# Patient Record
Sex: FEMALE | Race: WHITE | NOT HISPANIC OR LATINO
[De-identification: names, ages, dates, MRNs, and addresses within clinical notes are randomized per-mention and may not be internally consistent; named-entity substitution may affect disease eponyms.]

---

## 2016-12-29 NOTE — ED PROVIDER NOTE - OBJECTIVE STATEMENT
Pt presents to the ED by her podiatrist for a foot infection. She has diabetes, pvd. She has a necrotic 1st right toe that came off today when he was removing her dressing. 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

## 2016-12-29 NOTE — H&P ADULT. - PROBLEM SELECTOR PLAN 1
2/2 right toe gangrene/cellulititis , and also left site of toe amputationgangrene/cellulitus.. consults podiatry Dr Reyes, Vascular Dr Hill,   abx vanco/zosyn, IVF 2LNS (ED), on 150cc/hr, (bp borderline low), repeated lactate wnl, f/u ulcer culture, bc.. goal clear lact,map>65, UO>0.5..

## 2016-12-29 NOTE — ED ADULT NURSE NOTE - PMH
Arthritis    Asthma    Back ache    [place] esophagus    Chronic pain    Diabetes    DM (diabetes mellitus)    Fibromyalgia    Foot ulcer  Left Foot  Osteopenia    Shoulder joint stiffness, left    Stomach ulcer

## 2016-12-29 NOTE — H&P ADULT. - HISTORY OF PRESENT ILLNESS
50 y/o F w/hx DM, depression, COPD, current smoker, Presents to the ED, sent by her podiatrist (Dr Reyes), due to right toe gangrene. She states  this has started weeks ago, usually goes to the podiatry every/week, but now the infection has worsen,. surrounded w/erythema and foul smelling discharge, yellowish, warm to palpation. Also noticed that in site of left toe amputation, started also w/erythema and gangrene, associated w/ abundant foul smelling dc as well/ As;p c/o of fever and chills. A vascular doct has been recommended in the past, but she states she hasn't set up an appt yet.Denies vomiting, n, cough, abd pain,dysuria, sob, weakness, blurred vision, numbness, or any further complaints.

## 2016-12-29 NOTE — ED ADULT TRIAGE NOTE - CHIEF COMPLAINT QUOTE
Patient arrived to ED today with c/o bilateral foot pain.  Patient has abscess to her right big toe.  Patient was told to come to ED by Dr. Solomon for treatment and iv antibiotics.  Patient denies injury.

## 2016-12-29 NOTE — H&P ADULT. - ASSESSMENT
Amarilis Swan [id number] Pmd Dr Serrano   52 y/o F w/hx DM, depression, COPD, current smoker, Presents to the ED, sent by her podiatrist (Dr Reyes), due to right toe gangrene. She states  this has started weeks ago, usually goes to the podiatry every/week, but now the infection has worsen,. surrounded w/erythema and foul smelling discharge, yellowish, warm to palpation. Also noticed that in site of left toe amputation, started also w/erythema and gangrene, associated w/ abundant foul smelling dc as well/ As;p c/o of fever and chills. A vascular doct has been recommended in the past, but she states she hasn't set up an appt yet.Denies vomiting, n, cough, abd pain,dysuria, sob, weakness, blurred vision, numbness, or any further complaints. Amarilis Swan [id number] Pmd Dr Serrano Consult Podiatry Dr Reyes(left message on his phone), vascular consult Dr Hill Psych Consult  52 y/o F w/hx DM, depression, COPD, current smoker, Presents to the ED,w/ flat affect, non-stop crying.  sent by her podiatrist (Dr Reyes), due to right toe gangrene. She states  this has started weeks ago, usually goes to the podiatry every/week, but now the infection has worsen,. surrounded w/erythema and foul smelling discharge, yellowish, warm to palpation. Also noticed that in site of left toe amputation, started also w/erythema and gangrene, associated w/ abundant foul smelling dc as well/ As;p c/o of fever and chills. A vascular doct has been recommended in the past, but she states she hasn't set up an appt yet.Denies vomiting, n, cough, abd pain,dysuria, sob, weakness, blurred vision, numbness, or any further complaints. alct elevated, hr 111, bp 92/64 admitted sepsis/toe gangrene/cellulitis.started on  vanco/zosyn, IVF, repeated lactate wnl, f/u cultures

## 2016-12-29 NOTE — ED ADULT NURSE NOTE - OBJECTIVE STATEMENT
pt states that she went to her foot doctor today for her foot infection and was told to come to ED for admission and antibiotic therapy. pt  left foot first two toes amputated area there swollen and red. left foot is also swollen to ankle. patient's right great toe is necrotic with redness and swelling to the toe. pt .

## 2016-12-29 NOTE — ED ADULT NURSE REASSESSMENT NOTE - NS ED NURSE REASSESS COMMENT FT1
pt with complaints of pain. medication given. lactate draw. pt explained plan of care. will continue to monitor.

## 2016-12-30 NOTE — PROGRESS NOTE ADULT - SUBJECTIVE AND OBJECTIVE BOX
CC: toe gangrene (29 Dec 2016 21:26)    HPI:  50 y/o F w/hx DM, depression, COPD, current smoker, Presents to the ED, sent by her podiatrist (Dr Reyes), due to right toe gangrene. She states  this has started weeks ago, usually goes to the podiatry every/week, but now the infection has worsen,. surrounded w/erythema and foul smelling discharge, yellowish, warm to palpation. Also noticed that in site of left toe amputation, started also w/erythema and gangrene, associated w/ abundant foul smelling dc as well/ As;p c/o of fever and chills. A vascular doct has been recommended in the past, but she states she hasn't set up an appt yet.Denies vomiting, n, cough, abd pain,dysuria, sob, weakness, blurred vision, numbness, or any further complaints. (29 Dec 2016 21:26)    INTERVAL HPI/OVERNIGHT EVENTS: Constipation, numbness b/l feet    Vital Signs Last 24 Hrs  T(C): 36.9, Max: 37.4 (- @ 23:39)  T(F): 98.5, Max: 99.3 (12- @ 23:39)  HR: 71 (71 - 90)  BP: 108/70 (92/64 - 108/72)  BP(mean): --  RR: 18 (18 - 20)  SpO2: 95% (95% - 97%)  I&O's Detail                            10.7   3.48  )-----------( 114      ( 30 Dec 2016 08:13 )             32.0     30 Dec 2016 08:13    133    |  95     |  9.0    ----------------------------<  204    3.4     |  27.0   |  0.54     Ca    7.9        30 Dec 2016 08:13    TPro  6.0    /  Alb  2.4    /  TBili  0.6    /  DBili  x      /  AST  16     /  ALT  8      /  AlkPhos  81     30 Dec 2016 08:13    PT/INR - ( 29 Dec 2016 17:58 )   PT: 14.9 sec;   INR: 1.35 ratio         PTT - ( 29 Dec 2016 17:58 )  PTT:29.9 sec  CAPILLARY BLOOD GLUCOSE  232 (29 Dec 2016 23:39)  319 (29 Dec 2016 18:16)    LIVER FUNCTIONS - ( 30 Dec 2016 08:13 )  Alb: 2.4 g/dL / Pro: 6.0 g/dL / ALK PHOS: 81 U/L / ALT: 8 U/L / AST: 16 U/L / GGT: x           Urinalysis Basic - ( 30 Dec 2016 13:27 )    Color: Yellow / Appearance: Slightly Turbid / S.010 / pH: x  Gluc: x / Ketone: Negative  / Bili: Negative / Urobili: Negative mg/dL   Blood: x / Protein: 15 mg/dL / Nitrite: Negative   Leuk Esterase: Small / RBC: 20-30 /HPF / WBC 26-50   Sq Epi: x / Non Sq Epi: Few / Bacteria: Few      Hemoglobin A1C, Whole Blood: 8.0 % ( @ 08:13)    MEDICATIONS  (STANDING):  DULoxetine 60milliGRAM(s) Oral daily  buDESOnide  80 MICROgram(s)/formoterol 4.5 MICROgram(s) Inhaler 2Puff(s) Inhalation two times a day  montelukast 10milliGRAM(s) Oral at bedtime  heparin  Injectable 5000Unit(s) SubCutaneous every 12 hours  insulin glargine Injectable (LANTUS) 30Unit(s) SubCutaneous at bedtime  insulin lispro (HumaLOG) corrective regimen sliding scale  SubCutaneous Before meals and at bedtime  dextrose 5%. 1000milliLiter(s) IV Continuous <Continuous>  dextrose 50% Injectable 12.5Gram(s) IV Push once  dextrose 50% Injectable 25Gram(s) IV Push once  dextrose 50% Injectable 25Gram(s) IV Push once  sodium chloride 0.9%. 1000milliLiter(s) IV Continuous <Continuous>  piperacillin/tazobactam IVPB. 3.375Gram(s) IV Intermittent every 8 hours  vancomycin  IVPB 1000milliGRAM(s) IV Intermittent every 8 hours  diazepam    Tablet 5milliGRAM(s) Oral every 8 hours  polyethylene glycol 3350 17Gram(s) Oral daily  Dakins Solution - 1/2 Strength 1Application(s) Topical daily  buPROPion XL . 300milliGRAM(s) Oral daily  traZODone 100milliGRAM(s) Oral at bedtime    MEDICATIONS  (PRN):  gabapentin 800milliGRAM(s) Oral three times a day PRN neuropathic pain  dextrose Gel 1Dose(s) Oral once PRN Blood Glucose LESS THAN 70 milliGRAM(s)/deciliter  glucagon  Injectable 1milliGRAM(s) IntraMuscular once PRN Glucose LESS THAN 70 milligrams/deciliter  ALBUTerol/ipratropium for Nebulization 3milliLiter(s) Nebulizer every 8 hours PRN sob  HYDROmorphone   Tablet 1milliGRAM(s) Oral every 6 hours PRN Severe Pain (7 - 10)      RADIOLOGY & ADDITIONAL TESTS: CC: toe gangrene (29 Dec 2016 21:26)    HPI:  50 y/o F w/hx DM, depression, COPD, current smoker, Presents to the ED, sent by her podiatrist (Dr Reyes), due to right toe gangrene. She states  this has started weeks ago, usually goes to the podiatry every/week, but now the infection has worsen,. surrounded w/erythema and foul smelling discharge, yellowish, warm to palpation. Also noticed that in site of left toe amputation, started also w/erythema and gangrene, associated w/ abundant foul smelling dc as well/ As;p c/o of fever and chills. A vascular doct has been recommended in the past, but she states she hasn't set up an appt yet.Denies vomiting, n, cough, abd pain,dysuria, sob, weakness, blurred vision, numbness, or any further complaints. (29 Dec 2016 21:26)    INTERVAL HPI/OVERNIGHT EVENTS: Constipation, numbness b/l feet    Vital Signs Last 24 Hrs  T(C): 36.9, Max: 37.4 (- @ 23:39)  T(F): 98.5, Max: 99.3 (12- @ 23:39)  HR: 71 (71 - 90)  BP: 108/70 (92/64 - 108/72)  BP(mean): --  RR: 18 (18 - 20)  SpO2: 95% (95% - 97%)  I&O's Detail                        10.7   3.48  )-----------( 114      ( 30 Dec 2016 08:13 )             32.0     30 Dec 2016 08:13    133    |  95     |  9.0    ----------------------------<  204    3.4     |  27.0   |  0.54     Ca    7.9        30 Dec 2016 08:13    TPro  6.0    /  Alb  2.4    /  TBili  0.6    /  DBili  x      /  AST  16     /  ALT  8      /  AlkPhos  81     30 Dec 2016 08:13    PT/INR - ( 29 Dec 2016 17:58 )   PT: 14.9 sec;   INR: 1.35 ratio         PTT - ( 29 Dec 2016 17:58 )  PTT:29.9 sec  CAPILLARY BLOOD GLUCOSE  232 (29 Dec 2016 23:39)  319 (29 Dec 2016 18:16)    LIVER FUNCTIONS - ( 30 Dec 2016 08:13 )  Alb: 2.4 g/dL / Pro: 6.0 g/dL / ALK PHOS: 81 U/L / ALT: 8 U/L / AST: 16 U/L / GGT: x           Urinalysis Basic - ( 30 Dec 2016 13:27 )    Color: Yellow / Appearance: Slightly Turbid / S.010 / pH: x  Gluc: x / Ketone: Negative  / Bili: Negative / Urobili: Negative mg/dL   Blood: x / Protein: 15 mg/dL / Nitrite: Negative   Leuk Esterase: Small / RBC: 20-30 /HPF / WBC 26-50   Sq Epi: x / Non Sq Epi: Few / Bacteria: Few      Hemoglobin A1C, Whole Blood: 8.0 % ( @ 08:13)    MEDICATIONS  (STANDING):  DULoxetine 60milliGRAM(s) Oral daily  buDESOnide  80 MICROgram(s)/formoterol 4.5 MICROgram(s) Inhaler 2Puff(s) Inhalation two times a day  montelukast 10milliGRAM(s) Oral at bedtime  heparin  Injectable 5000Unit(s) SubCutaneous every 12 hours  insulin glargine Injectable (LANTUS) 30Unit(s) SubCutaneous at bedtime  insulin lispro (HumaLOG) corrective regimen sliding scale  SubCutaneous Before meals and at bedtime  dextrose 5%. 1000milliLiter(s) IV Continuous <Continuous>  dextrose 50% Injectable 12.5Gram(s) IV Push once  dextrose 50% Injectable 25Gram(s) IV Push once  dextrose 50% Injectable 25Gram(s) IV Push once  sodium chloride 0.9%. 1000milliLiter(s) IV Continuous <Continuous>  piperacillin/tazobactam IVPB. 3.375Gram(s) IV Intermittent every 8 hours  vancomycin  IVPB 1000milliGRAM(s) IV Intermittent every 8 hours  diazepam    Tablet 5milliGRAM(s) Oral every 8 hours  polyethylene glycol 3350 17Gram(s) Oral daily  Dakins Solution - 1/2 Strength 1Application(s) Topical daily  buPROPion XL . 300milliGRAM(s) Oral daily  traZODone 100milliGRAM(s) Oral at bedtime    MEDICATIONS  (PRN):  gabapentin 800milliGRAM(s) Oral three times a day PRN neuropathic pain  dextrose Gel 1Dose(s) Oral once PRN Blood Glucose LESS THAN 70 milliGRAM(s)/deciliter  glucagon  Injectable 1milliGRAM(s) IntraMuscular once PRN Glucose LESS THAN 70 milligrams/deciliter  ALBUTerol/ipratropium for Nebulization 3milliLiter(s) Nebulizer every 8 hours PRN sob  HYDROmorphone   Tablet 1milliGRAM(s) Oral every 6 hours PRN Severe Pain (7 - 10)      RADIOLOGY & ADDITIONAL TESTS:

## 2016-12-30 NOTE — PROGRESS NOTE ADULT - ASSESSMENT
51y  Female with DM, depression, COPD, current smoker, sent by podiatrist Dr Reyes for right toe gangrene worsening drainage. At prior left toe amputation site also with erythema and gangrene,  foul smelling drainage, patient with low grade temperatures of 99.3 in hospital, but reported fevers at home.   Blood cultures in hospital positive for GPC bacteremia. 51y  Female with DM, depression, COPD, current smoker, sent by podiatrist Dr Reyes for right toe gangrene worsening drainage. At prior left toe amputation site also with erythema and gangrene,  foul smelling drainage, patient with low grade temperatures of 99.3 in hospital, but reported fevers at home.   Blood cultures in hospital positive for GPC bacteremia in 2 bottles

## 2016-12-30 NOTE — PROGRESS NOTE ADULT - PROBLEM SELECTOR PLAN 1
ID Consult  IV Vanc/Zosyn  BC x 2  Echo ID Consult appreciated  IV Vanc/Zosyn  BC x 2 - follow up sensitivities  Echo to further evaluate valves and for preoperative assessment

## 2016-12-30 NOTE — CONSULT NOTE ADULT - ASSESSMENT
Failure to respond to oral outpatient management of bilateral DM foot infection involving both great toes. No evidence of significant arterial insufficieny - no indication for arterial Duplex, PERNELL or PVR's.

## 2016-12-30 NOTE — PROGRESS NOTE ADULT - SUBJECTIVE AND OBJECTIVE BOX
Patient is in MRI. Due to her poor historian and bacteremia, echo was ordered to rule out endocarditis Will take patient for amputation and debridement Monday afternoon.     Podiatry to follow.

## 2016-12-30 NOTE — CONSULT NOTE ADULT - SUBJECTIVE AND OBJECTIVE BOX
51 year old female patient seen bedside for right foot gangrene. Patient complains of N/V no sob, abdominal pain, had no bowel movement since the 14th. Patient had nothing to eat today. Patient has a podiatrist  who sent her to the ED for Right foot gangrene. Patient states she changes her dressing at home. She is a poor historian and says she is very weak. Patient denies fever and chills    vitals: 98.5 71 108/70 18 95   Labs: 3.48 WBC 10.7 /32.0 H/H 114 plt   BUN 9.0 Creatinine .54   HBA1c 8.0       PMH: Depression, Diabetic Mellitis uncontrolled, Fibromyalgia , Smoker  Social: Smoker  PSH: Left partial 1st ray amputation     Exam:  B/L foot dp/pt pulses, cft wnl to all digits except hallux right foot gangrene and left foot amputated hallux.  TG: Bilateral foot are warm  Derm: Right foot hallux necrotic and gangrenous probing to bone ulceration malodor and erythema which ends at the first mpj  no other wounds.  Left foot - ulceration at the previous amputated hallux site- does not probe to bone, draining purulence. malodor, erythema to the anterior midfoot which decreased since yesterday  Neuro: protective sensation diminished

## 2016-12-30 NOTE — CONSULT NOTE ADULT - SUBJECTIVE AND OBJECTIVE BOX
The patient is a 51 year old diabetic who is an active smoker and is s/p left great toe amp. She was referred to [facility] ED with an infection of the left reat toe amp site and gangrene of the right great toe. She was taking an oral 1st generation cephalosporin as an outpatient and was apparently failing this therapy and so was referred for in patient care.    Other PMH: COPD, depression    	-Vertigo (780.4, R42)  Onset: 2-Sep-2014  Status: Active	  	-Toe infection (686.9, L08.9)  Onset: 29-Aug-2016  Status: Active	  	-Restrictive lung disease (518.89, J98.4)  Onset: 4-Mar-2014  Status: Active	  	-Pain syndrome, chronic (338.4, G89.4)  Onset: 4-Oct-2013  Status: Active	  	-Nicotine dependence (305.1, F17.200)  Onset: 5-Jan-2015  Status: Active	  	-Neuropathy, diabetic (250.60, 357.2, E11.40)  Onset: 2-Sep-2014  Status: Active	  	-Nausea (787.02, R11.0)  Onset: 4-Dec-2015  Status: Active	  	-Muscle spasm of left shoulder (728.85, M62.838)  Onset: 14-Aug-2014  Status: Active	  	-Medication management (V58.69, Z79.899)  Onset: 2-May-2016  Status: Active	  	-IDDM (insulin dependent diabetes mellitus) (250.00, V58.67, E11.9, Z79.4)  Onset: 4-Oct-2013  Status: Active	  	-IBS (irritable bowel syndrome) (564.1, K58.9)  Onset: 5-May-2015  Status: Active	  	-Hypovitaminosis D (268.9, E55.9)  Onset: 5-May-2015  Status: Active	  	-Head ache (784.0, R51)  Onset: 14-Jan-2014  Status: Active	  	-GERD (gastroesophageal reflux disease) (530.81, K21.9)  Onset: 4-Oct-2013  Status: Active	  	-Gastroparesis (536.3, K31.84)  Onset: 7-Aug-2015  Status: Active	  	-Flu vaccine need (V04.81, Z23)  Onset: 30-Nov-2016  Status: Active	  	-Encounter for routine gynecological examination (V72.31, Z01.419)  Onset: 15-Sep-2014  Status: Active	  	-Ear pain (388.70, H92.09)  Onset: 4-Oct-2013  Status: Active	  	-Diarrhea, unspecified type (787.91, R19.7)  Onset: 29-Aug-2016  Status: Active	  	-Depression (311, F32.9)  Onset: 4-Oct-2013  Status: Active	  	-Chest pain (786.50, R07.9)  Onset: 2-Sep-2014  Status: Active	  	-Bursitis of shoulder (726.10, M75.50)  Onset: 14-Aug-2014  Status: Active	  	-Atypical chest pain (786.59, R07.89)  Onset: 28-Oct-2016  Status: Active	  	-Asthma with chronic obstructive pulmonary disease (COPD) (493.20, J44.9, J45.909)  Onset: 4-Mar-2014  Status: Active	    MEDICATIONS  (STANDING):  DULoxetine 60milliGRAM(s) Oral daily  buDESOnide  80 MICROgram(s)/formoterol 4.5 MICROgram(s) Inhaler 2Puff(s) Inhalation two times a day  montelukast 10milliGRAM(s) Oral at bedtime  heparin  Injectable 5000Unit(s) SubCutaneous every 12 hours  insulin glargine Injectable (LANTUS) 30Unit(s) SubCutaneous at bedtime  insulin lispro (HumaLOG) corrective regimen sliding scale  SubCutaneous Before meals and at bedtime  dextrose 5%. 1000milliLiter(s) IV Continuous <Continuous>  dextrose 50% Injectable 12.5Gram(s) IV Push once  dextrose 50% Injectable 25Gram(s) IV Push once  dextrose 50% Injectable 25Gram(s) IV Push once  sodium chloride 0.9%. 1000milliLiter(s) IV Continuous <Continuous>  piperacillin/tazobactam IVPB. 3.375Gram(s) IV Intermittent every 8 hours  vancomycin  IVPB 1000milliGRAM(s) IV Intermittent every 8 hours  diazepam    Tablet 5milliGRAM(s) Oral every 8 hours  polyethylene glycol 3350 17Gram(s) Oral daily  potassium chloride    Tablet ER 20milliEquivalent(s) Oral once  Dakins Solution - 1/2 Strength 1Application(s) Topical daily  buPROPion XL . 300milliGRAM(s) Oral daily  traZODone 100milliGRAM(s) Oral at bedtime      ROS: C/O nausea, bilateral foot pain, denies CP/SOB, any lateralizing motor weakness      Vital Signs Last 24 Hrs  T(C): 36.9, Max: 37.4 (12-29 @ 23:39)  T(F): 98.5, Max: 99.3 (12-29 @ 23:39)  HR: 71 (71 - 111)  BP: 108/70 (90/60 - 108/72)  BP(mean): --  RR: 18 (18 - 24)  SpO2: 95% (95% - 97%)      O/E: Easily palpable bilateral popliteal and DP pulses.  Dry gangrene right great toe with surrounding erythematous reaction/cellulitis  Cellulitis left gret toe at amp site        Comprehensive Metabolic Panel in AM (12.30.16 @ 08:13)    Sodium, Serum: 133 mmol/L    Potassium, Serum: 3.4 mmol/L    Chloride, Serum: 95 mmol/L    Carbon Dioxide, Serum: 27.0 mmol/L    Anion Gap, Serum: 11 mmol/L    Blood Urea Nitrogen, Serum: 9.0 mg/dL    Creatinine, Serum: 0.54 mg/dL    Glucose, Serum: 204 mg/dL    Calcium, Total Serum: 7.9 mg/dL    Protein Total, Serum: 6.0 g/dL    Albumin, Serum: 2.4 g/dL    Bilirubin Total, Serum: 0.6 mg/dL    Alkaline Phosphatase, Serum: 81 U/L    Aspartate Aminotransferase (AST/SGOT): 16 U/L    Alanine Aminotransferase (ALT/SGPT): 8 U/L    Complete Blood Count + Automated Diff in AM (12.30.16 @ 08:13)    WBC Count: 3.48 K/uL    RBC Count: 3.77 M/uL    Hemoglobin: 10.7 g/dL    Hematocrit: 32.0 %    Mean Cell Volume: 84.9 fl    Mean Cell Hemoglobin: 28.4 pg    Mean Cell Hemoglobin Conc: 33.4 g/dL    Red Cell Distrib Width: 12.9 %    Platelet Count - Automated: 114 K/uL    Auto Neutrophil %: 80.0: Differential percentages must be correlated with absolute numbers for  clinical significance. %    Auto Lymphocyte %: 9.0 %    Auto Monocyte %: 11.0 %

## 2016-12-30 NOTE — CONSULT NOTE ADULT - PROBLEM SELECTOR RECOMMENDATION 2
Treat infection with antibiotics and surgically (per podiatry) as needed. No indication for additional arterial testing or vascular intervention.    Rec aggressive DVT prophylaxis with both SCD's and an anticoagulant.
Antibiotics improving cellulitis
- will need vascular surgery eval if not already called  - consider MRI of LEs to r/o OM   - continue Vancomycin 1 gram IV Q8h; trough prior to 4th dose - goal 15-20  - continue zosyn

## 2016-12-30 NOTE — CONSULT NOTE ADULT - ASSESSMENT
51 y.o. diabetic smoker, here for foot infection. found with bacteremia.  RIGHT 1st toe gangrene and likely osteomyelitis. LEFT 1st metatarsal area with ulcer and cellulitis

## 2016-12-30 NOTE — CONSULT NOTE ADULT - SUBJECTIVE AND OBJECTIVE BOX
[id number]  SONYA LENNON is a 51y  Female with DM, depression, COPD, current smoker, sent by podiatrist Dr Reyes for right toe gangrene worsening drainage. At prior left toe amputation site also with erythema and gangrene,  foul smelling drainage.   patient with low grade temperatures of 99.3 in hospital, but reported fevers at home.               Past Medical & Surgical Hx:  PAST MEDICAL & SURGICAL HISTORY:  Fibromyalgia  DM (diabetes mellitus)  Foot ulcer: Left Foot  Osteopenia  Chronic pain  Back ache  Arthritis  Shoulder joint stiffness, left  [place] esophagus  Stomach ulcer  Diabetes  Asthma  S/P knee surgery  S/P hysterectomy  S/P cholecystectomy      Problem List:  HEALTH ISSUES - PROBLEM Dx:  Skin ulcer of left foot with fat layer exposed: Skin ulcer of left foot with fat layer exposed  Cellulitis of left lower extremity: Cellulitis of left lower extremity  Cellulitis of toe of right foot: Cellulitis of toe of right foot  Depression: Depression  Smoker: Smoker  COPD (chronic obstructive pulmonary disease): COPD (chronic obstructive pulmonary disease)  Diabetes: Diabetes  Toe gangrene: Toe gangrene  Sepsis: Sepsis    Social Hx:  Social History:  · Marital Status	Domestic partner	  · Lives With	friend	    Substance Use History:  · Substance Use	never used	    Alcohol Use History:  · Have you ever consumed alcohol	never	    Tobacco Usage Definitions:  . .    Tobacco Usage:  · Tobacco Usage: Former smoker	  · Tobacco Type: cigarettes	  · Last Tobacco Use (dd-mmm-yy): 29-Dec-2016	  · Number of Packs per Day: 1	  · Cessation Medication Offered: refused	        FAMILY HISTORY:  No pertinent family history in first degree relatives    Allergies  No Known Allergies  Intolerances      MEDICATIONS  (STANDING):  DULoxetine 60milliGRAM(s) Oral daily  buDESOnide  80 MICROgram(s)/formoterol 4.5 MICROgram(s) Inhaler 2Puff(s) Inhalation two times a day  montelukast 10milliGRAM(s) Oral at bedtime  heparin  Injectable 5000Unit(s) SubCutaneous every 12 hours  insulin glargine Injectable (LANTUS) 30Unit(s) SubCutaneous at bedtime  insulin lispro (HumaLOG) corrective regimen sliding scale  SubCutaneous Before meals and at bedtime  dextrose 5%. 1000milliLiter(s) IV Continuous <Continuous>  dextrose 50% Injectable 12.5Gram(s) IV Push once  dextrose 50% Injectable 25Gram(s) IV Push once  dextrose 50% Injectable 25Gram(s) IV Push once  sodium chloride 0.9%. 1000milliLiter(s) IV Continuous <Continuous>  piperacillin/tazobactam IVPB. 3.375Gram(s) IV Intermittent every 8 hours  vancomycin  IVPB 1000milliGRAM(s) IV Intermittent every 8 hours  diazepam    Tablet 5milliGRAM(s) Oral every 8 hours  polyethylene glycol 3350 17Gram(s) Oral daily  potassium chloride    Tablet ER 20milliEquivalent(s) Oral once  Dakins Solution - 1/2 Strength 1Application(s) Topical daily  buPROPion XL . 300milliGRAM(s) Oral daily  traZODone 100milliGRAM(s) Oral at bedtime    MEDICATIONS  (PRN):  gabapentin 800milliGRAM(s) Oral three times a day PRN neuropathic pain  dextrose Gel 1Dose(s) Oral once PRN Blood Glucose LESS THAN 70 milliGRAM(s)/deciliter  glucagon  Injectable 1milliGRAM(s) IntraMuscular once PRN Glucose LESS THAN 70 milligrams/deciliter  ALBUTerol/ipratropium for Nebulization 3milliLiter(s) Nebulizer every 8 hours PRN sob  HYDROmorphone   Tablet 2milliGRAM(s) Oral every 6 hours PRN Severe Pain (7 - 10)     REVIEW OF SYSTEMS:  CONSTITUTIONAL:  as per HPI  HEENT:  Eyes:  No diplopia or blurred vision. ENT:  No earache, sore throat or runny nose.  CARDIOVASCULAR:  No pressure, squeezing, strangling, tightness, heaviness or aching about the chest, neck, axilla or epigastrium.  RESPIRATORY:  No cough, shortness of breath, PND or orthopnea.  GASTROINTESTINAL:  No nausea, vomiting or diarrhea.  GENITOURINARY:  No dysuria, frequency or urgency. No Blood in urine  MUSCULOSKELETAL:  no joint aches, no muscle pain  SKIN:  No change in skin, hair or nails.  NEUROLOGIC:  No paresthesias, fasciculations, seizures or weakness.  PSYCHIATRIC:  No disorder of thought or mood.  ENDOCRINE:  No heat or cold intolerance, polyuria or polydipsia.  HEMATOLOGICAL:  No easy bruising or bleeding.       Physical Exam:  Vital Signs Last 24 Hrs  T(C): 36.9, Max: 37.4 (12-29 @ 23:39)  T(F): 98.5, Max: 99.3 (12-29 @ 23:39)  HR: 71 (71 - 111)  BP: 108/70 (90/60 - 108/72)  RR: 18 (18 - 24)  SpO2: 95% (95% - 97%)  Height (cm): 172.7 (12-29 @ 23:15)  Weight (kg): 69.9 (12-29 @ 23:15)  BMI (kg/m2): 23.4 (12-29 @ 23:15)  BSA (m2): 1.83 (12-29 @ 23:15)  GEN: NAD, pleasant  HEENT: normocephalic and atraumatic. EOMI. [place].    NECK: Supple. No carotid bruits.  No lymphadenopathy or thyromegaly.  LUNGS: Clear to auscultation.  HEART: Regular rate and rhythm without murmur.  ABDOMEN: Soft, nontender, and nondistended.  Positive bowel sounds.    EXTREMITIES: Without any cyanosis, clubbing, rash, lesions or edema.  NEUROLOGIC: Cranial nerves II through XII are grossly intact.  PSYCHIATRIC: Appropriate affect .  SKIN: No ulceration or induration present.      Labs:   30 Dec 2016 08:13    133    |  95     |  9.0    ----------------------------<  204    3.4     |  27.0   |  0.54     Ca    7.9        30 Dec 2016 08:13    TPro  6.0    /  Alb  2.4    /  TBili  0.6    /  DBili  x      /  AST  16     /  ALT  8      /  AlkPhos  81     30 Dec 2016 08:13                          10.7   3.48  )-----------( 114      ( 30 Dec 2016 08:13 )             32.0       PT/INR - ( 29 Dec 2016 17:58 )   PT: 14.9 sec;   INR: 1.35 ratio         PTT - ( 29 Dec 2016 17:58 )  PTT:29.9 sec    LIVER FUNCTIONS - ( 30 Dec 2016 08:13 )  Alb: 2.4 g/dL / Pro: 6.0 g/dL / ALK PHOS: 81 U/L / ALT: 8 U/L / AST: 16 U/L / GGT: x           CAPILLARY BLOOD GLUCOSE  232 (29 Dec 2016 23:39)  319 (29 Dec 2016 18:16)    RECENT CULTURES:  12-29 .Blood Blood XXXX XXXX   Growth in aerobic bottle: Gram Positive Cocci in Clusters  Aerobic Bottle: 14:15 Hours to positivity  Anaerobic Bottle: No growth to date  .  TYPE: (C=Critical, N=Notification, A=Abnormal) C  TESTS:  _ Positive Blood GS  DATE/TIME CALLED: _ 12/30/20 12/29/16 Bilateral feet  IMPRESSION:  Bilateral feet soft tissue swelling. No evidence of   osteomyelitis. MRI is more sensitive. Chronic postoperative changes. [id number]  SOYNA LENNON is a 51y  Female with DM, depression, COPD, current smoker, sent by podiatrist Dr Reyes for right toe gangrene worsening drainage. At prior left toe amputation site also with erythema and gangrene,  foul smelling drainage.   patient with low grade temperatures of 99.3 in hospital, but reported fevers at home.     Blood cultures in hospital positive for GPC bacteremia. We are consulted for evaluation.         Past Medical & Surgical Hx:  PAST MEDICAL & SURGICAL HISTORY:  Fibromyalgia  DM (diabetes mellitus)  Foot ulcer: Left Foot  Osteopenia  Chronic pain  Back ache  Arthritis  Shoulder joint stiffness, left  [place] esophagus  Stomach ulcer  Diabetes  Asthma  S/P knee surgery  S/P hysterectomy  S/P cholecystectomy      Problem List:  HEALTH ISSUES - PROBLEM Dx:  Skin ulcer of left foot with fat layer exposed: Skin ulcer of left foot with fat layer exposed  Cellulitis of left lower extremity: Cellulitis of left lower extremity  Cellulitis of toe of right foot: Cellulitis of toe of right foot  Depression: Depression  Smoker: Smoker  COPD (chronic obstructive pulmonary disease): COPD (chronic obstructive pulmonary disease)  Diabetes: Diabetes  Toe gangrene: Toe gangrene  Sepsis: Sepsis    Social Hx:  Social History:  · Marital Status	Domestic partner	  · Lives With	friend	    Substance Use History:  · Substance Use	never used	    Alcohol Use History:  · Have you ever consumed alcohol	never	    Tobacco Usage Definitions:  . .    Tobacco Usage:  · Tobacco Usage: Former smoker	  · Tobacco Type: cigarettes	  · Last Tobacco Use (dd-mmm-yy): 29-Dec-2016	  · Number of Packs per Day: 1	  · Cessation Medication Offered: refused	         FAMILY HISTORY:  No pertinent family history in first degree relatives    Allergies  No Known Allergies  Intolerances      MEDICATIONS  (STANDING):  DULoxetine 60milliGRAM(s) Oral daily  buDESOnide  80 MICROgram(s)/formoterol 4.5 MICROgram(s) Inhaler 2Puff(s) Inhalation two times a day  montelukast 10milliGRAM(s) Oral at bedtime  heparin  Injectable 5000Unit(s) SubCutaneous every 12 hours  insulin glargine Injectable (LANTUS) 30Unit(s) SubCutaneous at bedtime  insulin lispro (HumaLOG) corrective regimen sliding scale  SubCutaneous Before meals and at bedtime  dextrose 5%. 1000milliLiter(s) IV Continuous <Continuous>  dextrose 50% Injectable 12.5Gram(s) IV Push once  dextrose 50% Injectable 25Gram(s) IV Push once  dextrose 50% Injectable 25Gram(s) IV Push once  sodium chloride 0.9%. 1000milliLiter(s) IV Continuous <Continuous>  piperacillin/tazobactam IVPB. 3.375Gram(s) IV Intermittent every 8 hours  vancomycin  IVPB 1000milliGRAM(s) IV Intermittent every 8 hours  diazepam    Tablet 5milliGRAM(s) Oral every 8 hours  polyethylene glycol 3350 17Gram(s) Oral daily  potassium chloride    Tablet ER 20milliEquivalent(s) Oral once  Dakins Solution - 1/2 Strength 1Application(s) Topical daily  buPROPion XL . 300milliGRAM(s) Oral daily  traZODone 100milliGRAM(s) Oral at bedtime    MEDICATIONS  (PRN):  gabapentin 800milliGRAM(s) Oral three times a day PRN neuropathic pain  dextrose Gel 1Dose(s) Oral once PRN Blood Glucose LESS THAN 70 milliGRAM(s)/deciliter  glucagon  Injectable 1milliGRAM(s) IntraMuscular once PRN Glucose LESS THAN 70 milligrams/deciliter  ALBUTerol/ipratropium for Nebulization 3milliLiter(s) Nebulizer every 8 hours PRN sob  HYDROmorphone   Tablet 2milliGRAM(s) Oral every 6 hours PRN Severe Pain (7 - 10)     REVIEW OF SYSTEMS:  CONSTITUTIONAL:  as per HPI  HEENT:  Eyes:  No diplopia or blurred vision. ENT:  No earache, sore throat or runny nose.  CARDIOVASCULAR:  No pressure, squeezing, strangling, tightness, heaviness or aching about the chest, neck, axilla or epigastrium.  RESPIRATORY:  No cough, shortness of breath, PND or orthopnea.  GASTROINTESTINAL:  No nausea, vomiting or diarrhea.  GENITOURINARY:  No dysuria, frequency or urgency. No Blood in urine  MUSCULOSKELETAL:  foot pain  SKIN:  as above  NEUROLOGIC:  No paresthesias, fasciculations, seizures or weakness.  PSYCHIATRIC:  No disorder of thought or mood.  ENDOCRINE:  No heat or cold intolerance, polyuria or polydipsia.  HEMATOLOGICAL:  No easy bruising or bleeding.       Physical Exam:  Vital Signs Last 24 Hrs  T(C): 36.9, Max: 37.4 (12-29 @ 23:39)  T(F): 98.5, Max: 99.3 (12-29 @ 23:39)  HR: 71 (71 - 111)  BP: 108/70 (90/60 - 108/72)  RR: 18 (18 - 24)  SpO2: 95% (95% - 97%)  Height (cm): 172.7 (12-29 @ 23:15)  Weight (kg): 69.9 (12-29 @ 23:15)  BMI (kg/m2): 23.4 (12-29 @ 23:15)  BSA (m2): 1.83 (12-29 @ 23:15)  GEN: NAD, pleasant  HEENT: normocephalic and atraumatic. EOMI. [place]., POOR DENTITION    NECK: Supple. No carotid bruits.     LUNGS: Clear to auscultation.  HEART: Regular rate and rhythm without murmur.  ABDOMEN: Soft, nontender, and nondistended.  Positive bowel sounds.    EXTREMITIES: RIGHT foot  1st toe dry gangrene distally, with wet gangrene proximally.           LEFT foot 1st and 2nd toes missing.  base of amputation site wtih 5mm ulcer and surrounding erythema. base is wet with serosang drainage.   NEUROLOGIC: Cranial nerves II through XII are grossly intact.  PSYCHIATRIC: Appropriate affect .  SKIN: No ulceration or induration present.      Labs:   30 Dec 2016 08:13    133    |  95     |  9.0    ----------------------------<  204    3.4     |  27.0   |  0.54     Ca    7.9        30 Dec 2016 08:13    TPro  6.0    /  Alb  2.4    /  TBili  0.6    /  DBili  x      /  AST  16     /  ALT  8      /  AlkPhos  81     30 Dec 2016 08:13                          10.7   3.48  )-----------( 114      ( 30 Dec 2016 08:13 )             32.0       PT/INR - ( 29 Dec 2016 17:58 )   PT: 14.9 sec;   INR: 1.35 ratio         PTT - ( 29 Dec 2016 17:58 )  PTT:29.9 sec    LIVER FUNCTIONS - ( 30 Dec 2016 08:13 )  Alb: 2.4 g/dL / Pro: 6.0 g/dL / ALK PHOS: 81 U/L / ALT: 8 U/L / AST: 16 U/L / GGT: x           CAPILLARY BLOOD GLUCOSE  232 (29 Dec 2016 23:39)  319 (29 Dec 2016 18:16)    RECENT CULTURES:  12-29 .Blood Blood XXXX XXXX   Growth in aerobic bottle: Gram Positive Cocci in Clusters  Aerobic Bottle: 14:15 Hours to positivity  Anaerobic Bottle: No growth to date  .  TYPE: (C=Critical, N=Notification, A=Abnormal) C  TESTS:  _ Positive Blood GS  DATE/TIME CALLED: _ 12/30/20 12/29/16 Bilateral feet  IMPRESSION:  Bilateral feet soft tissue swelling. No evidence of   osteomyelitis. MRI is more sensitive. Chronic postoperative changes.

## 2016-12-31 NOTE — PROGRESS NOTE ADULT - ASSESSMENT
51y  Female with DM, depression, COPD, current smoker, sent by podiatrist Dr Reyes for right toe gangrene worsening drainage. At prior left toe amputation site also with erythema and gangrene,  foul smelling drainage, patient with low grade temperatures of 99.3 in hospital, but reported fevers at home.   Blood cultures in hospital positive for GPC bacteremia in 2 bottles

## 2016-12-31 NOTE — PROGRESS NOTE ADULT - PROBLEM SELECTOR PLAN 1
ID Consult appreciated  IV Vanc/Zosyn  BC x 2 - follow up sensitivities  Transthoracic Echo - no valvular evidence of endocarditis noted - will discuss with ID if CARLITA is warranted

## 2016-12-31 NOTE — PROGRESS NOTE ADULT - SUBJECTIVE AND OBJECTIVE BOX
50y/o female patient seen at bedside for follow up care of bilateral foot ulcerations with gangrene of right hallux. Pt is in NAD. Pt denies f/c/n/v/sob/d.     PMH: Depression, Diabetic Mellitis uncontrolled, Fibromyalgia , Smoker  Social: Smoker  PSH: Left partial 1st ray amputation                             10.3   4.59  )-----------( 108      ( 31 Dec 2016 08:12 )             31.3   31 Dec 2016 08:08    130    |  94     |  9.0    ----------------------------<  216    3.8     |  26.0   |  0.81     Ca    8.1        31 Dec 2016 08:08  Phos  2.7       31 Dec 2016 08:08  Mg     1.6       31 Dec 2016 08:08    TPro  6.0    /  Alb  2.4    /  TBili  0.6    /  DBili  x      /  AST  16     /  ALT  8      /  AlkPhos  81     30 Dec 2016 08:13      Exam:  B/L foot dp/pt pulses, cft wnl to all digits except hallux right foot gangrene and left foot amputated hallux.  TG: Bilateral foot are warm  Derm: Right foot hallux necrotic and gangrenous with ulcer that probes to bone, erythema which ends at the first mpj, purulence discharge noted, no other wounds.  Left foot - ulceration at the previous amputated hallux site- does not probe to bone, draining purulence, erythema to the anterior midfoot which decreased since yesterday  Neuro: protective sensation diminished

## 2016-12-31 NOTE — PROGRESS NOTE ADULT - SUBJECTIVE AND OBJECTIVE BOX
Patient was seen and evaluated today AM, is here since Thursday for Rt. Gangrenous great Toe, and also has hx of Depression for many years, currently on Cymbalta 60 mg with Wellbutrin  mg daily. Has trouble ambulating, but alert and oriented to time and place. On Dilaudid for pain, never tried to commit suicide, no drug abuse smokes cigarettes like a chimney, wants to stop cigarettes, and was advised to have Nicotine Patch 21 mg daily for 12 hours rather than other interventions.    MSE: Patient a 50 y/o female, looks older than stated age, dressed in hospital gown, pleasant on approach with good eye contact. Mood is OK with constricted affect. Speech is of normal vol/tone, her thoughts are linear with no S/H/I/P. Denied A/H or paranoia. AAOx3 inattentive at times and less focused and concentrated. Memory is intact.     Diagnosis: M. Depressive D/O, Recurrent , Severe without Psychosis    Plan: Continue with Wellbutrin  mg daily                               Cymbalta 60 mg daily          F/U in AM          Routine Checks.

## 2016-12-31 NOTE — PROGRESS NOTE ADULT - ASSESSMENT
A:  Right foot gangrene and cellitlis  Left foot ulceration infected with cellulitis     P:  Pt evaluated, chart reviewed.  Wounds cleaned with dakins and dressed with DSD  Continue IV abx as per ID team   MRI reviewed: Left: Status post second ray amputation at the level of the metatarsal head. Cutaneous ulceration along the medial aspect of the stump of the metatarsal with adjacent soft tissue edema and enhancement and with findings consistent with osteomyelitis in the second metatarsal.  Right: Cutaneous ulcerations adjacent to the distal phalanx of the hallux. Associated osteomyelitis in the distal phalanx and 10 x 9 x 12 mm abscess in the plantar subcutaneous tissues underlying the distal phalanx.  elsa discontinued   Pending medicine clearance  NPO patient- possible OR on Monday afternoon.   pending culture bedside  Discussed with patient about need for surgery, patient agrees.

## 2016-12-31 NOTE — PROGRESS NOTE ADULT - SUBJECTIVE AND OBJECTIVE BOX
CC:  Patient noted to have some right arm swelling just superior and inferior to the antecubital fossa.  She noted that her bilateral foot pain improves slightly with oral dilaudid and is tolerable.    HPI:  50 y/o F w/hx DM, depression, COPD, current smoker, Presents to the ED, sent by her podiatrist (Dr Reyes), due to right toe gangrene. She states  this has started weeks ago, usually goes to the podiatry every/week, but now the infection has worsen,. surrounded w/erythema and foul smelling discharge, yellowish, warm to palpation. Also noticed that in site of left toe amputation, started also w/erythema and gangrene, associated w/ abundant foul smelling dc as well/ As;p c/o of fever and chills. A vascular doct has been recommended in the past, but she states she hasn't set up an appt yet.Denies vomiting, n, cough, abd pain,dysuria, sob, weakness, blurred vision, numbness, or any further complaints. (29 Dec 2016 21:26)    REVIEW OF SYSTEMS:    Patient denied fever, chills, abdominal pain, nausea, vomiting, cough, shortness of breath, chest pain or palpitations    Vital Signs Last 24 Hrs  T(C): 37.2, Max: 37.8 (12-30 @ 23:16)  T(F): 98.9, Max: 100.1 (12-30 @ 23:16)  HR: 81 (74 - 85)  BP: 110/68 (99/60 - 117/72)  BP(mean): --  RR: 17 (16 - 18)  SpO2: 97% (94% - 97%)I&O's Summary    PHYSICAL EXAM:  GENERAL: NAD   HEENT: +EOMI, anicteric, no [place]  NECK: Supple, No JVD   CHEST/LUNG: CTA bilaterally; Normal effort  HEART: S1S2 Normal intensity, no murmurs, gallops or rubs noted  ABDOMEN: Soft, BS Normoactive, NT, ND, no HSM noted  EXTREMITIES:  1+ radial pulses noted, no clubbing, cyanosis, or edema noted, limited ROM, both feet dressings are in place  SKIN: feet covered at this time, did not remove  NEURO: A&Ox3, no focal deficits noted, CN II-XII intact  PSYCH: normal mood and affect; insight/judgement appropriate    LABS:                        10.3   4.59  )-----------( 108      ( 31 Dec 2016 08:12 )             31.3     31 Dec 2016 08:08    130    |  94     |  9.0    ----------------------------<  216    3.8     |  26.0   |  0.81     Ca    8.1        31 Dec 2016 08:08  Phos  2.7       31 Dec 2016 08:08  Mg     1.6       31 Dec 2016 08:08    TPro  6.0    /  Alb  2.4    /  TBili  0.6    /  DBili  x      /  AST  16     /  ALT  8      /  AlkPhos  81     30 Dec 2016 08:13      Urinalysis Basic - ( 30 Dec 2016 13:27 )    Color: Yellow / Appearance: Slightly Turbid / S.010 / pH: x  Gluc: x / Ketone: Negative  / Bili: Negative / Urobili: Negative mg/dL   Blood: x / Protein: 15 mg/dL / Nitrite: Negative   Leuk Esterase: Small / RBC: 20-30 /HPF / WBC 26-50   Sq Epi: x / Non Sq Epi: Few / Bacteria: Few      RADIOLOGY & ADDITIONAL TESTS:    MEDICATIONS:  MEDICATIONS  (STANDING):  DULoxetine 60milliGRAM(s) Oral daily  buDESOnide  80 MICROgram(s)/formoterol 4.5 MICROgram(s) Inhaler 2Puff(s) Inhalation two times a day  montelukast 10milliGRAM(s) Oral at bedtime  heparin  Injectable 5000Unit(s) SubCutaneous every 12 hours  insulin glargine Injectable (LANTUS) 30Unit(s) SubCutaneous at bedtime  insulin lispro (HumaLOG) corrective regimen sliding scale  SubCutaneous Before meals and at bedtime  dextrose 5%. 1000milliLiter(s) IV Continuous <Continuous>  dextrose 50% Injectable 12.5Gram(s) IV Push once  dextrose 50% Injectable 25Gram(s) IV Push once  dextrose 50% Injectable 25Gram(s) IV Push once  piperacillin/tazobactam IVPB. 3.375Gram(s) IV Intermittent every 8 hours  vancomycin  IVPB 1000milliGRAM(s) IV Intermittent every 8 hours  diazepam    Tablet 5milliGRAM(s) Oral every 8 hours  polyethylene glycol 3350 17Gram(s) Oral daily  Dakins Solution - 1/2 Strength 1Application(s) Topical daily  buPROPion XL . 300milliGRAM(s) Oral daily  traZODone 100milliGRAM(s) Oral at bedtime  fluconAZOLE   Tablet 200milliGRAM(s) Oral daily  magnesium oxide 400milliGRAM(s) Oral two times a day with meals    MEDICATIONS  (PRN):  gabapentin 800milliGRAM(s) Oral three times a day PRN neuropathic pain  dextrose Gel 1Dose(s) Oral once PRN Blood Glucose LESS THAN 70 milliGRAM(s)/deciliter  glucagon  Injectable 1milliGRAM(s) IntraMuscular once PRN Glucose LESS THAN 70 milligrams/deciliter  ALBUTerol/ipratropium for Nebulization 3milliLiter(s) Nebulizer every 8 hours PRN sob  HYDROmorphone   Tablet 1milliGRAM(s) Oral every 6 hours PRN Severe Pain (7 - 10)

## 2017-01-01 ENCOUNTER — RESULT REVIEW (OUTPATIENT)
Age: 52
End: 2017-01-01

## 2017-01-01 NOTE — PROGRESS NOTE ADULT - ASSESSMENT
51y  Female with DM, depression, COPD, current smoker, sent by podiatrist Dr Reyes for right toe gangrene worsening drainage. At prior left toe amputation site also with erythema and gangrene,  foul smelling drainage, patient with low grade temperatures of 99.3 in hospital, but reported fevers at home.   Blood cultures in hospital positive for GPC bacteremia in 4 bottles and recommendation for CARLITA made.  I consulted [facility] to see patient. 51y  Female with DM, depression, COPD, current smoker, sent by podiatrist Dr Reyes for right toe gangrene worsening drainage. At prior left toe amputation site also with erythema and gangrene,  foul smelling drainage, patient with low grade temperatures of 99.3 in hospital, but reported fevers at home.   Blood cultures in hospital positive for GPC bacteremia in 4 bottles and recommendation for CARLITA made.  I consulted [facility] to see patient.  Patient is medically optimized for surgery tomorrow as her ECG and Echo don't show any evidence of ischemia and a normal LVFn.

## 2017-01-01 NOTE — PROGRESS NOTE ADULT - PROBLEM SELECTOR PLAN 1
- MSSA bacteremia  - needs CARLITA to r/o endocarditis in view of persistent  - daily blood cultures  - d/c vanco/ Zosyn  - started on Nafcillin 2 gram IV Q 4H

## 2017-01-01 NOTE — PROGRESS NOTE ADULT - PROBLEM SELECTOR PLAN 5
HGA1c 8.0  continue Accuchecks ISS/Lantus  Diabetes Specialist Consult uncontrolled  HGA1c 8.0  continue Accuchecks ISS/Lantus - lantus increased to 40units  Diabetes Specialist Consult

## 2017-01-01 NOTE — PROGRESS NOTE ADULT - SUBJECTIVE AND OBJECTIVE BOX
CC: Patient seen crying today as she had recently been informed that her bacteremia may have led to an infection in her heart.  She denied feet pain, fever, chills.    HPI:  52 y/o F w/hx DM, depression, COPD, current smoker, Presents to the ED, sent by her podiatrist (Dr Reyes), due to right toe gangrene. She states  this has started weeks ago, usually goes to the podiatry every/week, but now the infection has worsen,. surrounded w/erythema and foul smelling discharge, yellowish, warm to palpation. Also noticed that in site of left toe amputation, started also w/erythema and gangrene, associated w/ abundant foul smelling dc as well/ As;p c/o of fever and chills. A vascular doct has been recommended in the past, but she states she hasn't set up an appt yet.Denies vomiting, n, cough, abd pain,dysuria, sob, weakness, blurred vision, numbness, or any further complaints. (29 Dec 2016 21:26)    REVIEW OF SYSTEMS:    Patient denied abdominal pain, nausea, vomiting, cough, shortness of breath, chest pain or palpitations    Vital Signs Last 24 Hrs  T(C): 36.3, Max: 37 (12-31 @ 23:42)  T(F): 97.4, Max: 98.6 (12-31 @ 23:42)  HR: 74 (74 - 93)  BP: 101/64 (101/64 - 118/72)  BP(mean): --  RR: 18 (18 - 19)  SpO2: 98% (95% - 98%)I&O's Summary    PHYSICAL EXAM:  GENERAL: seen crying.  No apparent cardiopulmonary distress  HEENT: PERRL, +EOMI, anicteric, no [place]  NECK: Supple, No JVD   CHEST/LUNG: CTA bilaterally; Normal effort  HEART: S1S2 decreased intensity, no murmurs, gallops or rubs noted  ABDOMEN: Soft, BS Normoactive, NT, ND, no HSM noted  EXTREMITIES:  diminished pedal pulses noted with gangrenous toes noted bilaterally;  FROM x 4  SKIN: as above  NEURO: A&Ox3, + neuropathy both feet  PSYCH: insight/judgement appropriate    LABS:                        10.5   4.37  )-----------( 115      ( 01 Jan 2017 09:57 )             31.4     01 Jan 2017 09:57    133    |  93     |  12.0   ----------------------------<  232    3.6     |  28.0   |  1.56     Ca    8.3        01 Jan 2017 09:57  Phos  2.7       31 Dec 2016 08:08  Mg     1.9       01 Jan 2017 09:57          RADIOLOGY & ADDITIONAL TESTS:    MEDICATIONS:  MEDICATIONS  (STANDING):  DULoxetine 60milliGRAM(s) Oral daily  buDESOnide  80 MICROgram(s)/formoterol 4.5 MICROgram(s) Inhaler 2Puff(s) Inhalation two times a day  montelukast 10milliGRAM(s) Oral at bedtime  heparin  Injectable 5000Unit(s) SubCutaneous every 12 hours  insulin glargine Injectable (LANTUS) 30Unit(s) SubCutaneous at bedtime  insulin lispro (HumaLOG) corrective regimen sliding scale  SubCutaneous Before meals and at bedtime  dextrose 5%. 1000milliLiter(s) IV Continuous <Continuous>  dextrose 50% Injectable 12.5Gram(s) IV Push once  dextrose 50% Injectable 25Gram(s) IV Push once  dextrose 50% Injectable 25Gram(s) IV Push once  diazepam    Tablet 5milliGRAM(s) Oral every 8 hours  polyethylene glycol 3350 17Gram(s) Oral daily  Dakins Solution - 1/2 Strength 1Application(s) Topical daily  buPROPion XL . 300milliGRAM(s) Oral daily  traZODone 100milliGRAM(s) Oral at bedtime  magnesium oxide 400milliGRAM(s) Oral two times a day with meals  nafcillin  IVPB 2Gram(s) IV Intermittent every 4 hours  nafcillin  IVPB  IV Intermittent     MEDICATIONS  (PRN):  gabapentin 800milliGRAM(s) Oral three times a day PRN neuropathic pain  dextrose Gel 1Dose(s) Oral once PRN Blood Glucose LESS THAN 70 milliGRAM(s)/deciliter  glucagon  Injectable 1milliGRAM(s) IntraMuscular once PRN Glucose LESS THAN 70 milligrams/deciliter  ALBUTerol/ipratropium for Nebulization 3milliLiter(s) Nebulizer every 8 hours PRN sob  HYDROmorphone   Tablet 1milliGRAM(s) Oral every 6 hours PRN Severe Pain (7 - 10)

## 2017-01-01 NOTE — PROGRESS NOTE ADULT - SUBJECTIVE AND OBJECTIVE BOX
52y/o female patient seen at bedside for follow up care of bilateral foot ulcerations with gangrene of right hallux. Pt is in NAD. Pt denies f/c/n/v/sob/d.     PMH: Depression, Diabetic Mellitis uncontrolled, Fibromyalgia , Smoker  Social: Smoker  PSH: Left partial 1st ray amputation                             10.5   4.37  )-----------( 115      ( 01 Jan 2017 09:57 )             31.4          Exam:  B/L foot dp/pt pulses, cft wnl to all digits except hallux right foot gangrene and left foot amputated hallux.  TG: Bilateral foot are warm  Derm: Right foot hallux necrotic and gangrenous with ulcer that probes to bone, erythema which ends at the first mpj, purulence discharge noted, no other wounds.  Left foot - ulceration at the previous amputated hallux site- does not probe to bone, draining purulence, erythema to the anterior midfoot which decreased since yesterday  Neuro: protective sensation diminished

## 2017-01-01 NOTE — PROGRESS NOTE ADULT - PROBLEM SELECTOR PLAN 1
ID Consult appreciated  IV Vanc/Zosyn - need to titrate down vanco as current trough is high.  BC x 2 - follow up sensitivities  Transthoracic Echo - no valvular evidence of endocarditis noted - will discuss with ID if CARLITA is warranted

## 2017-01-01 NOTE — PROGRESS NOTE ADULT - ASSESSMENT
A:  Right foot gangrene and cellitlis  Left foot ulceration infected with cellulitis     P:  Pt evaluated, chart reviewed.  Wounds cleaned with dakins and dressed with DSD  Continue IV abx as per ID team   MRI reviewed: Left: Status post second ray amputation at the level of the metatarsal head. Cutaneous ulceration along the medial aspect of the stump of the metatarsal with adjacent soft tissue edema and enhancement and with findings consistent with osteomyelitis in the second metatarsal.  Right: Cutaneous ulcerations adjacent to the distal phalanx of the hallux. Associated osteomyelitis in the distal phalanx and 10 x 9 x 12 mm abscess in the plantar subcutaneous tissues underlying the distal phalanx.  elsa discontinued     Pending medicine clearance    NPO patient- possible OR on Monday afternoon 1/2/17  pending culture bedside  Discussed with patient about need for surgery, patient agrees.

## 2017-01-01 NOTE — CONSULT NOTE ADULT - SUBJECTIVE AND OBJECTIVE BOX
[facility]                                   [address] [place] [address]                                                      PHONE: [phone]                                                         FAX: [phone]  [email or web address]  ---------------------------------------------------------------------------------------------------------------------------------    Reason for Consult: Possible endocarditis  CVS: None  HPI:  SONYA LENNON is an 51y Female PMHx DM, [place]'s esophagus, fibromyalgia admitted with right toe gangrene found to have MSSA bacteremia. The patient had a normal cath 2013 but no longer wants to follow with that cardiology group.  The recently stopped smoking and is able to walk up a flight of stairs without limitation.    PAST MEDICAL & SURGICAL HISTORY:  Fibromyalgia  DM (diabetes mellitus)  Foot ulcer: Left Foot  Osteopenia  Chronic pain  Back ache  Arthritis  Shoulder joint stiffness, left  [place] esophagus  Stomach ulcer  Diabetes  Asthma  S/P knee surgery  S/P hysterectomy  S/P cholecystectomy      No Known Allergies      MEDICATIONS  (STANDING):  DULoxetine 60milliGRAM(s) Oral daily  buDESOnide  80 MICROgram(s)/formoterol 4.5 MICROgram(s) Inhaler 2Puff(s) Inhalation two times a day  montelukast 10milliGRAM(s) Oral at bedtime  heparin  Injectable 5000Unit(s) SubCutaneous every 12 hours  insulin lispro (HumaLOG) corrective regimen sliding scale  SubCutaneous Before meals and at bedtime  dextrose 5%. 1000milliLiter(s) IV Continuous <Continuous>  dextrose 50% Injectable 12.5Gram(s) IV Push once  dextrose 50% Injectable 25Gram(s) IV Push once  dextrose 50% Injectable 25Gram(s) IV Push once  diazepam    Tablet 5milliGRAM(s) Oral every 8 hours  polyethylene glycol 3350 17Gram(s) Oral daily  Dakins Solution - 1/2 Strength 1Application(s) Topical daily  buPROPion XL . 300milliGRAM(s) Oral daily  traZODone 100milliGRAM(s) Oral at bedtime  magnesium oxide 400milliGRAM(s) Oral two times a day with meals  nafcillin  IVPB 2Gram(s) IV Intermittent every 4 hours  nafcillin  IVPB  IV Intermittent   insulin glargine Injectable (LANTUS) 40Unit(s) SubCutaneous at bedtime    MEDICATIONS  (PRN):  gabapentin 800milliGRAM(s) Oral three times a day PRN neuropathic pain  dextrose Gel 1Dose(s) Oral once PRN Blood Glucose LESS THAN 70 milliGRAM(s)/deciliter  glucagon  Injectable 1milliGRAM(s) IntraMuscular once PRN Glucose LESS THAN 70 milligrams/deciliter  ALBUTerol/ipratropium for Nebulization 3milliLiter(s) Nebulizer every 8 hours PRN sob  HYDROmorphone   Tablet 1milliGRAM(s) Oral every 6 hours PRN Severe Pain (7 - 10)      Social History:  Cigarettes:   recently quit                 Alchohol:    no             Illicit Drug Abuse:  no    ROS: Negative other than as mentioned in HPI.    Vital Signs Last 24 Hrs  T(C): 36.3, Max: 37 (12-31 @ 23:42)  T(F): 97.4, Max: 98.6 (12-31 @ 23:42)  HR: 74 (74 - 93)  BP: 101/64 (101/64 - 118/72)  BP(mean): --  RR: 18 (18 - 19)  SpO2: 98% (95% - 98%)  ICU Vital Signs Last 24 Hrs  [facility]  I&O's Detail    I&O's Summary    Drug Dosing Weight  [facility][note 3]      PHYSICAL EXAM:  General: Appears well developed, well nourished alert and cooperative.  HEENT: Head; normocephalic, atraumatic.  Eyes: Pupils reactive, cornea wnl.  Neck: Supple, no nodes adenopathy, no NVD or carotid bruit or thyromegaly.  CARDIOVASCULAR: Normal S1 and S2, No murmur, rub, gallop or lift.   LUNGS: No rales, rhonchi or wheeze. Normal breath sounds bilaterally.  ABDOMEN: Soft, nontender without mass or organomegaly. bowel sounds normoactive.  EXTREMITIES: No clubbing, cyanosis or edema. LE wounds  SKIN: warm and dry with normal turgor.  NEURO: Alert/oriented x 3/normal motor exam. No pathologic reflexes.    PSYCH: normal affect.        LABS:                        10.5   4.37  )-----------( 115      ( 01 Jan 2017 09:57 )             31.4     01 Jan 2017 09:57    133    |  93     |  12.0   ----------------------------<  232    3.6     |  28.0   |  1.56     Ca    8.3        01 Jan 2017 09:57  Phos  2.7       31 Dec 2016 08:08  Mg     1.9       01 Jan 2017 09:57      SONYA LENNON            RADIOLOGY & ADDITIONAL STUDIES:      ECG: NS @ 80 no acute ischemic changes    ECHO:   1. Left ventricular ejection fraction, by visual estimation, is 55 to   60%.   2. Normal global left ventricular systolic function.   3. Normal left ventricular internal cavity size.   4. Spectral Doppler shows impaired relaxation pattern of left   ventricular myocardial filling (Grade I diastolic dysfunction).   5. Trivial pericardial effusion.      Assessment and Plan:  In summary, SONYA LENNON is an 51y Female with past medical history significant for DM, [place]'s esophagus, fibromyalgia admitted with right toe gangrene found to have MSSA bacteremia. The patient had a normal cath 2013 but no longer wants to follow with that cardiology group.  The recently stopped smoking and is able to walk up a flight of stairs without limitation.    1) Will prepare with CARLITA for Tuesday (NPO after midnight monday night except meds) although scheduling maybe difficult with holiday weekend.    2) Preoperative Optimization       -No Cardiovascular Contraindication to surgery       -Patient is acceptable risk for a moderate  risk surgery       -Continue cardiac medications as scheduled       -Will follow

## 2017-01-01 NOTE — PROGRESS NOTE ADULT - SUBJECTIVE AND OBJECTIVE BOX
SONYA LENNON   Chart reviewed  patient seen and examined.     Allergies:  No Known Allergies      Medications:  gabapentin 800milliGRAM(s) Oral three times a day PRN  DULoxetine 60milliGRAM(s) Oral daily  buDESOnide  80 MICROgram(s)/formoterol 4.5 MICROgram(s) Inhaler 2Puff(s) Inhalation two times a day  montelukast 10milliGRAM(s) Oral at bedtime  heparin  Injectable 5000Unit(s) SubCutaneous every 12 hours  insulin glargine Injectable (LANTUS) 30Unit(s) SubCutaneous at bedtime  insulin lispro (HumaLOG) corrective regimen sliding scale  SubCutaneous Before meals and at bedtime  dextrose 5%. 1000milliLiter(s) IV Continuous <Continuous>  dextrose Gel 1Dose(s) Oral once PRN  dextrose 50% Injectable 12.5Gram(s) IV Push once  dextrose 50% Injectable 25Gram(s) IV Push once  dextrose 50% Injectable 25Gram(s) IV Push once  glucagon  Injectable 1milliGRAM(s) IntraMuscular once PRN  ALBUTerol/ipratropium for Nebulization 3milliLiter(s) Nebulizer every 8 hours PRN  diazepam    Tablet 5milliGRAM(s) Oral every 8 hours  HYDROmorphone   Tablet 1milliGRAM(s) Oral every 6 hours PRN  polyethylene glycol 3350 17Gram(s) Oral daily  Dakins Solution - 1/2 Strength 1Application(s) Topical daily  buPROPion XL . 300milliGRAM(s) Oral daily  traZODone 100milliGRAM(s) Oral at bedtime  magnesium oxide 400milliGRAM(s) Oral two times a day with meals  nafcillin  IVPB  IV Intermittent             REVIEW OF SYSTEMS:  CONSTITUTIONAL:  as per HPI  HEENT:  Eyes:  No diplopia or blurred vision. ENT:  No earache, sore throat or runny nose.  CARDIOVASCULAR:  No pressure, squeezing, strangling, tightness, heaviness or aching about the chest, neck, axilla or epigastrium.  RESPIRATORY:  No cough, shortness of breath, PND or orthopnea.  GASTROINTESTINAL:  No nausea, vomiting or diarrhea.  GENITOURINARY:  No dysuria, frequency or urgency. No Blood in urine  MUSCULOSKELETAL:  no joint aches, no muscle pain  SKIN:  No change in skin, hair or nails.  NEUROLOGIC:  No paresthesias, fasciculations, seizures or weakness.  PSYCHIATRIC:  No disorder of thought or mood.  ENDOCRINE:  No heat or cold intolerance, polyuria or polydipsia.  HEMATOLOGICAL:  No easy bruising or bleeding.            Physical Exam:  ICU Vital Signs Last 24 Hrs  T(C): 36.5, Max: 37.2 ( @ 16:04)  T(F): 97.7, Max: 98.9 ( @ 16:04)  HR: 77 (77 - 93)  BP: 115/77 (110/68 - 118/72)  BP(mean): --  ABP: --  ABP(mean): --  RR: 19 (17 - 19)  SpO2: 95% (95% - 97%)    GEN: NAD, pleasant  HEENT: normocephalic and atraumatic. EOMI. AURORA.    NECK: Supple. No carotid bruits.  No lymphadenopathy or thyromegaly.  LUNGS: Clear to auscultation.  HEART: Regular rate and rhythm without murmur.  ABDOMEN: Soft, nontender, and nondistended.  Positive bowel sounds.    EXTREMITIES: Without any cyanosis, clubbing, rash, lesions or edema.  NEUROLOGIC: Cranial nerves II through XII are grossly intact.  PSYCHIATRIC: Appropriate affect .  SKIN: No ulceration or induration present.        Labs:  2017 09:57    133    |  93     |  12.0   ----------------------------<  232    3.6     |  28.0   |  1.56     Ca    8.3        2017 09:57  Phos  2.7       31 Dec 2016 08:08  Mg     1.9       2017 09:57                            10.5   4.37  )-----------( 115      ( 2017 09:57 )             31.4         Urinalysis Basic - ( 30 Dec 2016 13:27 )    Color: Yellow / Appearance: Slightly Turbid / S.010 / pH: x  Gluc: x / Ketone: Negative  / Bili: Negative / Urobili: Negative mg/dL   Blood: x / Protein: 15 mg/dL / Nitrite: Negative   Leuk Esterase: Small / RBC: 20-30 /HPF / WBC 26-50   Sq Epi: x / Non Sq Epi: Few / Bacteria: Few           CAPILLARY BLOOD GLUCOSE  229 (2017 08:28)  204 (31 Dec 2016 17:00)  255 (31 Dec 2016 12:00)        RECENT CULTURES:   .Urine Clean Catch (Midstream) XXXX XXXX   No growth     .Blood Blood Staphylococcus aureus XXXX   Growth in aerobic bottle: Staphylococcus aureus  Aerobic Bottle: 1 day 02:07 Hours to positivity  Anaerobic Bottle: No growth to date  ,  TYPE: (C=Critical, N=Notification, A=Abnormal) c  TESTS:  _ gs  DATE/TIME CALLED: 2016 15:22:52  CALLED     .Blood Blood Staphylococcus aureus XXXX   Growth in aerobic bottle: Staphylococcus aureus  Aerobic Bottle: 17:02 Hours to positivity  Anaerobic Bottle: No growth to date  ,  TYPE: (C=Critical, N=Notification, A=Abnormal) c  TESTS:  _ gs  DATE/TIME CALLED: _ 2016 09:19:18  CALLED TO: _     .Other Right foot gangrene hallux Staphylococcus aureus  Enterococcus faecalis XXXX   Numerous Staphylococcus aureus  Numerous Enterococcus faecalis     .Blood Blood Staphylococcus aureus XXXX   Growth in anaerobic bottle: Staphylococcus aureus  Anaerobic Bottle: 20:41 Hours to positivity  Aerobic Bottle: No growth to date  .  TYPE: (C=Critical, N=Notification, A=Abnormal) c  TESTS:  _ bldgs  DATE/TIME CALLED: _ 2016 17:33:11  CALLED     .Blood Blood Staphylococcus aureus XXXX   Growth in aerobic and anaerobic bottles: Staphylococcus aureus  Aerobic Bottle: 14:15 Hours to positivity  Anaerobic Bottle: 23:36 Hours to positivity  .  TYPE: (C=Critical, N=Notification, A=Abnormal) C  TESTS:  _ Positive Blood GS  DATE/TIME MAYNARD    Culture - Blood (16 @ 12:43)    -  Gentamicin: S <=4    -  Erythromycin: R >4    -  Cefazolin: S <=4    -  Levofloxacin: S <=1    -  Moxifloxacin(Aerobic): S <=0.5    -  Oxacillin: S <=0.25    -  RIF- Rifampin: S <=1    -  Trimethoprim/Sulfamethoxazole: S <=0.5/9.5    -  Vancomycin: S 2    -  Ampicillin/Sulbactam: S <=8/4    -  Ciprofloxacin: S <=1    -  Clindamycin: R <=0.5    -  Penicillin: S <=0.03    -  Tetra/Doxy: S <=4    Specimen Source: .Blood Blood    Organism: Staphylococcus aureus    Culture Results:   Growth in aerobic bottle: Staphylococcus aureus  Aerobic Bottle: 1 day 02:07 Hours to positivity  Anaerobic Bottle: No growth to date  ,  TYPE: (C=Critical, N=Notification, A=Abnormal) c  TESTS:  _ gs  DATE/TIME CALLED: _ 2016 15:22:52  CALLED TO: Evan burns rn  READ BACK (2 Patient Identifiers)(Y/N): _ y  READ BACK VALUES (Y/N): _ y  CALLED BY: Evan ashby    Organism Identification: Staphylococcus aureus    Method Type: JEREL    MRI foot LEFT  IMPRESSION: Status post second ray amputation at the level of the   metatarsal head. Cutaneous ulceration along the medial aspect of the   stump of the metatarsal with adjacent soft tissue edema and enhancement   and with findings consistent with osteomyelitis in the second metatarsal,   as above.    Other findings as above.    MRI foot right  MPRESSION: Cutaneous ulcerations adjacent to the distal phalanx of the   hallux. Associated osteomyelitis in the distal phalanx and 10 x 9 x 12 mm   abscess in the plantar subcutaneous tissues underlying the distal phalanx.          TTE     Summary:   1. Left ventricular ejection fraction, by visual estimation, is 55 to   60%.   2. Normal global left ventricular systolic function.   3. Normal left ventricular internal cavity size.   4. Spectral Doppler shows impaired relaxation pattern of left   ventricular myocardial filling (Grade I diastolic dysfunction).   5. Trivial pericardial effusion. SONYA LENNON   Chart reviewed  patient seen and examined.       Allergies:  No Known Allergies    Medications:  gabapentin 800milliGRAM(s) Oral three times a day PRN  DULoxetine 60milliGRAM(s) Oral daily  buDESOnide  80 MICROgram(s)/formoterol 4.5 MICROgram(s) Inhaler 2Puff(s) Inhalation two times a day  montelukast 10milliGRAM(s) Oral at bedtime  heparin  Injectable 5000Unit(s) SubCutaneous every 12 hours  insulin glargine Injectable (LANTUS) 30Unit(s) SubCutaneous at bedtime  insulin lispro (HumaLOG) corrective regimen sliding scale  SubCutaneous Before meals and at bedtime  dextrose 5%. 1000milliLiter(s) IV Continuous <Continuous>  dextrose Gel 1Dose(s) Oral once PRN  dextrose 50% Injectable 12.5Gram(s) IV Push once  dextrose 50% Injectable 25Gram(s) IV Push once  dextrose 50% Injectable 25Gram(s) IV Push once  glucagon  Injectable 1milliGRAM(s) IntraMuscular once PRN  ALBUTerol/ipratropium for Nebulization 3milliLiter(s) Nebulizer every 8 hours PRN  diazepam    Tablet 5milliGRAM(s) Oral every 8 hours  HYDROmorphone   Tablet 1milliGRAM(s) Oral every 6 hours PRN  polyethylene glycol 3350 17Gram(s) Oral daily  Dakins Solution - 1/2 Strength 1Application(s) Topical daily  buPROPion XL . 300milliGRAM(s) Oral daily  traZODone 100milliGRAM(s) Oral at bedtime  magnesium oxide 400milliGRAM(s) Oral two times a day with meals  nafcillin  IVPB  IV Intermittent             REVIEW OF SYSTEMS:  CONSTITUTIONAL:  as per HPI  HEENT:  Eyes:  No diplopia or blurred vision. ENT:  No earache, sore throat or runny nose.  CARDIOVASCULAR:  No pressure, squeezing, strangling, tightness, heaviness or aching about the chest, neck, axilla or epigastrium.  RESPIRATORY:  No cough, shortness of breath, PND or orthopnea.  GASTROINTESTINAL:  No nausea, vomiting or diarrhea.  GENITOURINARY:  No dysuria, frequency or urgency. No Blood in urine  MUSCULOSKELETAL:  no joint aches, no muscle pain  SKIN:  No change in skin, hair or nails.  NEUROLOGIC:  No paresthesias, fasciculations, seizures or weakness.  PSYCHIATRIC:  No disorder of thought or mood.  ENDOCRINE:  No heat or cold intolerance, polyuria or polydipsia.  HEMATOLOGICAL:  No easy bruising or bleeding.            Physical Exam:  ICU Vital Signs Last 24 Hrs  T(C): 36.5, Max: 37.2 ( @ 16:04)  T(F): 97.7, Max: 98.9 ( @ 16:04)  HR: 77 (77 - 93)  BP: 115/77 (110/68 - 118/72)  BP(mean): --  ABP: --  ABP(mean): --  RR: 19 (17 - 19)  SpO2: 95% (95% - 97%)    GEN: NAD, pleasant  HEENT: normocephalic and atraumatic. EOMI. AURORA., POOR DENTITION    NECK: Supple. No carotid bruits.     LUNGS: Clear to auscultation.  HEART: Regular rate and rhythm without murmur.  ABDOMEN: Soft, nontender, and nondistended.  Positive bowel sounds.    EXTREMITIES: dressing sin place on both feet.   NEUROLOGIC: Cranial nerves II through XII are grossly intact.  PSYCHIATRIC: Appropriate affect .       Labs:  2017 09:57    133    |  93     |  12.0   ----------------------------<  232    3.6     |  28.0   |  1.56     Ca    8.3        2017 09:57  Phos  2.7       31 Dec 2016 08:08  Mg     1.9       2017 09:57                            10.5   4.37  )-----------( 115      ( 2017 09:57 )             31.4       Urinalysis Basic - ( 30 Dec 2016 13:27 )    Color: Yellow / Appearance: Slightly Turbid / S.010 / pH: x  Gluc: x / Ketone: Negative  / Bili: Negative / Urobili: Negative mg/dL   Blood: x / Protein: 15 mg/dL / Nitrite: Negative   Leuk Esterase: Small / RBC: 20-30 /HPF / WBC 26-50   Sq Epi: x / Non Sq Epi: Few / Bacteria: Few      CAPILLARY BLOOD GLUCOSE  229 (2017 08:28)  204 (31 Dec 2016 17:00)  255 (31 Dec 2016 12:00)      RECENT CULTURES:   .Urine Clean Catch (Midstream) XXXX XXXX   No growth     .Blood Blood Staphylococcus aureus XXXX   Growth in aerobic bottle: Staphylococcus aureus  Aerobic Bottle: 1 day 02:07 Hours to positivity  Anaerobic Bottle: No growth to date  ,  TYPE: (C=Critical, N=Notification, A=Abnormal) c  TESTS:  _ gs  DATE/TIME CALLED: _ 2016 15:22:52  CALLED     .Blood Blood Staphylococcus aureus XXXX   Growth in aerobic bottle: Staphylococcus aureus  Aerobic Bottle: 17:02 Hours to positivity  Anaerobic Bottle: No growth to date  ,  TYPE: (C=Critical, N=Notification, A=Abnormal) c  TESTS:  _ gs  DATE/TIME CALLED: _ 2016 09:19:18  CALLED TO: _     .Other Right foot gangrene hallux Staphylococcus aureus  Enterococcus faecalis XXXX   Numerous Staphylococcus aureus  Numerous Enterococcus faecalis     .Blood Blood Staphylococcus aureus XXXX   Growth in anaerobic bottle: Staphylococcus aureus  Anaerobic Bottle: 20:41 Hours to positivity  Aerobic Bottle: No growth to date  .  TYPE: (C=Critical, N=Notification, A=Abnormal) c  TESTS:  _ bldgs  DATE/TIME CALLED: _ 2016 17:33:11  CALLED     .Blood Blood Staphylococcus aureus XXXX   Growth in aerobic and anaerobic bottles: Staphylococcus aureus  Aerobic Bottle: 14:15 Hours to positivity  Anaerobic Bottle: 23:36 Hours to positivity  .  TYPE: (C=Critical, N=Notification, A=Abnormal) C  TESTS:  _ Positive Blood GS  DATE/TIME MAYNARD    Culture - Blood (. @ 12:43)    -  Gentamicin: S <=4    -  Erythromycin: R >4    -  Cefazolin: S <=4    -  Levofloxacin: S <=1    -  Moxifloxacin(Aerobic): S <=0.5    -  Oxacillin: S <=0.25    -  RIF- Rifampin: S <=1    -  Trimethoprim/Sulfamethoxazole: S <=0.5/9.5    -  Vancomycin: S 2    -  Ampicillin/Sulbactam: S <=8/4    -  Ciprofloxacin: S <=1    -  Clindamycin: R <=0.5    -  Penicillin: S <=0.03    -  Tetra/Doxy: S <=4    Specimen Source: .Blood Blood    Organism: Staphylococcus aureus    Culture Results:   Growth in aerobic bottle: Staphylococcus aureus  Aerobic Bottle: 1 day 02:07 Hours to positivity  Anaerobic Bottle: No growth to date  ,  TYPE: (C=Critical, N=Notification, A=Abnormal) c  TESTS:  _ gs  DATE/TIME CALLED: _ 2016 15:22:52  CALLED TO: Evan burns rn  READ BACK (2 Patient Identifiers)(Y/N): _ y  READ BACK VALUES (Y/N): _ y  CALLED BY: Evan ashby    Organism Identification: Staphylococcus aureus    Method Type: JEREL    MRI foot LEFT  IMPRESSION: Status post second ray amputation at the level of the   metatarsal head. Cutaneous ulceration along the medial aspect of the   stump of the metatarsal with adjacent soft tissue edema and enhancement   and with findings consistent with osteomyelitis in the second metatarsal,   as above.    Other findings as above.    MRI foot right  MPRESSION: Cutaneous ulcerations adjacent to the distal phalanx of the   hallux. Associated osteomyelitis in the distal phalanx and 10 x 9 x 12 mm   abscess in the plantar subcutaneous tissues underlying the distal phalanx.          TTE     Summary:   1. Left ventricular ejection fraction, by visual estimation, is 55 to   60%.   2. Normal global left ventricular systolic function.   3. Normal left ventricular internal cavity size.   4. Spectral Doppler shows impaired relaxation pattern of left   ventricular myocardial filling (Grade I diastolic dysfunction).   5. Trivial pericardial effusion.

## 2017-01-01 NOTE — PROGRESS NOTE ADULT - ASSESSMENT
51 y.o. diabetic smoker, here for foot infection. found with MSSA bacteremia.  RIGHT 1st toe gangrene and osteomyelitis. LEFT 2nd metatarsal OM.

## 2017-01-02 NOTE — BRIEF OPERATIVE NOTE - PRE-OP DX
Osteomyelitis of foot, left, acute  01/02/2017    Active  Amadou Aj  Osteomyelitis of foot, right, acute  01/02/2017    Active  Amadou Aj

## 2017-01-02 NOTE — BRIEF OPERATIVE NOTE - POST-OP DX
Osteomyelitis of foot, left, acute  01/02/2017    Active  Amadou Aj  Osteomyelitis of foot, left, acute  01/02/2017    Active  Amadou Aj

## 2017-01-02 NOTE — PROGRESS NOTE ADULT - SUBJECTIVE AND OBJECTIVE BOX
Podiatry post op note    S: Dr. Alf Sanchez  A: Dr. Amadou Dawson, pgy-2  P: Wet gangrene with osteomyelitis of right hallux and osteomyelitis of left 2nd metatarsal with infected ulceration  P: Same  P: Right partial 1st ray amputation and Left 2nd metatarsal resection with debridement of ulceration  P: Bone and Soft tissue bilateral foot  A: IV sedation with local bilateral foot block  H: none  E: 60cc   M: iodoform packing on Left  I: none  C: none    Pt transferred to PACU in stable condition with VSS and NVS intact to both feet. Pt to remain NON WB on both feet. Please keep dressing on both feet corby, dry and intact. If any strikethrough, please reinforce dressing. Podiatry to follow pt in house.

## 2017-01-03 NOTE — CONSULT NOTE ADULT - SUBJECTIVE AND OBJECTIVE BOX
Chief Complaint:    HPI:  52 y/o F w/hx DM, depression, COPD, current smoker, Presents to the ED, sent by her podiatrist (Dr Reyes), due to right toe gangrene. She states  this has started weeks ago, usually goes to the podiatry every/week, but now the infection has worsen,. surrounded w/erythema and foul smelling discharge, yellowish, warm to palpation. Also noticed that in site of left toe amputation, started also w/erythema and gangrene, associated w/ abundant foul smelling dc as well/ As;p c/o of fever and chills. A vascular doct has been recommended in the past, but she states she hasn't set up an appt yet.Denies vomiting, n, cough, abd pain,dysuria, sob, weakness, blurred vision, numbness, or any further complaints. (29 Dec 2016 21:26)      PAST MEDICAL & SURGICAL HISTORY:  Fibromyalgia  DM (diabetes mellitus)  Foot ulcer: Left Foot  Osteopenia  Chronic pain  Back ache  Arthritis  Shoulder joint stiffness, left  [place] esophagus  Stomach ulcer  Diabetes  Asthma  S/P knee surgery  S/P hysterectomy  S/P cholecystectomy      FAMILY HISTORY:  No pertinent family history in first degree relatives      SOCIAL HISTORY:  [ ] Denies Smoking, Alcohol, or Drug Use    Allergies    No Known Allergies    Intolerances        PAIN MEDICATIONS:  gabapentin 800milliGRAM(s) Oral three times a day PRN  DULoxetine 60milliGRAM(s) Oral daily  diazepam    Tablet 5milliGRAM(s) Oral every 8 hours  buPROPion XL . 300milliGRAM(s) Oral daily  traZODone 100milliGRAM(s) Oral at bedtime  ondansetron Injectable 4milliGRAM(s) IV Push every 4 hours PRN  HYDROmorphone   Tablet 2milliGRAM(s) Oral every 4 hours PRN  HYDROmorphone   Tablet 1milliGRAM(s) Oral every 4 hours PRN    Heme:  heparin  Injectable 5000Unit(s) SubCutaneous every 12 hours    Antibiotics:  nafcillin  IVPB 2Gram(s) IV Intermittent every 4 hours  nafcillin  IVPB  IV Intermittent     Cardiovascular:    GI:  polyethylene glycol 3350 17Gram(s) Oral daily    Endocrine:  insulin lispro (HumaLOG) corrective regimen sliding scale  SubCutaneous Before meals and at bedtime  dextrose Gel 1Dose(s) Oral once PRN  dextrose 50% Injectable 12.5Gram(s) IV Push once  dextrose 50% Injectable 25Gram(s) IV Push once  dextrose 50% Injectable 25Gram(s) IV Push once  glucagon  Injectable 1milliGRAM(s) IntraMuscular once PRN  insulin glargine Injectable (LANTUS) 40Unit(s) SubCutaneous at bedtime    All Other Medications:  dextrose 5%. 1000milliLiter(s) IV Continuous <Continuous>  Dakins Solution - 1/2 Strength 1Application(s) Topical daily  saccharomyces boulardii 500milliGRAM(s) Oral two times a day  sodium chloride 0.9%. 1000milliLiter(s) IV Continuous <Continuous>      REVIEW OF SYSTEMS:    CONSTITUTIONAL: No fever, weight loss, or fatigue  EYES: No eye pain, visual disturbances, or discharge  ENMT:  No difficulty hearing, tinnitus, vertigo; No sinus or throat pain  NECK: No pain or stiffness  BREASTS: No pain, masses, or nipple discharge  RESPIRATORY: No cough, wheezing, chills or hemoptysis; No shortness of breath  CARDIOVASCULAR: No chest pain, palpitations, dizziness, or leg swelling  GASTROINTESTINAL: No abdominal or epigastric pain. No nausea, vomiting, or hematemesis; No diarrhea or constipation. No melena or hematochezia.  GENITOURINARY: No dysuria, frequency, hematuria, or incontinence  NEUROLOGICAL: No headaches, memory loss, loss of strength, numbness, or tremors  SKIN: No itching, burning, rashes, or lesions   LYMPH NODES: No enlarged glands  ENDOCRINE: No heat or cold intolerance; No hair loss  MUSCULOSKELETAL: No joint pain or swelling; No muscle, back, or extremity pain  PSYCHIATRIC: No depression, anxiety, mood swings, or difficulty sleeping  HEME/LYMPH: No easy bruising, or bleeding gums  ALLERY AND IMMUNOLOGIC: No hives or eczema      Vital Signs Last 24 Hrs  T(C): 36.1, Max: 36.6 (01-02 @ 14:22)  T(F): 97, Max: 97.8 (01-02 @ 14:22)  HR: 79 (76 - 80)  BP: 99/62 (93/56 - 108/63)  BP(mean): --  RR: 18 (13 - 22)  SpO2: 62% (62% - 100%)    PAIN SCORE:         SCALE USED: (1-10 VNRS)             CONSTITUTIONAL: Well-appearing; well nourished; in no apparrent distress.  HEAD: Normocephalic, atraumatic.  EYES: PERRL, EOM intact, conjunctiva and sclera WNL  NECK/LYMPH: Supple  LUNGS:Normal chest excursion with respiration  ABD/GI: Normal bowel sounds; non-distended, non-tender, no palpable organomegly.  Back: No evidence of deformity, or step off noted.  Mild pain to palpation of the para-spinal area.   EXT/MS: Normal ROM in all four extremities; non-tender to palpation; distal pulses are normal.  SKIN: Warm and dry; good skin turgor; no apparrent lesions or exudate.  NEURO: Awake, alert and oriented X3, no gross deficits        LABS:                          9.8    4.28  )-----------( 141      ( 03 Jan 2017 09:07 )             29.7     03 Jan 2017 09:07    133    |  93     |  10.0   ----------------------------<  109    3.4     |  27.0   |  1.61     Ca    8.0        03 Jan 2017 09:07  Phos  4.1       03 Jan 2017 09:07  Mg     2.0       03 Jan 2017 09:07            RADIOLOGY:    Drug Screen:            [ ]  [place]  Reviewed and Copied to Chart Chief Complaint: Incisional pain    HPI:  50 y/o female with history of DM, depression, COPD, current smoker, admitted via ED with right to gangrene and cellulitis. Patient states pain, foul odor, redness and got progressively worst over past few weeks, even though she was evaluated weekly by her Podiatrist Dr. Esquivel. Patient reports difficulty with weight bearing, due to worsening symptoms. Patient admits to fever and chills. Patient is s/p  Right foot partial first ray amputation, Left foot  debridement and second metatarsal debridement and bone biopsy.       PAST MEDICAL & SURGICAL HISTORY:  Fibromyalgia  DM (diabetes mellitus)  Foot ulcer: Left Foot  Osteopenia  Chronic pain  Back ache  Arthritis  Shoulder joint stiffness, left  [place] esophagus  Stomach ulcer  Diabetes  Asthma  S/P knee surgery  S/P hysterectomy  S/P cholecystectomy      FAMILY HISTORY:  No pertinent family history in first degree relatives      SOCIAL HISTORY:  [ ] Denies Smoking, Alcohol, or Drug Use    Allergies    No Known Allergies    Intolerances        PAIN MEDICATIONS:  gabapentin 800milliGRAM(s) Oral three times a day PRN  DULoxetine 60milliGRAM(s) Oral daily  diazepam    Tablet 5milliGRAM(s) Oral every 8 hours  buPROPion XL . 300milliGRAM(s) Oral daily  traZODone 100milliGRAM(s) Oral at bedtime  ondansetron Injectable 4milliGRAM(s) IV Push every 4 hours PRN  HYDROmorphone   Tablet 2milliGRAM(s) Oral every 4 hours PRN  HYDROmorphone   Tablet 1milliGRAM(s) Oral every 4 hours PRN    Heme:  heparin  Injectable 5000Unit(s) SubCutaneous every 12 hours    Antibiotics:  nafcillin  IVPB 2Gram(s) IV Intermittent every 4 hours  nafcillin  IVPB  IV Intermittent     Cardiovascular:    GI:  polyethylene glycol 3350 17Gram(s) Oral daily    Endocrine:  insulin lispro (HumaLOG) corrective regimen sliding scale  SubCutaneous Before meals and at bedtime  dextrose Gel 1Dose(s) Oral once PRN  dextrose 50% Injectable 12.5Gram(s) IV Push once  dextrose 50% Injectable 25Gram(s) IV Push once  dextrose 50% Injectable 25Gram(s) IV Push once  glucagon  Injectable 1milliGRAM(s) IntraMuscular once PRN  insulin glargine Injectable (LANTUS) 40Unit(s) SubCutaneous at bedtime    All Other Medications:  dextrose 5%. 1000milliLiter(s) IV Continuous <Continuous>  Dakins Solution - 1/2 Strength 1Application(s) Topical daily  saccharomyces boulardii 500milliGRAM(s) Oral two times a day  sodium chloride 0.9%. 1000milliLiter(s) IV Continuous <Continuous>      REVIEW OF SYSTEMS:    CONSTITUTIONAL: + fever,  fatigue  NECK: No pain or stiffness  RESPIRATORY: No cough, wheezing, chills; No shortness of breath  CARDIOVASCULAR: No chest pain, palpitations, dizziness  GASTROINTESTINAL: No abdominal or epigastric pain. No nausea, vomiting, No diarrhea or constipation..  GENITOURINARY: No dysuria, frequency, hematuria, or incontinence  NEUROLOGICAL: No headaches, memory loss  SKIN: No itching, burning, rashes,   MUSCULOSKELETAL: + joint pain, swelling; + muscle, back, and extremity pain  PSYCHIATRIC: + depression, anxiety, and difficulty sleeping      Vital Signs Last 24 Hrs  T(C): 36.1, Max: 36.6 (01-02 @ 14:22)  T(F): 97, Max: 97.8 (01-02 @ 14:22)  HR: 79 (76 - 80)  BP: 99/62 (93/56 - 108/63)  BP(mean): --  RR: 18 (13 - 22)  SpO2: 62% (62% - 100%)    PAIN SCORE:         SCALE USED: (1-10 VNRS)             CONSTITUTIONAL: Well-appearing; well nourished; in no apparent distress.  HEAD: Normocephalic, atraumatic.  EYES: PERRL, EOM intact, conjunctiva and sclera WNL  NECK/LYMPH: Supple  LUNGS: Normal chest excursion with respiration  ABD/GI: Normal bowel sounds; non-distended, non-tender  Back: No evidence of deformity, or step off noted.  Mild pain to palpation of the para-spinal area.   EXT/MS: Dressing noted to bilateral LE, C/D/I  SKIN: Warm and dry; good skin turgor;   NEURO: Awake, alert and oriented X3, no gross deficits        LABS:                          9.8    4.28  )-----------( 141      ( 03 Jan 2017 09:07 )             29.7     03 Jan 2017 09:07    133    |  93     |  10.0   ----------------------------<  109    3.4     |  27.0   |  1.61     Ca    8.0        03 Jan 2017 09:07  Phos  4.1       03 Jan 2017 09:07  Mg     2.0       03 Jan 2017 09:07            RADIOLOGY:    Drug Screen:            [x ]  [place]  Reviewed and Copied to Chart

## 2017-01-03 NOTE — DIETITIAN INITIAL EVALUATION ADULT. - OTHER INFO
Pt currently NPO after midnight, tolerating consistent carbohydrate diet with poor intake. Refusing nutritional supplements. " Doesn't want to talk about food right now, makes her nauseas just thinking about it". Pt with poor DM compliance at home. Pt stating she doesn't think shes able to care for herself for much longer-SW made aware. Diet education not appropriate at this time. States her weight fluctuates between 135-155lb.

## 2017-01-03 NOTE — CONSULT NOTE ADULT - ASSESSMENT
Problem/Plan - 1:  ·  Problem: Sepsis.  Plan: 2/2 right toe gangrene/cellulititis , and also left site of toe amputationgangrene/cellulitus.. consults podiatry Dr Reyes, Vascular Dr Hill,   abx vanco/zosyn, IVF 2LNS (ED), on 150cc/hr, (bp borderline low), repeated lactate wnl, f/u ulcer culture, bc.. goal clear lact,map>65, UO>0.5...     Problem/Plan - 2:  ·  Problem: Toe gangrene.  Plan: as above.. 2/2 PAD  arterial dopplers for now, pt will need vascular imagings.     Problem/Plan - 3:  ·  Problem: Diabetes.  Plan: lantus, slinfind scale.     Problem/Plan - 4:  ·  Problem: COPD (chronic obstructive pulmonary disease).  Plan: cont advair  albuterol prn.     Problem/Plan - 5:  ·  Problem: Smoker.  Plan: refused smoking cessation.     Problem/Plan - 6:  Problem: Depression. Plan: psych consult.      Discharge Planning:  ·  Tentative Discharge Location: unsure.  Expected Length of Stay: unknown.     Document Status:   H&P Status:  · Document Status	Authored by Attending 52 y/o female diabetic, right toe gangrene, cellulitis.  S/p: Right foot partial first ray amputation                                                                                Left foot debridement and second metatarsal debridement and                                                                                                                       bone biopsy

## 2017-01-03 NOTE — CONSULT NOTE ADULT - PROBLEM SELECTOR PROBLEM 1
Bacteremia due to Staphylococcus
Cellulitis of left lower extremity
Osteomyelitis of multiple sites, unspecified chronicity
Toe gangrene

## 2017-01-03 NOTE — PROGRESS NOTE ADULT - PROBLEM SELECTOR PLAN 1
ID Consult appreciated  IV Vanc/Zosyn - need to titrate down vanco as current trough is high.  BC daily  Transthoracic Echo - no valvular evidence of endocarditis noted - CARLITA scheduled for tomorrow  Will need PICC when BC no longer positive ID Consult appreciated  IV Nafcillin   BC daily  Transthoracic Echo - no valvular evidence of endocarditis noted - CARLITA scheduled for tomorrow  Will need PICC when BC no longer positive

## 2017-01-03 NOTE — CONSULT NOTE ADULT - CONSULT REASON
Evaluation of arterial adequacy for healing of bilateral toe infections/gangrene
Bilateral foot wounds right foot gangrene
Pain Management
bacteremia
possible endocarditis

## 2017-01-03 NOTE — DIETITIAN INITIAL EVALUATION ADULT. - ORAL INTAKE PTA
Pt reports persistent nausea 2/2 GI issues (barretts esophagus, ulcerations of GI lining x several years). Pt states she barely eats. Only cold liquids seem to not give pain./poor

## 2017-01-03 NOTE — PROGRESS NOTE ADULT - ASSESSMENT
s/p 1 day right foot first partial ray amputation and left foot incision and drainage and second metatarsal debridement and bone biopsy

## 2017-01-03 NOTE — PROGRESS NOTE ADULT - SUBJECTIVE AND OBJECTIVE BOX
CC: toe gangrene (29 Dec 2016 21:26)    HPI:  50 y/o F w/hx DM, depression, COPD, current smoker, Presents to the ED, sent by her podiatrist (Dr Reyes), due to right toe gangrene. She states  this has started weeks ago, usually goes to the podiatry every/week, but now the infection has worsen,. surrounded w/erythema and foul smelling discharge, yellowish, warm to palpation. Also noticed that in site of left toe amputation, started also w/erythema and gangrene, associated w/ abundant foul smelling dc as well/ As;p c/o of fever and chills. A vascular doct has been recommended in the past, but she states she hasn't set up an appt yet.Denies vomiting, n, cough, abd pain,dysuria, sob, weakness, blurred vision, numbness, or any further complaints. (29 Dec 2016 21:26)    INTERVAL HPI/OVERNIGHT EVENTS: B/L foot pain    Vital Signs Last 24 Hrs  T(C): 36.1, Max: 36.2 (01-02 @ 17:13)  T(F): 97, Max: 97.2 (01-02 @ 17:13)  HR: 79 (76 - 80)  BP: 99/62 (93/56 - 99/62)  BP(mean): --  RR: 18 (13 - 22)  SpO2: 62% (62% - 100%)  I&O's Detail                      9.8    4.28  )-----------( 141      ( 03 Jan 2017 09:07 )             29.7     03 Jan 2017 09:07    133    |  93     |  10.0   ----------------------------<  109    3.4     |  27.0   |  1.61     Ca    8.0        03 Jan 2017 09:07  Phos  4.1       03 Jan 2017 09:07  Mg     2.0       03 Jan 2017 09:07        CAPILLARY BLOOD GLUCOSE  150 (03 Jan 2017 07:30)  112 (02 Jan 2017 21:57)        Hemoglobin A1C, Whole Blood: 8.0 % (12-30 @ 08:13)    MEDICATIONS  (STANDING):  DULoxetine 60milliGRAM(s) Oral daily  buDESOnide  80 MICROgram(s)/formoterol 4.5 MICROgram(s) Inhaler 2Puff(s) Inhalation two times a day  montelukast 10milliGRAM(s) Oral at bedtime  heparin  Injectable 5000Unit(s) SubCutaneous every 12 hours  insulin lispro (HumaLOG) corrective regimen sliding scale  SubCutaneous Before meals and at bedtime  dextrose 5%. 1000milliLiter(s) IV Continuous <Continuous>  dextrose 50% Injectable 12.5Gram(s) IV Push once  dextrose 50% Injectable 25Gram(s) IV Push once  dextrose 50% Injectable 25Gram(s) IV Push once  diazepam    Tablet 5milliGRAM(s) Oral every 8 hours  polyethylene glycol 3350 17Gram(s) Oral daily  Dakins Solution - 1/2 Strength 1Application(s) Topical daily  buPROPion XL . 300milliGRAM(s) Oral daily  traZODone 100milliGRAM(s) Oral at bedtime  nafcillin  IVPB 2Gram(s) IV Intermittent every 4 hours  nafcillin  IVPB  IV Intermittent   insulin glargine Injectable (LANTUS) 40Unit(s) SubCutaneous at bedtime  saccharomyces boulardii 500milliGRAM(s) Oral two times a day  tiotropium 18 MICROgram(s) Capsule 1Capsule(s) Inhalation daily  sodium chloride 0.9%. 1000milliLiter(s) IV Continuous <Continuous>    MEDICATIONS  (PRN):  gabapentin 800milliGRAM(s) Oral three times a day PRN neuropathic pain  dextrose Gel 1Dose(s) Oral once PRN Blood Glucose LESS THAN 70 milliGRAM(s)/deciliter  glucagon  Injectable 1milliGRAM(s) IntraMuscular once PRN Glucose LESS THAN 70 milligrams/deciliter  ALBUTerol/ipratropium for Nebulization 3milliLiter(s) Nebulizer every 8 hours PRN sob  ondansetron Injectable 4milliGRAM(s) IV Push every 4 hours PRN Nausea and/or Vomiting  HYDROmorphone   Tablet 2milliGRAM(s) Oral every 4 hours PRN Severe Pain (7 - 10)  HYDROmorphone   Tablet 1milliGRAM(s) Oral every 4 hours PRN Moderate Pain (4 - 6)      RADIOLOGY & ADDITIONAL TESTS:    REVIEW OF SYSTEMS:  + B/L foot pain/back pain, + Depressed mood  Patient denied abdominal pain, nausea, vomiting, cough, shortness of breath, chest pain or palpitations CC: toe gangrene (29 Dec 2016 21:26)    HPI:  52 y/o F w/hx DM, depression, COPD, current smoker, Presents to the ED, sent by her podiatrist (Dr Reyes), due to right toe gangrene. She states  this has started weeks ago, usually goes to the podiatry every/week, but now the infection has worsen,. surrounded w/erythema and foul smelling discharge, yellowish, warm to palpation. Also noticed that in site of left toe amputation, started also w/erythema and gangrene, associated w/ abundant foul smelling dc as well/ As;p c/o of fever and chills. A vascular doct has been recommended in the past, but she states she hasn't set up an appt yet.Denies vomiting, n, cough, abd pain,dysuria, sob, weakness, blurred vision, numbness, or any further complaints. (29 Dec 2016 21:26)    INTERVAL HPI/OVERNIGHT EVENTS: B/L foot pain  Other ROS reviewed and neg     Vital Signs Last 24 Hrs  T(C): 36.1, Max: 36.2 (01-02 @ 17:13)  T(F): 97, Max: 97.2 (01-02 @ 17:13)  HR: 79 (76 - 80)  BP: 99/62 (93/56 - 99/62)  BP(mean): --  RR: 18 (13 - 22)  SpO2: 62% (62% - 100%)  I&O's Detail                      9.8    4.28  )-----------( 141      ( 03 Jan 2017 09:07 )             29.7     03 Jan 2017 09:07    133    |  93     |  10.0   ----------------------------<  109    3.4     |  27.0   |  1.61     Ca    8.0        03 Jan 2017 09:07  Phos  4.1       03 Jan 2017 09:07  Mg     2.0       03 Jan 2017 09:07        CAPILLARY BLOOD GLUCOSE  150 (03 Jan 2017 07:30)  112 (02 Jan 2017 21:57)        Hemoglobin A1C, Whole Blood: 8.0 % (12-30 @ 08:13)    MEDICATIONS  (STANDING):  DULoxetine 60milliGRAM(s) Oral daily  buDESOnide  80 MICROgram(s)/formoterol 4.5 MICROgram(s) Inhaler 2Puff(s) Inhalation two times a day  montelukast 10milliGRAM(s) Oral at bedtime  heparin  Injectable 5000Unit(s) SubCutaneous every 12 hours  insulin lispro (HumaLOG) corrective regimen sliding scale  SubCutaneous Before meals and at bedtime  dextrose 5%. 1000milliLiter(s) IV Continuous <Continuous>  dextrose 50% Injectable 12.5Gram(s) IV Push once  dextrose 50% Injectable 25Gram(s) IV Push once  dextrose 50% Injectable 25Gram(s) IV Push once  diazepam    Tablet 5milliGRAM(s) Oral every 8 hours  polyethylene glycol 3350 17Gram(s) Oral daily  Dakins Solution - 1/2 Strength 1Application(s) Topical daily  buPROPion XL . 300milliGRAM(s) Oral daily  traZODone 100milliGRAM(s) Oral at bedtime  nafcillin  IVPB 2Gram(s) IV Intermittent every 4 hours  nafcillin  IVPB  IV Intermittent   insulin glargine Injectable (LANTUS) 40Unit(s) SubCutaneous at bedtime  saccharomyces boulardii 500milliGRAM(s) Oral two times a day  tiotropium 18 MICROgram(s) Capsule 1Capsule(s) Inhalation daily  sodium chloride 0.9%. 1000milliLiter(s) IV Continuous <Continuous>    MEDICATIONS  (PRN):  gabapentin 800milliGRAM(s) Oral three times a day PRN neuropathic pain  dextrose Gel 1Dose(s) Oral once PRN Blood Glucose LESS THAN 70 milliGRAM(s)/deciliter  glucagon  Injectable 1milliGRAM(s) IntraMuscular once PRN Glucose LESS THAN 70 milligrams/deciliter  ALBUTerol/ipratropium for Nebulization 3milliLiter(s) Nebulizer every 8 hours PRN sob  ondansetron Injectable 4milliGRAM(s) IV Push every 4 hours PRN Nausea and/or Vomiting  HYDROmorphone   Tablet 2milliGRAM(s) Oral every 4 hours PRN Severe Pain (7 - 10)  HYDROmorphone   Tablet 1milliGRAM(s) Oral every 4 hours PRN Moderate Pain (4 - 6)      RADIOLOGY & ADDITIONAL TESTS:    REVIEW OF SYSTEMS:  + B/L foot pain/back pain, + Depressed mood  Patient denied abdominal pain, nausea, vomiting, cough, shortness of breath, chest pain or palpitations

## 2017-01-03 NOTE — CONSULT NOTE ADULT - PROBLEM SELECTOR RECOMMENDATION 9
Treat infection with antibiotics and surgically (per podiatry) as needed. No indication for additional arterial testing or vascular intervention.    Rec aggressive DVT prophylaxis with both SCD's and an anticoagulant.
Antibiotics- IV   MRI pending   Xray negative for gas gangrene   elsa pending   Pending clearance from Medicine- abdominal xray and EKG  NPO patient- possible OR today or Monday afternoon depending on MRI results  pending culture bedside  Discussed with patient about need for surgery, patient agrees.
- repeat blood cx x 2 sets today  - check ESR / CRp  - needs echo; r/o endocarditis - duration of infection is unclear - pt unreliable hx
1-Pain Control: Meds-      Adjust-Dilaudid 1/2mg po q4hprn     Continue:  Valium 5mg/q8 as ordered                   Cymbalta 60mg qd as ordered                   Trazadone 100mg as ordered    2-Bowel Regimen: Miralax  3-Patient agrees with above plan, verbalised understanding questions answered to her satisfaction.  4-Will continue to follow  5-Thank You for this kind consult

## 2017-01-03 NOTE — PROGRESS NOTE ADULT - SUBJECTIVE AND OBJECTIVE BOX
[place] [facility] - [facility], THE HEART CENTER                                   [address] [place] [address]                                                      PHONE: [phone]                                                         FAX: [phone]  [email or web address]  ---------------------------------------------------------------------------------------------------------------------------------    Overnight events/patient complaints:      PAST MEDICAL & SURGICAL HISTORY:  Fibromyalgia  DM (diabetes mellitus)  Foot ulcer: Left Foot  Osteopenia  Chronic pain  Back ache  Arthritis  Shoulder joint stiffness, left  Matamoros esophagus  Stomach ulcer  Diabetes  Asthma  S/P knee surgery  S/P hysterectomy  S/P cholecystectomy      No Known Allergies    MEDICATIONS  (STANDING):  DULoxetine 60milliGRAM(s) Oral daily  buDESOnide  80 MICROgram(s)/formoterol 4.5 MICROgram(s) Inhaler 2Puff(s) Inhalation two times a day  montelukast 10milliGRAM(s) Oral at bedtime  heparin  Injectable 5000Unit(s) SubCutaneous every 12 hours  insulin lispro (HumaLOG) corrective regimen sliding scale  SubCutaneous Before meals and at bedtime  dextrose 5%. 1000milliLiter(s) IV Continuous <Continuous>  dextrose 50% Injectable 12.5Gram(s) IV Push once  dextrose 50% Injectable 25Gram(s) IV Push once  dextrose 50% Injectable 25Gram(s) IV Push once  diazepam    Tablet 5milliGRAM(s) Oral every 8 hours  polyethylene glycol 3350 17Gram(s) Oral daily  Dakins Solution - 1/2 Strength 1Application(s) Topical daily  buPROPion XL . 300milliGRAM(s) Oral daily  traZODone 100milliGRAM(s) Oral at bedtime  nafcillin  IVPB 2Gram(s) IV Intermittent every 4 hours  nafcillin  IVPB  IV Intermittent   insulin glargine Injectable (LANTUS) 40Unit(s) SubCutaneous at bedtime  saccharomyces boulardii 500milliGRAM(s) Oral two times a day  tiotropium 18 MICROgram(s) Capsule 1Capsule(s) Inhalation daily  sodium chloride 0.9%. 1000milliLiter(s) IV Continuous <Continuous>    MEDICATIONS  (PRN):  gabapentin 800milliGRAM(s) Oral three times a day PRN neuropathic pain  dextrose Gel 1Dose(s) Oral once PRN Blood Glucose LESS THAN 70 milliGRAM(s)/deciliter  glucagon  Injectable 1milliGRAM(s) IntraMuscular once PRN Glucose LESS THAN 70 milligrams/deciliter  ALBUTerol/ipratropium for Nebulization 3milliLiter(s) Nebulizer every 8 hours PRN sob  ondansetron Injectable 4milliGRAM(s) IV Push every 4 hours PRN Nausea and/or Vomiting  HYDROmorphone   Tablet 2milliGRAM(s) Oral every 4 hours PRN Severe Pain (7 - 10)  HYDROmorphone   Tablet 1milliGRAM(s) Oral every 4 hours PRN Moderate Pain (4 - 6)      Vital Signs Last 24 Hrs  T(C): 36.1, Max: 36.2 (01-02 @ 17:13)  T(F): 97, Max: 97.2 (01-02 @ 17:13)  HR: 79 (76 - 80)  BP: 99/62 (93/56 - 99/62)  BP(mean): --  RR: 18 (13 - 22)  SpO2: 62% (62% - 100%)  ICU Vital Signs Last 24 Hrs  SONYA LENNON  I&O's Detail    I&O's Summary    Drug Dosing Weight  SONYASTAN LENNON      PHYSICAL EXAM:  General: Appears well developed, well nourished alert and cooperative.  HEENT: Head; normocephalic, atraumatic.  Eyes: Pupils reactive, cornea wnl.  Neck: Supple, no nodes adenopathy, no NVD or carotid bruit or thyromegaly.  CARDIOVASCULAR: Normal S1 and S2, No murmur, rub, gallop or lift.   LUNGS: No rales, rhonchi or wheeze. Normal breath sounds bilaterally.  ABDOMEN: Soft, nontender without mass or organomegaly. bowel sounds normoactive.  EXTREMITIES: No clubbing, cyanosis or edema. Distal pulses wnl.   SKIN: warm and dry with normal turgor.  NEURO: Alert/oriented x 3/normal motor exam. No pathologic reflexes.    PSYCH: normal affect.        LABS:                        9.8    4.28  )-----------( 141      ( 03 Jan 2017 09:07 )             29.7     03 Jan 2017 09:07    133    |  93     |  10.0   ----------------------------<  109    3.4     |  27.0   |  1.61     Ca    8.0        03 Jan 2017 09:07  Phos  4.1       03 Jan 2017 09:07  Mg     2.0       03 Jan 2017 09:07      SONYA LENNON            RADIOLOGY & ADDITIONAL STUDIES:    INTERPRETATION OF TELEMETRY (personally reviewed):        ECG: NS @ 80 no acute ischemic changes    ECHO:   1. Left ventricular ejection fraction, by visual estimation, is 55 to   60%.   2. Normal global left ventricular systolic function.   3. Normal left ventricular internal cavity size.   4. Spectral Doppler shows impaired relaxation pattern of left   ventricular myocardial filling (Grade I diastolic dysfunction).   5. Trivial pericardial effusion.      Assessment and Plan:  In summary, SONYA LENNON is an 51y Female with past medical history significant for DM, Matamoros's esophagus, fibromyalgia admitted with right toe gangrene found to have MSSA bacteremia. The patient had a normal cath 2013 but no longer wants to follow with that cardiology group.  Pt recently stopped smoking and is able to walk up a flight of stairs without limitation.    1) R/O Endocarditis  - CARLITA tomorrow  -Pt to be NPO after midnight    2) Preoperative Optimization       -No Cardiovascular Contraindication to surgery       -Patient is acceptable risk for a moderate  risk surgery       -Continue cardiac medications as scheduled    Thank you for allowing [facility] to participate in the care of this patient.  Please feel free to call with any questions or concerns. [place] [facility] - [facility], [facility]                                   [address] [place] [address]                                                      PHONE: [phone]                                                         FAX: [phone]  [email or web address]  ---------------------------------------------------------------------------------------------------------------------------------    Overnight events/patient complaints:  no complaints     PAST MEDICAL & SURGICAL HISTORY:  Fibromyalgia  DM (diabetes mellitus)  Foot ulcer: Left Foot  Osteopenia  Chronic pain  Back ache  Arthritis  Shoulder joint stiffness, left  Matamoros esophagus  Stomach ulcer  Diabetes  Asthma  S/P knee surgery  S/P hysterectomy  S/P cholecystectomy      No Known Allergies    MEDICATIONS  (STANDING):  DULoxetine 60milliGRAM(s) Oral daily  buDESOnide  80 MICROgram(s)/formoterol 4.5 MICROgram(s) Inhaler 2Puff(s) Inhalation two times a day  montelukast 10milliGRAM(s) Oral at bedtime  heparin  Injectable 5000Unit(s) SubCutaneous every 12 hours  insulin lispro (HumaLOG) corrective regimen sliding scale  SubCutaneous Before meals and at bedtime  dextrose 5%. 1000milliLiter(s) IV Continuous <Continuous>  dextrose 50% Injectable 12.5Gram(s) IV Push once  dextrose 50% Injectable 25Gram(s) IV Push once  dextrose 50% Injectable 25Gram(s) IV Push once  diazepam    Tablet 5milliGRAM(s) Oral every 8 hours  polyethylene glycol 3350 17Gram(s) Oral daily  Dakins Solution - 1/2 Strength 1Application(s) Topical daily  buPROPion XL . 300milliGRAM(s) Oral daily  traZODone 100milliGRAM(s) Oral at bedtime  nafcillin  IVPB 2Gram(s) IV Intermittent every 4 hours  nafcillin  IVPB  IV Intermittent   insulin glargine Injectable (LANTUS) 40Unit(s) SubCutaneous at bedtime  saccharomyces boulardii 500milliGRAM(s) Oral two times a day  tiotropium 18 MICROgram(s) Capsule 1Capsule(s) Inhalation daily  sodium chloride 0.9%. 1000milliLiter(s) IV Continuous <Continuous>    MEDICATIONS  (PRN):  gabapentin 800milliGRAM(s) Oral three times a day PRN neuropathic pain  dextrose Gel 1Dose(s) Oral once PRN Blood Glucose LESS THAN 70 milliGRAM(s)/deciliter  glucagon  Injectable 1milliGRAM(s) IntraMuscular once PRN Glucose LESS THAN 70 milligrams/deciliter  ALBUTerol/ipratropium for Nebulization 3milliLiter(s) Nebulizer every 8 hours PRN sob  ondansetron Injectable 4milliGRAM(s) IV Push every 4 hours PRN Nausea and/or Vomiting  HYDROmorphone   Tablet 2milliGRAM(s) Oral every 4 hours PRN Severe Pain (7 - 10)  HYDROmorphone   Tablet 1milliGRAM(s) Oral every 4 hours PRN Moderate Pain (4 - 6)      Vital Signs Last 24 Hrs  T(C): 36.1, Max: 36.2 (01-02 @ 17:13)  T(F): 97, Max: 97.2 (01-02 @ 17:13)  HR: 79 (76 - 80)  BP: 99/62 (93/56 - 99/62)  BP(mean): --  RR: 18 (13 - 22)  SpO2: 62% (62% - 100%)  ICU Vital Signs Last 24 Hrs  SONYA LENNON  I&O's Detail    I&O's Summary    Drug Dosing Weight  SONYA LENNON      PHYSICAL EXAM:  General: Appears well developed, well nourished alert and cooperative.  HEENT: Head; normocephalic, atraumatic.  Eyes: Pupils reactive, cornea wnl.  Neck: Supple, no nodes adenopathy, no NVD or carotid bruit or thyromegaly.  CARDIOVASCULAR: Normal S1 and S2, No murmur, rub, gallop or lift.   LUNGS: No rales, rhonchi or wheeze. Normal breath sounds bilaterally.  ABDOMEN: Soft, nontender without mass or organomegaly. bowel sounds normoactive.  EXTREMITIES: No clubbing, cyanosis or edema. Distal pulses wnl.   SKIN: warm and dry with normal turgor.  NEURO: Alert/oriented x 3/normal motor exam. No pathologic reflexes.    PSYCH: normal affect.        LABS:                        9.8    4.28  )-----------( 141      ( 03 Jan 2017 09:07 )             29.7     03 Jan 2017 09:07    133    |  93     |  10.0   ----------------------------<  109    3.4     |  27.0   |  1.61     Ca    8.0        03 Jan 2017 09:07  Phos  4.1       03 Jan 2017 09:07  Mg     2.0       03 Jan 2017 09:07      SONYA LENNON            RADIOLOGY & ADDITIONAL STUDIES:    INTERPRETATION OF TELEMETRY (personally reviewed):        ECG: NS @ 80 no acute ischemic changes    ECHO:   1. Left ventricular ejection fraction, by visual estimation, is 55 to   60%.   2. Normal global left ventricular systolic function.   3. Normal left ventricular internal cavity size.   4. Spectral Doppler shows impaired relaxation pattern of left   ventricular myocardial filling (Grade I diastolic dysfunction).   5. Trivial pericardial effusion.      Assessment and Plan:  In summary, SONYA LENNON is an 51y Female with past medical history significant for DM, Matamoros's esophagus, fibromyalgia admitted with right toe gangrene found to have MSSA bacteremia. The patient had a normal cath 2013 but no longer wants to follow with that cardiology group.  Pt recently stopped smoking and is able to walk up a flight of stairs without limitation.    1) R/O Endocarditis  - CARLITA tomorrow  -Pt to be NPO after midnight    2) Preoperative Optimization       -No Cardiovascular Contraindication to surgery       -Patient is acceptable risk for a moderate  risk surgery       -Continue cardiac medications as scheduled    Thank you for allowing [facility] to participate in the care of this patient.  Please feel free to call with any questions or concerns.

## 2017-01-03 NOTE — PROGRESS NOTE ADULT - PROBLEM SELECTOR PLAN 1
Patient was seen  dressing changes with packing left foot and bilateral xeroform and DSD  Patient can start walking with surgical shoes  Patient is stable for d/c from podiatry- pending path and culture OR results.  Continue IV antibiotics

## 2017-01-03 NOTE — PROGRESS NOTE ADULT - SUBJECTIVE AND OBJECTIVE BOX
51 year old female patient post op podiatry note s/p 1 day   Right foot partial first ray amputation   Left foot - debridement and second metatarsal debridement and bone biopsy.   Patient denies f/c/v/n/d or sob. Laying comfortable in bed with foot dressing intact , no strikethrough.     vitals: Vital Signs Last 24 Hrs  T(C): 36.1, Max: 36.6 (01-02 @ 14:22)  T(F): 97, Max: 97.8 (01-02 @ 14:22)  HR: 79 (76 - 80)  BP: 99/62 (93/56 - 108/63)  BP(mean): --  RR: 18 (13 - 22)  SpO2: 62% (62% - 100%)    Exam:  Bilateral surgical incisions sutures are intact. No dehiscences no drainage decrease in edema and erythema, no signs of necrosis. No malodor, no tenderness on palpation.

## 2017-01-04 NOTE — PROGRESS NOTE ADULT - SUBJECTIVE AND OBJECTIVE BOX
CC: toe gangrene (29 Dec 2016 21:26)    HPI: 50 y/o F w/hx DM, depression, COPD, current smoker, Presents to the ED, sent by her podiatrist (Dr Reyes), due to right toe gangrene. She states  this has started weeks ago, usually goes to the podiatry every/week, but now the infection has worsen,. surrounded w/erythema and foul smelling discharge, yellowish, warm to palpation. Also noticed that in site of left toe amputation, started also w/erythema and gangrene, associated w/ abundant foul smelling dc as well/ As;p c/o of fever and chills. A vascular doct has been recommended in the past, but she states she hasn't set up an appt yet.Denies vomiting, n, cough, abd pain,dysuria, sob, weakness, blurred vision, numbness, or any further complaints. (29 Dec 2016 21:26)    INTERVAL HPI/OVERNIGHT EVENTS: B/L foot pain, occasional abdominal pain, severely depressed, awaiting CARLITA today    Other ROS reviewed and neg     Vital Signs Last 24 Hrs  T(C): 36.3, Max: 36.7 (01-03 @ 23:31)  T(F): 97.3, Max: 98 (01-03 @ 23:31)  HR: 75 (75 - 77)  BP: 101/65 (86/62 - 102/63)  BP(mean): --  RR: 18 (17 - 18)  SpO2: 97% (94% - 97%)               9.3    4.36  )-----------( 132      ( 04 Jan 2017 07:34 )             28.2     04 Jan 2017 07:31    130    |  93     |  13.0   ----------------------------<  136    3.8     |  27.0   |  2.02     Ca    7.9        04 Jan 2017 07:31  Phos  4.5       04 Jan 2017 07:31  Mg     2.2       04 Jan 2017 07:31        CAPILLARY BLOOD GLUCOSE  112 (04 Jan 2017 13:33)  119 (04 Jan 2017 08:20)  133 (03 Jan 2017 21:41)        Hemoglobin A1C, Whole Blood: 8.0 % (12-30 @ 08:13)      MEDICATIONS  (STANDING):  DULoxetine 60milliGRAM(s) Oral daily  buDESOnide  80 MICROgram(s)/formoterol 4.5 MICROgram(s) Inhaler 2Puff(s) Inhalation two times a day  montelukast 10milliGRAM(s) Oral at bedtime  heparin  Injectable 5000Unit(s) SubCutaneous every 12 hours  insulin lispro (HumaLOG) corrective regimen sliding scale  SubCutaneous Before meals and at bedtime  dextrose 5%. 1000milliLiter(s) IV Continuous <Continuous>  dextrose 50% Injectable 12.5Gram(s) IV Push once  dextrose 50% Injectable 25Gram(s) IV Push once  dextrose 50% Injectable 25Gram(s) IV Push once  diazepam    Tablet 5milliGRAM(s) Oral every 8 hours  polyethylene glycol 3350 17Gram(s) Oral daily  Dakins Solution - 1/2 Strength 1Application(s) Topical daily  buPROPion XL . 300milliGRAM(s) Oral daily  traZODone 100milliGRAM(s) Oral at bedtime  nafcillin  IVPB 2Gram(s) IV Intermittent every 4 hours  nafcillin  IVPB  IV Intermittent   insulin glargine Injectable (LANTUS) 40Unit(s) SubCutaneous at bedtime  saccharomyces boulardii 500milliGRAM(s) Oral two times a day  tiotropium 18 MICROgram(s) Capsule 1Capsule(s) Inhalation daily  sodium chloride 0.9%. 1000milliLiter(s) IV Continuous <Continuous>    MEDICATIONS  (PRN):  gabapentin 800milliGRAM(s) Oral three times a day PRN neuropathic pain  dextrose Gel 1Dose(s) Oral once PRN Blood Glucose LESS THAN 70 milliGRAM(s)/deciliter  glucagon  Injectable 1milliGRAM(s) IntraMuscular once PRN Glucose LESS THAN 70 milligrams/deciliter  ALBUTerol/ipratropium for Nebulization 3milliLiter(s) Nebulizer every 8 hours PRN sob  ondansetron Injectable 4milliGRAM(s) IV Push every 4 hours PRN Nausea and/or Vomiting  HYDROmorphone   Tablet 2milliGRAM(s) Oral every 4 hours PRN Severe Pain (7 - 10)  HYDROmorphone   Tablet 1milliGRAM(s) Oral every 4 hours PRN Moderate Pain (4 - 6)      RADIOLOGY & ADDITIONAL TESTS:    REVIEW OF SYSTEMS:  + B/L foot pain/back pain, + Depressed mood  Patient denied abdominal pain, nausea, vomiting, cough, shortness of breath, chest pain or palpitations      Physical Exam:   · Constitutional	detailed exam  · Constitutional Comments	NAD  · ENMT	No oral lesions; no gross abnormalities  · Back	No deformity or limitation of movement  · Respiratory	detailed exam  · Respiratory Comments	Course BS B/L  · Cardiovascular	detailed exam  · Cardiovascular Details	regular rate and rhythm  · Cardiovascular Details	positive S1; positive S2  · Gastrointestinal	detailed exam  · GI Normal	soft; nontender; bowel sounds normal  · Extremities	detailed exam  · Extremities Comments	B/L foot dressings, not removed  · Neurological	Alert & oriented; no sensory, motor or coordination deficits, normal reflexes  · Musculoskeletal	detailed exam  · Musculoskeletal Details	decreased ROM due to pain  · Psychiatric	detailed exam  · Psychiatric Details	depressed

## 2017-01-04 NOTE — PROGRESS NOTE ADULT - SUBJECTIVE AND OBJECTIVE BOX
51 year old female patient post op podiatry note s/p 1 day   Right foot partial first ray amputation   Left foot - debridement and second metatarsal debridement and bone biopsy.   Patient denies f/c/v/n/d or sob. Laying comfortable in bed with foot dressing intact , no strikethrough.   Patient states she is confused and does not know what she is talking about.     vitals: Vital Signs Last 24 Hrs  T(C): 36.3, Max: 36.7 (01-03 @ 23:31)  T(F): 97.3, Max: 98 (01-03 @ 23:31)  HR: 75 (75 - 77)  BP: 101/65 (86/62 - 102/63)  BP(mean): --  RR: 18 (17 - 18)  SpO2: 97% (94% - 97%)    04 Jan 2017 07:31    130    |  93     |  13.0   ----------------------------<  136    3.8     |  27.0   |  2.02     Ca    7.9        04 Jan 2017 07:31  Phos  4.5       04 Jan 2017 07:31  Mg     2.2       04 Jan 2017 07:31                          9.3    4.36  )-----------( 132      ( 04 Jan 2017 07:34 )             28.2       Exam:  Bilateral surgical incisions sutures are intact. No dehiscences no drainage decrease in edema and erythema, no signs of necrosis. No malodor, no tenderness on palpation.

## 2017-01-04 NOTE — PROGRESS NOTE ADULT - ASSESSMENT
s/p 2 day right foot first partial ray amputation and left foot incision and drainage and second metatarsal debridement and bone biopsy

## 2017-01-04 NOTE — PROGRESS NOTE ADULT - SUBJECTIVE AND OBJECTIVE BOX
SONYA LENNON   Chart reviewed  patient seen and examined.   labs reviewed    Allergies:  No Known Allergies    MEDICATIONS  (STANDING):  DULoxetine 60milliGRAM(s) Oral daily  buDESOnide  80 MICROgram(s)/formoterol 4.5 MICROgram(s) Inhaler 2Puff(s) Inhalation two times a day  montelukast 10milliGRAM(s) Oral at bedtime  heparin  Injectable 5000Unit(s) SubCutaneous every 12 hours  insulin lispro (HumaLOG) corrective regimen sliding scale  SubCutaneous Before meals and at bedtime  dextrose 5%. 1000milliLiter(s) IV Continuous <Continuous>  dextrose 50% Injectable 12.5Gram(s) IV Push once  dextrose 50% Injectable 25Gram(s) IV Push once  dextrose 50% Injectable 25Gram(s) IV Push once  diazepam    Tablet 5milliGRAM(s) Oral every 8 hours  polyethylene glycol 3350 17Gram(s) Oral daily  Dakins Solution - 1/2 Strength 1Application(s) Topical daily  buPROPion XL . 300milliGRAM(s) Oral daily  traZODone 100milliGRAM(s) Oral at bedtime  nafcillin  IVPB 2Gram(s) IV Intermittent every 4 hours  nafcillin  IVPB  IV Intermittent   insulin glargine Injectable (LANTUS) 40Unit(s) SubCutaneous at bedtime  saccharomyces boulardii 500milliGRAM(s) Oral two times a day  tiotropium 18 MICROgram(s) Capsule 1Capsule(s) Inhalation daily  sodium chloride 0.9%. 5000milliLiter(s) IV Continuous <Continuous>    MEDICATIONS  (PRN):  gabapentin 800milliGRAM(s) Oral three times a day PRN neuropathic pain  dextrose Gel 1Dose(s) Oral once PRN Blood Glucose LESS THAN 70 milliGRAM(s)/deciliter  glucagon  Injectable 1milliGRAM(s) IntraMuscular once PRN Glucose LESS THAN 70 milligrams/deciliter  ALBUTerol/ipratropium for Nebulization 3milliLiter(s) Nebulizer every 8 hours PRN sob  ondansetron Injectable 4milliGRAM(s) IV Push every 4 hours PRN Nausea and/or Vomiting  HYDROmorphone   Tablet 2milliGRAM(s) Oral every 4 hours PRN Severe Pain (7 - 10)  HYDROmorphone   Tablet 1milliGRAM(s) Oral every 4 hours PRN Moderate Pain (4 - 6)        REVIEW OF SYSTEMS:  CONSTITUTIONAL:  as per HPI  HEENT:  Eyes:  No diplopia or blurred vision. ENT:  No earache, sore throat or runny nose.  CARDIOVASCULAR:  No pressure, squeezing, strangling, tightness, heaviness or aching about the chest, neck, axilla or epigastrium.  RESPIRATORY:  No cough, shortness of breath, PND or orthopnea.  GASTROINTESTINAL:  No nausea, vomiting or diarrhea.  GENITOURINARY:  No dysuria, frequency or urgency. No Blood in urine  MUSCULOSKELETAL:  no joint aches, no muscle pain  SKIN:  No change in skin, hair or nails.  NEUROLOGIC:  No paresthesias, fasciculations, seizures or weakness.  PSYCHIATRIC:  No disorder of thought or mood.  ENDOCRINE:  No heat or cold intolerance, polyuria or polydipsia.  HEMATOLOGICAL:  No easy bruising or bleeding.        Physical Exam:  Vital Signs Last 24 Hrs  T(C): 36.7, Max: 36.7 (01-03 @ 23:31)  T(F): 98, Max: 98 (01-03 @ 23:31)  HR: 77 (76 - 77)  BP: 95/60 (95/60 - 102/63)  BP(mean): --  RR: 18 (17 - 18)  SpO2: 94% (94% - 96%)    GEN: NAD, pleasant  HEENT: normocephalic and atraumatic. EOMI. AURORA., POOR DENTITION    NECK: Supple. No carotid bruits.     LUNGS: Clear to auscultation.  HEART: Regular rate and rhythm without murmur.  ABDOMEN: Soft, nontender, and nondistended.  Positive bowel sounds.    EXTREMITIES: DRESSING in bilateral feet  NEUROLOGIC: Cranial nerves II through XII are grossly intact.  PSYCHIATRIC: Appropriate affect .       Labs:  04 Jan 2017 07:31    130    |  93     |  13.0   ----------------------------<  136    3.8     |  27.0   |  2.02     Ca    7.9        04 Jan 2017 07:31  Phos  4.5       04 Jan 2017 07:31  Mg     2.2       04 Jan 2017 07:31                            9.3    4.36  )-----------( 132      ( 04 Jan 2017 07:34 )             28.2                 CAPILLARY BLOOD GLUCOSE  119 (04 Jan 2017 08:20)  133 (03 Jan 2017 21:41)        RECENT CULTURES:  01-02 .Tissue r 1st metatarsal bone (swabs) XXXX   Rare White blood cells  No organisms seen   No growth at 1 day.  Culture in progress    01-02 .Blood Blood XXXX XXXX   No growth at 48 hours    01-01 .Blood Blood Staphylococcus aureus XXXX   Growth in aerobic bottle: Staphylococcus aureus  Aerobic Bottle: 23:58 Hours to positivity  Anaerobic Bottle: No growth to date  ,  TYPE: (C=Critical, N=Notification, A=Abnormal) c  TESTS:  _ gs  DATE/TIME CALLED: _ 01/02/2017 13:54:24  CALLED TO: _    12-30 .Urine Clean Catch (Midstream) XXXX XXXX   No growth    12-30 .Blood Blood Staphylococcus aureus XXXX   Growth in aerobic bottle: Staphylococcus aureus  Aerobic Bottle: 1 day 02:07 Hours to positivity  Anaerobic Bottle: No growth to date  ,  TYPE: (C=Critical, N=Notification, A=Abnormal) c  TESTS:  _ gs  DATE/TIME CALLED: _ 12/31/2016 15:22:52  CALLED    12-30 .Blood Blood Staphylococcus aureus XXXX   Growth in aerobic bottle: Staphylococcus aureus  Aerobic Bottle: 17:02 Hours to positivity  Anaerobic Bottle: No growth to date  ,  TYPE: (C=Critical, N=Notification, A=Abnormal) c  TESTS:  _ gs  DATE/TIME CALLED: _ 12/31/2016 09:19:18  CALLED TO: _    12-30 .Other Right foot gangrene hallux Staphylococcus aureus  Enterococcus faecalis XXXX   Numerous Staphylococcus aureus  Numerous Enterococcus faecalis    12-29 .Blood Blood Staphylococcus aureus XXXX   Growth in anaerobic bottle: Staphylococcus aureus  Anaerobic Bottle: 20:41 Hours to positivity  Aerobic Bottle: No growth at 5 days.  .  TYPE: (C=Critical, N=Notification, A=Abnormal) c  TESTS:  _ bldgs  DATE/TIME CALLED: _ 12/30/2016 17:33:11  CALL    12-29 .Blood Blood Staphylococcus aureus XXXX   Growth in aerobic and anaerobic bottles: Staphylococcus aureus  Aerobic Bottle: 14:15 Hours to positivity  Anaerobic Bottle: 23:36 Hours to positivity  .  TYPE: (C=Critical, N=Notification, A=Abnormal) C  TESTS:  _ Positive Blood GS  DATE/TIME MAYNARD      MRI foot right  MPRESSION: Cutaneous ulcerations adjacent to the distal phalanx of the   hallux. Associated osteomyelitis in the distal phalanx and 10 x 9 x 12 mm   abscess in the plantar subcutaneous tissues underlying the distal phalanx.          TTE     Summary:   1. Left ventricular ejection fraction, by visual estimation, is 55 to   60%.   2. Normal global left ventricular systolic function.   3. Normal left ventricular internal cavity size.   4. Spectral Doppler shows impaired relaxation pattern of left   ventricular myocardial filling (Grade I diastolic dysfunction).   5. Trivial pericardial effusion. SONYA LENNON   Chart reviewed. patient seen and examined. labs reviewed.  complaining of pain in the LEFT hand.    Allergies:  No Known Allergies    MEDICATIONS  (STANDING):  DULoxetine 60milliGRAM(s) Oral daily  buDESOnide  80 MICROgram(s)/formoterol 4.5 MICROgram(s) Inhaler 2Puff(s) Inhalation two times a day  montelukast 10milliGRAM(s) Oral at bedtime  heparin  Injectable 5000Unit(s) SubCutaneous every 12 hours  insulin lispro (HumaLOG) corrective regimen sliding scale  SubCutaneous Before meals and at bedtime  dextrose 5%. 1000milliLiter(s) IV Continuous <Continuous>  dextrose 50% Injectable 12.5Gram(s) IV Push once  dextrose 50% Injectable 25Gram(s) IV Push once  dextrose 50% Injectable 25Gram(s) IV Push once  diazepam    Tablet 5milliGRAM(s) Oral every 8 hours  polyethylene glycol 3350 17Gram(s) Oral daily  Dakins Solution - 1/2 Strength 1Application(s) Topical daily  buPROPion XL . 300milliGRAM(s) Oral daily  traZODone 100milliGRAM(s) Oral at bedtime  nafcillin  IVPB 2Gram(s) IV Intermittent every 4 hours  nafcillin  IVPB  IV Intermittent   insulin glargine Injectable (LANTUS) 40Unit(s) SubCutaneous at bedtime  saccharomyces boulardii 500milliGRAM(s) Oral two times a day  tiotropium 18 MICROgram(s) Capsule 1Capsule(s) Inhalation daily  sodium chloride 0.9%. 5000milliLiter(s) IV Continuous <Continuous>    MEDICATIONS  (PRN):  gabapentin 800milliGRAM(s) Oral three times a day PRN neuropathic pain  dextrose Gel 1Dose(s) Oral once PRN Blood Glucose LESS THAN 70 milliGRAM(s)/deciliter  glucagon  Injectable 1milliGRAM(s) IntraMuscular once PRN Glucose LESS THAN 70 milligrams/deciliter  ALBUTerol/ipratropium for Nebulization 3milliLiter(s) Nebulizer every 8 hours PRN sob  ondansetron Injectable 4milliGRAM(s) IV Push every 4 hours PRN Nausea and/or Vomiting  HYDROmorphone   Tablet 2milliGRAM(s) Oral every 4 hours PRN Severe Pain (7 - 10)  HYDROmorphone   Tablet 1milliGRAM(s) Oral every 4 hours PRN Moderate Pain (4 - 6)        REVIEW OF SYSTEMS:  CONSTITUTIONAL:  as per HPI  HEENT:  Eyes:  No diplopia or blurred vision. ENT:  No earache, sore throat or runny nose.  CARDIOVASCULAR:  No pressure, squeezing, strangling, tightness, heaviness or aching about the chest, neck, axilla or epigastrium.  RESPIRATORY:  No cough, shortness of breath, PND or orthopnea.  GASTROINTESTINAL:  No nausea, vomiting or diarrhea.  GENITOURINARY:  No dysuria, frequency or urgency. No Blood in urine  MUSCULOSKELETAL:  no joint aches, no muscle pain  SKIN:  No change in skin, hair or nails.  NEUROLOGIC:  No paresthesias, fasciculations, seizures or weakness.  PSYCHIATRIC:  No disorder of thought or mood.  ENDOCRINE:  No heat or cold intolerance, polyuria or polydipsia.  HEMATOLOGICAL:  No easy bruising or bleeding.        Physical Exam:  Vital Signs Last 24 Hrs  T(C): 36.7, Max: 36.7 (01-03 @ 23:31)  T(F): 98, Max: 98 (01-03 @ 23:31)  HR: 77 (76 - 77)  BP: 95/60 (95/60 - 102/63)  BP(mean): --  RR: 18 (17 - 18)  SpO2: 94% (94% - 96%)    GEN: NAD, pleasant  HEENT: normocephalic and atraumatic. EOMI. AURORA., POOR DENTITION    NECK: Supple. No carotid bruits.     LUNGS: Clear to auscultation.  HEART: Regular rate and rhythm without murmur.  ABDOMEN: Soft, nontender, and nondistended.  Positive bowel sounds.    EXTREMITIES: DRESSING in bilateral feet  NEUROLOGIC: Cranial nerves II through XII are grossly intact.  PSYCHIATRIC: Appropriate affect .       Labs:  04 Jan 2017 07:31    130    |  93     |  13.0   ----------------------------<  136    3.8     |  27.0   |  2.02     Ca    7.9        04 Jan 2017 07:31  Phos  4.5       04 Jan 2017 07:31  Mg     2.2       04 Jan 2017 07:31                            9.3    4.36  )-----------( 132      ( 04 Jan 2017 07:34 )             28.2                 CAPILLARY BLOOD GLUCOSE  119 (04 Jan 2017 08:20)  133 (03 Jan 2017 21:41)        RECENT CULTURES:  01-02 .Tissue r 1st metatarsal bone (swabs) XXXX   Rare White blood cells  No organisms seen   No growth at 1 day.  Culture in progress    01-02 .Blood Blood XXXX XXXX   No growth at 48 hours    01-01 .Blood Blood Staphylococcus aureus XXXX   Growth in aerobic bottle: Staphylococcus aureus  Aerobic Bottle: 23:58 Hours to positivity  Anaerobic Bottle: No growth to date  ,  TYPE: (C=Critical, N=Notification, A=Abnormal) c  TESTS:  _ gs  DATE/TIME CALLED: _ 01/02/2017 13:54:24  CALLED TO: _    12-30 .Urine Clean Catch (Midstream) XXXX XXXX   No growth    12-30 .Blood Blood Staphylococcus aureus XXXX   Growth in aerobic bottle: Staphylococcus aureus  Aerobic Bottle: 1 day 02:07 Hours to positivity  Anaerobic Bottle: No growth to date  ,  TYPE: (C=Critical, N=Notification, A=Abnormal) c  TESTS:  _ gs  DATE/TIME CALLED: _ 12/31/2016 15:22:52  CALLED    12-30 .Blood Blood Staphylococcus aureus XXXX   Growth in aerobic bottle: Staphylococcus aureus  Aerobic Bottle: 17:02 Hours to positivity  Anaerobic Bottle: No growth to date  ,  TYPE: (C=Critical, N=Notification, A=Abnormal) c  TESTS:  _ gs  DATE/TIME CALLED: _ 12/31/2016 09:19:18  CALLED TO: _    12-30 .Other Right foot gangrene hallux Staphylococcus aureus  Enterococcus faecalis XXXX   Numerous Staphylococcus aureus  Numerous Enterococcus faecalis    12-29 .Blood Blood Staphylococcus aureus XXXX   Growth in anaerobic bottle: Staphylococcus aureus  Anaerobic Bottle: 20:41 Hours to positivity  Aerobic Bottle: No growth at 5 days.  .  TYPE: (C=Critical, N=Notification, A=Abnormal) c  TESTS:  _ bldgs  DATE/TIME CALLED: _ 12/30/2016 17:33:11  CALL    12-29 .Blood Blood Staphylococcus aureus XXXX   Growth in aerobic and anaerobic bottles: Staphylococcus aureus  Aerobic Bottle: 14:15 Hours to positivity  Anaerobic Bottle: 23:36 Hours to positivity  .  TYPE: (C=Critical, N=Notification, A=Abnormal) C  TESTS:  _ Positive Blood GS  DATE/TIME MAYNARD      MRI foot right  MPRESSION: Cutaneous ulcerations adjacent to the distal phalanx of the   hallux. Associated osteomyelitis in the distal phalanx and 10 x 9 x 12 mm   abscess in the plantar subcutaneous tissues underlying the distal phalanx.          TTE     Summary:   1. Left ventricular ejection fraction, by visual estimation, is 55 to   60%.   2. Normal global left ventricular systolic function.   3. Normal left ventricular internal cavity size.   4. Spectral Doppler shows impaired relaxation pattern of left   ventricular myocardial filling (Grade I diastolic dysfunction).   5. Trivial pericardial effusion.

## 2017-01-04 NOTE — PROGRESS NOTE ADULT - PROBLEM SELECTOR PLAN 1
ID Consult appreciated  IV Nafcillin   BC daily  Transthoracic Echo - no valvular evidence of endocarditis noted - CARLITA scheduled for today  Will need PICC when BC no longer positive

## 2017-01-04 NOTE — PROGRESS NOTE ADULT - PROBLEM SELECTOR PLAN 1
- MSSA bacteremia  - needs CARLITA to r/o endocarditis in view of persistent positive blood cultures  - cont Nafcillin 2 gram IV Q 4H

## 2017-01-04 NOTE — PROGRESS NOTE ADULT - PROBLEM SELECTOR PLAN 1
Patient was seen  dressing changes with packing left foot and bilateral xeroform and DSD  Patient can start walking with surgical shoes, packing intact to the left foot.   Patient is stable for d/c from podiatry- pending path and culture OR results.  Continue IV antibiotics

## 2017-01-04 NOTE — PROGRESS NOTE ADULT - ASSESSMENT
51 y.o. diabetic smoker, here for foot infection. found with MSSA bacteremia.  RIGHT 1st toe gangrene and osteomyelitis. LEFT 2nd metatarsal OM. s/p amputation.  Cultures sent to lab from OR.

## 2017-01-05 NOTE — PROGRESS NOTE ADULT - ASSESSMENT
52 y/o female diabetic, right toe gangrene, cellulitis.  S/p: Right foot partial first ray amputation                                                                                Left foot debridement and second metatarsal debridement and                                                                                                                       bone biopsy          Problem/Plan - 1:  ·  Problem: Sepsis.  Plan: 2/2 right toe gangrene/cellulititis , and also left site of toe amputationgangrene/cellulitus.. consults podiatry Dr Reyes, Vascular Dr Hill,   abx vanco/zosyn, IVF 2LNS (ED), on 150cc/hr, (bp borderline low), repeated lactate wnl, f/u ulcer culture, bc.. goal clear lact,map>65, UO>0.5...     Problem/Plan - 2:  ·  Problem: Toe gangrene.  Plan: as above.. 2/2 PAD  arterial dopplers for now, pt will need vascular imagings.     Problem/Plan - 3:  ·  Problem: Diabetes.  Plan: lantus, slinfind scale.     Problem/Plan - 4:  ·  Problem: COPD (chronic obstructive pulmonary disease).  Plan: cont advair  albuterol prn.     Problem/Plan - 5:  ·  Problem: Smoker.  Plan: refused smoking cessation.     Problem/Plan - 6:  Problem: Depression. Plan: psych consult.      Discharge Planning:  ·  Tentative Discharge Location: unsure.  Expected Length of Stay: unknown.     Document Status:   H&P Status:  · Document Status	Authored by Attending

## 2017-01-05 NOTE — PROGRESS NOTE ADULT - ASSESSMENT
50 YO FEMALE WITH PVD S/P OR AMPUTATION RIGTH 1ST TOE AND LEFT FOOT ULCER  OSTEOMYELITIS WITH BACTERMIA  ON NAFCILLIN  PLAN AT LEAST 4 WEEKS IV ABX WILL FOLLOW UP  WITH FURTHER RECOMMENDATIONS

## 2017-01-05 NOTE — PROGRESS NOTE ADULT - ASSESSMENT
51 y.o.  Female with DMII, depression, COPD, current smoker sent by podiatrist Dr Reyes for right toe gangrene worsening drainage. At prior left toe amputation site also with erythema and gangrene,  foul smelling drainage, patient with low grade temperatures of 99.3 in hospital, but reported fevers at home.   Pt underwent right foot I st toe partial amputation and Left II debridement, noted to have MSSA bacteremia - resolved on IV abx with neg CARLITA. Course complicated by ERIC due to vancomycin and now diarrhea

## 2017-01-05 NOTE — PROGRESS NOTE ADULT - PROBLEM SELECTOR PLAN 1
ID Consult appreciated  IV Nafcillin   BC daily  CARLITA/TTE - no valvular evidence of endocarditis noted   Will need PICC as BC no longer positive - at least 4 weeks of IV abx as discussed with Dr. Blackburn  New diarrhea - ? abx induced vs Cdiff - f/u Cdiff PCR

## 2017-01-05 NOTE — PROGRESS NOTE ADULT - PROBLEM SELECTOR PLAN 1
1-Pain Controlled  2- Continue:  Dilaudid 1/2mg po q4hprn                      Valium 5mg/q8 as ordered                     Cymbalta 60mg qd as ordered                    Trazadone 100mg as ordered  3-Bowel Regimen: Miralax  4-Patient agrees with above plan.   5-Will sign off, reconsult as needed 1-Patient is stable, pain is controlled with current medication regimen  2- Continue:  Dilaudid 1/2mg po q4hprn                      Valium 5mg/q8 as ordered                      Cymbalta 60mg qd as ordered                     Trazadone 100mg as ordered  3-Bowel Regimen: Miralax  4-Discussed above plan with Dr. Pollard, plan is possible d/c in next 24 hours.  5-Patient will follow-up with her current pain management PCP on d/c  6-Will sign off  5-Will sign off, reconsult as needed

## 2017-01-05 NOTE — PROGRESS NOTE ADULT - SUBJECTIVE AND OBJECTIVE BOX
Patient seen and examined at bedside  Well maintained with the current medication regimen  No adverse side effects  Diet as tolerated      PAST MEDICAL & SURGICAL HISTORY:  Fibromyalgia  DM (diabetes mellitus)  Foot ulcer: Left Foot  Osteopenia  Chronic pain  Back ache  Arthritis  Shoulder joint stiffness, left  [place] esophagus  Stomach ulcer  Diabetes  Asthma  S/P knee surgery  S/P hysterectomy  S/P cholecystectomy      SOCIAL HISTORY:  [ ] Denies Smoking, Alcohol, or Drug Use    Allergies    No Known Allergies    Intolerances        PAIN MEDICATIONS:  DULoxetine 60milliGRAM(s) Oral daily  diazepam    Tablet 5milliGRAM(s) Oral every 8 hours  buPROPion XL . 300milliGRAM(s) Oral daily  traZODone 100milliGRAM(s) Oral at bedtime  ondansetron Injectable 4milliGRAM(s) IV Push every 4 hours PRN  HYDROmorphone   Tablet 2milliGRAM(s) Oral every 4 hours PRN  HYDROmorphone   Tablet 1milliGRAM(s) Oral every 4 hours PRN  gabapentin 600milliGRAM(s) Oral two times a day PRN    Heme:  heparin  Injectable 5000Unit(s) SubCutaneous every 12 hours    Antibiotics:  nafcillin  IVPB 2Gram(s) IV Intermittent every 4 hours  nafcillin  IVPB  IV Intermittent     Cardiac:    Pulmonary:  buDESOnide  80 MICROgram(s)/formoterol 4.5 MICROgram(s) Inhaler 2Puff(s) Inhalation two times a day  montelukast 10milliGRAM(s) Oral at bedtime  ALBUTerol/ipratropium for Nebulization 3milliLiter(s) Nebulizer every 8 hours PRN  tiotropium 18 MICROgram(s) Capsule 1Capsule(s) Inhalation daily    Endocrine  insulin lispro (HumaLOG) corrective regimen sliding scale  SubCutaneous Before meals and at bedtime  dextrose Gel 1Dose(s) Oral once PRN  dextrose 50% Injectable 12.5Gram(s) IV Push once  dextrose 50% Injectable 25Gram(s) IV Push once  dextrose 50% Injectable 25Gram(s) IV Push once  glucagon  Injectable 1milliGRAM(s) IntraMuscular once PRN  insulin glargine Injectable (LANTUS) 40Unit(s) SubCutaneous at bedtime    GI:  polyethylene glycol 3350 17Gram(s) Oral daily  pantoprazole    Tablet 40milliGRAM(s) Oral before breakfast    All Other Meds:  dextrose 5%. 1000milliLiter(s) IV Continuous <Continuous>  Dakins Solution - 1/2 Strength 1Application(s) Topical daily  saccharomyces boulardii 500milliGRAM(s) Oral two times a day  sodium chloride 0.9%. 5000milliLiter(s) IV Continuous <Continuous>      PHYSICAL EXAM:    Vital Signs Last 24 Hrs  T(C): 36.6, Max: 36.7 (01-04 @ 15:56)  T(F): 97.9, Max: 98 (01-04 @ 15:56)  HR: 72 (72 - 77)  BP: 100/52 (89/58 - 101/65)  BP(mean): --  RR: 18 (18 - 18)  SpO2: 97% (93% - 98%)    PAIN SCORE:         SCALE USED: (1-10 VNRS)       GENERAL: Comfortable, in no apparent distress  HEENT:  Atraumatic, Normocephalic  NECK: Supple  LUNG: Respiration even and unlabored, no distress noted  ABDOMEN: Soft, Nontender, Nondistended;   BACK: No midline bony tenderness to palpation, no step off or deformity noted.   EXTREMITIES:  Bilateral LE, dressing C/D/I  NEUROLOGIC: Awake, alert and oriented X3;  No focal deficits  SKIN: Warm and Dry    LABS:                          9.3    4.36  )-----------( 132      ( 04 Jan 2017 07:34 )             28.2     05 Jan 2017 07:20    132    |  98     |  13.0   ----------------------------<  83     3.7     |  24.0   |  2.05     Ca    7.7        05 Jan 2017 07:20  Phos  4.5       04 Jan 2017 07:31  Mg     2.2       04 Jan 2017 07:31            Drug Screen:        RADIOLOGY:      [ ]  [place]  Reviewed and Copied to Chart Patient seen and examined at bedside  Reports medication is effective, but continues to have moderate to severe pain with activities.  Patient requesting IV Morphine, as it "helps me to sleep, and better for my pain"  No adverse side effects  Diet as tolerated      PAST MEDICAL & SURGICAL HISTORY:  Fibromyalgia  DM (diabetes mellitus)  Foot ulcer: Left Foot  Osteopenia  Chronic pain  Back ache  Arthritis  Shoulder joint stiffness, left  [place] esophagus  Stomach ulcer  Diabetes  Asthma  S/P knee surgery  S/P hysterectomy  S/P cholecystectomy      SOCIAL HISTORY:  [ ] Denies Smoking, Alcohol, or Drug Use    Allergies    No Known Allergies    Intolerances        PAIN MEDICATIONS:  DULoxetine 60milliGRAM(s) Oral daily  diazepam    Tablet 5milliGRAM(s) Oral every 8 hours  buPROPion XL . 300milliGRAM(s) Oral daily  traZODone 100milliGRAM(s) Oral at bedtime  ondansetron Injectable 4milliGRAM(s) IV Push every 4 hours PRN  HYDROmorphone   Tablet 2milliGRAM(s) Oral every 4 hours PRN  HYDROmorphone   Tablet 1milliGRAM(s) Oral every 4 hours PRN  gabapentin 600milliGRAM(s) Oral two times a day PRN    Heme:  heparin  Injectable 5000Unit(s) SubCutaneous every 12 hours    Antibiotics:  nafcillin  IVPB 2Gram(s) IV Intermittent every 4 hours  nafcillin  IVPB  IV Intermittent     Cardiac:    Pulmonary:  buDESOnide  80 MICROgram(s)/formoterol 4.5 MICROgram(s) Inhaler 2Puff(s) Inhalation two times a day  montelukast 10milliGRAM(s) Oral at bedtime  ALBUTerol/ipratropium for Nebulization 3milliLiter(s) Nebulizer every 8 hours PRN  tiotropium 18 MICROgram(s) Capsule 1Capsule(s) Inhalation daily    Endocrine  insulin lispro (HumaLOG) corrective regimen sliding scale  SubCutaneous Before meals and at bedtime  dextrose Gel 1Dose(s) Oral once PRN  dextrose 50% Injectable 12.5Gram(s) IV Push once  dextrose 50% Injectable 25Gram(s) IV Push once  dextrose 50% Injectable 25Gram(s) IV Push once  glucagon  Injectable 1milliGRAM(s) IntraMuscular once PRN  insulin glargine Injectable (LANTUS) 40Unit(s) SubCutaneous at bedtime    GI:  polyethylene glycol 3350 17Gram(s) Oral daily  pantoprazole    Tablet 40milliGRAM(s) Oral before breakfast    All Other Meds:  dextrose 5%. 1000milliLiter(s) IV Continuous <Continuous>  Dakins Solution - 1/2 Strength 1Application(s) Topical daily  saccharomyces boulardii 500milliGRAM(s) Oral two times a day  sodium chloride 0.9%. 5000milliLiter(s) IV Continuous <Continuous>      PHYSICAL EXAM:    Vital Signs Last 24 Hrs  T(C): 36.6, Max: 36.7 (01-04 @ 15:56)  T(F): 97.9, Max: 98 (01-04 @ 15:56)  HR: 72 (72 - 77)  BP: 100/52 (89/58 - 101/65)  BP(mean): --  RR: 18 (18 - 18)  SpO2: 97% (93% - 98%)    PAIN SCORE:    5-6/10     SCALE USED: (1-10 VNRS)       GENERAL: Comfortable, in no apparent distress  HEENT:  Atraumatic, Normocephalic  NECK: Supple  LUNG: Respiration even and unlabored, no distress noted  ABDOMEN: Soft, Nontender, Nondistended;   BACK: No midline bony tenderness to palpation, no step off or deformity noted.   EXTREMITIES:  Bilateral LE, dressing C/D/I  NEUROLOGIC: Awake, alert and oriented X3;  No focal deficits  SKIN: Warm and Dry    LABS:                          9.3    4.36  )-----------( 132      ( 04 Jan 2017 07:34 )             28.2     05 Jan 2017 07:20    132    |  98     |  13.0   ----------------------------<  83     3.7     |  24.0   |  2.05     Ca    7.7        05 Jan 2017 07:20  Phos  4.5       04 Jan 2017 07:31  Mg     2.2       04 Jan 2017 07:31            Drug Screen:        RADIOLOGY:      [ ]  [place]  Reviewed and Copied to Chart

## 2017-01-05 NOTE — PROGRESS NOTE ADULT - SUBJECTIVE AND OBJECTIVE BOX
CC: toe gangrene (29 Dec 2016 21:26)    HPI: 50 y/o F w/hx DM, depression, COPD, current smoker, Presents to the ED, sent by her podiatrist (Dr Reyes), due to right toe gangrene. She states  this has started weeks ago, usually goes to the podiatry every/week, but now the infection has worsen,. surrounded w/erythema and foul smelling discharge, yellowish, warm to palpation. Also noticed that in site of left toe amputation, started also w/erythema and gangrene, associated w/ abundant foul smelling dc as well/ As;p c/o of fever and chills. A vascular doct has been recommended in the past, but she states she hasn't set up an appt yet.Denies vomiting, n, cough, abd pain,dysuria, sob, weakness, blurred vision, numbness, or any further complaints. (29 Dec 2016 21:26)    INTERVAL HPI/OVERNIGHT EVENTS: B/L foot pain, occasional abdominal pain, severely depressed, awaiting CARLITA today    Other ROS reviewed and neg     Vital Signs Last 24 Hrs  T(C): 36.6, Max: 36.7 (01-04 @ 15:56)  T(F): 97.9, Max: 98 (01-04 @ 15:56)  HR: 72 (72 - 77)  BP: 100/52 (89/58 - 101/65)  BP(mean): --  RR: 18 (18 - 18)  SpO2: 97% (93% - 98%)               9.3    4.36  )-----------( 132      ( 04 Jan 2017 07:34 )             28.2     05 Jan 2017 07:20    132    |  98     |  13.0   ----------------------------<  83     3.7     |  24.0   |  2.05     Ca    7.7        05 Jan 2017 07:20  Phos  4.5       04 Jan 2017 07:31  Mg     2.2       04 Jan 2017 07:31        CAPILLARY BLOOD GLUCOSE  91 (04 Jan 2017 22:32)  89 (04 Jan 2017 17:43)  112 (04 Jan 2017 13:33)    MEDICATIONS  (STANDING):  DULoxetine 60milliGRAM(s) Oral daily  buDESOnide  80 MICROgram(s)/formoterol 4.5 MICROgram(s) Inhaler 2Puff(s) Inhalation two times a day  montelukast 10milliGRAM(s) Oral at bedtime  heparin  Injectable 5000Unit(s) SubCutaneous every 12 hours  insulin lispro (HumaLOG) corrective regimen sliding scale  SubCutaneous Before meals and at bedtime  dextrose 5%. 1000milliLiter(s) IV Continuous <Continuous>  dextrose 50% Injectable 12.5Gram(s) IV Push once  dextrose 50% Injectable 25Gram(s) IV Push once  dextrose 50% Injectable 25Gram(s) IV Push once  diazepam    Tablet 5milliGRAM(s) Oral every 8 hours  polyethylene glycol 3350 17Gram(s) Oral daily  Dakins Solution - 1/2 Strength 1Application(s) Topical daily  buPROPion XL . 300milliGRAM(s) Oral daily  traZODone 100milliGRAM(s) Oral at bedtime  nafcillin  IVPB 2Gram(s) IV Intermittent every 4 hours  nafcillin  IVPB  IV Intermittent   insulin glargine Injectable (LANTUS) 40Unit(s) SubCutaneous at bedtime  saccharomyces boulardii 500milliGRAM(s) Oral two times a day  tiotropium 18 MICROgram(s) Capsule 1Capsule(s) Inhalation daily  sodium chloride 0.9%. 1000milliLiter(s) IV Continuous <Continuous>    MEDICATIONS  (PRN):  gabapentin 800milliGRAM(s) Oral three times a day PRN neuropathic pain  dextrose Gel 1Dose(s) Oral once PRN Blood Glucose LESS THAN 70 milliGRAM(s)/deciliter  glucagon  Injectable 1milliGRAM(s) IntraMuscular once PRN Glucose LESS THAN 70 milligrams/deciliter  ALBUTerol/ipratropium for Nebulization 3milliLiter(s) Nebulizer every 8 hours PRN sob  ondansetron Injectable 4milliGRAM(s) IV Push every 4 hours PRN Nausea and/or Vomiting  HYDROmorphone   Tablet 2milliGRAM(s) Oral every 4 hours PRN Severe Pain (7 - 10)  HYDROmorphone   Tablet 1milliGRAM(s) Oral every 4 hours PRN Moderate Pain (4 - 6)      RADIOLOGY & ADDITIONAL TESTS:    REVIEW OF SYSTEMS:  + B/L foot pain/back pain, + Depressed mood  Patient denied abdominal pain, nausea, vomiting, cough, shortness of breath, chest pain or palpitations      Physical Exam:   · Constitutional	detailed exam  · Constitutional Comments	NAD  · ENMT	No oral lesions; no gross abnormalities  · Back	No deformity or limitation of movement  · Respiratory	detailed exam  · Respiratory Comments	Course BS B/L  · Cardiovascular	detailed exam  · Cardiovascular Details	regular rate and rhythm  · Cardiovascular Details	positive S1; positive S2  · Gastrointestinal	detailed exam  · GI Normal	soft; nontender; bowel sounds normal  · Extremities	detailed exam  · Extremities Comments	B/L foot dressings, not removed  · Neurological	Alert & oriented; no sensory, motor or coordination deficits, normal reflexes  · Musculoskeletal	detailed exam  · Musculoskeletal Details	decreased ROM due to pain  · Psychiatric	detailed exam  · Psychiatric Details	depressed

## 2017-01-05 NOTE — PROGRESS NOTE ADULT - SUBJECTIVE AND OBJECTIVE BOX
51 year old female patient post op podiatry note s/p 3 day   Right foot partial first ray amputation   Left foot - debridement and second metatarsal debridement and bone biopsy.   Patient denies f/c/v/n/d or sob. Laying comfortable in bed with foot dressing intact , no strikethrough.   Patient states she is confused and does not know what she is talking about.                           9.3    4.36  )-----------( 132      ( 04 Jan 2017 07:34 )             28.2     Exam:  Bilateral surgical incisions sutures are intact. No dehiscences no drainage decrease in edema and erythema, no signs of necrosis. No malodor, no tenderness on palpation.

## 2017-01-05 NOTE — PROGRESS NOTE ADULT - SUBJECTIVE AND OBJECTIVE BOX
SONYA LENNON is a 51y Female with HPI:  50 y/o F w/hx DM, depression, COPD, current smoker, Presents to the ED, sent by her podiatrist (Dr Reyes), due to right toe gangrene. She states  this has started weeks ago, usually goes to the podiatry every/week, but now the infection has worsen,. surrounded w/erythema and foul smelling discharge, yellowish, warm to palpation. Also noticed that in site of left toe amputation, started also w/erythema and gangrene, associated w/ abundant foul smelling dc as well/ As;p c/o of fever and chills  pt s/p or amp right first toe  ON IV ABX MSSA BACTEREMIA NOW CLEARED    Allergies:  No Known Allergies      Medications:  DULoxetine 60milliGRAM(s) Oral daily  buDESOnide  80 MICROgram(s)/formoterol 4.5 MICROgram(s) Inhaler 2Puff(s) Inhalation two times a day  montelukast 10milliGRAM(s) Oral at bedtime  heparin  Injectable 5000Unit(s) SubCutaneous every 12 hours  insulin lispro (HumaLOG) corrective regimen sliding scale  SubCutaneous Before meals and at bedtime  dextrose 5%. 1000milliLiter(s) IV Continuous <Continuous>  dextrose Gel 1Dose(s) Oral once PRN  dextrose 50% Injectable 12.5Gram(s) IV Push once  dextrose 50% Injectable 25Gram(s) IV Push once  dextrose 50% Injectable 25Gram(s) IV Push once  glucagon  Injectable 1milliGRAM(s) IntraMuscular once PRN  ALBUTerol/ipratropium for Nebulization 3milliLiter(s) Nebulizer every 8 hours PRN  diazepam    Tablet 5milliGRAM(s) Oral every 8 hours  polyethylene glycol 3350 17Gram(s) Oral daily  Dakins Solution - 1/2 Strength 1Application(s) Topical daily  buPROPion XL . 300milliGRAM(s) Oral daily  traZODone 100milliGRAM(s) Oral at bedtime  nafcillin  IVPB 2Gram(s) IV Intermittent every 4 hours  nafcillin  IVPB  IV Intermittent   insulin glargine Injectable (LANTUS) 40Unit(s) SubCutaneous at bedtime  saccharomyces boulardii 500milliGRAM(s) Oral two times a day  ondansetron Injectable 4milliGRAM(s) IV Push every 4 hours PRN  tiotropium 18 MICROgram(s) Capsule 1Capsule(s) Inhalation daily  HYDROmorphone   Tablet 2milliGRAM(s) Oral every 4 hours PRN  HYDROmorphone   Tablet 1milliGRAM(s) Oral every 4 hours PRN  gabapentin 600milliGRAM(s) Oral two times a day PRN  sodium chloride 0.9%. 5000milliLiter(s) IV Continuous <Continuous>  pantoprazole    Tablet 40milliGRAM(s) Oral before breakfast      ANTIBIOTICS: NAFCILLIN        Review of Systems: - Negative except as mentioned above     Physical Exam:  ICU Vital Signs Last 24 Hrs  T(C): 36.6, Max: 36.7 (01-04 @ 15:56)  T(F): 97.9, Max: 98 (01-04 @ 15:56)  HR: 72 (72 - 77)  BP: 100/52 (89/58 - 101/65)  BP(mean): --  ABP: --  ABP(mean): --  RR: 18 (18 - 18)  SpO2: 97% (93% - 98%)    GEN: NAD, pleasant  HEENT: normocephalic and atraumatic. EOMI. [place]...  NECK: Supple. No carotid bruits.  No lymphadenopathy or thyromegaly.  LUNGS: Clear to auscultation.  HEART: Regular rate and rhythm without murmur.  ABDOMEN: Soft, nontender, and nondistended.  Positive bowel sounds.  No hepatosplenomegaly was noted.  NO REBOUND NO GUARDING  EXTREMITIES: Without any cyanosis, clubbing, rash, lesions or edema.  NEUROLOGIC: Cranial nerves II through XII are grossly intact.    SKIN: No ulceration or induration present.      Labs:  05 Jan 2017 07:20    132    |  98     |  13.0   ----------------------------<  83     3.7     |  24.0   |  2.05     Ca    7.7        05 Jan 2017 07:20  Phos  4.5       04 Jan 2017 07:31  Mg     2.2       04 Jan 2017 07:31                            9.3    4.36  )-----------( 132      ( 04 Jan 2017 07:34 )             28.2                 CAPILLARY BLOOD GLUCOSE  91 (04 Jan 2017 22:32)  89 (04 Jan 2017 17:43)  112 (04 Jan 2017 13:33)        RECENT CULTURES:  01-03 .Blood Blood XXXX XXXX   No growth at 48 hours    01-02 .Tissue r 1st metatarsal bone (swabs) XXXX   Rare White blood cells  No organisms seen   Culture in progress    01-02 .Blood Blood XXXX XXXX   No growth at 48 hours    01-01 .Blood Blood Staphylococcus aureus XXXX   Growth in aerobic bottle: Staphylococcus aureus  Aerobic Bottle: 23:58 Hours to positivity  Anaerobic Bottle: No growth to date  ,  TYPE: (C=Critical, N=Notification, A=Abnormal) c  TESTS:  _ gs  DATE/TIME CALLED: _ 01/02/2017 13:54:24  CALLED TO: _    12-30 .Urine Clean Catch (Midstream) XXXX XXXX   No growth    12-30 .Blood Blood Staphylococcus aureus XXXX   Growth in aerobic bottle: Staphylococcus aureus  Aerobic Bottle: 1 day 02:07 Hours to positivity  Anaerobic Bottle: No growth at 5 days.  ,  TYPE: (C=Critical, N=Notification, A=Abnormal) c  TESTS:  _ gs  DATE/TIME CALLED: _ 12/31/2016 15:22:52  DERICK    12-30 .Blood Blood Staphylococcus aureus XXXX   Growth in aerobic bottle: Staphylococcus aureus  Aerobic Bottle: 17:02 Hours to positivity  Anaerobic Bottle: No growth at 5 days.  ,  TYPE: (C=Critical, N=Notification, A=Abnormal) c  TESTS:  _ gs  DATE/TIME CALLED: _ 12/31/2016 09:19:18  CALLED TO    12-30 .Other Right foot gangrene hallux Staphylococcus aureus  Enterococcus faecalis XXXX   Numerous Staphylococcus aureus  Numerous Enterococcus faecalis    12-29 .Blood Blood Staphylococcus aureus XXXX   Growth in anaerobic bottle: Staphylococcus aureus  Anaerobic Bottle: 20:41 Hours to positivity  Aerobic Bottle: No growth at 5 days.  .  TYPE: (C=Critical, N=Notification, A=Abnormal) c  TESTS:  _ bldgs  DATE/TIME CALLED: _ 12/30/2016 17:33:11  CALL    12-29 .Blood Blood Staphylococcus aureus XXXX   Growth in aerobic and anaerobic bottles: Staphylococcus aureus  Aerobic Bottle: 14:15 Hours to positivity  Anaerobic Bottle: 23:36 Hours to positivity  .  TYPE: (C=Critical, N=Notification, A=Abnormal) C  TESTS:  _ Positive Blood GS  DATE/TIME MAYNARD

## 2017-01-06 NOTE — PROGRESS NOTE ADULT - SUBJECTIVE AND OBJECTIVE BOX
51 year old female patient seen bedside with . Patient denies f/c but complains of vomiting and stomach pain. Patient has bilateral foot dressing intact with no pain bilateral feet. Patient is s/p bilateral foot surgery.     vitals:Vital Signs Last 24 Hrs  T(C): 36.1, Max: 36.9 (01-05 @ 21:38)  T(F): 97, Max: 98.5 (01-05 @ 21:38)  HR: 70 (70 - 75)  BP: 98/66 (82/54 - 99/68)  BP(mean): --  RR: 16 (16 - 20)  SpO2: 96% (96% - 99%)    Exam: Right foot -  sutures intact no erythema no edema no wound dehiscence no tenderness on palpation   Left foot- packing intact minimal drainage serous no malodor, no bone exposed, mild erythema to the wound.

## 2017-01-06 NOTE — PROGRESS NOTE ADULT - ASSESSMENT
s/p bilateral foot surgery, right hallux and partial first ray amputation and left foot incision and drainage and left second metatarsal debridment.

## 2017-01-06 NOTE — PROGRESS NOTE ADULT - SUBJECTIVE AND OBJECTIVE BOX
SONYA LENNON is a 51y Female with HPI:  52 y/o F w/hx DM, depression, COPD, current smoker, Presents to the ED, sent by her podiatrist (Dr Reyes), due to right toe gangrene. She states  this has started weeks ago, usually goes to the podiatry every/week, but now the infection has worsen,. surrounded w/erythema and foul smelling discharge, yellowish, warm to palpation. Also noticed that in site of left toe amputation, started also w/erythema and gangrene, associated w/ abundant foul smelling dc as well/ As;p c/o of fever and chills  pt s/p or amp right first toe  ON IV ABX MSSA BACTEREMIA NOW CLEARED  MINOR DIARRHEA FROM NAFCILLIN  ALL F/UP C/S NEG    Allergies:  No Known Allergies      Medications:  DULoxetine 60milliGRAM(s) Oral daily  buDESOnide  80 MICROgram(s)/formoterol 4.5 MICROgram(s) Inhaler 2Puff(s) Inhalation two times a day  montelukast 10milliGRAM(s) Oral at bedtime  heparin  Injectable 5000Unit(s) SubCutaneous every 12 hours  insulin lispro (HumaLOG) corrective regimen sliding scale  SubCutaneous Before meals and at bedtime  dextrose 5%. 1000milliLiter(s) IV Continuous <Continuous>  dextrose Gel 1Dose(s) Oral once PRN  dextrose 50% Injectable 12.5Gram(s) IV Push once  dextrose 50% Injectable 25Gram(s) IV Push once  dextrose 50% Injectable 25Gram(s) IV Push once  glucagon  Injectable 1milliGRAM(s) IntraMuscular once PRN  ALBUTerol/ipratropium for Nebulization 3milliLiter(s) Nebulizer every 8 hours PRN  diazepam    Tablet 5milliGRAM(s) Oral every 8 hours  polyethylene glycol 3350 17Gram(s) Oral daily  Dakins Solution - 1/2 Strength 1Application(s) Topical daily  buPROPion XL . 300milliGRAM(s) Oral daily  traZODone 100milliGRAM(s) Oral at bedtime  nafcillin  IVPB 2Gram(s) IV Intermittent every 4 hours  nafcillin  IVPB  IV Intermittent   insulin glargine Injectable (LANTUS) 40Unit(s) SubCutaneous at bedtime  saccharomyces boulardii 500milliGRAM(s) Oral two times a day  ondansetron Injectable 4milliGRAM(s) IV Push every 4 hours PRN  tiotropium 18 MICROgram(s) Capsule 1Capsule(s) Inhalation daily  HYDROmorphone   Tablet 2milliGRAM(s) Oral every 4 hours PRN  HYDROmorphone   Tablet 1milliGRAM(s) Oral every 4 hours PRN  gabapentin 600milliGRAM(s) Oral two times a day PRN  sodium chloride 0.9%. 5000milliLiter(s) IV Continuous <Continuous>  pantoprazole    Tablet 40milliGRAM(s) Oral before breakfast      ANTIBIOTICS: NAFCILLIN CHANGED TO KEFZOL        Review of Systems: - Negative except as mentioned above     Physical Exam:  ICU Vital Signs Last 24 Hrs  T(C): 36.6, Max: 36.7 (01-04 @ 15:56)  T(F): 97.9, Max: 98 (01-04 @ 15:56)  HR: 72 (72 - 77)  BP: 100/52 (89/58 - 101/65)  BP(mean): --  ABP: --  ABP(mean): --  RR: 18 (18 - 18)  SpO2: 97% (93% - 98%)    GEN: NAD, pleasant  HEENT: normocephalic and atraumatic. EOMI. A[place]...  NECK: Supple. No carotid bruits.  No lymphadenopathy or thyromegaly.  LUNGS: Clear to auscultation.  HEART: Regular rate and rhythm without murmur.  ABDOMEN: Soft, nontender, and nondistended.  Positive bowel sounds.  No hepatosplenomegaly was noted.  NO REBOUND NO GUARDING  EXTREMITIES: Without any cyanosis, clubbing, rash, lesions or edema.  NEUROLOGIC: Cranial nerves II through XII are grossly intact.    SKIN: No ulceration or induration present.      Labs:  05 Jan 2017 07:20    132    |  98     |  13.0   ----------------------------<  83     3.7     |  24.0   |  2.05     Ca    7.7        05 Jan 2017 07:20  Phos  4.5       04 Jan 2017 07:31  Mg     2.2       04 Jan 2017 07:31                            9.3    4.36  )-----------( 132      ( 04 Jan 2017 07:34 )             28.2                 CAPILLARY BLOOD GLUCOSE  91 (04 Jan 2017 22:32)  89 (04 Jan 2017 17:43)  112 (04 Jan 2017 13:33)        RECENT CULTURES:  01-03 .Blood Blood XXXX XXXX   No growth at 48 hours    01-02 .Tissue r 1st metatarsal bone (swabs) XXXX   Rare White blood cells  No organisms seen   Culture in progress    01-02 .Blood Blood XXXX XXXX   No growth at 48 hours    01-01 .Blood Blood Staphylococcus aureus XXXX   Growth in aerobic bottle: Staphylococcus aureus  Aerobic Bottle: 23:58 Hours to positivity  Anaerobic Bottle: No growth to date  ,  TYPE: (C=Critical, N=Notification, A=Abnormal) c  TESTS:  _ gs  DATE/TIME CALLED: _ 01/02/2017 13:54:24  CALLED TO: _    12-30 .Urine Clean Catch (Midstream) XXXX XXXX   No growth    12-30 .Blood Blood Staphylococcus aureus XXXX   Growth in aerobic bottle: Staphylococcus aureus  Aerobic Bottle: 1 day 02:07 Hours to positivity  Anaerobic Bottle: No growth at 5 days.  ,  TYPE: (C=Critical, N=Notification, A=Abnormal) c  TESTS:  _ gs  DATE/TIME CALLED: _ 12/31/2016 15:22:52  DERICK    12-30 .Blood Blood Staphylococcus aureus XXXX   Growth in aerobic bottle: Staphylococcus aureus  Aerobic Bottle: 17:02 Hours to positivity  Anaerobic Bottle: No growth at 5 days.  ,  TYPE: (C=Critical, N=Notification, A=Abnormal) c  TESTS:  _ gs  DATE/TIME CALLED: _ 12/31/2016 09:19:18  CALLED TO    12-30 .Other Right foot gangrene hallux Staphylococcus aureus  Enterococcus faecalis XXXX   Numerous Staphylococcus aureus  Numerous Enterococcus faecalis    12-29 .Blood Blood Staphylococcus aureus XXXX   Growth in anaerobic bottle: Staphylococcus aureus  Anaerobic Bottle: 20:41 Hours to positivity  Aerobic Bottle: No growth at 5 days.  .  TYPE: (C=Critical, N=Notification, A=Abnormal) c  TESTS:  _ bldgs  DATE/TIME CALLED: _ 12/30/2016 17:33:11  CALL    12-29 .Blood Blood Staphylococcus aureus XXXX   Growth in aerobic and anaerobic bottles: Staphylococcus aureus  Aerobic Bottle: 14:15 Hours to positivity  Anaerobic Bottle: 23:36 Hours to positivity  .  TYPE: (C=Critical, N=Notification, A=Abnormal) C  TESTS:  _ Positive Blood GS  DATE/TIME MAYNARD

## 2017-01-06 NOTE — PROGRESS NOTE ADULT - PROBLEM SELECTOR PLAN 1
continue antibiotics  patient is stable for d/c as per podiatry  patient is still complaining of vomiting and stomach pain  wound care: packing to the left foot after saline flush with  xeroform and DSD   right foot xeroform and DSD

## 2017-01-06 NOTE — PROGRESS NOTE ADULT - SUBJECTIVE AND OBJECTIVE BOX
CC: toe gangrene (29 Dec 2016 21:26)    HPI: 50 y/o Female with PMHx of DMII, depression, COPD, current smoker sent by her podiatrist (Dr Reyes) due to right toe gangrene.     INTERVAL HPI/OVERNIGHT EVENTS: B/L feet pain, occasional abdominal pain, severely depressed, diarrhea, general weakness  Other ROS reviewed and neg     Vital Signs Last 24 Hrs  T(C): 36.7, Max: 36.9 (01-05 @ 21:38)  T(F): 98, Max: 98.5 (01-05 @ 21:38)  HR: 71 (70 - 94)  BP: 98/52 (81/57 - 99/68)  BP(mean): --  RR: 18 (18 - 20)  SpO2: 98% (97% - 99%)                        8.0    2.83  )-----------( 120      ( 06 Jan 2017 08:25 )             24.9     06 Jan 2017 08:25    139    |  108    |  9.0    ----------------------------<  83     3.0     |  21.0   |  1.64     Ca    6.5        06 Jan 2017 08:25    CAPILLARY BLOOD GLUCOSE  135 (05 Jan 2017 21:56)    MEDICATIONS  (STANDING):  DULoxetine 60milliGRAM(s) Oral daily  buDESOnide  80 MICROgram(s)/formoterol 4.5 MICROgram(s) Inhaler 2Puff(s) Inhalation two times a day  montelukast 10milliGRAM(s) Oral at bedtime  heparin  Injectable 5000Unit(s) SubCutaneous every 12 hours  insulin lispro (HumaLOG) corrective regimen sliding scale  SubCutaneous Before meals and at bedtime  dextrose 5%. 1000milliLiter(s) IV Continuous <Continuous>  dextrose 50% Injectable 12.5Gram(s) IV Push once  dextrose 50% Injectable 25Gram(s) IV Push once  dextrose 50% Injectable 25Gram(s) IV Push once  diazepam    Tablet 5milliGRAM(s) Oral every 8 hours  polyethylene glycol 3350 17Gram(s) Oral daily  Dakins Solution - 1/2 Strength 1Application(s) Topical daily  buPROPion XL . 300milliGRAM(s) Oral daily  traZODone 100milliGRAM(s) Oral at bedtime  nafcillin  IVPB 2Gram(s) IV Intermittent every 4 hours  nafcillin  IVPB  IV Intermittent   insulin glargine Injectable (LANTUS) 40Unit(s) SubCutaneous at bedtime  saccharomyces boulardii 500milliGRAM(s) Oral two times a day  tiotropium 18 MICROgram(s) Capsule 1Capsule(s) Inhalation daily  sodium chloride 0.9%. 1000milliLiter(s) IV Continuous <Continuous>    MEDICATIONS  (PRN):  gabapentin 800milliGRAM(s) Oral three times a day PRN neuropathic pain  dextrose Gel 1Dose(s) Oral once PRN Blood Glucose LESS THAN 70 milliGRAM(s)/deciliter  glucagon  Injectable 1milliGRAM(s) IntraMuscular once PRN Glucose LESS THAN 70 milligrams/deciliter  ALBUTerol/ipratropium for Nebulization 3milliLiter(s) Nebulizer every 8 hours PRN sob  ondansetron Injectable 4milliGRAM(s) IV Push every 4 hours PRN Nausea and/or Vomiting  HYDROmorphone   Tablet 2milliGRAM(s) Oral every 4 hours PRN Severe Pain (7 - 10)  HYDROmorphone   Tablet 1milliGRAM(s) Oral every 4 hours PRN Moderate Pain (4 - 6)      RADIOLOGY & ADDITIONAL TESTS: reviewed    REVIEW OF SYSTEMS:  + B/L foot pain/back pain, + Depressed mood, + abd pain periodically with cramps and diarrhea now, + generalized weakness    Physical Exam:    in mild distress, severely depressed and tearfull  · ENMT	No oral lesions; no gross abnormalities  · Back	No deformity or limitation of movement  · Respiratory	detailed exam  · Respiratory Comments	Course BS B/L  · Cardiovascular	detailed exam  · Cardiovascular Details	regular rate and rhythm  · Cardiovascular Details	positive S1; positive S2  · Gastrointestinal	detailed exam  · GI Normal	soft; nontender; bowel sounds normal  · Extremities	detailed exam  · Extremities Comments	B/L foot dressings, not removed  · Neurological	Alert & oriented; no sensory, motor or coordination deficits, normal reflexes  · Musculoskeletal	detailed exam  · Musculoskeletal Details	decreased ROM due to pain  · Psychiatric	detailed exam  · Psychiatric Details	depressed

## 2017-01-06 NOTE — PROGRESS NOTE ADULT - ASSESSMENT
52 YO FEMALE WITH PVD S/P OR AMPUTATION RIGTH 1ST TOE AND LEFT FOOT ULCER  OSTEOMYELITIS WITH BACTERMIA  CHANGE TO KEFZOL THROUGH END OF MONTH  PICC ORDERED  D/W PT

## 2017-01-07 NOTE — PROGRESS NOTE ADULT - PROBLEM SELECTOR PLAN 1
ID Consult appreciated  IV Nafcillin changed to ancef due to diarrhea - likely cause of N/V now - will discuss with ID other options  BC daily  CARLITA/TTE - no valvular evidence of endocarditis noted   Will need PICC as BC no longer positive - at least 4 weeks of IV abx as discussed with Dr. Blackburn  New diarrhea - ? abx induced vs Cdiff - f/u Cdiff PCR neg

## 2017-01-07 NOTE — PROGRESS NOTE ADULT - SUBJECTIVE AND OBJECTIVE BOX
CC: toe gangrene (29 Dec 2016 21:26)    HPI: 52 y/o Female with PMHx of DMII, depression, COPD, current smoker sent by her podiatrist (Dr Reyes) due to right toe gangrene.     INTERVAL HPI/OVERNIGHT EVENTS: B/L feet pain, c/o left sided abdominal pain with N/V, severely depressed, diarrhea, general weakness  Other ROS reviewed and neg     Vital Signs Last 24 Hrs  T(C): 36.2, Max: 36.4 (01-06 @ 23:40)  T(F): 97.2, Max: 97.5 (01-06 @ 23:40)  HR: 80 (70 - 94)  BP: 113/72 (90/54 - 122/72)  BP(mean): --  RR: 18 (16 - 18)  SpO2: 99% (96% - 100%)                              9.6    3.30  )-----------( 157      ( 07 Jan 2017 05:38 )             29.1     07 Jan 2017 05:38    137    |  106    |  8.0    ----------------------------<  96     4.7     |  21.0   |  2.00     Ca    8.1        07 Jan 2017 05:38  Mg     2.1       07 Jan 2017 05:38    CAPILLARY BLOOD GLUCOSE  84 (07 Jan 2017 11:41)  104 (07 Jan 2017 08:55)  80 (06 Jan 2017 21:21)    MEDICATIONS  (STANDING):  DULoxetine 60milliGRAM(s) Oral daily  buDESOnide  80 MICROgram(s)/formoterol 4.5 MICROgram(s) Inhaler 2Puff(s) Inhalation two times a day  montelukast 10milliGRAM(s) Oral at bedtime  heparin  Injectable 5000Unit(s) SubCutaneous every 12 hours  insulin lispro (HumaLOG) corrective regimen sliding scale  SubCutaneous Before meals and at bedtime  dextrose 5%. 1000milliLiter(s) IV Continuous <Continuous>  dextrose 50% Injectable 12.5Gram(s) IV Push once  dextrose 50% Injectable 25Gram(s) IV Push once  dextrose 50% Injectable 25Gram(s) IV Push once  polyethylene glycol 3350 17Gram(s) Oral daily  Dakins Solution - 1/2 Strength 1Application(s) Topical daily  buPROPion XL . 300milliGRAM(s) Oral daily  traZODone 100milliGRAM(s) Oral at bedtime  insulin glargine Injectable (LANTUS) 40Unit(s) SubCutaneous at bedtime  saccharomyces boulardii 500milliGRAM(s) Oral two times a day  tiotropium 18 MICROgram(s) Capsule 1Capsule(s) Inhalation daily  sodium chloride 0.9%. 5000milliLiter(s) IV Continuous <Continuous>  pantoprazole    Tablet 40milliGRAM(s) Oral before breakfast  ceFAZolin   IVPB 2000milliGRAM(s) IV Intermittent every 8 hours    MEDICATIONS  (PRN):  dextrose Gel 1Dose(s) Oral once PRN Blood Glucose LESS THAN 70 milliGRAM(s)/deciliter  glucagon  Injectable 1milliGRAM(s) IntraMuscular once PRN Glucose LESS THAN 70 milligrams/deciliter  ALBUTerol/ipratropium for Nebulization 3milliLiter(s) Nebulizer every 8 hours PRN sob  ondansetron Injectable 4milliGRAM(s) IV Push every 4 hours PRN Nausea and/or Vomiting  HYDROmorphone   Tablet 2milliGRAM(s) Oral every 4 hours PRN Severe Pain (7 - 10)  HYDROmorphone   Tablet 1milliGRAM(s) Oral every 4 hours PRN Moderate Pain (4 - 6)  gabapentin 600milliGRAM(s) Oral two times a day PRN neuropathic pain      RADIOLOGY & ADDITIONAL TESTS: reviewed    REVIEW OF SYSTEMS:  + B/L foot pain/back pain, + Depressed mood, + abd pain periodically with cramps and diarrhea now, + generalized weakness    Physical Exam:    in mild distress, severely depressed and tearfull  · ENMT	No oral lesions; no gross abnormalities  · Back	No deformity or limitation of movement  · Respiratory	detailed exam  · Respiratory Comments	Course BS B/L  · Cardiovascular	detailed exam  · Cardiovascular Details	regular rate and rhythm  · Cardiovascular Details	positive S1; positive S2  · Gastrointestinal	detailed exam  · GI Normal	soft; mild tenderness on left on deep palpation; bowel sounds normal, anterior abdominal wall ecchymoses  · Extremities	detailed exam  · Extremities Comments	B/L foot dressings, not removed  · Neurological	Alert & oriented; no sensory, motor or coordination deficits, normal reflexes  · Musculoskeletal	detailed exam  · Musculoskeletal Details	decreased ROM due to pain  · Psychiatric	detailed exam  · Psychiatric Details	depressed

## 2017-01-07 NOTE — PROGRESS NOTE ADULT - PROBLEM SELECTOR PLAN 1
continue antibiotics  patient is stable for d/c as per podiatry  wound care: packing to the left foot after saline flush with  xeroform and DSD   right foot xeroform and DSD

## 2017-01-07 NOTE — PROGRESS NOTE ADULT - SUBJECTIVE AND OBJECTIVE BOX
51 year old female patient seen bedside. Patient denies f/c/n/v/sob. Patient has bilateral foot dressing intact with no pain bilateral feet. Patient is s/p bilateral foot surgery.                             9.6    3.30  )-----------( 157      ( 07 Jan 2017 05:38 )             29.1       Exam: Right foot -  sutures intact no erythema no edema no wound dehiscence no tenderness on palpation   Left foot- packing intact minimal drainage serous no malodor, no bone exposed, mild erythema to the wound.

## 2017-01-07 NOTE — PROGRESS NOTE ADULT - ASSESSMENT
51 y.o.  Female with DMII, depression, COPD, current smoker sent by podiatrist Dr Reyes for right toe gangrene worsening drainage. At prior left toe amputation site also with erythema and gangrene,  foul smelling drainage, patient with low grade temperatures of 99.3 in hospital, but reported fevers at home.   Pt underwent right foot I st toe partial amputation and Left II debridement, noted to have MSSA bacteremia - resolved on IV abx with neg CARLITA. Course complicated by ERIC due to vancomycin and now diarrhea, abd pain with N/V

## 2017-01-08 NOTE — PROGRESS NOTE ADULT - PROBLEM SELECTOR PLAN 10
Diflucan x 14 days
Diflucan x 14 days
left hand from IV site - compresses
Diflucan x 14 days

## 2017-01-08 NOTE — PROGRESS NOTE ADULT - SUBJECTIVE AND OBJECTIVE BOX
CC: toe gangrene (29 Dec 2016 21:26)    HPI: 50 y/o Female with PMHx of DMII, depression, COPD, current smoker sent by her podiatrist (Dr Reyes) due to right toe gangrene.     INTERVAL HPI/OVERNIGHT EVENTS: B/L feet pain, c/o left sided abdominal pain with N/V, severely depressed, diarrhea, general weakness, decreased po intake  Other ROS reviewed and neg     Vital Signs Last 24 Hrs  T(C): 36.4, Max: 36.8 (01-07 @ 15:38)  T(F): 97.5, Max: 98.3 (01-07 @ 15:38)  HR: 76 (76 - 81)  BP: 129/82 (97/65 - 129/82)  BP(mean): --  RR: 19 (18 - 19)  SpO2: 100% (98% - 100%)                                      9.4    2.94  )-----------( 149      ( 08 Jan 2017 05:26 )             29.4     08 Jan 2017 05:26    138    |  106    |  7.0    ----------------------------<  84     3.9     |  22.0   |  1.97     Ca    8.2        08 Jan 2017 05:26  Mg     2.1       07 Jan 2017 05:38    CAPILLARY BLOOD GLUCOSE  109 (08 Jan 2017 11:39)  111 (08 Jan 2017 08:26)  76 (07 Jan 2017 17:13)    MEDICATIONS  (STANDING):  DULoxetine 60milliGRAM(s) Oral daily  buDESOnide  80 MICROgram(s)/formoterol 4.5 MICROgram(s) Inhaler 2Puff(s) Inhalation two times a day  montelukast 10milliGRAM(s) Oral at bedtime  heparin  Injectable 5000Unit(s) SubCutaneous every 12 hours  insulin lispro (HumaLOG) corrective regimen sliding scale  SubCutaneous Before meals and at bedtime  dextrose 5%. 1000milliLiter(s) IV Continuous <Continuous>  dextrose 50% Injectable 12.5Gram(s) IV Push once  dextrose 50% Injectable 25Gram(s) IV Push once  dextrose 50% Injectable 25Gram(s) IV Push once  polyethylene glycol 3350 17Gram(s) Oral daily  Dakins Solution - 1/2 Strength 1Application(s) Topical daily  buPROPion XL . 300milliGRAM(s) Oral daily  traZODone 100milliGRAM(s) Oral at bedtime  saccharomyces boulardii 500milliGRAM(s) Oral two times a day  tiotropium 18 MICROgram(s) Capsule 1Capsule(s) Inhalation daily  pantoprazole    Tablet 40milliGRAM(s) Oral before breakfast  ceFAZolin   IVPB 2000milliGRAM(s) IV Intermittent every 8 hours  lactated ringers. 1000milliLiter(s) IV Continuous <Continuous>  diazepam    Tablet 5milliGRAM(s) Oral every 8 hours  ergocalciferol [id number](s) Oral every week  insulin glargine Injectable (LANTUS) 30Unit(s) SubCutaneous at bedtime    MEDICATIONS  (PRN):  dextrose Gel 1Dose(s) Oral once PRN Blood Glucose LESS THAN 70 milliGRAM(s)/deciliter  glucagon  Injectable 1milliGRAM(s) IntraMuscular once PRN Glucose LESS THAN 70 milligrams/deciliter  ALBUTerol/ipratropium for Nebulization 3milliLiter(s) Nebulizer every 8 hours PRN sob  ondansetron Injectable 4milliGRAM(s) IV Push every 4 hours PRN Nausea and/or Vomiting  HYDROmorphone   Tablet 2milliGRAM(s) Oral every 4 hours PRN Severe Pain (7 - 10)  HYDROmorphone   Tablet 1milliGRAM(s) Oral every 4 hours PRN Moderate Pain (4 - 6)  gabapentin 600milliGRAM(s) Oral two times a day PRN neuropathic pain    RADIOLOGY & ADDITIONAL TESTS: reviewed    REVIEW OF SYSTEMS:  + B/L foot pain/back pain, + Depressed mood, + abd pain periodically with cramps and diarrhea now, + generalized weakness    Physical Exam:    in mild distress, severely depressed and tearfull  · ENMT	No oral lesions; no gross abnormalities  · Back	No deformity or limitation of movement  · Respiratory	detailed exam  · Respiratory Comments	Course BS B/L  · Cardiovascular	detailed exam  · Cardiovascular Details	regular rate and rhythm  · Cardiovascular Details	positive S1; positive S2  · Gastrointestinal	detailed exam  · GI Normal	soft; mild tenderness on left on deep palpation; bowel sounds normal, anterior abdominal wall ecchymoses  · Extremities	detailed exam  · Extremities Comments	B/L foot dressings, not removed  · Neurological	Alert & oriented; no sensory, motor or coordination deficits, normal reflexes  · Musculoskeletal	detailed exam  · Musculoskeletal Details	decreased ROM due to pain  · Psychiatric	detailed exam  · Psychiatric Details	depressed

## 2017-01-08 NOTE — PROGRESS NOTE ADULT - PROBLEM SELECTOR PLAN 5
HGA1c 8.0  continue Accuchecks ISS/Lantus - lantus  40units - decrease to 30 due to poor po intake  Diabetes Specialist Consult

## 2017-01-08 NOTE — PROGRESS NOTE ADULT - ASSESSMENT
MSSA - KEFZOL THROUGH END ON MONTH  DOSE ADJ DUE TO CRD - POSS CAUSE OF GI SXS  PE WNL  DEFER VANCO WITH ELEV CREAT AS OUTPT

## 2017-01-08 NOTE — PROGRESS NOTE ADULT - SUBJECTIVE AND OBJECTIVE BOX
SONYA LENNON is a 51y Female with HPI:  50 y/o F w/hx DM, depression, COPD, current smoker, Presents to the ED, sent by her podiatrist (Dr Reyes), due to right toe gangrene. She states  this has started weeks ago, usually goes to the podiatry every/week, but now the infection has worsen,. surrounded w/erythema and foul smelling discharge, yellowish, warm to palpation. Also noticed that in site of left toe amputation, started also w/erythema and gangrene, associated w/ abundant foul smelling dc as well/ As;p c/o of fever and chills  pt s/p or amp right first toe  ON IV ABX MSSA BACTEREMIA NOW CLEARED  MINOR DIARRHEA FROM NAFCILLIN  ALL F/UP C/S NEG  DIARRHEA PERSISTS ON KEFZOL - CDIFF NEG    Allergies:  No Known Allergies      Medications:  DULoxetine 60milliGRAM(s) Oral daily  buDESOnide  80 MICROgram(s)/formoterol 4.5 MICROgram(s) Inhaler 2Puff(s) Inhalation two times a day  montelukast 10milliGRAM(s) Oral at bedtime  heparin  Injectable 5000Unit(s) SubCutaneous every 12 hours  insulin lispro (HumaLOG) corrective regimen sliding scale  SubCutaneous Before meals and at bedtime  dextrose 5%. 1000milliLiter(s) IV Continuous <Continuous>  dextrose Gel 1Dose(s) Oral once PRN  dextrose 50% Injectable 12.5Gram(s) IV Push once  dextrose 50% Injectable 25Gram(s) IV Push once  dextrose 50% Injectable 25Gram(s) IV Push once  glucagon  Injectable 1milliGRAM(s) IntraMuscular once PRN  ALBUTerol/ipratropium for Nebulization 3milliLiter(s) Nebulizer every 8 hours PRN  diazepam    Tablet 5milliGRAM(s) Oral every 8 hours  polyethylene glycol 3350 17Gram(s) Oral daily  Dakins Solution - 1/2 Strength 1Application(s) Topical daily  buPROPion XL . 300milliGRAM(s) Oral daily  traZODone 100milliGRAM(s) Oral at bedtime  nafcillin  IVPB 2Gram(s) IV Intermittent every 4 hours  nafcillin  IVPB  IV Intermittent   insulin glargine Injectable (LANTUS) 40Unit(s) SubCutaneous at bedtime  saccharomyces boulardii 500milliGRAM(s) Oral two times a day  ondansetron Injectable 4milliGRAM(s) IV Push every 4 hours PRN  tiotropium 18 MICROgram(s) Capsule 1Capsule(s) Inhalation daily  HYDROmorphone   Tablet 2milliGRAM(s) Oral every 4 hours PRN  HYDROmorphone   Tablet 1milliGRAM(s) Oral every 4 hours PRN  gabapentin 600milliGRAM(s) Oral two times a day PRN  sodium chloride 0.9%. 5000milliLiter(s) IV Continuous <Continuous>  pantoprazole    Tablet 40milliGRAM(s) Oral before breakfast      ANTIBIOTICS: NAFCILLIN CHANGED TO KEFZOL        Review of Systems: - Negative except as mentioned aboveDIARRHEA     Physical Exam:  ICU Vital Signs Last 24 Hrs  T(C): 36.6, Max: 36.7 (01-04 @ 15:56)  T(F): 97.9, Max: 98 (01-04 @ 15:56)  HR: 72 (72 - 77)  BP: 100/52 (89/58 - 101/65)  BP(mean): --  ABP: --  ABP(mean): --  RR: 18 (18 - 18)  SpO2: 97% (93% - 98%)    GEN: NAD, pleasant  HEENT: normocephalic and atraumatic. EOMI. AURORA...  NECK: Supple. No carotid bruits.  No lymphadenopathy or thyromegaly.  LUNGS: Clear to auscultation.  HEART: Regular rate and rhythm without murmur.  ABDOMEN: Soft, nontender, and nondistended.  Positive bowel sounds.  No hepatosplenomegaly was noted.  NO REBOUND NO GUARDING  EXTREMITIES: Without any cyanosis, clubbing, rash, lesions or edema.  NEUROLOGIC: Cranial nerves II through XII are grossly intact.    SKIN: No ulceration or induration present.      Labs:  05 Jan 2017 07:20    132    |  98     |  13.0   ----------------------------<  83     3.7     |  24.0   |  2.05     Ca    7.7        05 Jan 2017 07:20  Phos  4.5       04 Jan 2017 07:31  Mg     2.2       04 Jan 2017 07:31                            9.3    4.36  )-----------( 132      ( 04 Jan 2017 07:34 )             28.2                 CAPILLARY BLOOD GLUCOSE  91 (04 Jan 2017 22:32)  89 (04 Jan 2017 17:43)  112 (04 Jan 2017 13:33)        RECENT CULTURES:  01-03 .Blood Blood XXXX XXXX   No growth at 48 hours    01-02 .Tissue r 1st metatarsal bone (swabs) XXXX   Rare White blood cells  No organisms seen   Culture in progress    01-02 .Blood Blood XXXX XXXX   No growth at 48 hours    01-01 .Blood Blood Staphylococcus aureus XXXX   Growth in aerobic bottle: Staphylococcus aureus  Aerobic Bottle: 23:58 Hours to positivity  Anaerobic Bottle: No growth to date  ,  TYPE: (C=Critical, N=Notification, A=Abnormal) c  TESTS:  _ gs  DATE/TIME CALLED: _ 01/02/2017 13:54:24  CALLED TO: _    12-30 .Urine Clean Catch (Midstream) XXXX XXXX   No growth    12-30 .Blood Blood Staphylococcus aureus XXXX   Growth in aerobic bottle: Staphylococcus aureus  Aerobic Bottle: 1 day 02:07 Hours to positivity  Anaerobic Bottle: No growth at 5 days.  ,  TYPE: (C=Critical, N=Notification, A=Abnormal) c  TESTS:  _ gs  DATE/TIME CALLED: _ 12/31/2016 15:22:52  DERICK    12-30 .Blood Blood Staphylococcus aureus XXXX   Growth in aerobic bottle: Staphylococcus aureus  Aerobic Bottle: 17:02 Hours to positivity  Anaerobic Bottle: No growth at 5 days.  ,  TYPE: (C=Critical, N=Notification, A=Abnormal) c  TESTS:  _ gs  DATE/TIME CALLED: _ 12/31/2016 09:19:18  CALLED TO    12-30 .Other Right foot gangrene hallux Staphylococcus aureus  Enterococcus faecalis XXXX   Numerous Staphylococcus aureus  Numerous Enterococcus faecalis    12-29 .Blood Blood Staphylococcus aureus XXXX   Growth in anaerobic bottle: Staphylococcus aureus  Anaerobic Bottle: 20:41 Hours to positivity  Aerobic Bottle: No growth at 5 days.  .  TYPE: (C=Critical, N=Notification, A=Abnormal) c  TESTS:  _ bldgs  DATE/TIME CALLED: _ 12/30/2016 17:33:11  CALL    12-29 .Blood Blood Staphylococcus aureus XXXX   Growth in aerobic and anaerobic bottles: Staphylococcus aureus  Aerobic Bottle: 14:15 Hours to positivity  Anaerobic Bottle: 23:36 Hours to positivity

## 2017-01-09 NOTE — PROGRESS NOTE ADULT - PROBLEM SELECTOR PLAN 6
Budesonide  Duo neb prn
Budesonide  Duo neb prn  smoking cessation
gabapentin
improving slowly with IV hydration - likely Vanc toxicity  Tona dc'd  IV fluids as pt has no po intake with N/V   medical renal dx on sono
Budesonide  Duo neb prn
gabapentin

## 2017-01-09 NOTE — PROCEDURE NOTE - ESTIMATED BLOOD LOSS
Anneliese Goode   MRN: [id number]    Department:  [facility] Emergency Department   Date of Visit:  8/12/2019           Disclosure     Insurance plans vary and the physician(s) referred by the ER may not be covered by your plan.  Please contact y tell this physician (or your personal doctor if your instructions are to return to your personal doctor) about any new or lasting problems. The primary care or specialist physician will see patients referred from the [facility] Emergency Department.  Maninder Cagle none

## 2017-01-09 NOTE — PROGRESS NOTE ADULT - PROBLEM SELECTOR PROBLEM 8
Prophylactic measure
Prophylactic measure
PAD (peripheral artery disease)
Prophylactic measure
Phlebitis
Prophylactic measure
PAD (peripheral artery disease)

## 2017-01-09 NOTE — PROGRESS NOTE ADULT - SUBJECTIVE AND OBJECTIVE BOX
CC: toe gangrene (29 Dec 2016 21:26)    HPI:50 y/o Female with PMHx of DMII, depression, COPD, current smoker sent by her podiatrist (Dr Reyes) due to right toe gangrene.    INTERVAL HPI/OVERNIGHT EVENTS: Refusing Lantus due to hypoglycemia. Appetite remains poor.    Vital Signs Last 24 Hrs  T(C): 36.8, Max: 36.9 (01-08 @ 23:00)  T(F): 98.3, Max: 98.4 (01-08 @ 23:00)  HR: 78 (72 - 78)  BP: 120/80 (110/69 - 120/80)  BP(mean): --  RR: 18 (18 - 18)  SpO2: 98% (98% - 99%)  I&O's Detail                        9.0    2.53  )-----------( 119      ( 09 Jan 2017 06:12 )             27.4     09 Jan 2017 06:11    138    |  104    |  8.0    ----------------------------<  110    4.1     |  24.0   |  1.77     Ca    8.1        09 Jan 2017 06:11  Mg     1.6       09 Jan 2017 06:11        CAPILLARY BLOOD GLUCOSE  118 (09 Jan 2017 12:41)  114 (09 Jan 2017 08:50)  113 (08 Jan 2017 21:08)  98 (08 Jan 2017 17:32)      MEDICATIONS  (STANDING):  DULoxetine 60milliGRAM(s) Oral daily  buDESOnide  80 MICROgram(s)/formoterol 4.5 MICROgram(s) Inhaler 2Puff(s) Inhalation two times a day  montelukast 10milliGRAM(s) Oral at bedtime  heparin  Injectable 5000Unit(s) SubCutaneous every 12 hours  insulin lispro (HumaLOG) corrective regimen sliding scale  SubCutaneous Before meals and at bedtime  dextrose 5%. 1000milliLiter(s) IV Continuous <Continuous>  dextrose 50% Injectable 12.5Gram(s) IV Push once  dextrose 50% Injectable 25Gram(s) IV Push once  dextrose 50% Injectable 25Gram(s) IV Push once  Dakins Solution - 1/2 Strength 1Application(s) Topical daily  buPROPion XL . 300milliGRAM(s) Oral daily  traZODone 100milliGRAM(s) Oral at bedtime  saccharomyces boulardii 500milliGRAM(s) Oral two times a day  tiotropium 18 MICROgram(s) Capsule 1Capsule(s) Inhalation daily  pantoprazole    Tablet 40milliGRAM(s) Oral before breakfast  lactated ringers. 1000milliLiter(s) IV Continuous <Continuous>  diazepam    Tablet 5milliGRAM(s) Oral every 8 hours  ergocalciferol [id number](s) Oral every week  ceFAZolin   IVPB 1000milliGRAM(s) IV Intermittent every 8 hours  magnesium sulfate  IVPB 2Gram(s) IV Intermittent once  insulin glargine Injectable (LANTUS) 10Unit(s) SubCutaneous at bedtime    MEDICATIONS  (PRN):  dextrose Gel 1Dose(s) Oral once PRN Blood Glucose LESS THAN 70 milliGRAM(s)/deciliter  glucagon  Injectable 1milliGRAM(s) IntraMuscular once PRN Glucose LESS THAN 70 milligrams/deciliter  ALBUTerol/ipratropium for Nebulization 3milliLiter(s) Nebulizer every 8 hours PRN sob  ondansetron Injectable 4milliGRAM(s) IV Push every 4 hours PRN Nausea and/or Vomiting  gabapentin 600milliGRAM(s) Oral two times a day PRN neuropathic pain  HYDROmorphone   Tablet 2milliGRAM(s) Oral every 4 hours PRN Severe Pain (7 - 10)  HYDROmorphone   Tablet 1milliGRAM(s) Oral every 4 hours PRN Moderate Pain (4 - 6)      RADIOLOGY & ADDITIONAL TESTS:    ROS: +diarrhea, +decrease appetite  +B/L foot pain and back pain  All other pertinent systems reviewed and are negative

## 2017-01-09 NOTE — PROCEDURE NOTE - NSPROCDETAILS_GEN_ALL_CORE
location identified, draped/prepped, sterile technique used/sterile technique, catheter placed/ultrasound guidance/sterile dressing applied/supine position

## 2017-01-09 NOTE — PROGRESS NOTE ADULT - ASSESSMENT
51 y.o.  Female with DMII, depression, COPD, current smoker sent by podiatrist Dr Reyes for right toe gangrene worsening drainage. At prior left toe amputation site also with erythema and gangrene,  foul smelling drainage, patient with low grade temperatures of 99.3 in hospital, but reported fevers at home.   Pt underwent right foot I st toe partial amputation and Left II debridement, noted to have MSSA bacteremia - resolved on IV abx with neg CARLITA. Course complicated by ERIC due to vancomycin and now diarrhea, abd pain no vomiting today. PICC line placed, seen by PT

## 2017-01-09 NOTE — PHYSICAL THERAPY INITIAL EVALUATION ADULT - ADDITIONAL COMMENTS
Pt reports using [facility] PTA, has a transport chair for long distances. Lives with boyfriend who is able to assist

## 2017-01-09 NOTE — PROGRESS NOTE ADULT - EXTREMITIES COMMENTS
B/L foot dressing C/D/I
B/L foot dressings, not removed
Left foot - ulceration at the previous amputated hallux site-purulent drainage  Right foot hallux necrotic and gangrenous probing to bone ulceration malodor and erythema

## 2017-01-09 NOTE — PROGRESS NOTE ADULT - PROBLEM SELECTOR PLAN 1
ID Consult appreciated  IV Nafcillin changed to ancef due to diarrhea   - BC  CARLITA/TTE - no valvular evidence of endocarditis noted   Ancef IV until the end of month as per ID  S/P PICC

## 2017-01-09 NOTE — PROGRESS NOTE ADULT - PROBLEM SELECTOR PROBLEM 2
Osteomyelitis of multiple sites, unspecified chronicity
Foot ulcer
Osteomyelitis of multiple sites, unspecified chronicity
MSSA (methicillin susceptible Staphylococcus aureus)
Diabetic foot infection
Foot ulcer
Foot ulcer

## 2017-01-09 NOTE — PHYSICAL THERAPY INITIAL EVALUATION ADULT - CRITERIA FOR SKILLED THERAPEUTIC INTERVENTIONS
impairments found/therapy frequency/anticipated discharge recommendation/functional limitations in following categories/rehab potential

## 2017-01-09 NOTE — PROGRESS NOTE ADULT - GASTROINTESTINAL DETAILS
soft/nontender/bowel sounds normal
bowel sounds normal/soft/nontender
nontender/soft/bowel sounds normal

## 2017-01-09 NOTE — PROGRESS NOTE ADULT - PROBLEM SELECTOR PROBLEM 6
Chronic obstructive pulmonary disease, unspecified COPD type
Chronic obstructive pulmonary disease, unspecified COPD type
COPD (chronic obstructive pulmonary disease)
Chronic obstructive pulmonary disease, unspecified COPD type
Neuropathy
ERIC (acute kidney injury)
Chronic obstructive pulmonary disease, unspecified COPD type
Neuropathy

## 2017-01-09 NOTE — PROGRESS NOTE ADULT - ASSESSMENT
s/p bilateral foot surgery, right hallux and partial first ray amputation and left foot incision and drainage and left second metatarsal debridement

## 2017-01-09 NOTE — PROGRESS NOTE ADULT - PROBLEM SELECTOR PLAN 8
heparin sq
heparin sq
Vascular consult  PERNELL/PVR ordered
heparin sq
warm soaks
heparin sq
Vascular consult  PERNELL/PVR ordered

## 2017-01-09 NOTE — PROGRESS NOTE ADULT - PROBLEM SELECTOR PLAN 9
HH dropped form 13 on admission to 8 today without apparent hemorrhage    - stool guaiac, iron studies, B12, folate, retic count    - monitor HH closely    - transfuse <8 if symptomatic    - w/u is neg so far - ? acute illness + ABL with surgery, no evidence to suspect RPH
HH dropped form 13 on admission to 8 today without apparent hemorrhage    - stool guaiac, iron studies, B12, folate, retic count    - monitor HH closely    - transfuse <8 if symptomatic    - w/u is neg so far - ? acute illness + ABL with surgery, no evidence to suspect RPH
KUB no obstruction  Aylin lax ordered
Vascular consult  PERNELL/PVR ordered
HH dropped form 13 on admission to 8 today without apparent hemorrhage    - stool guaiac, iron studies, B12, folate, retic count    - monitor HH closely    - transfuse <8 if symptomatic
heparin sq
KUB no obstruction  Aylin lax ordered

## 2017-01-09 NOTE — PROGRESS NOTE ADULT - PROBLEM SELECTOR PROBLEM 9
Anemia, unspecified type
Anemia, unspecified type
Constipation
PAD (peripheral artery disease)
Anemia, unspecified type
Prophylactic measure
Constipation

## 2017-01-09 NOTE — PROGRESS NOTE ADULT - PROBLEM SELECTOR PLAN 7
Pain management consult appreciated
gabapentin
heparin sq
: HH dropped form 13 on admission to 8 today without apparent hemorrhage    - stool guaiac, iron studies, B12, folate, retic count    - monitor HH closely    - transfuse <8 if symptomatic    - w/u is neg so far - ? acute illness + ABL with surgery, no evidence to suspect RPH.
Pain management consult
heparin sq

## 2017-01-09 NOTE — PROGRESS NOTE ADULT - PROBLEM SELECTOR PROBLEM 3
ERIC (acute kidney injury)
ERIC (acute kidney injury)
Depression
ERIC (acute kidney injury)
Depression
ERIC (acute kidney injury)
Depression

## 2017-01-09 NOTE — PHYSICAL THERAPY INITIAL EVALUATION ADULT - DIAGNOSIS, PT EVAL
Pt with decreased functional mobility secondary to LE weakness, decrease balance and decreased endurance.

## 2017-01-09 NOTE — PHYSICAL THERAPY INITIAL EVALUATION ADULT - PERTINENT HX OF CURRENT PROBLEM, REHAB EVAL
Pt presents with gangrene, s/p right hallux and partial first ray amputation, left foot incision/drainage

## 2017-01-09 NOTE — PROGRESS NOTE ADULT - SUBJECTIVE AND OBJECTIVE BOX
51 year old female patient seen bedside. Patient denies f/c/n/v/sob. Patient has bilateral foot dressing intact with no pain bilateral feet. Patient is s/p bilateral foot surgery.                                        9.0    2.53  )-----------( 119      ( 09 Jan 2017 06:12 )             27.4       Exam: Right foot -  sutures intact no erythema no edema no wound dehiscence no tenderness on palpation   Left foot- packing intact minimal drainage serous no malodor, no bone exposed, mild erythema to the wound.

## 2017-01-09 NOTE — PROGRESS NOTE ADULT - PROBLEM SELECTOR PLAN 2
s/p 1 day   Right foot partial first toe amputation   Left foot - debridement and second metatarsal debridement and bone biopsy.   Wound care as per Podiatry
s/p 1 day   Right foot partial first toe amputation   Left foot - debridement and second metatarsal debridement and bone biopsy.   Wound care as per Podiatry
MRI completed  EXAM:  MRI FOOT W - W O CONT LT                        PROCEDURE DATE: FINDINGS: The patient is status post amputation of the first ray at the level the mid metatarsal. There is no marrow signal alteration within the   stump of the metatarsal to suggest osteomyelitis. The patient is also status post amputation of the second ray at the level of the metatarsal head with associated bony productive changes at the stump of the metatarsal. There is a cutaneous ulceration along the medial aspect of the stump of the second metatarsal head. There is high T2 and low T1 marrow signal within the head of the second metatarsal extending   proximally to involve the metatarsal shaft, consistent with osteomyelitis. There is marked soft tissue edema and enhancement surrounding the head of the second metatarsal which is suggestive of phlegmonous change. No well-defined collections are demonstrated.  There is chronic appearing flattening of the head of the third metatarsal   with secondary advanced third metatarsophalangeal joint osteoarthritis.    There are mild osteoarthritic changes at the tarsometatarsal   articulations.    There is diffuse fatty atrophy and increased T2 signal of the musculature   surrounding the foot, consistent with denervation. 12/30/2016      Possible OR on Monday as per Podiatry  Wound care as per Podiatry  EKG for cardiac risk stratification - read as a normal ECG with no ischemic findings
MRI completed  EXAM:  MRI FOOT W - W O CONT LT                        PROCEDURE DATE: FINDINGS: The patient is status post amputation of the first ray at the level the mid metatarsal. There is no marrow signal alteration within the   stump of the metatarsal to suggest osteomyelitis. The patient is also status post amputation of the second ray at the level of the metatarsal head with associated bony productive changes at the stump of the metatarsal. There is a cutaneous ulceration along the medial aspect of the stump of the second metatarsal head. There is high T2 and low T1 marrow signal within the head of the second metatarsal extending   proximally to involve the metatarsal shaft, consistent with osteomyelitis. There is marked soft tissue edema and enhancement surrounding the head of the second metatarsal which is suggestive of phlegmonous change. No well-defined collections are demonstrated.  There is chronic appearing flattening of the head of the third metatarsal   with secondary advanced third metatarsophalangeal joint osteoarthritis.    There are mild osteoarthritic changes at the tarsometatarsal   articulations.    There is diffuse fatty atrophy and increased T2 signal of the musculature   surrounding the foot, consistent with denervation. 12/30/2016      Possible OR on Monday as per Podiatry  Wound care as per Podiatry  EKG for cardiac risk stratification - read as a normal ECG with no ischemic findings
bilateral foot OM  - s/p amputation
s/p Right foot partial first toe amputation   Left foot - debridement of second metatarsal and bone biopsy.   Wound care as per Podiatry  may ambulate in surgical shoes
bilateral foot OM  - pending medical clearance prior to amputation.
s/p Right foot partial first toe amputation   Left foot - debridement of second metatarsal and bone biopsy.   Wound care as per Podiatry  may ambulate in surgical shoes.   PT
s/p 1 day   Right foot partial first ray amputation   Left foot - debridement and second metatarsal debridement and bone biopsy.   Wound care as per Podiatry
MRI completed results pending  Possible OR on Monday as per Podiatry  Wound care as per Podiatry  EKG for cardiac risk stratification

## 2017-01-09 NOTE — PROGRESS NOTE ADULT - PROBLEM SELECTOR PROBLEM 4
Depression
Depression
R/O Bacterial endocarditis, unspecified chronicity
DM (diabetes mellitus)
Depression
Diabetes
Depression
DM (diabetes mellitus)

## 2017-01-09 NOTE — PROGRESS NOTE ADULT - PROBLEM SELECTOR PLAN 3
worsening despite IV hydration - likely Vanc toxicity  Jackieo dc'd  IV fluids  repeat labs in am  Ueos and renal sonogram
worsening despite IV hydration - likely Vanc toxicity  Vanco dc'd  IV fluids  repeat labs without improvement, but stabilisied at 2, medical renal dx on sono  Ueos are neg
Psych consult  continue home medications
improving slowly with IV hydration - likely Vanc toxicity  Vanco dc'd  IV fluids  medical renal dx on sono  Ueos are neg
improving slowly with IV hydration - likely Vanc toxicity  Vanco dc'd  IV fluids to be resumed as pt has no po intake with N/V and creatinine went up again  medical renal dx on sono  Ueos are neg
improving slowly with IV hydration - likely Vanc toxicity  Vanco dc'd  IV fluids to be resumed as pt has no po intake with N/V and creatinine went up again  medical renal dx on sono  Ueos are neg
possibly due to vanco toxicity  IVF, hold vanco and repeat levels
Psych following, improved on current RX
- likely from recent Vancomycin  - increased IVF hydration  - trend renal function.   - avoid nephrotoxic agents.
possibly r/t Vanc toxicity  Vanco dc'd  IV fluids  repeat labs in am
Psych consult  continue home medications

## 2017-01-09 NOTE — PROGRESS NOTE ADULT - PROBLEM SELECTOR PLAN 4
Psych consult  continue home meds
Psych consult  continue home meds - increase wellbutrin
HGA1c 8.0  continue Accuchecks ISS/Lantus  Diabetes Specialist Consult
Psych consult  continue home medications
Psych consult  continue home meds - increased wellbutrin
Psych consult  continue home meds - increased wellbutrin
Psych consult  continue home meds - increased wellbutrin  reevaluate for trazodone dosage
suggest CARLITA to r/o endocarditis   - this would change duration of therapy if CARLITA is positive.
Lantus decreased due to poor oral intake  Accuchecks AC/HS
Psych consult  continue home meds
HGA1c 8.0  continue Accuchecks ISS/Lantus  Diabetes Specialist Consult

## 2017-01-10 ENCOUNTER — TRANSCRIPTION ENCOUNTER (OUTPATIENT)
Age: 52
End: 2017-01-10

## 2017-01-10 RX ORDER — DIAZEPAM 5 MG
1 TABLET ORAL
Qty: 0 | Refills: 0 | COMMUNITY
Start: 2017-01-10

## 2017-01-10 RX ORDER — DULOXETINE HYDROCHLORIDE 30 MG/1
2 CAPSULE, DELAYED RELEASE ORAL
Qty: 0 | Refills: 0 | COMMUNITY
Start: 2017-01-10

## 2017-01-10 RX ORDER — GABAPENTIN 400 MG/1
1 CAPSULE ORAL
Qty: 0 | Refills: 0 | COMMUNITY
Start: 2017-01-10

## 2017-01-10 NOTE — DISCHARGE NOTE ADULT - MEDICATION SUMMARY - MEDICATIONS TO STOP TAKING
I will STOP taking the medications listed below when I get home from the hospital:    NovoLOG 100 units/mL subcutaneous solution  --  subcutaneous 2 times a day  -- 25 units    Soma 350 mg oral tablet  -- 1 tab(s) by mouth 3 times a day, As Needed    Norco 325 mg-5 mg oral tablet  -- 1 tab(s) by mouth every 6 hours    Lidoderm 5% topical film  -- Apply on skin to affected area once a day    saccharomyces boulardii lyo 250 mg oral capsule  -- 2 cap(s) by mouth 2 times a day    cephalexin 500 mg oral capsule  -- 1 cap(s) by mouth 4 times a day    Norco 10 mg-325 mg oral tablet  -- 1-2 tab(s) by mouth every 4-6 hours    cetirizine 10 mg oral tablet  -- 1 tab(s) by mouth once a day

## 2017-01-10 NOTE — DISCHARGE NOTE ADULT - PATIENT PORTAL LINK FT
“You can access the [employer] Patient Portal, offered by [facility], by registering with the following website: [email or web address]”

## 2017-01-10 NOTE — DISCHARGE NOTE ADULT - CARE PROVIDER_API CALL
Dwight Pillai), Infectious Disease; Internal Medicine  [address] [place] [address]  Phone: [phone]  Fax: [phone]    Mark Reyes), Podiatric Medicine and Surgery  [address] [place] [address]  Phone: [phone]  Fax: [phone]

## 2017-01-10 NOTE — PROGRESS NOTE ADULT - SUBJECTIVE AND OBJECTIVE BOX
51 year old female patient seen bedside in [facility]. Patient is AAOx3, states she feel better and is in a good mood.     Vital Signs Last 24 Hrs  T(C): 36.2, Max: 36.9 (01-09 @ 16:30)  T(F): 97.1, Max: 98.4 (01-09 @ 16:30)  HR: 78 (68 - 88)  BP: 116/72 (87/61 - 116/72)  BP(mean): --  RR: 18 (18 - 18)  SpO2: 100% (96% - 100%)    10 Cosme 2017 08:15    138    |  103    |  10.0   ----------------------------<  108    3.8     |  25.0   |  1.77     Ca    7.8        10 Cosme 2017 08:15  Mg     1.9       10 Cosme 2017 08:15                          8.2    2.69  )-----------( 119      ( 10 Cosme 2017 08:15 )             25.5     Exam: RIght foot- surgical site clean, no signs of gross infections, sutures intact no open wounds.   Left foot, small area open post surgery- non draining, no malodor, decrease of erythema.

## 2017-01-10 NOTE — PROGRESS NOTE ADULT - PROBLEM SELECTOR PLAN 1
Patient continue abc as per ID  Patient is stable podiatrically to be discharged- no further surgery  wound care dressing: adaptic, Right foot and DSD  left foot saline flush with daily packing and adaptic with DSD  Follow up with Dr. Alf Sanchez at D/C

## 2017-01-10 NOTE — DISCHARGE NOTE ADULT - ADDITIONAL INSTRUCTIONS
Patient should return to original psych facilty within 120 days of rehab. patient is not to be kept in [facility] for an extended period of time

## 2017-01-10 NOTE — DISCHARGE NOTE ADULT - CARE PLAN
Principal Discharge DX:	Bacteremia  Goal:	The patient will have resolution of infection  Instructions for follow-up, activity and diet:	IV Nafcillin changed to ancef due to diarrhea   - BC  CARLITA/TTE - no valvular evidence of endocarditis noted   Ancef IV until the end of month as per ID  S/P PICC.  Secondary Diagnosis:	Diabetic foot infection  Instructions for follow-up, activity and diet:	s/p Right foot partial first toe amputation   Left foot - debridement of second metatarsal and bone biopsy.   Wound care as per Podiatry  may ambulate in surgical shoes.   PT.  Secondary Diagnosis:	Anemia, unspecified type  Instructions for follow-up, activity and diet:	work up negative  probably R/T acute blood loss from surgery and acute illness  H&H stable  monitor labs  Secondary Diagnosis:	COPD (chronic obstructive pulmonary disease)  Instructions for follow-up, activity and diet:	Continue DUO/NEB/LABA/Singulair  Secondary Diagnosis:	Depression  Instructions for follow-up, activity and diet:	Continue current RX  Seen and followed by Psych  Secondary Diagnosis:	Diabetes  Instructions for follow-up, activity and diet:	Ronnie Winter AC/HS with insulin coverage  Consistent Carb diet Principal Discharge DX:	Bacteremia  Goal:	The patient will have resolution of infection  Instructions for follow-up, activity and diet:	IV Nafcillin changed to ancef due to diarrhea   - BC  CARLITA/TTE - no valvular evidence of endocarditis noted   Ancef IV until the end of month as per ID  S/P PICC.  Secondary Diagnosis:	Diabetic foot infection  Instructions for follow-up, activity and diet:	s/p Right foot partial first toe amputation   Left foot - debridement of second metatarsal and bone biopsy.   Wound care as per Podiatry  may ambulate in surgical shoes.   PT.  Secondary Diagnosis:	Anemia, unspecified type  Instructions for follow-up, activity and diet:	work up negative  probably R/T acute blood loss from surgery and acute illness  H&H stable  monitor labs  Secondary Diagnosis:	COPD (chronic obstructive pulmonary disease)  Instructions for follow-up, activity and diet:	Continue DUO/NEB/LABA/Singulair  Secondary Diagnosis:	Depression  Instructions for follow-up, activity and diet:	Continue current RX  Seen and followed by Psych  Secondary Diagnosis:	Diabetes  Instructions for follow-up, activity and diet:	Ronnie  Accuchecks AC/HS with insulin coverage  Consistent Carb diet  Secondary Diagnosis:	ERIC (acute kidney injury)  Instructions for follow-up, activity and diet:	probably R/T Vanco  improved with IV fluids  avoid nephrotoxic drugs  monitor labs

## 2017-01-10 NOTE — DISCHARGE NOTE ADULT - PLAN OF CARE
IV Nafcillin changed to ancef due to diarrhea   - BC  CARLITA/TTE - no valvular evidence of endocarditis noted   Ancef IV until the end of month as per ID  S/P PICC. The patient will have resolution of infection s/p Right foot partial first toe amputation   Left foot - debridement of second metatarsal and bone biopsy.   Wound care as per Podiatry  may ambulate in surgical shoes.   PT. work up negative  probably R/T acute blood loss from surgery and acute illness  H&H stable  monitor labs Continue DUO/NEB/LABA/Singulair Continue current RX  Seen and followed by Psych Lantus  Accuchecks AC/HS with insulin coverage  Consistent Carb diet probably R/T Vanco  improved with IV fluids  avoid nephrotoxic drugs  monitor labs

## 2017-01-10 NOTE — DISCHARGE NOTE ADULT - MEDICATION SUMMARY - MEDICATIONS TO TAKE
I will START or STAY ON the medications listed below when I get home from the hospital:    HYDROmorphone  -- 1 milligram(s) by mouth every 4 hours, As Needed for moderate pain  -- Indication: For PAin    HYDROmorphone 2 mg oral tablet  -- 1 tab(s) by mouth every 4 hours, As needed, Severe Pain (7 - 10)  hold for sedation or RR<12  -- Indication: For PAin    gabapentin 600 mg oral tablet  -- 1 tab(s) by mouth 2 times a day, As needed, neuropathic pain  -- Indication: For Neuropathy    diazePAM 5 mg oral tablet  -- 1 tab(s) by mouth every 8 hours  -- Indication: For Muscle spasm    traZODone 100 mg oral tablet  -- 1 tab(s) by mouth once a day (at bedtime)  -- Indication: For Depression    DULoxetine 60 mg oral delayed release capsule  -- 1 cap(s) by mouth once a day  -- Indication: For Depression    insulin lispro 100 units/mL subcutaneous solution  --  subcutaneous 4 times a day (before meals and at bedtime); 2 Unit(s) if Glucose 151 - 200  4 Unit(s) if Glucose 201 - 250  6 Unit(s) if Glucose 251 - 300  8 Unit(s) if Glucose 301 - 350  10 Unit(s) if Glucose 351 - 400  12 Unit(s) if Glucose Greater Than 400  -- Indication: For Diabetes    insulin glargine  -- 10 unit(s) subcutaneous once a day (at bedtime)  -- Indication: For Diabetes    sodium hypochlorite 0.25% topical solution  -- 1 application on skin once a day  -- Indication: For wound care    budesonide-formoterol 80 mcg-4.5 mcg/inh inhalation aerosol  -- 1 dose(s) inhaled 2 times a day  -- Indication: For COPD (chronic obstructive pulmonary disease)    ipratropium-albuterol 0.5 mg-2.5 mg/3 mLinhalation solution  -- 3 milliliter(s) inhaled every 6 hours, As Needed  -- Indication: For COPD (chronic obstructive pulmonary disease)    ceFAZolin  -- 1 gram(s) intravenous every 8 hours thru 1/31/17  -- Indication: For Bacteremia    montelukast 10 mg oral tablet  -- 1 tab(s) by mouth once a day (at bedtime)  -- Indication: For COPD (chronic obstructive pulmonary disease)    pantoprazole 40 mg oral delayed release tablet  -- 1 tab(s) by mouth 2 times a day  -- Indication: For GERD    buPROPion 300 mg/24 hours (XL) oral tablet, extended release  -- 1 tab(s) by mouth once a day  -- Indication: For Depression    ergocalciferol 50,000 intl units (1.25 mg) oral capsule  -- 1 cap(s) by mouth once a week  -- Indication: For vitamin d deficiency

## 2017-01-10 NOTE — DISCHARGE NOTE ADULT - MEDICATION SUMMARY - MEDICATIONS TO CHANGE
I will SWITCH the dose or number of times a day I take the medications listed below when I get home from the hospital:    Lantus 100 units/mL subcutaneous solution  -- 70 units sq bid  -- 70 units AM and 70 units HS    Neurontin 800 mg oral tablet  -- 1 tab(s) by mouth 3 times a day    Cymbalta  -- 90 milligram(s) by mouth once a day    Protonix 40 mg oral delayed release tablet  -- 1 tab(s) by mouth once a day    traZODone  --  150 mg by mouth qhs    diazePAM 5 mg oral tablet  -- 1 tab by mouth in the morning and afternoon, and 2 tabs at bedtime    insulin glargine 100 units/mL subcutaneous solution  -- 70 unit(s) subcutaneous 2 times a day  -- 70 units AM and 70 units HS    gabapentin 800 mg oral tablet  -- 1 tab(s) by mouth 3 times a day    insulin aspart 100 units/mL subcutaneous solution  -- 25 unit(s) subcutaneous 3 times a day  -- 25 units    traZODone 100 mg oral tablet  -- 2 tab(s) by mouth once a day (at bedtime)

## 2017-01-10 NOTE — DISCHARGE NOTE ADULT - HOSPITAL COURSE
51 y.o.  Female with DMII, depression, COPD, current smoker sent by podiatrist Dr Reyes for right toe gangrene worsening drainage. At prior left toe amputation site also with erythema and gangrene,  foul smelling drainage, patient with low grade temperatures of 99.3 in hospital, but reported fevers at home.   Pt underwent right foot I st toe partial amputation and Left II debridement, noted to have MSSA bacteremia - resolved on IV abx with neg CARLITA. Course complicated by ERIC due to vancomycin and now diarrhea, abd pain no vomiting today. PICC line placed, seen by PT recommended ISIDRO. Followed by ID recommend IV Kefzol via PICC thru 1/31/17.    Vital Signs Last 24 Hrs  T(C): 36.2, Max: 36.9 (01-09 @ 16:30)  T(F): 97.1, Max: 98.4 (01-09 @ 16:30)  HR: 78 (68 - 88)  BP: 116/72 (87/61 - 116/72)  BP(mean): --  RR: 18 (18 - 18)  SpO2: 100% (96% - 100%)                              8.2    2.69  )-----------( 119      ( 10 Cosme 2017 08:15 )             25.5     10 Cosme 2017 08:15    138    |  103    |  10.0   ----------------------------<  108    3.8     |  25.0   |  1.77     Ca    7.8        10 Cosme 2017 08:15  Mg     1.9       10 Cosme 2017 08:15        CAPILLARY BLOOD GLUCOSE  104 (10 Cosme 2017 07:41)  106 (09 Jan 2017 21:00)  102 (09 Jan 2017 17:28)  118 (09 Jan 2017 12:41)

## 2017-01-10 NOTE — DISCHARGE NOTE ADULT - SECONDARY DIAGNOSIS.
Diabetic foot infection Anemia, unspecified type COPD (chronic obstructive pulmonary disease) Depression Diabetes ERIC (acute kidney injury)

## 2017-01-23 ENCOUNTER — APPOINTMENT (OUTPATIENT)
Dept: INTERNAL MEDICINE | Facility: CLINIC | Age: 52
End: 2017-01-23

## 2017-02-06 ENCOUNTER — APPOINTMENT (OUTPATIENT)
Dept: INTERNAL MEDICINE | Facility: CLINIC | Age: 52
End: 2017-02-06

## 2017-02-06 ENCOUNTER — NON-APPOINTMENT (OUTPATIENT)
Age: 52
End: 2017-02-06

## 2017-02-17 ENCOUNTER — APPOINTMENT (OUTPATIENT)
Dept: INTERNAL MEDICINE | Facility: CLINIC | Age: 52
End: 2017-02-17

## 2017-02-27 ENCOUNTER — APPOINTMENT (OUTPATIENT)
Dept: INTERNAL MEDICINE | Facility: CLINIC | Age: 52
End: 2017-02-27

## 2017-02-27 VITALS
TEMPERATURE: 97.5 F | BODY MASS INDEX: 23.86 KG/M2 | HEART RATE: 87 BPM | OXYGEN SATURATION: 98 % | DIASTOLIC BLOOD PRESSURE: 70 MMHG | SYSTOLIC BLOOD PRESSURE: 120 MMHG | WEIGHT: 152 LBS | HEIGHT: 67 IN

## 2017-02-28 ENCOUNTER — APPOINTMENT (OUTPATIENT)
Dept: RADIOLOGY | Facility: CLINIC | Age: 52
End: 2017-02-28

## 2017-02-28 ENCOUNTER — OUTPATIENT (OUTPATIENT)
Dept: OUTPATIENT SERVICES | Facility: HOSPITAL | Age: 52
LOS: 1 days | End: 2017-02-28
Payer: MEDICAID

## 2017-02-28 DIAGNOSIS — Z00.8 ENCOUNTER FOR OTHER GENERAL EXAMINATION: ICD-10-CM

## 2017-02-28 PROCEDURE — 73630 X-RAY EXAM OF FOOT: CPT

## 2017-02-28 PROCEDURE — 73610 X-RAY EXAM OF ANKLE: CPT

## 2017-03-22 ENCOUNTER — APPOINTMENT (OUTPATIENT)
Dept: RADIOLOGY | Facility: CLINIC | Age: 52
End: 2017-03-22

## 2017-03-22 ENCOUNTER — OUTPATIENT (OUTPATIENT)
Dept: OUTPATIENT SERVICES | Facility: HOSPITAL | Age: 52
LOS: 1 days | End: 2017-03-22
Payer: MEDICAID

## 2017-03-22 DIAGNOSIS — Z00.8 ENCOUNTER FOR OTHER GENERAL EXAMINATION: ICD-10-CM

## 2017-03-22 PROCEDURE — 73630 X-RAY EXAM OF FOOT: CPT

## 2017-03-22 PROCEDURE — 73630 X-RAY EXAM OF FOOT: CPT | Mod: 26,RT

## 2017-03-29 ENCOUNTER — APPOINTMENT (OUTPATIENT)
Dept: INTERNAL MEDICINE | Facility: CLINIC | Age: 52
End: 2017-03-29

## 2017-03-29 VITALS
DIASTOLIC BLOOD PRESSURE: 80 MMHG | HEART RATE: 62 BPM | HEIGHT: 67 IN | TEMPERATURE: 98.1 F | OXYGEN SATURATION: 97 % | SYSTOLIC BLOOD PRESSURE: 100 MMHG

## 2017-03-31 ENCOUNTER — MEDICATION RENEWAL (OUTPATIENT)
Age: 52
End: 2017-03-31

## 2017-04-11 ENCOUNTER — APPOINTMENT (OUTPATIENT)
Dept: INTERNAL MEDICINE | Facility: CLINIC | Age: 52
End: 2017-04-11

## 2017-04-11 ENCOUNTER — LABORATORY RESULT (OUTPATIENT)
Age: 52
End: 2017-04-11

## 2017-04-11 VITALS
OXYGEN SATURATION: 95 % | BODY MASS INDEX: 22.6 KG/M2 | DIASTOLIC BLOOD PRESSURE: 90 MMHG | SYSTOLIC BLOOD PRESSURE: 130 MMHG | TEMPERATURE: 97.7 F | HEIGHT: 67 IN | HEART RATE: 101 BPM | WEIGHT: 144 LBS

## 2017-04-11 DIAGNOSIS — K58.9 IRRITABLE BOWEL SYNDROME W/OUT DIARRHEA: ICD-10-CM

## 2017-04-11 DIAGNOSIS — R30.0 DYSURIA: ICD-10-CM

## 2017-04-11 DIAGNOSIS — Z56.0 UNEMPLOYMENT, UNSPECIFIED: ICD-10-CM

## 2017-04-11 SDOH — ECONOMIC STABILITY - INCOME SECURITY: UNEMPLOYMENT, UNSPECIFIED: Z56.0

## 2017-04-12 ENCOUNTER — CLINICAL ADVICE (OUTPATIENT)
Age: 52
End: 2017-04-12

## 2017-04-12 LAB
ALBUMIN SERPL ELPH-MCNC: 4.2 G/DL
ALP BLD-CCNC: 125 U/L
ALT SERPL-CCNC: 15 U/L
AMYLASE/CREAT SERPL: 69 U/L
ANION GAP SERPL CALC-SCNC: 17 MMOL/L
APPEARANCE: CLEAR
AST SERPL-CCNC: 30 U/L
BASOPHILS # BLD AUTO: 0.03 K/UL
BASOPHILS NFR BLD AUTO: 0.4 %
BILIRUB SERPL-MCNC: 0.3 MG/DL
BILIRUBIN URINE: NEGATIVE
BLOOD URINE: ABNORMAL
BUN SERPL-MCNC: 19 MG/DL
CALCIUM SERPL-MCNC: 9.2 MG/DL
CHLORIDE SERPL-SCNC: 104 MMOL/L
CO2 SERPL-SCNC: 22 MMOL/L
COLOR: YELLOW
CREAT SERPL-MCNC: 2.18 MG/DL
EOSINOPHIL # BLD AUTO: 0.24 K/UL
EOSINOPHIL NFR BLD AUTO: 3.1 %
GLUCOSE QUALITATIVE U: NORMAL MG/DL
GLUCOSE SERPL-MCNC: 135 MG/DL
HCT VFR BLD CALC: 40.4 %
HGB BLD-MCNC: 13.2 G/DL
IMM GRANULOCYTES NFR BLD AUTO: 0.1 %
KETONES URINE: NEGATIVE
LEUKOCYTE ESTERASE URINE: NEGATIVE
LPL SERPL-CCNC: 45 U/L
LYMPHOCYTES # BLD AUTO: 2.03 K/UL
LYMPHOCYTES NFR BLD AUTO: 26.3 %
MAN DIFF?: NORMAL
MCHC RBC-ENTMCNC: 28.2 PG
MCHC RBC-ENTMCNC: 32.7 GM/DL
MCV RBC AUTO: 86.3 FL
MONOCYTES # BLD AUTO: 0.41 K/UL
MONOCYTES NFR BLD AUTO: 5.3 %
NEUTROPHILS # BLD AUTO: 4.99 K/UL
NEUTROPHILS NFR BLD AUTO: 64.8 %
NITRITE URINE: NEGATIVE
PH URINE: 5.5
PLATELET # BLD AUTO: 121 K/UL
POTASSIUM SERPL-SCNC: 4.3 MMOL/L
PROT SERPL-MCNC: 7.3 G/DL
PROTEIN URINE: 100 MG/DL
RBC # BLD: 4.68 M/UL
RBC # FLD: 14.4 %
SODIUM SERPL-SCNC: 143 MMOL/L
SPECIFIC GRAVITY URINE: 1.01
UROBILINOGEN URINE: NORMAL MG/DL
WBC # FLD AUTO: 7.71 K/UL

## 2017-04-12 RX ORDER — METRONIDAZOLE 500 MG/1
500 TABLET ORAL 3 TIMES DAILY
Qty: 42 | Refills: 0 | Status: DISCONTINUED | COMMUNITY
Start: 2017-03-29 | End: 2017-04-12

## 2017-04-12 RX ORDER — CIPROFLOXACIN HYDROCHLORIDE 500 MG/1
500 TABLET, FILM COATED ORAL
Qty: 28 | Refills: 0 | Status: DISCONTINUED | COMMUNITY
Start: 2017-03-29 | End: 2017-04-12

## 2017-04-14 LAB — BACTERIA UR CULT: NORMAL

## 2017-04-17 ENCOUNTER — FORM ENCOUNTER (OUTPATIENT)
Age: 52
End: 2017-04-17

## 2017-04-17 ENCOUNTER — APPOINTMENT (OUTPATIENT)
Dept: INTERNAL MEDICINE | Facility: CLINIC | Age: 52
End: 2017-04-17

## 2017-04-17 VITALS
HEIGHT: 67 IN | HEART RATE: 77 BPM | TEMPERATURE: 97.6 F | OXYGEN SATURATION: 95 % | WEIGHT: 152 LBS | DIASTOLIC BLOOD PRESSURE: 80 MMHG | BODY MASS INDEX: 23.86 KG/M2 | SYSTOLIC BLOOD PRESSURE: 124 MMHG

## 2017-04-18 ENCOUNTER — APPOINTMENT (OUTPATIENT)
Dept: ULTRASOUND IMAGING | Facility: CLINIC | Age: 52
End: 2017-04-18

## 2017-04-18 ENCOUNTER — OUTPATIENT (OUTPATIENT)
Dept: OUTPATIENT SERVICES | Facility: HOSPITAL | Age: 52
LOS: 1 days | End: 2017-04-18
Payer: MEDICAID

## 2017-04-18 DIAGNOSIS — R60.0 LOCALIZED EDEMA: ICD-10-CM

## 2017-04-18 PROCEDURE — 93971 EXTREMITY STUDY: CPT

## 2017-04-18 PROCEDURE — 93971 EXTREMITY STUDY: CPT | Mod: 26,RT

## 2017-04-19 ENCOUNTER — FORM ENCOUNTER (OUTPATIENT)
Age: 52
End: 2017-04-19

## 2017-04-20 ENCOUNTER — OUTPATIENT (OUTPATIENT)
Dept: OUTPATIENT SERVICES | Facility: HOSPITAL | Age: 52
LOS: 1 days | End: 2017-04-20
Payer: MEDICAID

## 2017-04-20 ENCOUNTER — APPOINTMENT (OUTPATIENT)
Dept: CT IMAGING | Facility: CLINIC | Age: 52
End: 2017-04-20

## 2017-04-20 DIAGNOSIS — K57.92 DIVERTICULITIS OF INTESTINE, PART UNSPECIFIED, WITHOUT PERFORATION OR ABSCESS WITHOUT BLEEDING: ICD-10-CM

## 2017-04-20 PROCEDURE — 74176 CT ABD & PELVIS W/O CONTRAST: CPT

## 2017-05-01 ENCOUNTER — APPOINTMENT (OUTPATIENT)
Dept: INTERNAL MEDICINE | Facility: CLINIC | Age: 52
End: 2017-05-01

## 2017-05-01 VITALS
BODY MASS INDEX: 23.07 KG/M2 | TEMPERATURE: 97.4 F | OXYGEN SATURATION: 97 % | HEART RATE: 97 BPM | HEIGHT: 67 IN | WEIGHT: 147 LBS | SYSTOLIC BLOOD PRESSURE: 110 MMHG | DIASTOLIC BLOOD PRESSURE: 84 MMHG

## 2017-05-31 ENCOUNTER — MEDICATION RENEWAL (OUTPATIENT)
Age: 52
End: 2017-05-31

## 2017-06-02 ENCOUNTER — INPATIENT (INPATIENT)
Facility: HOSPITAL | Age: 52
LOS: 6 days | Discharge: HOME HEALTH | DRG: 477 | End: 2017-06-09
Attending: HOSPITALIST | Admitting: HOSPITALIST
Payer: MEDICAID

## 2017-06-02 VITALS
RESPIRATION RATE: 18 BRPM | DIASTOLIC BLOOD PRESSURE: 71 MMHG | WEIGHT: 154.1 LBS | OXYGEN SATURATION: 99 % | HEIGHT: 68 IN | SYSTOLIC BLOOD PRESSURE: 105 MMHG | TEMPERATURE: 98 F | HEART RATE: 66 BPM

## 2017-06-02 DIAGNOSIS — E11.621 TYPE 2 DIABETES MELLITUS WITH FOOT ULCER: ICD-10-CM

## 2017-06-02 DIAGNOSIS — L03.90 CELLULITIS, UNSPECIFIED: ICD-10-CM

## 2017-06-02 DIAGNOSIS — E11.9 TYPE 2 DIABETES MELLITUS WITHOUT COMPLICATIONS: ICD-10-CM

## 2017-06-02 LAB
ALBUMIN SERPL ELPH-MCNC: 3.7 G/DL — SIGNIFICANT CHANGE UP (ref 3.3–5.2)
ALP SERPL-CCNC: 126 U/L — HIGH (ref 40–120)
ALT FLD-CCNC: 12 U/L — SIGNIFICANT CHANGE UP
ANION GAP SERPL CALC-SCNC: 14 MMOL/L — SIGNIFICANT CHANGE UP (ref 5–17)
APTT BLD: 36.5 SEC — SIGNIFICANT CHANGE UP (ref 27.5–37.4)
AST SERPL-CCNC: 19 U/L — SIGNIFICANT CHANGE UP
BILIRUB SERPL-MCNC: 0.5 MG/DL — SIGNIFICANT CHANGE UP (ref 0.4–2)
BLD GP AB SCN SERPL QL: SIGNIFICANT CHANGE UP
BUN SERPL-MCNC: 26 MG/DL — HIGH (ref 8–20)
CALCIUM SERPL-MCNC: 8.5 MG/DL — LOW (ref 8.6–10.2)
CHLORIDE SERPL-SCNC: 97 MMOL/L — LOW (ref 98–107)
CO2 SERPL-SCNC: 23 MMOL/L — SIGNIFICANT CHANGE UP (ref 22–29)
CREAT SERPL-MCNC: 1.87 MG/DL — HIGH (ref 0.5–1.3)
GLUCOSE SERPL-MCNC: 110 MG/DL — SIGNIFICANT CHANGE UP (ref 70–115)
GRAM STN FLD: SIGNIFICANT CHANGE UP
HCT VFR BLD CALC: 31.4 % — LOW (ref 37–47)
HGB BLD-MCNC: 10.4 G/DL — LOW (ref 12–16)
INR BLD: 1.33 RATIO — HIGH (ref 0.88–1.16)
MCHC RBC-ENTMCNC: 28.7 PG — SIGNIFICANT CHANGE UP (ref 27–31)
MCHC RBC-ENTMCNC: 33.1 G/DL — SIGNIFICANT CHANGE UP (ref 32–36)
MCV RBC AUTO: 86.7 FL — SIGNIFICANT CHANGE UP (ref 81–99)
PLATELET # BLD AUTO: 97 K/UL — LOW (ref 150–400)
POTASSIUM SERPL-MCNC: 5.3 MMOL/L — SIGNIFICANT CHANGE UP (ref 3.5–5.3)
POTASSIUM SERPL-SCNC: 5.3 MMOL/L — SIGNIFICANT CHANGE UP (ref 3.5–5.3)
PROT SERPL-MCNC: 7.2 G/DL — SIGNIFICANT CHANGE UP (ref 6.6–8.7)
PROTHROM AB SERPL-ACNC: 14.7 SEC — HIGH (ref 9.8–12.7)
RBC # BLD: 3.62 M/UL — LOW (ref 4.4–5.2)
RBC # FLD: 13.2 % — SIGNIFICANT CHANGE UP (ref 11–15.6)
SODIUM SERPL-SCNC: 134 MMOL/L — LOW (ref 135–145)
SPECIMEN SOURCE: SIGNIFICANT CHANGE UP
TYPE + AB SCN PNL BLD: SIGNIFICANT CHANGE UP
WBC # BLD: 7.7 K/UL — SIGNIFICANT CHANGE UP (ref 4.8–10.8)
WBC # FLD AUTO: 7.7 K/UL — SIGNIFICANT CHANGE UP (ref 4.8–10.8)

## 2017-06-02 PROCEDURE — 71010: CPT | Mod: 26

## 2017-06-02 PROCEDURE — 99223 1ST HOSP IP/OBS HIGH 75: CPT

## 2017-06-02 PROCEDURE — 93010 ELECTROCARDIOGRAM REPORT: CPT

## 2017-06-02 PROCEDURE — 99222 1ST HOSP IP/OBS MODERATE 55: CPT

## 2017-06-02 PROCEDURE — 99285 EMERGENCY DEPT VISIT HI MDM: CPT

## 2017-06-02 PROCEDURE — 73620 X-RAY EXAM OF FOOT: CPT | Mod: 26,RT

## 2017-06-02 RX ORDER — GLUCAGON INJECTION, SOLUTION 0.5 MG/.1ML
1 INJECTION, SOLUTION SUBCUTANEOUS ONCE
Qty: 0 | Refills: 0 | Status: DISCONTINUED | OUTPATIENT
Start: 2017-06-02 | End: 2017-06-05

## 2017-06-02 RX ORDER — DEXTROSE 50 % IN WATER 50 %
1 SYRINGE (ML) INTRAVENOUS ONCE
Qty: 0 | Refills: 0 | Status: DISCONTINUED | OUTPATIENT
Start: 2017-06-02 | End: 2017-06-05

## 2017-06-02 RX ORDER — INSULIN LISPRO 100/ML
VIAL (ML) SUBCUTANEOUS
Qty: 0 | Refills: 0 | Status: DISCONTINUED | OUTPATIENT
Start: 2017-06-02 | End: 2017-06-05

## 2017-06-02 RX ORDER — CEFAZOLIN SODIUM 1 G
1000 VIAL (EA) INJECTION ONCE
Qty: 0 | Refills: 0 | Status: COMPLETED | OUTPATIENT
Start: 2017-06-02 | End: 2017-06-02

## 2017-06-02 RX ORDER — DIAZEPAM 5 MG
5 TABLET ORAL EVERY 8 HOURS
Qty: 0 | Refills: 0 | Status: DISCONTINUED | OUTPATIENT
Start: 2017-06-02 | End: 2017-06-05

## 2017-06-02 RX ORDER — INSULIN GLARGINE 100 [IU]/ML
30 INJECTION, SOLUTION SUBCUTANEOUS AT BEDTIME
Qty: 0 | Refills: 0 | Status: DISCONTINUED | OUTPATIENT
Start: 2017-06-02 | End: 2017-06-03

## 2017-06-02 RX ORDER — CEFAZOLIN SODIUM 1 G
1000 VIAL (EA) INJECTION EVERY 8 HOURS
Qty: 0 | Refills: 0 | Status: DISCONTINUED | OUTPATIENT
Start: 2017-06-02 | End: 2017-06-05

## 2017-06-02 RX ORDER — PIPERACILLIN AND TAZOBACTAM 4; .5 G/20ML; G/20ML
3.38 INJECTION, POWDER, LYOPHILIZED, FOR SOLUTION INTRAVENOUS ONCE
Qty: 0 | Refills: 0 | Status: COMPLETED | OUTPATIENT
Start: 2017-06-02 | End: 2017-06-02

## 2017-06-02 RX ORDER — DEXTROSE 50 % IN WATER 50 %
25 SYRINGE (ML) INTRAVENOUS ONCE
Qty: 0 | Refills: 0 | Status: DISCONTINUED | OUTPATIENT
Start: 2017-06-02 | End: 2017-06-05

## 2017-06-02 RX ORDER — PANTOPRAZOLE SODIUM 20 MG/1
40 TABLET, DELAYED RELEASE ORAL
Qty: 0 | Refills: 0 | Status: DISCONTINUED | OUTPATIENT
Start: 2017-06-02 | End: 2017-06-05

## 2017-06-02 RX ORDER — TRAZODONE HCL 50 MG
100 TABLET ORAL AT BEDTIME
Qty: 0 | Refills: 0 | Status: DISCONTINUED | OUTPATIENT
Start: 2017-06-02 | End: 2017-06-05

## 2017-06-02 RX ORDER — SODIUM CHLORIDE 9 MG/ML
1000 INJECTION, SOLUTION INTRAVENOUS
Qty: 0 | Refills: 0 | Status: DISCONTINUED | OUTPATIENT
Start: 2017-06-02 | End: 2017-06-05

## 2017-06-02 RX ORDER — IPRATROPIUM/ALBUTEROL SULFATE 18-103MCG
3 AEROSOL WITH ADAPTER (GRAM) INHALATION EVERY 6 HOURS
Qty: 0 | Refills: 0 | Status: DISCONTINUED | OUTPATIENT
Start: 2017-06-02 | End: 2017-06-05

## 2017-06-02 RX ORDER — GABAPENTIN 400 MG/1
600 CAPSULE ORAL
Qty: 0 | Refills: 0 | Status: DISCONTINUED | OUTPATIENT
Start: 2017-06-02 | End: 2017-06-05

## 2017-06-02 RX ORDER — SODIUM CHLORIDE 9 MG/ML
3 INJECTION INTRAMUSCULAR; INTRAVENOUS; SUBCUTANEOUS EVERY 8 HOURS
Qty: 0 | Refills: 0 | Status: DISCONTINUED | OUTPATIENT
Start: 2017-06-02 | End: 2017-06-05

## 2017-06-02 RX ORDER — DULOXETINE HYDROCHLORIDE 30 MG/1
60 CAPSULE, DELAYED RELEASE ORAL DAILY
Qty: 0 | Refills: 0 | Status: DISCONTINUED | OUTPATIENT
Start: 2017-06-02 | End: 2017-06-05

## 2017-06-02 RX ORDER — HEPARIN SODIUM 5000 [USP'U]/ML
5000 INJECTION INTRAVENOUS; SUBCUTANEOUS EVERY 12 HOURS
Qty: 0 | Refills: 0 | Status: DISCONTINUED | OUTPATIENT
Start: 2017-06-02 | End: 2017-06-05

## 2017-06-02 RX ORDER — DEXTROSE 50 % IN WATER 50 %
12.5 SYRINGE (ML) INTRAVENOUS ONCE
Qty: 0 | Refills: 0 | Status: DISCONTINUED | OUTPATIENT
Start: 2017-06-02 | End: 2017-06-05

## 2017-06-02 RX ORDER — INSULIN GLARGINE 100 [IU]/ML
10 INJECTION, SOLUTION SUBCUTANEOUS AT BEDTIME
Qty: 0 | Refills: 0 | Status: DISCONTINUED | OUTPATIENT
Start: 2017-06-02 | End: 2017-06-02

## 2017-06-02 RX ORDER — MONTELUKAST 4 MG/1
10 TABLET, CHEWABLE ORAL DAILY
Qty: 0 | Refills: 0 | Status: DISCONTINUED | OUTPATIENT
Start: 2017-06-02 | End: 2017-06-05

## 2017-06-02 RX ORDER — CEFAZOLIN SODIUM 1 G
VIAL (EA) INJECTION
Qty: 0 | Refills: 0 | Status: DISCONTINUED | OUTPATIENT
Start: 2017-06-02 | End: 2017-06-05

## 2017-06-02 RX ORDER — VANCOMYCIN HCL 1 G
1000 VIAL (EA) INTRAVENOUS ONCE
Qty: 0 | Refills: 0 | Status: DISCONTINUED | OUTPATIENT
Start: 2017-06-02 | End: 2017-06-02

## 2017-06-02 RX ORDER — BUPROPION HYDROCHLORIDE 150 MG/1
300 TABLET, EXTENDED RELEASE ORAL DAILY
Qty: 0 | Refills: 0 | Status: DISCONTINUED | OUTPATIENT
Start: 2017-06-02 | End: 2017-06-05

## 2017-06-02 RX ADMIN — Medication 100 MILLIGRAM(S): at 17:59

## 2017-06-02 RX ADMIN — DULOXETINE HYDROCHLORIDE 60 MILLIGRAM(S): 30 CAPSULE, DELAYED RELEASE ORAL at 17:58

## 2017-06-02 RX ADMIN — GABAPENTIN 600 MILLIGRAM(S): 400 CAPSULE ORAL at 17:57

## 2017-06-02 RX ADMIN — HEPARIN SODIUM 5000 UNIT(S): 5000 INJECTION INTRAVENOUS; SUBCUTANEOUS at 17:58

## 2017-06-02 RX ADMIN — PIPERACILLIN AND TAZOBACTAM 200 GRAM(S): 4; .5 INJECTION, POWDER, LYOPHILIZED, FOR SOLUTION INTRAVENOUS at 15:13

## 2017-06-02 RX ADMIN — Medication 100 MILLIGRAM(S): at 21:35

## 2017-06-02 RX ADMIN — Medication 5 MILLIGRAM(S): at 18:00

## 2017-06-02 RX ADMIN — SODIUM CHLORIDE 3 MILLILITER(S): 9 INJECTION INTRAMUSCULAR; INTRAVENOUS; SUBCUTANEOUS at 21:11

## 2017-06-02 RX ADMIN — INSULIN GLARGINE 30 UNIT(S): 100 INJECTION, SOLUTION SUBCUTANEOUS at 21:39

## 2017-06-02 NOTE — CONSULT NOTE ADULT - SUBJECTIVE AND OBJECTIVE BOX
NPP INFECTIOUS DISEASES AND INTERNAL MEDICINE [facility]  =======================================================  Dwight Pillai MD [facility]   Jabier Curtis MD  Diplomates American Board of Internal Medicine and Infectious Diseases  =======================================================    [id number]  SONYA LENNON is a 52y  Female   This 52 y.o. F with diabetes, left foot ulcer in past, prior MSSA bacteremia, who has been referred here for a non healing foot ulcer in the right side.             =======================================================  Past Medical & Surgical Hx:  =====================  PAST MEDICAL & SURGICAL HISTORY:  Fibromyalgia  DM (diabetes mellitus)  Foot ulcer: Left Foot  Osteopenia  Chronic pain  Back ache  Arthritis  Shoulder joint stiffness, left  [place] esophagus  Stomach ulcer  Diabetes  Asthma  S/P knee surgery  S/P hysterectomy  S/P cholecystectomy      Problem List:  ==========  HEALTH ISSUES - PROBLEM Dx:          Social Hx:  =======  no toxic habits currently      FAMILY HISTORY:  No pertinent family history in first degree relatives      Allergies    No Known Allergies    Intolerances        MEDICATIONS  (STANDING):  sodium chloride 0.9% lock flush 3milliLiter(s) IV Push every 8 hours  ceFAZolin   IVPB  IV Intermittent   ceFAZolin   IVPB 1000milliGRAM(s) IV Intermittent every 8 hours  heparin  Injectable 5000Unit(s) SubCutaneous every 12 hours  pantoprazole    Tablet 40milliGRAM(s) Oral before breakfast  buPROPion XL . 300milliGRAM(s) Oral daily  montelukast 10milliGRAM(s) Oral daily  insulin lispro (HumaLOG) corrective regimen sliding scale  SubCutaneous three times a day before meals  dextrose 5%. 1000milliLiter(s) IV Continuous <Continuous>  dextrose 50% Injectable 12.5Gram(s) IV Push once  dextrose 50% Injectable 25Gram(s) IV Push once  dextrose 50% Injectable 25Gram(s) IV Push once  diazepam    Tablet 5milliGRAM(s) Oral every 8 hours  DULoxetine 60milliGRAM(s) Oral daily  traZODone 100milliGRAM(s) Oral at bedtime  gabapentin 600milliGRAM(s) Oral two times a day  insulin glargine Injectable (LANTUS) 30Unit(s) SubCutaneous at bedtime    MEDICATIONS  (PRN):  dextrose Gel 1Dose(s) Oral once PRN Blood Glucose LESS THAN 70 milliGRAM(s)/deciliter  glucagon  Injectable 1milliGRAM(s) IntraMuscular once PRN Glucose LESS THAN 70 milligrams/deciliter  ALBUTerol/ipratropium for Nebulization 3milliLiter(s) Nebulizer every 6 hours PRN Bronchospasm  oxyCODONE  5 mG/acetaminophen 325 mG 2Tablet(s) Oral every 4 hours PRN Moderate Pain (4 - 6)        =======================================================  REVIEW OF SYSTEMS:  Constitutional: No fever, No chills, No sweats.  Eye: No icterus, No double vision.  Ear/Nose/Mouth/Throat: No nasal congestion, No sore throat.  Respiratory: No shortness of breath, No cough, No sputum production, No wheezing.  Cardiovascular: No chest pain, No palpitations, No syncope.  Gastrointestinal: No nausea, No vomiting, No diarrhea, No abdominal pain.  Genitourinary: No dysuria, No hematuria, No change in urine stream.  Hematology/Lymphatics: No bleeding tendency.  Endocrine: No excessive thirst, No polyuria.  Immunologic: No malaise.  Musculoskeletal: No back pain, No neck pain, No joint pain, No muscle pain.  Integumentary: No rash, No pruritus, No skin lesion.  Neurologic: No numbness, No tingling, No headache.  Psychiatric: No depression.    =======================================================  I&O's Detail      Physical Exam:  ============  Vital Signs Last 24 Hrs  T(C): 36.4, Max: 36.9 (06-02 @ 13:38)  T(F): 97.5, Max: 98.4 (06-02 @ 13:38)  HR: 75 (66 - 75)  BP: 101/64 (101/64 - 105/71)  BP(mean): --  RR: 20 (18 - 20)  SpO2: 99% (99% - 99%)  Height (cm): 172.7 (06-02 @ 13:38)  Weight (kg): 69.9 (06-02 @ 13:38)  BMI (kg/m2): 23.4 (06-02 @ 13:38)  BSA (m2): 1.83 (06-02 @ 13:38)  General: Alert and oriented, No acute distress.  Eye: Pupils are equal, round and reactive to light, Extraocular movements are intact, Normal conjunctiva.  HENT: Normocephalic, Oral mucosa is moist, No pharyngeal erythema, No sinus tenderness.  Neck: Supple, No lymphadenopathy.  Respiratory: Lungs are clear to auscultation, Respirations are non-labored.  Cardiovascular: Normal rate, Regular rhythm, No murmur, Good pulses equal in all extremities, No edema.  Gastrointestinal: Soft, Non-tender, Non-distended, Normal bowel sounds.  Genitourinary: No costovertebral angle tenderness.  Lymphatics: No lymphadenopathy neck, axilla, groin.  Musculoskeletal: Normal range of motion, Normal strength.  Integumentary: No rash.  Neurologic: Alert, Oriented, No focal deficits, Cranial Nerves II-XII are grossly intact.  Psychiatric: Appropriate mood & affect.      =======================================================  Labs:  ====    Labs:  06-02    134<L>  |  97<L>  |  26.0<H>  ----------------------------<  110  5.3   |  23.0  |  1.87<H>    Ca    8.5<L>      02 Jun 2017 15:23    TPro  7.2  /  Alb  3.7  /  TBili  0.5  /  DBili  x   /  AST  19  /  ALT  12  /  AlkPhos  126<H>  06-02                          10.4   7.7   )-----------( 97       ( 02 Jun 2017 15:23 )             31.4       PT/INR - ( 02 Jun 2017 15:23 )   PT: 14.7 sec;   INR: 1.33 ratio         PTT - ( 02 Jun 2017 15:23 )  PTT:36.5 sec    LIVER FUNCTIONS - ( 02 Jun 2017 15:23 )  Alb: 3.7 g/dL / Pro: 7.2 g/dL / ALK PHOS: 126 U/L / ALT: 12 U/L / AST: 19 U/L / GGT: x                ====================     EXAM:  FOOT 2VIEWS RT                          PROCEDURE DATE:  06/02/2017        INTERPRETATION:  Right foot     CLINICAL INFORMATION: A status post of first metatarsal limitation of.   TECHNIQUE: AP,lateral and oblique views.  Comparison. 3/22/2017 and 2/28/2017  FINDINGS:    There is soft tissue swelling surrounding the surgical site. The second   cuneiform bone shows evidence of osteolysis peripherally and the   increased periosteal reaction of the remnant of first metatarsal bone   noted. There is a chronic fracture deformity of the second proximal   phalanx with impaction without callus formation unchanged from prior   exam. There is overlap of the metatarsal calcaneal joint consistent with   dislocation. The lateral film shows a rocker-bottom deformity with   dissociation/dislocation  between the metatarsal bones and the tarsal bones.     IMPRESSION:    Severe rocker-bottom deformity with disassociation and dislocation of the   first metatarsal and tarsal articulation. An  Soft tissue swelling.  Osteolysis versus osteopenic changes of the second cuneiform bone and   increased periosteal reaction of the base of the remnant first metatarsal   bone. Overlap of the second through fifth metatarsal calcaneal joint.        CHRISTOPHER GUERRERO M.D., ATTENDING RADIOLOGIST  This document has been electronically signed. Jun 2 2017  3:23PM NPP INFECTIOUS DISEASES AND INTERNAL MEDICINE [facility]  =======================================================  Dwight Pillai MD [facility]   Jabier Curtis MD  Diplomates American Board of Internal Medicine and Infectious Diseases  =======================================================    [id number]  SONYA LENNON is a 52y  Female   This 52 y.o. F with diabetes, COPD, smoker,  left foot ulcer in past, prior MSSA bacteremia, who has been referred here for a non healing foot ulcer in the right side.             =======================================================  Past Medical & Surgical Hx:  =====================  PAST MEDICAL & SURGICAL HISTORY:  Fibromyalgia  DM (diabetes mellitus)  Foot ulcer: Left Foot  Osteopenia  Chronic pain  Back ache  Arthritis  Shoulder joint stiffness, left  [place] esophagus  Stomach ulcer  Diabetes  Asthma  S/P knee surgery  S/P hysterectomy  S/P cholecystectomy      Problem List:  ==========  HEALTH ISSUES - PROBLEM Dx:          Social Hx:  =======  no toxic habits currently      FAMILY HISTORY:  No pertinent family history in first degree relatives      Allergies    No Known Allergies    Intolerances        MEDICATIONS  (STANDING):  sodium chloride 0.9% lock flush 3milliLiter(s) IV Push every 8 hours  ceFAZolin   IVPB  IV Intermittent   ceFAZolin   IVPB 1000milliGRAM(s) IV Intermittent every 8 hours  heparin  Injectable 5000Unit(s) SubCutaneous every 12 hours  pantoprazole    Tablet 40milliGRAM(s) Oral before breakfast  buPROPion XL . 300milliGRAM(s) Oral daily  montelukast 10milliGRAM(s) Oral daily  insulin lispro (HumaLOG) corrective regimen sliding scale  SubCutaneous three times a day before meals  dextrose 5%. 1000milliLiter(s) IV Continuous <Continuous>  dextrose 50% Injectable 12.5Gram(s) IV Push once  dextrose 50% Injectable 25Gram(s) IV Push once  dextrose 50% Injectable 25Gram(s) IV Push once  diazepam    Tablet 5milliGRAM(s) Oral every 8 hours  DULoxetine 60milliGRAM(s) Oral daily  traZODone 100milliGRAM(s) Oral at bedtime  gabapentin 600milliGRAM(s) Oral two times a day  insulin glargine Injectable (LANTUS) 30Unit(s) SubCutaneous at bedtime    MEDICATIONS  (PRN):  dextrose Gel 1Dose(s) Oral once PRN Blood Glucose LESS THAN 70 milliGRAM(s)/deciliter  glucagon  Injectable 1milliGRAM(s) IntraMuscular once PRN Glucose LESS THAN 70 milligrams/deciliter  ALBUTerol/ipratropium for Nebulization 3milliLiter(s) Nebulizer every 6 hours PRN Bronchospasm  oxyCODONE  5 mG/acetaminophen 325 mG 2Tablet(s) Oral every 4 hours PRN Moderate Pain (4 - 6)        =======================================================  REVIEW OF SYSTEMS:  Constitutional: No fever, No chills, No sweats.  Eye: No icterus, No double vision.  Ear/Nose/Mouth/Throat: No nasal congestion, No sore throat.  Respiratory: No shortness of breath, No cough, No sputum production, No wheezing.  Cardiovascular: No chest pain, No palpitations, No syncope.  Gastrointestinal: No nausea, No vomiting, No diarrhea, No abdominal pain.  Genitourinary: No dysuria, No hematuria, No change in urine stream.  Hematology/Lymphatics: No bleeding tendency.  Endocrine: No excessive thirst, No polyuria.  Immunologic: No malaise.  Musculoskeletal: No back pain, No neck pain, No joint pain, No muscle pain.  Integumentary: No rash, No pruritus, No skin lesion.  Neurologic: No numbness, No tingling, No headache.  Psychiatric: No depression.    =======================================================  I&O's Detail      Physical Exam:  ============  Vital Signs Last 24 Hrs  T(C): 36.4, Max: 36.9 (06-02 @ 13:38)  T(F): 97.5, Max: 98.4 (06-02 @ 13:38)  HR: 75 (66 - 75)  BP: 101/64 (101/64 - 105/71)  BP(mean): --  RR: 20 (18 - 20)  SpO2: 99% (99% - 99%)  Height (cm): 172.7 (06-02 @ 13:38)  Weight (kg): 69.9 (06-02 @ 13:38)  BMI (kg/m2): 23.4 (06-02 @ 13:38)  BSA (m2): 1.83 (06-02 @ 13:38)  General: Alert and oriented, No acute distress.  Eye: Pupils are equal, round and reactive to light, Extraocular movements are intact, Normal conjunctiva.  HENT: Normocephalic, Oral mucosa is moist, No pharyngeal erythema, No sinus tenderness.  Neck: Supple, No lymphadenopathy.  Respiratory: Lungs are clear to auscultation, Respirations are non-labored.  Cardiovascular: Normal rate, Regular rhythm, No murmur, Good pulses equal in all extremities, No edema.  Gastrointestinal: Soft, Non-tender, Non-distended, Normal bowel sounds.  Genitourinary: No costovertebral angle tenderness.  Lymphatics: No lymphadenopathy neck, axilla, groin.  Musculoskeletal: Normal range of motion, Normal strength.  Integumentary: No rash.  Neurologic: Alert, Oriented, No focal deficits, Cranial Nerves II-XII are grossly intact.  Psychiatric: Appropriate mood & affect.      =======================================================  Labs:  ====    Labs:  06-02    134<L>  |  97<L>  |  26.0<H>  ----------------------------<  110  5.3   |  23.0  |  1.87<H>    Ca    8.5<L>      02 Jun 2017 15:23    TPro  7.2  /  Alb  3.7  /  TBili  0.5  /  DBili  x   /  AST  19  /  ALT  12  /  AlkPhos  126<H>  06-02                          10.4   7.7   )-----------( 97       ( 02 Jun 2017 15:23 )             31.4       PT/INR - ( 02 Jun 2017 15:23 )   PT: 14.7 sec;   INR: 1.33 ratio         PTT - ( 02 Jun 2017 15:23 )  PTT:36.5 sec    LIVER FUNCTIONS - ( 02 Jun 2017 15:23 )  Alb: 3.7 g/dL / Pro: 7.2 g/dL / ALK PHOS: 126 U/L / ALT: 12 U/L / AST: 19 U/L / GGT: x                ====================     EXAM:  FOOT 2VIEWS RT                          PROCEDURE DATE:  06/02/2017        INTERPRETATION:  Right foot     CLINICAL INFORMATION: A status post of first metatarsal limitation of.   TECHNIQUE: AP,lateral and oblique views.  Comparison. 3/22/2017 and 2/28/2017  FINDINGS:    There is soft tissue swelling surrounding the surgical site. The second   cuneiform bone shows evidence of osteolysis peripherally and the   increased periosteal reaction of the remnant of first metatarsal bone   noted. There is a chronic fracture deformity of the second proximal   phalanx with impaction without callus formation unchanged from prior   exam. There is overlap of the metatarsal calcaneal joint consistent with   dislocation. The lateral film shows a rocker-bottom deformity with   dissociation/dislocation  between the metatarsal bones and the tarsal bones.     IMPRESSION:    Severe rocker-bottom deformity with disassociation and dislocation of the   first metatarsal and tarsal articulation. An  Soft tissue swelling.  Osteolysis versus osteopenic changes of the second cuneiform bone and   increased periosteal reaction of the base of the remnant first metatarsal   bone. Overlap of the second through fifth metatarsal calcaneal joint.        CHRISTOPHER GUERRERO M.D., ATTENDING RADIOLOGIST  This document has been electronically signed. Jun 2 2017  3:23PM NPP INFECTIOUS DISEASES AND INTERNAL MEDICINE [facility]  =======================================================  Dwight Pillai MD [facility]   Jabier Curtis MD  Diplomates American Board of Internal Medicine and Infectious Diseases  =======================================================    [facility]-[id number]  SONYA LENNON is a 52y  Female   This 52 y.o. F with diabetes, COPD, smoker,  left foot ulcer in past, prior MSSA bacteremia, who has been referred here for a non healing foot ulcer in the right side.    patient reports that this has only opened up in the recent 3 days. About 1 week ago, she had fevers subj, chill, sweats at home. Has been taking NORCO at home since then. She was sent in here by her podiatrist, now Dr. Sanchez.    Patient was last seen in the hospital in 12/2016, where she was found with MSSA bacteremia.     =======================================================  Past Medical & Surgical Hx:  =====================  PAST MEDICAL & SURGICAL HISTORY:  Fibromyalgia  DM (diabetes mellitus)  Foot ulcer: Left Foot  Osteopenia  Chronic pain  Back ache  Arthritis  Shoulder joint stiffness, left  [place] esophagus  Stomach ulcer  Diabetes  Asthma  S/P knee surgery  S/P hysterectomy  S/P cholecystectomy      Problem List:  ==========  HEALTH ISSUES - PROBLEM Dx:          Social Hx:  =======  smokes 1/2 PPD, was upwards of 3PPD in the past.       FAMILY HISTORY:  No pertinent family history in first degree relatives      Allergies    No Known Allergies    Intolerances        MEDICATIONS  (STANDING):  sodium chloride 0.9% lock flush 3milliLiter(s) IV Push every 8 hours  ceFAZolin   IVPB  IV Intermittent   ceFAZolin   IVPB 1000milliGRAM(s) IV Intermittent every 8 hours  heparin  Injectable 5000Unit(s) SubCutaneous every 12 hours  pantoprazole    Tablet 40milliGRAM(s) Oral before breakfast  buPROPion XL . 300milliGRAM(s) Oral daily  montelukast 10milliGRAM(s) Oral daily  insulin lispro (HumaLOG) corrective regimen sliding scale  SubCutaneous three times a day before meals  dextrose 5%. 1000milliLiter(s) IV Continuous <Continuous>  dextrose 50% Injectable 12.5Gram(s) IV Push once  dextrose 50% Injectable 25Gram(s) IV Push once  dextrose 50% Injectable 25Gram(s) IV Push once  diazepam    Tablet 5milliGRAM(s) Oral every 8 hours  DULoxetine 60milliGRAM(s) Oral daily  traZODone 100milliGRAM(s) Oral at bedtime  gabapentin 600milliGRAM(s) Oral two times a day  insulin glargine Injectable (LANTUS) 30Unit(s) SubCutaneous at bedtime    MEDICATIONS  (PRN):  dextrose Gel 1Dose(s) Oral once PRN Blood Glucose LESS THAN 70 milliGRAM(s)/deciliter  glucagon  Injectable 1milliGRAM(s) IntraMuscular once PRN Glucose LESS THAN 70 milligrams/deciliter  ALBUTerol/ipratropium for Nebulization 3milliLiter(s) Nebulizer every 6 hours PRN Bronchospasm  oxyCODONE  5 mG/acetaminophen 325 mG 2Tablet(s) Oral every 4 hours PRN Moderate Pain (4 - 6)        =======================================================  REVIEW OF SYSTEMS:  Constitutional: + fever, + chills, + sweats.  Eye: No icterus, No double vision.  Ear/Nose/Mouth/Throat: No nasal congestion, No sore throat.  Respiratory: No shortness of breath, No cough, No sputum production, No wheezing.  Cardiovascular: No chest pain, No palpitations, No syncope.  Gastrointestinal: No nausea, No vomiting, No diarrhea, No abdominal pain.  Genitourinary: No dysuria, No hematuria, No change in urine stream.  Hematology/Lymphatics: No bleeding tendency.  Endocrine: No excessive thirst, No polyuria.  Immunologic: No malaise.  Musculoskeletal: No back pain, No neck pain, No joint pain, No muscle pain.  Integumentary: AS ABOVE  Neurologic: No numbness, No tingling, No headache.  Psychiatric: No depression.    =======================================================  I&O's Detail      Physical Exam:  ============  Vital Signs Last 24 Hrs  T(C): 36.4, Max: 36.9 (06-02 @ 13:38)  T(F): 97.5, Max: 98.4 (06-02 @ 13:38)  HR: 75 (66 - 75)  BP: 101/64 (101/64 - 105/71)  BP(mean): --  RR: 20 (18 - 20)  SpO2: 99% (99% - 99%)  Height (cm): 172.7 (06-02 @ 13:38)  Weight (kg): 69.9 (06-02 @ 13:38)  BMI (kg/m2): 23.4 (06-02 @ 13:38)  BSA (m2): 1.83 (06-02 @ 13:38)  General: Alert and oriented, No acute distress. THIN  Eye: Pupils are equal, round and reactive to light, Extraocular movements are intact, Normal conjunctiva.  HENT: Normocephalic, Oral mucosa is moist, No pharyngeal erythema, No sinus tenderness. POOR DENTITION. MISSING MULTIPLE TEETH  Neck: Supple, No lymphadenopathy.  Respiratory: fair inspiration,  Respirations are non-labored.  Cardiovascular: Normal rate, Regular rhythm, No murmur, Good pulses equal in all extremities, No edema.  Gastrointestinal: Soft, Non-tender, Non-distended, Normal bowel sounds.  Genitourinary: No costovertebral angle tenderness.  Lymphatics: No lymphadenopathy neck, axilla, groin.  Musculoskeletal: Normal range of motion, Normal strength.  s/p AMPUTATION of LEFT TOES #1 #2  and AMPUTATION OF RIGHT #1  Integumentary: ULCER on right medial foot Quarter size; no significant drainage. No pus expressed  Neurologic: Alert, Oriented, No focal deficits, Cranial Nerves II-XII are grossly intact.  Psychiatric: Appropriate mood & affect.      =======================================================  Labs:  ====    Labs:  06-02    134<L>  |  97<L>  |  26.0<H>  ----------------------------<  110  5.3   |  23.0  |  1.87<H>    Ca    8.5<L>      02 Jun 2017 15:23    TPro  7.2  /  Alb  3.7  /  TBili  0.5  /  DBili  x   /  AST  19  /  ALT  12  /  AlkPhos  126<H>  06-02                          10.4   7.7   )-----------( 97       ( 02 Jun 2017 15:23 )             31.4       PT/INR - ( 02 Jun 2017 15:23 )   PT: 14.7 sec;   INR: 1.33 ratio         PTT - ( 02 Jun 2017 15:23 )  PTT:36.5 sec    LIVER FUNCTIONS - ( 02 Jun 2017 15:23 )  Alb: 3.7 g/dL / Pro: 7.2 g/dL / ALK PHOS: 126 U/L / ALT: 12 U/L / AST: 19 U/L / GGT: x                ====================     EXAM:  FOOT 2VIEWS RT                          PROCEDURE DATE:  06/02/2017        INTERPRETATION:  Right foot     CLINICAL INFORMATION: A status post of first metatarsal limitation of.   TECHNIQUE: AP,lateral and oblique views.  Comparison. 3/22/2017 and 2/28/2017  FINDINGS:    There is soft tissue swelling surrounding the surgical site. The second   cuneiform bone shows evidence of osteolysis peripherally and the   increased periosteal reaction of the remnant of first metatarsal bone   noted. There is a chronic fracture deformity of the second proximal   phalanx with impaction without callus formation unchanged from prior   exam. There is overlap of the metatarsal calcaneal joint consistent with   dislocation. The lateral film shows a rocker-bottom deformity with   dissociation/dislocation  between the metatarsal bones and the tarsal bones.     IMPRESSION:    Severe rocker-bottom deformity with disassociation and dislocation of the   first metatarsal and tarsal articulation. An  Soft tissue swelling.  Osteolysis versus osteopenic changes of the second cuneiform bone and   increased periosteal reaction of the base of the remnant first metatarsal   bone. Overlap of the second through fifth metatarsal calcaneal joint.       CHRISTOPHER GUERRERO M.D., ATTENDING RADIOLOGIST  This document has been electronically signed. Jun 2 2017  3:23PM

## 2017-06-02 NOTE — CONSULT NOTE ADULT - SUBJECTIVE AND OBJECTIVE BOX
Patient is a 52y old  Female who presents with a chief complaint of Persistent foot infection (02 Jun 2017 16:50)      HPI:  52F referred to the hospital by her podiatrist for a non-healing foot infection. The patient reported that she had been following with her podiatrist Dr. Sanchez and on oral ciprofloxacin.  Patient was seen by Dr. Sanchez and told to come to the ED due to possible OM after finding the ulceration tracked deep to the midfoot.   She denied any fevers or chills at home. She did note discomfort at the site as well as some drainage. She reports that the foot ulceration was present since the prior discharge from the hospital. he patient also reported that she was treated with a cast on her foot which was recently taken off. She also noted difficulty with ambulation and has had episodes of falling at home.  Patient has previous history of multiple foot surgeries including partial amputations.          PMH:Fibromyalgia  DM (diabetes mellitus)  Foot ulcer  Osteopenia  Chronic pain  Back ache  Arthritis  Shoulder joint stiffness, left  [place]os esophagus  Stomach ulcer  Diabetes  Asthma    Allergies: No Known Allergies    Medications: sodium chloride 0.9% lock flush 3milliLiter(s) IV Push every 8 hours  ceFAZolin   IVPB  IV Intermittent   ceFAZolin   IVPB 1000milliGRAM(s) IV Intermittent every 8 hours  heparin  Injectable 5000Unit(s) SubCutaneous every 12 hours  pantoprazole    Tablet 40milliGRAM(s) Oral before breakfast  buPROPion XL . 300milliGRAM(s) Oral daily  montelukast 10milliGRAM(s) Oral daily  insulin lispro (HumaLOG) corrective regimen sliding scale  SubCutaneous three times a day before meals  dextrose 5%. 1000milliLiter(s) IV Continuous <Continuous>  dextrose Gel 1Dose(s) Oral once PRN  dextrose 50% Injectable 12.5Gram(s) IV Push once  dextrose 50% Injectable 25Gram(s) IV Push once  dextrose 50% Injectable 25Gram(s) IV Push once  glucagon  Injectable 1milliGRAM(s) IntraMuscular once PRN  diazepam    Tablet 5milliGRAM(s) Oral every 8 hours  ALBUTerol/ipratropium for Nebulization 3milliLiter(s) Nebulizer every 6 hours PRN  DULoxetine 60milliGRAM(s) Oral daily  traZODone 100milliGRAM(s) Oral at bedtime  gabapentin 600milliGRAM(s) Oral two times a day  oxyCODONE  5 mG/acetaminophen 325 mG 2Tablet(s) Oral every 4 hours PRN  insulin glargine Injectable (LANTUS) 30Unit(s) SubCutaneous at bedtime    FH:No pertinent family history in first degree relatives    PSX: S/P knee surgery  S/P hysterectomy  S/P cholecystectomy    SH: sodium chloride 0.9% lock flush 3milliLiter(s) IV Push every 8 hours  ceFAZolin   IVPB  IV Intermittent   ceFAZolin   IVPB 1000milliGRAM(s) IV Intermittent every 8 hours  heparin  Injectable 5000Unit(s) SubCutaneous every 12 hours  pantoprazole    Tablet 40milliGRAM(s) Oral before breakfast  buPROPion XL . 300milliGRAM(s) Oral daily  montelukast 10milliGRAM(s) Oral daily  insulin lispro (HumaLOG) corrective regimen sliding scale  SubCutaneous three times a day before meals  dextrose 5%. 1000milliLiter(s) IV Continuous <Continuous>  dextrose Gel 1Dose(s) Oral once PRN  dextrose 50% Injectable 12.5Gram(s) IV Push once  dextrose 50% Injectable 25Gram(s) IV Push once  dextrose 50% Injectable 25Gram(s) IV Push once  glucagon  Injectable 1milliGRAM(s) IntraMuscular once PRN  diazepam    Tablet 5milliGRAM(s) Oral every 8 hours  ALBUTerol/ipratropium for Nebulization 3milliLiter(s) Nebulizer every 6 hours PRN  DULoxetine 60milliGRAM(s) Oral daily  traZODone 100milliGRAM(s) Oral at bedtime  gabapentin 600milliGRAM(s) Oral two times a day  oxyCODONE  5 mG/acetaminophen 325 mG 2Tablet(s) Oral every 4 hours PRN  insulin glargine Injectable (LANTUS) 30Unit(s) SubCutaneous at bedtime      Vital Signs Last 24 Hrs  T(C): 36.4, Max: 36.9 (06-02 @ 13:38)  T(F): 97.5, Max: 98.4 (06-02 @ 13:38)  HR: 75 (66 - 75)  BP: 101/64 (101/64 - 105/71)  BP(mean): --  RR: 20 (18 - 20)  SpO2: 99% (99% - 99%)    LABS                        10.4   7.7   )-----------( 97       ( 02 Jun 2017 15:23 )             31.4               06-02    134<L>  |  97<L>  |  26.0<H>  ----------------------------<  110  5.3   |  23.0  |  1.87<H>    Ca    8.5<L>      02 Jun 2017 15:23    TPro  7.2  /  Alb  3.7  /  TBili  0.5  /  DBili  x   /  AST  19  /  ALT  12  /  AlkPhos  126<H>  06-02      ROS  REVIEW OF SYSTEMS:    CONSTITUTIONAL: No fever, weight loss, or fatigue  EYES: No eye pain, visual disturbances, or discharge  ENMT:  No difficulty hearing, tinnitus, vertigo; No sinus or throat pain  NECK: No pain or stiffness  BREASTS: No pain, masses, or nipple discharge  RESPIRATORY: No cough, wheezing, chills or hemoptysis; No shortness of breath  CARDIOVASCULAR: No chest pain, palpitations, dizziness, or leg swelling  GASTROINTESTINAL: No abdominal or epigastric pain. No nausea, vomiting, or hematemesis; No diarrhea or constipation. No melena or hematochezia.  GENITOURINARY: No dysuria, frequency, hematuria, or incontinence  NEUROLOGICAL: No headaches, memory loss, loss of strength, numbness, or tremors  SKIN: No itching, burning, rashes, or lesions   LYMPH NODES: No enlarged glands  ENDOCRINE: No heat or cold intolerance; No hair loss  MUSCULOSKELETAL: No joint pain or swelling; No muscle, back, or extremity pain  PSYCHIATRIC: No depression, anxiety, mood swings, or difficulty sleeping  HEME/LYMPH: No easy bruising, or bleeding gums  ALLERY AND IMMUNOLOGIC: No hives or eczema      PHYSICAL EXAM  GEN: SONYA LENNON is a pleasant well-nourished, well developed 52y Female in no acute distress, alert awake, and oriented to person, place and time.   LE Focused:  right foot  Vasc:  pedal pulses non palpable, CFT 2 sec x 4, TG WNL  Derm: Medial midfoot ulceration w/ fibrogranular base that probes deep to bone.  Mild sero-purulent drainage noted with no malodor.  Localized erythema extends proximally from the the periwound area.    Neuro: protective sensation diminished   MSK: muscle power 4/5 b/l all groups.  S/p right 1st ray amputation w/ severe midfoot collapse secondary to charcot deformity   Imaging: Severe rocker-bottom deformity with disassociation and dislocation of the   first metatarsal and tarsal articulation. An  Soft tissue swelling.  Osteolysis versus osteopenic changes of the second cuneiform bone and   increased periosteal reaction of the base of the remnant first metatarsal   bone. Overlap of the second through fifth metatarsal calcaneal joint.  Cultures: pending

## 2017-06-02 NOTE — ED PROVIDER NOTE - OBJECTIVE STATEMENT
Pt with hx of DM presents to the ED for a nonhealing right foot ulcer. States that her podiatrist Dr. Sanchez sent her in for evaluation. She has been on Cipro 250 mg daily.  States she had a cast on the foot to prevent any injuries but she had it removed and since then has had a couple of falls.

## 2017-06-02 NOTE — H&P ADULT - NSHPPHYSICALEXAM_GEN_ALL_CORE
General appearance: NAD, Awake, Alert  HEENT: NCAT, Conjunctiva clear, EOMI, Pupils reactive  Neck: Supple, No JVD, No tenderness  Lungs: Clear to auscultation, Breath sound equal bilaterally, No wheezes, No rales  Cardiovascular: S1S2, Regular rhythm  Abdomen: Soft, Nontender, Nondistended, No guarding/rebound, Positive bowel sounds  Extremities: No clubbing, No cyanosis, No edema, No calf tenderness, Right foot ulceration to the plantar surface without obvious drainage  Neuro: Strength equal bilaterally, No tremors  Psychiatric: Appropriate mood, Normal affect

## 2017-06-02 NOTE — CONSULT NOTE ADULT - PROBLEM SELECTOR RECOMMENDATION 9
Elevated ESR and CRP concerning for bone infection  - Cefazolin 1 gram Q8H for now,. prior Cx with MSSA ; only 1 remote penicillin SS enterococcus in past  - NEEDS MRI of foot.

## 2017-06-02 NOTE — CONSULT NOTE ADULT - ASSESSMENT
A  Right foot OM w/ ulceration  Charcot right foot  s/p Right foot 1st ray amputation    P  Patient evaluate dand chart reviewed  IV abx  Local woundcare  Cultures pending  X-rays reviewed  Rec. MRI  Rec. Vascular consult  Rec. ID consult  Betadine DSD applied  Possible surgical management including possible reconstruction of right foot planned for monday  Podiatry will f/u
This 52 y.o. F with Depression, DM, COPD, smoker, her for Diabetic foot infection.

## 2017-06-02 NOTE — H&P ADULT - ASSESSMENT
52F with non-healing right foot ulceration    Foot ulceration - Empiric antibiotics therapy initiated. Discussed with Infectious Disease. Blood cultures pending. Podiatry consultation pending.    Diabetes - Insulin coverage, close monitoring of blood glucose levels.    Arthritis / Fibromyalgia - Analgesic medications as needed.    Asthma - Inhaled bronchodilator therapy as needed.    Mat[place]oros esophagus - Proton pump inhibitor. Diet as tolerated.    Thrombocytopenia - CBC to be repeat to monitor platelet count while on heparin.    CKD - Monitoring renal function. No hyperkalemia noted on presentation. ERIC secondary to Vancomycin noted on prior documentation.

## 2017-06-02 NOTE — ED PROVIDER NOTE - NS ED ROS FT
Review of Systems:  	•	CONSTITUTIONAL : no fever or weight change  	•	SKIN : + ulcer right dorsal foot  	•	HEMATOLOGIC : no petechia, no bruising  	•	EYES : no eye pain, no blurred vision  	•	ENT : no change in hearing, no sore throat  	•	RESPIRATORY : no shortness of breath, no cough  	•	CARDIAC : no chest pain, no palpitations  	•	GI : no abd pain, no nausea, no vomiting, no diarrhea, no constipation, no bleeding   	•	MUSCULOSKELETAL : prior amputation of right great toe as well as left 1st and 2nd toe.   	•	NEUROLOGIC : no weakness, no headache, no anesthesia, no paresthesias

## 2017-06-02 NOTE — ED PROVIDER NOTE - MEDICAL DECISION MAKING DETAILS
Pt with nonhealing diabetic foot ulcer. Will start IV antibiotics, labs, xray. Diony will be admitted with MRI and podiatry consult.

## 2017-06-02 NOTE — H&P ADULT - HISTORY OF PRESENT ILLNESS
52F referred to the hospital by her podiatrist for a non-healing foot infection. The patient reported that she had been following with her podiatrist and on oral ciprofloxacin. She denied any fevers or chills at home. She did note discomfort at the site as well as some drainage. She reports that the foot ulceration was present since the prior discharge from the hospital. She is unaware of any relieving factors. The patient also reported that she was treated with a cast on her foot which was recently taken off. She also noted difficulty with ambulation and has had episodes of falling at home.  Documentation from the patient's prior admission was obtained and reviewed. The patient was noted to have MSSA bacteremia on the prior admission as well as acute kidney injury due to Vancomycin.

## 2017-06-02 NOTE — ED ADULT NURSE NOTE - CHIEF COMPLAINT QUOTE
c/o sent by podiatrist for eval of right foot ulcer that is infected c/o sent by podiatrist for eval of right foot ulcer that is infected, pt with history of diabetes and amputation of toes on right and left foot

## 2017-06-02 NOTE — ED STATDOCS - PROGRESS NOTE DETAILS
51 y/o F pt w/ PMHx of DM, osteopenia, and PSHx of toe amputation was sent in by Dr. Sanchez to the ED for evaluation of foot sore. Pt states that she was walking around last night without her cast and fell 3 times. Pt was told she would be admitted and that Dr. Sanchez will perform surgery on her R foot on Monday. Pt needs pre-op labs. Focused evaluation, orders entered, placed in main for further evaluation by another provider. 53 y/o F pt w/ PMHx of DM, osteopenia, and PSHx of toe amputation was sent in by Dr. Sanchez to the ED for evaluation of foot sore. Pt states that she was walking around last night without her cast and fell 3 times. Pt was told she would be admitted and that Dr. Sanchez will perform surgery on her R foot on Monday. Pt needs pre-op labs. Pt w/ cellulitis and probable osteo. Focused evaluation, orders entered, placed in main for further evaluation by another provider.

## 2017-06-02 NOTE — ED ADULT NURSE NOTE - OBJECTIVE STATEMENT
pt arrived with right foot pain ulcer pt arrived with right foot pain and ulcer to bottom of foot, pt states pain 10/10 pt with amputation of right big toe, and left big toe and second toe, pt with diabetes

## 2017-06-03 DIAGNOSIS — M19.90 UNSPECIFIED OSTEOARTHRITIS, UNSPECIFIED SITE: ICD-10-CM

## 2017-06-03 DIAGNOSIS — K22.70 BARRETT'S ESOPHAGUS WITHOUT DYSPLASIA: ICD-10-CM

## 2017-06-03 DIAGNOSIS — M79.7 FIBROMYALGIA: ICD-10-CM

## 2017-06-03 DIAGNOSIS — J45.909 UNSPECIFIED ASTHMA, UNCOMPLICATED: ICD-10-CM

## 2017-06-03 DIAGNOSIS — Z29.9 ENCOUNTER FOR PROPHYLACTIC MEASURES, UNSPECIFIED: ICD-10-CM

## 2017-06-03 DIAGNOSIS — N18.3 CHRONIC KIDNEY DISEASE, STAGE 3 (MODERATE): ICD-10-CM

## 2017-06-03 DIAGNOSIS — D69.6 THROMBOCYTOPENIA, UNSPECIFIED: ICD-10-CM

## 2017-06-03 DIAGNOSIS — D64.9 ANEMIA, UNSPECIFIED: ICD-10-CM

## 2017-06-03 LAB
ANION GAP SERPL CALC-SCNC: 12 MMOL/L — SIGNIFICANT CHANGE UP (ref 5–17)
BUN SERPL-MCNC: 27 MG/DL — HIGH (ref 8–20)
CALCIUM SERPL-MCNC: 8.9 MG/DL — SIGNIFICANT CHANGE UP (ref 8.6–10.2)
CHLORIDE SERPL-SCNC: 99 MMOL/L — SIGNIFICANT CHANGE UP (ref 98–107)
CO2 SERPL-SCNC: 24 MMOL/L — SIGNIFICANT CHANGE UP (ref 22–29)
CREAT SERPL-MCNC: 2.01 MG/DL — HIGH (ref 0.5–1.3)
EOSINOPHIL # BLD AUTO: 0.1 K/UL — SIGNIFICANT CHANGE UP (ref 0–0.5)
EOSINOPHIL NFR BLD AUTO: 1 % — SIGNIFICANT CHANGE UP (ref 0–6)
GLUCOSE SERPL-MCNC: 113 MG/DL — SIGNIFICANT CHANGE UP (ref 70–115)
HBA1C BLD-MCNC: 4.6 % — SIGNIFICANT CHANGE UP (ref 4–5.6)
HCT VFR BLD CALC: 30.2 % — LOW (ref 37–47)
HGB BLD-MCNC: 9.7 G/DL — LOW (ref 12–16)
LYMPHOCYTES # BLD AUTO: 0.8 K/UL — LOW (ref 1–4.8)
LYMPHOCYTES # BLD AUTO: 14.6 % — LOW (ref 20–55)
MCHC RBC-ENTMCNC: 28.2 PG — SIGNIFICANT CHANGE UP (ref 27–31)
MCHC RBC-ENTMCNC: 32.1 G/DL — SIGNIFICANT CHANGE UP (ref 32–36)
MCV RBC AUTO: 87.8 FL — SIGNIFICANT CHANGE UP (ref 81–99)
MONOCYTES # BLD AUTO: 0.5 K/UL — SIGNIFICANT CHANGE UP (ref 0–0.8)
MONOCYTES NFR BLD AUTO: 8.6 % — SIGNIFICANT CHANGE UP (ref 3–10)
NEUTROPHILS # BLD AUTO: 4.4 K/UL — SIGNIFICANT CHANGE UP (ref 1.8–8)
NEUTROPHILS NFR BLD AUTO: 75.5 % — HIGH (ref 37–73)
PLATELET # BLD AUTO: 97 K/UL — LOW (ref 150–400)
POTASSIUM SERPL-MCNC: 4.4 MMOL/L — SIGNIFICANT CHANGE UP (ref 3.5–5.3)
POTASSIUM SERPL-SCNC: 4.4 MMOL/L — SIGNIFICANT CHANGE UP (ref 3.5–5.3)
RBC # BLD: 3.44 M/UL — LOW (ref 4.4–5.2)
RBC # FLD: 13.3 % — SIGNIFICANT CHANGE UP (ref 11–15.6)
SODIUM SERPL-SCNC: 135 MMOL/L — SIGNIFICANT CHANGE UP (ref 135–145)
WBC # BLD: 5.9 K/UL — SIGNIFICANT CHANGE UP (ref 4.8–10.8)
WBC # FLD AUTO: 5.9 K/UL — SIGNIFICANT CHANGE UP (ref 4.8–10.8)

## 2017-06-03 PROCEDURE — 99233 SBSQ HOSP IP/OBS HIGH 50: CPT

## 2017-06-03 PROCEDURE — 76775 US EXAM ABDO BACK WALL LIM: CPT | Mod: 26

## 2017-06-03 RX ORDER — ONDANSETRON 8 MG/1
4 TABLET, FILM COATED ORAL EVERY 8 HOURS
Qty: 0 | Refills: 0 | Status: COMPLETED | OUTPATIENT
Start: 2017-06-03 | End: 2017-06-03

## 2017-06-03 RX ORDER — INSULIN GLARGINE 100 [IU]/ML
25 INJECTION, SOLUTION SUBCUTANEOUS AT BEDTIME
Qty: 0 | Refills: 0 | Status: DISCONTINUED | OUTPATIENT
Start: 2017-06-03 | End: 2017-06-04

## 2017-06-03 RX ORDER — SACCHAROMYCES BOULARDII 250 MG
250 POWDER IN PACKET (EA) ORAL
Qty: 0 | Refills: 0 | Status: DISCONTINUED | OUTPATIENT
Start: 2017-06-03 | End: 2017-06-05

## 2017-06-03 RX ADMIN — SODIUM CHLORIDE 3 MILLILITER(S): 9 INJECTION INTRAMUSCULAR; INTRAVENOUS; SUBCUTANEOUS at 22:17

## 2017-06-03 RX ADMIN — Medication 100 MILLIGRAM(S): at 12:47

## 2017-06-03 RX ADMIN — MONTELUKAST 10 MILLIGRAM(S): 4 TABLET, CHEWABLE ORAL at 12:47

## 2017-06-03 RX ADMIN — SODIUM CHLORIDE 3 MILLILITER(S): 9 INJECTION INTRAMUSCULAR; INTRAVENOUS; SUBCUTANEOUS at 12:55

## 2017-06-03 RX ADMIN — SODIUM CHLORIDE 3 MILLILITER(S): 9 INJECTION INTRAMUSCULAR; INTRAVENOUS; SUBCUTANEOUS at 05:47

## 2017-06-03 RX ADMIN — Medication 100 MILLIGRAM(S): at 22:18

## 2017-06-03 RX ADMIN — HEPARIN SODIUM 5000 UNIT(S): 5000 INJECTION INTRAVENOUS; SUBCUTANEOUS at 05:50

## 2017-06-03 RX ADMIN — INSULIN GLARGINE 25 UNIT(S): 100 INJECTION, SOLUTION SUBCUTANEOUS at 22:18

## 2017-06-03 RX ADMIN — GABAPENTIN 600 MILLIGRAM(S): 400 CAPSULE ORAL at 18:05

## 2017-06-03 RX ADMIN — BUPROPION HYDROCHLORIDE 300 MILLIGRAM(S): 150 TABLET, EXTENDED RELEASE ORAL at 12:47

## 2017-06-03 RX ADMIN — Medication 100 MILLIGRAM(S): at 02:20

## 2017-06-03 RX ADMIN — PANTOPRAZOLE SODIUM 40 MILLIGRAM(S): 20 TABLET, DELAYED RELEASE ORAL at 05:50

## 2017-06-03 RX ADMIN — ONDANSETRON 4 MILLIGRAM(S): 8 TABLET, FILM COATED ORAL at 12:55

## 2017-06-03 RX ADMIN — Medication 5 MILLIGRAM(S): at 22:18

## 2017-06-03 RX ADMIN — Medication 5 MILLIGRAM(S): at 05:47

## 2017-06-03 RX ADMIN — Medication 5 MILLIGRAM(S): at 12:46

## 2017-06-03 RX ADMIN — DULOXETINE HYDROCHLORIDE 60 MILLIGRAM(S): 30 CAPSULE, DELAYED RELEASE ORAL at 12:47

## 2017-06-03 RX ADMIN — GABAPENTIN 600 MILLIGRAM(S): 400 CAPSULE ORAL at 05:47

## 2017-06-03 RX ADMIN — HEPARIN SODIUM 5000 UNIT(S): 5000 INJECTION INTRAVENOUS; SUBCUTANEOUS at 18:02

## 2017-06-03 RX ADMIN — Medication 250 MILLIGRAM(S): at 18:03

## 2017-06-03 NOTE — PROGRESS NOTE ADULT - PROBLEM SELECTOR PLAN 4
-noted previously, cbc with normal plt morphology  -requested diff on todays sample, if clumping or true - will change heparin sq   -will monitor - no e/o bleeding

## 2017-06-03 NOTE — PROGRESS NOTE ADULT - SUBJECTIVE AND OBJECTIVE BOX
Patient is a 52y old  Female who presents with a chief complaint of Persistent foot infection (02 Jun 2017 16:50)    Patient seen and examined at bedside. No acute distress and no acute overnight events. This am, had some pain,received 1 percocet. + chills.     ROS: No chest pain, palpitations, sob, light headedness/dizziness, difficulty breathing/cough, fevers/chills, abdominal pain, n/v, diarrhea/constipation, dysuria or increased urinary frequency. s/p last bm overnight     HEALTH ISSUES - PROBLEM Dx:  Cellulitis: Cellulitis  DM (diabetes mellitus): DM (diabetes mellitus)  Diabetic foot ulcer: Diabetic foot ulcer      INTERVAL HPI/OVERNIGHT EVENTS:    Vital Signs Last 24 Hrs  T(C): 36.8, Max: 36.9 (06-02 @ 13:38)  T(F): 98.3, Max: 98.5 (06-03 @ 05:01)  HR: 92 (66 - 93)  BP: 103/53 (89/47 - 113/73)  BP(mean): --  RR: 19 (17 - 20)  SpO2: 92% (92% - 99%)    CAPILLARY BLOOD GLUCOSE  115 (03 Jun 2017 05:01)  93 (02 Jun 2017 21:38)  97 (02 Jun 2017 18:07)      PHYSICAL EXAM:      Constitutional: middle aged female, aaox3. drowsy     Eyes: eomi, perrla    Respiratory:ctab    Cardiovascular:s1s2nl, no m/r/g, rrr     Gastrointestinal:obese, soft, nt, bs +    Extremities: right foot dressing intact     Skin: grossly intact    MEDICATIONS  (STANDING):  sodium chloride 0.9% lock flush 3milliLiter(s) IV Push every 8 hours  ceFAZolin   IVPB  IV Intermittent   ceFAZolin   IVPB 1000milliGRAM(s) IV Intermittent every 8 hours  heparin  Injectable 5000Unit(s) SubCutaneous every 12 hours  pantoprazole    Tablet 40milliGRAM(s) Oral before breakfast  buPROPion XL . 300milliGRAM(s) Oral daily  montelukast 10milliGRAM(s) Oral daily  insulin lispro (HumaLOG) corrective regimen sliding scale  SubCutaneous three times a day before meals  dextrose 5%. 1000milliLiter(s) IV Continuous <Continuous>  dextrose 50% Injectable 12.5Gram(s) IV Push once  dextrose 50% Injectable 25Gram(s) IV Push once  dextrose 50% Injectable 25Gram(s) IV Push once  diazepam    Tablet 5milliGRAM(s) Oral every 8 hours  DULoxetine 60milliGRAM(s) Oral daily  traZODone 100milliGRAM(s) Oral at bedtime  gabapentin 600milliGRAM(s) Oral two times a day  insulin glargine Injectable (LANTUS) 30Unit(s) SubCutaneous at bedtime    MEDICATIONS  (PRN):  dextrose Gel 1Dose(s) Oral once PRN Blood Glucose LESS THAN 70 milliGRAM(s)/deciliter  glucagon  Injectable 1milliGRAM(s) IntraMuscular once PRN Glucose LESS THAN 70 milligrams/deciliter  ALBUTerol/ipratropium for Nebulization 3milliLiter(s) Nebulizer every 6 hours PRN Bronchospasm  oxyCODONE  5 mG/acetaminophen 325 mG 2Tablet(s) Oral every 4 hours PRN Moderate Pain (4 - 6)  ondansetron Injectable 4milliGRAM(s) IV Push every 8 hours PRN Nausea and/or Vomiting      LABS:                        9.7    5.9   )-----------( 97       ( 03 Jun 2017 06:10 )             30.2     06-03    135  |  99  |  27.0<H>  ----------------------------<  113  4.4   |  24.0  |  2.01<H>    Ca    8.9      03 Jun 2017 06:10    TPro  7.2  /  Alb  3.7  /  TBili  0.5  /  DBili  x   /  AST  19  /  ALT  12  /  AlkPhos  126<H>  06-02    PT/INR - ( 02 Jun 2017 15:23 )   PT: 14.7 sec;   INR: 1.33 ratio         PTT - ( 02 Jun 2017 15:23 )  PTT:36.5 sec    LIVER FUNCTIONS - ( 02 Jun 2017 15:23 )  Alb: 3.7 g/dL / Pro: 7.2 g/dL / ALK PHOS: 126 U/L / ALT: 12 U/L / AST: 19 U/L / GGT: x             RADIOLOGY & ADDITIONAL TESTS:

## 2017-06-03 NOTE — PROGRESS NOTE ADULT - PROBLEM SELECTOR PLAN 1
-c/w ancef day #2, added florastor   -await MRI, podiatry on board, will ask for visual of ulceration in am when dressing changes done  -pain control   -await podiatry for wb status

## 2017-06-04 DIAGNOSIS — D61.818 OTHER PANCYTOPENIA: ICD-10-CM

## 2017-06-04 LAB
-  AMPICILLIN/SULBACTAM: SIGNIFICANT CHANGE UP
-  AMPICILLIN/SULBACTAM: SIGNIFICANT CHANGE UP
-  CEFAZOLIN: SIGNIFICANT CHANGE UP
-  CEFAZOLIN: SIGNIFICANT CHANGE UP
-  CIPROFLOXACIN: SIGNIFICANT CHANGE UP
-  CIPROFLOXACIN: SIGNIFICANT CHANGE UP
-  CLINDAMYCIN: SIGNIFICANT CHANGE UP
-  CLINDAMYCIN: SIGNIFICANT CHANGE UP
-  ERYTHROMYCIN: SIGNIFICANT CHANGE UP
-  ERYTHROMYCIN: SIGNIFICANT CHANGE UP
-  GENTAMICIN: SIGNIFICANT CHANGE UP
-  GENTAMICIN: SIGNIFICANT CHANGE UP
-  LEVOFLOXACIN: SIGNIFICANT CHANGE UP
-  LEVOFLOXACIN: SIGNIFICANT CHANGE UP
-  MOXIFLOXACIN(AEROBIC): SIGNIFICANT CHANGE UP
-  MOXIFLOXACIN(AEROBIC): SIGNIFICANT CHANGE UP
-  OXACILLIN: SIGNIFICANT CHANGE UP
-  OXACILLIN: SIGNIFICANT CHANGE UP
-  PENICILLIN: SIGNIFICANT CHANGE UP
-  PENICILLIN: SIGNIFICANT CHANGE UP
-  RIFAMPIN: SIGNIFICANT CHANGE UP
-  RIFAMPIN: SIGNIFICANT CHANGE UP
-  TETRACYCLINE: SIGNIFICANT CHANGE UP
-  TETRACYCLINE: SIGNIFICANT CHANGE UP
-  TRIMETHOPRIM/SULFAMETHOXAZOLE: SIGNIFICANT CHANGE UP
-  TRIMETHOPRIM/SULFAMETHOXAZOLE: SIGNIFICANT CHANGE UP
-  VANCOMYCIN: SIGNIFICANT CHANGE UP
-  VANCOMYCIN: SIGNIFICANT CHANGE UP
ALBUMIN SERPL ELPH-MCNC: 3.4 G/DL — SIGNIFICANT CHANGE UP (ref 3.3–5.2)
ALP SERPL-CCNC: 139 U/L — HIGH (ref 40–120)
ALT FLD-CCNC: 7 U/L — SIGNIFICANT CHANGE UP
ANION GAP SERPL CALC-SCNC: 10 MMOL/L — SIGNIFICANT CHANGE UP (ref 5–17)
AST SERPL-CCNC: 15 U/L — SIGNIFICANT CHANGE UP
BASOPHILS # BLD AUTO: 0 K/UL — SIGNIFICANT CHANGE UP (ref 0–0.2)
BASOPHILS NFR BLD AUTO: 0.3 % — SIGNIFICANT CHANGE UP (ref 0–2)
BILIRUB SERPL-MCNC: 0.3 MG/DL — LOW (ref 0.4–2)
BUN SERPL-MCNC: 27 MG/DL — HIGH (ref 8–20)
CALCIUM SERPL-MCNC: 8.5 MG/DL — LOW (ref 8.6–10.2)
CHLORIDE SERPL-SCNC: 103 MMOL/L — SIGNIFICANT CHANGE UP (ref 98–107)
CO2 SERPL-SCNC: 25 MMOL/L — SIGNIFICANT CHANGE UP (ref 22–29)
CREAT SERPL-MCNC: 1.8 MG/DL — HIGH (ref 0.5–1.3)
EOSINOPHIL # BLD AUTO: 0.1 K/UL — SIGNIFICANT CHANGE UP (ref 0–0.5)
EOSINOPHIL NFR BLD AUTO: 2.1 % — SIGNIFICANT CHANGE UP (ref 0–6)
GLUCOSE SERPL-MCNC: 95 MG/DL — SIGNIFICANT CHANGE UP (ref 70–115)
HCT VFR BLD CALC: 27.4 % — LOW (ref 37–47)
HGB BLD-MCNC: 8.9 G/DL — LOW (ref 12–16)
LYMPHOCYTES # BLD AUTO: 0.9 K/UL — LOW (ref 1–4.8)
LYMPHOCYTES # BLD AUTO: 24.2 % — SIGNIFICANT CHANGE UP (ref 20–55)
MAGNESIUM SERPL-MCNC: 2 MG/DL — SIGNIFICANT CHANGE UP (ref 1.6–2.6)
MCHC RBC-ENTMCNC: 28.3 PG — SIGNIFICANT CHANGE UP (ref 27–31)
MCHC RBC-ENTMCNC: 32.5 G/DL — SIGNIFICANT CHANGE UP (ref 32–36)
MCV RBC AUTO: 87.3 FL — SIGNIFICANT CHANGE UP (ref 81–99)
METHOD TYPE: SIGNIFICANT CHANGE UP
METHOD TYPE: SIGNIFICANT CHANGE UP
MONOCYTES # BLD AUTO: 0.3 K/UL — SIGNIFICANT CHANGE UP (ref 0–0.8)
MONOCYTES NFR BLD AUTO: 6.8 % — SIGNIFICANT CHANGE UP (ref 3–10)
NEUTROPHILS # BLD AUTO: 2.6 K/UL — SIGNIFICANT CHANGE UP (ref 1.8–8)
NEUTROPHILS NFR BLD AUTO: 66.3 % — SIGNIFICANT CHANGE UP (ref 37–73)
PHOSPHATE SERPL-MCNC: 3.3 MG/DL — SIGNIFICANT CHANGE UP (ref 2.4–4.7)
PLATELET # BLD AUTO: 90 K/UL — LOW (ref 150–400)
POTASSIUM SERPL-MCNC: 4.6 MMOL/L — SIGNIFICANT CHANGE UP (ref 3.5–5.3)
POTASSIUM SERPL-SCNC: 4.6 MMOL/L — SIGNIFICANT CHANGE UP (ref 3.5–5.3)
PROT SERPL-MCNC: 6.7 G/DL — SIGNIFICANT CHANGE UP (ref 6.6–8.7)
RBC # BLD: 3.14 M/UL — LOW (ref 4.4–5.2)
RBC # FLD: 13.2 % — SIGNIFICANT CHANGE UP (ref 11–15.6)
SODIUM SERPL-SCNC: 138 MMOL/L — SIGNIFICANT CHANGE UP (ref 135–145)
WBC # BLD: 3.8 K/UL — LOW (ref 4.8–10.8)
WBC # FLD AUTO: 3.8 K/UL — LOW (ref 4.8–10.8)

## 2017-06-04 PROCEDURE — 99233 SBSQ HOSP IP/OBS HIGH 50: CPT

## 2017-06-04 PROCEDURE — 74020: CPT | Mod: 26

## 2017-06-04 RX ORDER — INSULIN GLARGINE 100 [IU]/ML
10 INJECTION, SOLUTION SUBCUTANEOUS AT BEDTIME
Qty: 0 | Refills: 0 | Status: DISCONTINUED | OUTPATIENT
Start: 2017-06-04 | End: 2017-06-05

## 2017-06-04 RX ORDER — INSULIN GLARGINE 100 [IU]/ML
20 INJECTION, SOLUTION SUBCUTANEOUS AT BEDTIME
Qty: 0 | Refills: 0 | Status: DISCONTINUED | OUTPATIENT
Start: 2017-06-04 | End: 2017-06-04

## 2017-06-04 RX ORDER — POLYETHYLENE GLYCOL 3350 17 G/17G
17 POWDER, FOR SOLUTION ORAL AT BEDTIME
Qty: 0 | Refills: 0 | Status: DISCONTINUED | OUTPATIENT
Start: 2017-06-04 | End: 2017-06-05

## 2017-06-04 RX ORDER — BUDESONIDE AND FORMOTEROL FUMARATE DIHYDRATE 160; 4.5 UG/1; UG/1
2 AEROSOL RESPIRATORY (INHALATION)
Qty: 0 | Refills: 0 | Status: DISCONTINUED | OUTPATIENT
Start: 2017-06-04 | End: 2017-06-05

## 2017-06-04 RX ADMIN — POLYETHYLENE GLYCOL 3350 17 GRAM(S): 17 POWDER, FOR SOLUTION ORAL at 21:27

## 2017-06-04 RX ADMIN — Medication 250 MILLIGRAM(S): at 05:24

## 2017-06-04 RX ADMIN — Medication 5 MILLIGRAM(S): at 05:24

## 2017-06-04 RX ADMIN — DULOXETINE HYDROCHLORIDE 60 MILLIGRAM(S): 30 CAPSULE, DELAYED RELEASE ORAL at 13:38

## 2017-06-04 RX ADMIN — GABAPENTIN 600 MILLIGRAM(S): 400 CAPSULE ORAL at 17:53

## 2017-06-04 RX ADMIN — BUDESONIDE AND FORMOTEROL FUMARATE DIHYDRATE 2 PUFF(S): 160; 4.5 AEROSOL RESPIRATORY (INHALATION) at 21:47

## 2017-06-04 RX ADMIN — MONTELUKAST 10 MILLIGRAM(S): 4 TABLET, CHEWABLE ORAL at 13:39

## 2017-06-04 RX ADMIN — Medication 100 MILLIGRAM(S): at 21:27

## 2017-06-04 RX ADMIN — Medication 5 MILLIGRAM(S): at 13:27

## 2017-06-04 RX ADMIN — HEPARIN SODIUM 5000 UNIT(S): 5000 INJECTION INTRAVENOUS; SUBCUTANEOUS at 05:24

## 2017-06-04 RX ADMIN — SODIUM CHLORIDE 3 MILLILITER(S): 9 INJECTION INTRAMUSCULAR; INTRAVENOUS; SUBCUTANEOUS at 05:25

## 2017-06-04 RX ADMIN — INSULIN GLARGINE 10 UNIT(S): 100 INJECTION, SOLUTION SUBCUTANEOUS at 21:27

## 2017-06-04 RX ADMIN — PANTOPRAZOLE SODIUM 40 MILLIGRAM(S): 20 TABLET, DELAYED RELEASE ORAL at 05:25

## 2017-06-04 RX ADMIN — SODIUM CHLORIDE 3 MILLILITER(S): 9 INJECTION INTRAMUSCULAR; INTRAVENOUS; SUBCUTANEOUS at 22:00

## 2017-06-04 RX ADMIN — Medication 100 MILLIGRAM(S): at 13:27

## 2017-06-04 RX ADMIN — HEPARIN SODIUM 5000 UNIT(S): 5000 INJECTION INTRAVENOUS; SUBCUTANEOUS at 17:53

## 2017-06-04 RX ADMIN — Medication 250 MILLIGRAM(S): at 17:53

## 2017-06-04 RX ADMIN — Medication 100 MILLIGRAM(S): at 05:24

## 2017-06-04 RX ADMIN — SODIUM CHLORIDE 3 MILLILITER(S): 9 INJECTION INTRAMUSCULAR; INTRAVENOUS; SUBCUTANEOUS at 12:52

## 2017-06-04 RX ADMIN — Medication 5 MILLIGRAM(S): at 21:27

## 2017-06-04 RX ADMIN — GABAPENTIN 600 MILLIGRAM(S): 400 CAPSULE ORAL at 05:24

## 2017-06-04 NOTE — PROGRESS NOTE ADULT - PROBLEM SELECTOR PLAN 3
s/p prior vanco doses from prior stay  -3B stage   -renal US  -encourage oral hydration s/p prior vanco doses from prior stay  -3B stage   -renal US shows medicorenal dz  -encourage oral hydration

## 2017-06-04 NOTE — PROGRESS NOTE ADULT - PROBLEM SELECTOR PLAN 6
on multidrug regimen   -meds reviewed - wellbutrin, neurontin, cymbalta and trazodone c/w singulair and prn nebs (states h/o copd) on advair in the past - off since she couldn't afford it   -started on symbicort as she feels that she needs it

## 2017-06-04 NOTE — PROGRESS NOTE ADULT - SUBJECTIVE AND OBJECTIVE BOX
Patient is a 52y old  Female who presents with a chief complaint of Persistent foot infection (02 Jun 2017 16:50)    Patient seen and examined at bedside. No acute distress and no acute overnight events. This am, had some pain,received 1 percocet. + chills.     ROS: No chest pain, palpitations, sob, light headedness/dizziness, difficulty breathing/cough, fevers/chills, abdominal pain, n/v, diarrhea/constipation, dysuria or increased urinary frequency. s/p last bm overnight     HEALTH ISSUES - PROBLEM Dx:  Cellulitis: Cellulitis  DM (diabetes mellitus): DM (diabetes mellitus)  Diabetic foot ulcer: Diabetic foot ulcer      INTERVAL HPI/OVERNIGHT EVENTS:    Vital Signs Last 24 Hrs  T(C): 36.3, Max: 36.9 (06-03 @ 16:09)  T(F): 97.4, Max: 98.4 (06-03 @ 16:09)  HR: 83 (69 - 90)  BP: 112/67 (100/51 - 112/67)  BP(mean): --  RR: 18 (18 - 18)  SpO2: 97% (96% - 98%)      PHYSICAL EXAM:      Constitutional: middle aged female, aaox3. drowsy     Eyes: eomi, perrla    Respiratory:ctab    Cardiovascular:s1s2nl, no m/r/g, rrr     Gastrointestinal:obese, soft, nt, bs +    Extremities: right foot dressing intact     Skin: grossly intact    MEDICATIONS  (STANDING):  sodium chloride 0.9% lock flush 3milliLiter(s) IV Push every 8 hours  ceFAZolin   IVPB  IV Intermittent   ceFAZolin   IVPB 1000milliGRAM(s) IV Intermittent every 8 hours  heparin  Injectable 5000Unit(s) SubCutaneous every 12 hours  pantoprazole    Tablet 40milliGRAM(s) Oral before breakfast  buPROPion XL . 300milliGRAM(s) Oral daily  montelukast 10milliGRAM(s) Oral daily  insulin lispro (HumaLOG) corrective regimen sliding scale  SubCutaneous three times a day before meals  dextrose 5%. 1000milliLiter(s) IV Continuous <Continuous>  dextrose 50% Injectable 12.5Gram(s) IV Push once  dextrose 50% Injectable 25Gram(s) IV Push once  dextrose 50% Injectable 25Gram(s) IV Push once  diazepam    Tablet 5milliGRAM(s) Oral every 8 hours  DULoxetine 60milliGRAM(s) Oral daily  traZODone 100milliGRAM(s) Oral at bedtime  gabapentin 600milliGRAM(s) Oral two times a day  insulin glargine Injectable (LANTUS) 30Unit(s) SubCutaneous at bedtime    MEDICATIONS  (PRN):  dextrose Gel 1Dose(s) Oral once PRN Blood Glucose LESS THAN 70 milliGRAM(s)/deciliter  glucagon  Injectable 1milliGRAM(s) IntraMuscular once PRN Glucose LESS THAN 70 milligrams/deciliter  ALBUTerol/ipratropium for Nebulization 3milliLiter(s) Nebulizer every 6 hours PRN Bronchospasm  oxyCODONE  5 mG/acetaminophen 325 mG 2Tablet(s) Oral every 4 hours PRN Moderate Pain (4 - 6)  ondansetron Injectable 4milliGRAM(s) IV Push every 8 hours PRN Nausea and/or Vomiting      LABS:                        9.7    5.9   )-----------( 97       ( 03 Jun 2017 06:10 )             30.2     06-03    135  |  99  |  27.0<H>  ----------------------------<  113  4.4   |  24.0  |  2.01<H>    Ca    8.9      03 Jun 2017 06:10    TPro  7.2  /  Alb  3.7  /  TBili  0.5  /  DBili  x   /  AST  19  /  ALT  12  /  AlkPhos  126<H>  06-02    PT/INR - ( 02 Jun 2017 15:23 )   PT: 14.7 sec;   INR: 1.33 ratio         PTT - ( 02 Jun 2017 15:23 )  PTT:36.5 sec    LIVER FUNCTIONS - ( 02 Jun 2017 15:23 )  Alb: 3.7 g/dL / Pro: 7.2 g/dL / ALK PHOS: 126 U/L / ALT: 12 U/L / AST: 19 U/L / GGT: x             RADIOLOGY & ADDITIONAL TESTS: Patient is a 52y old  Female who presents with a chief complaint of Persistent foot infection (02 Jun 2017 16:50)    Patient seen and examined at bedside. No acute distress and no acute overnight events. d/o b/l LQ abdominal pain this am, abated, cramping and with nausea. No BM x 3 days per patient, however, yesterday said that it happened last night.     ROS: No chest pain, palpitations, sob, light headedness/dizziness, difficulty breathing/cough, fevers/chills, dysuria or increased urinary frequency.     HEALTH ISSUES - PROBLEM Dx:  Cellulitis: Cellulitis  DM (diabetes mellitus): DM (diabetes mellitus)  Diabetic foot ulcer: Diabetic foot ulcer      INTERVAL HPI/OVERNIGHT EVENTS:    Vital Signs Last 24 Hrs  T(C): 36.3, Max: 36.9 (06-03 @ 16:09)  T(F): 97.4, Max: 98.4 (06-03 @ 16:09)  HR: 83 (69 - 90)  BP: 112/67 (100/51 - 112/67)  BP(mean): --  RR: 18 (18 - 18)  SpO2: 97% (96% - 98%)      PHYSICAL EXAM:      Constitutional: middle aged female, aaox3. drowsy     Eyes: eomi, perrla    Respiratory:ctab    Cardiovascular:s1s2nl, no m/r/g, rrr     Gastrointestinal:obese, soft, nt, bs +. nd. no rebound or guarding     Extremities: right foot dressing intact (wound seen via podiatry with images - ulceration on plantar aspect of Right foot heel). 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 300milliGRAM(s) Oral daily  montelukast 10milliGRAM(s) Oral daily  insulin lispro (HumaLOG) corrective regimen sliding scale  SubCutaneous three times a day before meals  dextrose 5%. 1000milliLiter(s) IV Continuous <Continuous>  dextrose 50% Injectable 12.5Gram(s) IV Push once  dextrose 50% Injectable 25Gram(s) IV Push once  dextrose 50% Injectable 25Gram(s) IV Push once  diazepam    Tablet 5milliGRAM(s) Oral every 8 hours  DULoxetine 60milliGRAM(s) Oral daily  traZODone 100milliGRAM(s) Oral at bedtime  gabapentin 600milliGRAM(s) Oral two times a day  insulin glargine Injectable (LANTUS) 30Unit(s) SubCutaneous at bedtime    MEDICATIONS  (PRN):  dextrose Gel 1Dose(s) Oral once PRN Blood Glucose LESS THAN 70 milliGRAM(s)/deciliter  glucagon  Injectable 1milliGRAM(s) IntraMuscular once PRN Glucose LESS THAN 70 milligrams/deciliter  ALBUTerol/ipratropium for Nebulization 3milliLiter(s) Nebulizer every 6 hours PRN Bronchospasm  oxyCODONE  5 mG/acetaminophen 325 mG 2Tablet(s) Oral every 4 hours PRN Moderate Pain (4 - 6)  ondansetron Injectable 4milliGRAM(s) IV Push every 8 hours PRN Nausea and/or Vomiting      LABS:                        8.9    3.8   )-----------( 90       ( 04 Jun 2017 06:12 )             27.4   06-04    138  |  103  |  27.0<H>  ----------------------------<  95  4.6   |  25.0  |  1.80<H>    Ca    8.5<L>      04 Jun 2017 06:12  Phos  3.3     06-04  Mg     2.0     06-04    TPro  6.7  /  Alb  3.4  /  TBili  0.3<L>  /  DBili  x   /  AST  15  /  ALT  7   /  AlkPhos  139<H>  06-04

## 2017-06-04 NOTE — PROGRESS NOTE ADULT - PROBLEM SELECTOR PLAN 4
-noted previously, cbc with normal plt morphology  -requested diff on todays sample, if clumping or true - will change heparin sq   -will monitor - no e/o bleeding today noted to be pancytopenice.   hgb trending down   occult ordered   f/up labs and anemia panel in am

## 2017-06-04 NOTE — PROGRESS NOTE ADULT - PROBLEM SELECTOR PLAN 1
-c/w ancef day #2, added florastor   -await MRI, podiatry on board, will ask for visual of ulceration in am when dressing changes done  -pain control   -await podiatry for wb status with [facility] + blood cultures and repeats pending in lab; echo ordered ? need for CARLITA - denies any recreational drug use   -c/w ancef day #3, florastor  -await MRI, podiatry on board plan per MRI  -pain control   -await podiatry for wb status

## 2017-06-04 NOTE — PROGRESS NOTE ADULT - PROBLEM SELECTOR PLAN 5
aocd likely. prior panel noted   -hgb stable on multidrug regimen   -meds reviewed - wellbutrin, neurontin, cymbalta and trazodone

## 2017-06-04 NOTE — PROGRESS NOTE ADULT - PROBLEM SELECTOR PLAN 7
c/w singulair and prn nebs c/w ppi  c/o abdominal pain - no bm x 2-3 days. Added miralax - she takes at home nightly   axr ordered as well but more likely to be 2/2 constipation

## 2017-06-04 NOTE — PROGRESS NOTE ADULT - PROBLEM SELECTOR PLAN 2
a1c 4.6, am fingerstick 101, decrease lantus by 5 units  c/w sliding scale a1c 4.6, am fingerstick 101, decrease lantus to 10 units at bedtime  c/w sliding scale

## 2017-06-05 ENCOUNTER — RESULT REVIEW (OUTPATIENT)
Age: 52
End: 2017-06-05

## 2017-06-05 LAB
FERRITIN SERPL-MCNC: 70.2 NG/ML — SIGNIFICANT CHANGE UP (ref 11–306)
FOLATE SERPL-MCNC: 4.8 NG/ML — SIGNIFICANT CHANGE UP (ref 4–16)
HCT VFR BLD CALC: 26.6 % — LOW (ref 37–47)
HGB BLD-MCNC: 8.7 G/DL — LOW (ref 12–16)
IRON SATN MFR SERPL: 13 % — LOW (ref 14–50)
IRON SATN MFR SERPL: 31 UG/DL — LOW (ref 37–145)
RBC # BLD: 3.04 M/UL — LOW (ref 4.4–5.2)
RETICS #: 46.5 K/UL — SIGNIFICANT CHANGE UP (ref 25–125)
RETICS/RBC NFR: 1.5 % — SIGNIFICANT CHANGE UP (ref 0.5–2.6)
TIBC SERPL-MCNC: 230 UG/DL — SIGNIFICANT CHANGE UP (ref 220–430)
TRANSFERRIN SERPL-MCNC: 161 MG/DL — LOW (ref 192–382)
VIT B12 SERPL-MCNC: 604 PG/ML — SIGNIFICANT CHANGE UP (ref 180–914)

## 2017-06-05 PROCEDURE — 88304 TISSUE EXAM BY PATHOLOGIST: CPT | Mod: 26

## 2017-06-05 PROCEDURE — 73718 MRI LOWER EXTREMITY W/O DYE: CPT | Mod: 26,RT

## 2017-06-05 PROCEDURE — 99232 SBSQ HOSP IP/OBS MODERATE 35: CPT

## 2017-06-05 PROCEDURE — 88311 DECALCIFY TISSUE: CPT | Mod: 26

## 2017-06-05 PROCEDURE — 99233 SBSQ HOSP IP/OBS HIGH 50: CPT

## 2017-06-05 RX ORDER — BUPROPION HYDROCHLORIDE 150 MG/1
300 TABLET, EXTENDED RELEASE ORAL DAILY
Qty: 0 | Refills: 0 | Status: DISCONTINUED | OUTPATIENT
Start: 2017-06-05 | End: 2017-06-09

## 2017-06-05 RX ORDER — SODIUM CHLORIDE 9 MG/ML
1000 INJECTION, SOLUTION INTRAVENOUS
Qty: 0 | Refills: 0 | Status: DISCONTINUED | OUTPATIENT
Start: 2017-06-05 | End: 2017-06-06

## 2017-06-05 RX ORDER — SODIUM CHLORIDE 9 MG/ML
1000 INJECTION, SOLUTION INTRAVENOUS
Qty: 0 | Refills: 0 | Status: DISCONTINUED | OUTPATIENT
Start: 2017-06-05 | End: 2017-06-09

## 2017-06-05 RX ORDER — DULOXETINE HYDROCHLORIDE 30 MG/1
60 CAPSULE, DELAYED RELEASE ORAL DAILY
Qty: 0 | Refills: 0 | Status: DISCONTINUED | OUTPATIENT
Start: 2017-06-05 | End: 2017-06-07

## 2017-06-05 RX ORDER — DEXTROSE 50 % IN WATER 50 %
1 SYRINGE (ML) INTRAVENOUS ONCE
Qty: 0 | Refills: 0 | Status: DISCONTINUED | OUTPATIENT
Start: 2017-06-05 | End: 2017-06-09

## 2017-06-05 RX ORDER — MONTELUKAST 4 MG/1
10 TABLET, CHEWABLE ORAL DAILY
Qty: 0 | Refills: 0 | Status: DISCONTINUED | OUTPATIENT
Start: 2017-06-05 | End: 2017-06-09

## 2017-06-05 RX ORDER — ASCORBIC ACID 60 MG
500 TABLET,CHEWABLE ORAL DAILY
Qty: 0 | Refills: 0 | Status: DISCONTINUED | OUTPATIENT
Start: 2017-06-05 | End: 2017-06-09

## 2017-06-05 RX ORDER — POLYETHYLENE GLYCOL 3350 17 G/17G
17 POWDER, FOR SOLUTION ORAL AT BEDTIME
Qty: 0 | Refills: 0 | Status: DISCONTINUED | OUTPATIENT
Start: 2017-06-05 | End: 2017-06-09

## 2017-06-05 RX ORDER — DEXTROSE 50 % IN WATER 50 %
25 SYRINGE (ML) INTRAVENOUS ONCE
Qty: 0 | Refills: 0 | Status: DISCONTINUED | OUTPATIENT
Start: 2017-06-05 | End: 2017-06-09

## 2017-06-05 RX ORDER — ASCORBIC ACID 60 MG
500 TABLET,CHEWABLE ORAL DAILY
Qty: 0 | Refills: 0 | Status: DISCONTINUED | OUTPATIENT
Start: 2017-06-05 | End: 2017-06-05

## 2017-06-05 RX ORDER — DIAZEPAM 5 MG
5 TABLET ORAL
Qty: 0 | Refills: 0 | Status: DISCONTINUED | OUTPATIENT
Start: 2017-06-05 | End: 2017-06-07

## 2017-06-05 RX ORDER — TRAZODONE HCL 50 MG
100 TABLET ORAL AT BEDTIME
Qty: 0 | Refills: 0 | Status: DISCONTINUED | OUTPATIENT
Start: 2017-06-05 | End: 2017-06-09

## 2017-06-05 RX ORDER — FENTANYL CITRATE 50 UG/ML
25 INJECTION INTRAVENOUS
Qty: 0 | Refills: 0 | Status: DISCONTINUED | OUTPATIENT
Start: 2017-06-05 | End: 2017-06-05

## 2017-06-05 RX ORDER — SODIUM CHLORIDE 9 MG/ML
1000 INJECTION, SOLUTION INTRAVENOUS
Qty: 0 | Refills: 0 | Status: DISCONTINUED | OUTPATIENT
Start: 2017-06-05 | End: 2017-06-05

## 2017-06-05 RX ORDER — FERROUS SULFATE 325(65) MG
325 TABLET ORAL DAILY
Qty: 0 | Refills: 0 | Status: DISCONTINUED | OUTPATIENT
Start: 2017-06-05 | End: 2017-06-05

## 2017-06-05 RX ORDER — IPRATROPIUM/ALBUTEROL SULFATE 18-103MCG
3 AEROSOL WITH ADAPTER (GRAM) INHALATION EVERY 6 HOURS
Qty: 0 | Refills: 0 | Status: DISCONTINUED | OUTPATIENT
Start: 2017-06-05 | End: 2017-06-09

## 2017-06-05 RX ORDER — INSULIN GLARGINE 100 [IU]/ML
10 INJECTION, SOLUTION SUBCUTANEOUS AT BEDTIME
Qty: 0 | Refills: 0 | Status: DISCONTINUED | OUTPATIENT
Start: 2017-06-05 | End: 2017-06-06

## 2017-06-05 RX ORDER — GABAPENTIN 400 MG/1
600 CAPSULE ORAL
Qty: 0 | Refills: 0 | Status: DISCONTINUED | OUTPATIENT
Start: 2017-06-05 | End: 2017-06-07

## 2017-06-05 RX ORDER — FERROUS SULFATE 325(65) MG
325 TABLET ORAL DAILY
Qty: 0 | Refills: 0 | Status: DISCONTINUED | OUTPATIENT
Start: 2017-06-05 | End: 2017-06-09

## 2017-06-05 RX ORDER — HEPARIN SODIUM 5000 [USP'U]/ML
5000 INJECTION INTRAVENOUS; SUBCUTANEOUS EVERY 12 HOURS
Qty: 0 | Refills: 0 | Status: DISCONTINUED | OUTPATIENT
Start: 2017-06-05 | End: 2017-06-09

## 2017-06-05 RX ORDER — PANTOPRAZOLE SODIUM 20 MG/1
40 TABLET, DELAYED RELEASE ORAL
Qty: 0 | Refills: 0 | Status: DISCONTINUED | OUTPATIENT
Start: 2017-06-05 | End: 2017-06-09

## 2017-06-05 RX ORDER — CEFAZOLIN SODIUM 1 G
1000 VIAL (EA) INJECTION EVERY 8 HOURS
Qty: 0 | Refills: 0 | Status: DISCONTINUED | OUTPATIENT
Start: 2017-06-05 | End: 2017-06-09

## 2017-06-05 RX ORDER — BUDESONIDE AND FORMOTEROL FUMARATE DIHYDRATE 160; 4.5 UG/1; UG/1
2 AEROSOL RESPIRATORY (INHALATION)
Qty: 0 | Refills: 0 | Status: DISCONTINUED | OUTPATIENT
Start: 2017-06-05 | End: 2017-06-09

## 2017-06-05 RX ORDER — SODIUM CHLORIDE 9 MG/ML
3 INJECTION INTRAMUSCULAR; INTRAVENOUS; SUBCUTANEOUS EVERY 8 HOURS
Qty: 0 | Refills: 0 | Status: DISCONTINUED | OUTPATIENT
Start: 2017-06-05 | End: 2017-06-09

## 2017-06-05 RX ORDER — INSULIN LISPRO 100/ML
VIAL (ML) SUBCUTANEOUS
Qty: 0 | Refills: 0 | Status: DISCONTINUED | OUTPATIENT
Start: 2017-06-05 | End: 2017-06-09

## 2017-06-05 RX ORDER — ONDANSETRON 8 MG/1
4 TABLET, FILM COATED ORAL ONCE
Qty: 0 | Refills: 0 | Status: DISCONTINUED | OUTPATIENT
Start: 2017-06-05 | End: 2017-06-05

## 2017-06-05 RX ORDER — GLUCAGON INJECTION, SOLUTION 0.5 MG/.1ML
1 INJECTION, SOLUTION SUBCUTANEOUS ONCE
Qty: 0 | Refills: 0 | Status: DISCONTINUED | OUTPATIENT
Start: 2017-06-05 | End: 2017-06-09

## 2017-06-05 RX ORDER — DEXTROSE 50 % IN WATER 50 %
12.5 SYRINGE (ML) INTRAVENOUS ONCE
Qty: 0 | Refills: 0 | Status: DISCONTINUED | OUTPATIENT
Start: 2017-06-05 | End: 2017-06-09

## 2017-06-05 RX ADMIN — FENTANYL CITRATE 25 MICROGRAM(S): 50 INJECTION INTRAVENOUS at 18:45

## 2017-06-05 RX ADMIN — GABAPENTIN 600 MILLIGRAM(S): 400 CAPSULE ORAL at 05:05

## 2017-06-05 RX ADMIN — PANTOPRAZOLE SODIUM 40 MILLIGRAM(S): 20 TABLET, DELAYED RELEASE ORAL at 05:05

## 2017-06-05 RX ADMIN — Medication 100 MILLIGRAM(S): at 21:47

## 2017-06-05 RX ADMIN — SODIUM CHLORIDE 65 MILLILITER(S): 9 INJECTION, SOLUTION INTRAVENOUS at 11:19

## 2017-06-05 RX ADMIN — FENTANYL CITRATE 25 MICROGRAM(S): 50 INJECTION INTRAVENOUS at 18:55

## 2017-06-05 RX ADMIN — FENTANYL CITRATE 25 MICROGRAM(S): 50 INJECTION INTRAVENOUS at 19:15

## 2017-06-05 RX ADMIN — Medication 250 MILLIGRAM(S): at 05:05

## 2017-06-05 RX ADMIN — INSULIN GLARGINE 10 UNIT(S): 100 INJECTION, SOLUTION SUBCUTANEOUS at 21:48

## 2017-06-05 RX ADMIN — SODIUM CHLORIDE 3 MILLILITER(S): 9 INJECTION INTRAMUSCULAR; INTRAVENOUS; SUBCUTANEOUS at 14:59

## 2017-06-05 RX ADMIN — SODIUM CHLORIDE 3 MILLILITER(S): 9 INJECTION INTRAMUSCULAR; INTRAVENOUS; SUBCUTANEOUS at 21:45

## 2017-06-05 RX ADMIN — FENTANYL CITRATE 25 MICROGRAM(S): 50 INJECTION INTRAVENOUS at 19:05

## 2017-06-05 RX ADMIN — HEPARIN SODIUM 5000 UNIT(S): 5000 INJECTION INTRAVENOUS; SUBCUTANEOUS at 05:07

## 2017-06-05 RX ADMIN — Medication 100 MILLIGRAM(S): at 05:04

## 2017-06-05 RX ADMIN — Medication 5 MILLIGRAM(S): at 05:05

## 2017-06-05 RX ADMIN — SODIUM CHLORIDE 3 MILLILITER(S): 9 INJECTION INTRAMUSCULAR; INTRAVENOUS; SUBCUTANEOUS at 10:34

## 2017-06-05 RX ADMIN — Medication 100 MILLIGRAM(S): at 21:45

## 2017-06-05 RX ADMIN — SODIUM CHLORIDE 65 MILLILITER(S): 9 INJECTION, SOLUTION INTRAVENOUS at 21:45

## 2017-06-05 RX ADMIN — POLYETHYLENE GLYCOL 3350 17 GRAM(S): 17 POWDER, FOR SOLUTION ORAL at 21:48

## 2017-06-05 NOTE — PROGRESS NOTE ADULT - SUBJECTIVE AND OBJECTIVE BOX
Patient is a 52y old  Female who presents with a chief complaint of Persistent foot infection (02 Jun 2017 16:50)    Patient seen and examined at bedside. No acute distress and no acute overnight events. BM now for 4 days. Intermittent LLQ pain, none now, tolerating diet. She woke up with a headache today, NPO after midnight, no diaphoresis and no n/v    ROS: No chest pain, palpitations, sob, difficulty breathing/cough, fevers/chills, dysuria or increased urinary frequency.     HEALTH ISSUES - PROBLEM Dx:  Cellulitis: Cellulitis  DM (diabetes mellitus): DM (diabetes mellitus)  Diabetic foot ulcer: Diabetic foot ulcer    Vital Signs Last 24 Hrs  T(C): 36.7, Max: 36.7 (06-04 @ 22:00)  T(F): 98, Max: 98 (06-04 @ 22:00)  HR: 70 (63 - 78)  BP: 113/78 (90/56 - 113/78)  BP(mean): --  RR: 18 (17 - 18)  SpO2: 95% (92% - 100%)    PHYSICAL EXAM:      Constitutional: middle aged female, aaox3. drowsy     Eyes: eomi, perrla    Respiratory:ctab    Cardiovascular:s1s2nl, no m/r/g, rrr     Gastrointestinal:obese, soft, nt, bs +. nd. no rebound or guarding     Extremities: right foot dressing intact (wound seen via podiatry with images - ulceration on plantar aspect of Right foot heel). 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 300milliGRAM(s) Oral daily  montelukast 10milliGRAM(s) Oral daily  insulin lispro (HumaLOG) corrective regimen sliding scale  SubCutaneous three times a day before meals  dextrose 5%. 1000milliLiter(s) IV Continuous <Continuous>  dextrose 50% Injectable 12.5Gram(s) IV Push once  dextrose 50% Injectable 25Gram(s) IV Push once  dextrose 50% Injectable 25Gram(s) IV Push once  DULoxetine 60milliGRAM(s) Oral daily  traZODone 100milliGRAM(s) Oral at bedtime  gabapentin 600milliGRAM(s) Oral two times a day  saccharomyces boulardii 250milliGRAM(s) Oral two times a day  polyethylene glycol 3350 17Gram(s) Oral at bedtime  buDESOnide 160 MICROgram(s)/formoterol 4.5 MICROgram(s) Inhaler 2Puff(s) Inhalation two times a day  insulin glargine Injectable (LANTUS) 10Unit(s) SubCutaneous at bedtime  dextrose 5% + sodium chloride 0.9%. 1000milliLiter(s) IV Continuous <Continuous>  diazepam    Tablet 5milliGRAM(s) Oral two times a day    MEDICATIONS  (PRN):  dextrose Gel 1Dose(s) Oral once PRN Blood Glucose LESS THAN 70 milliGRAM(s)/deciliter  glucagon  Injectable 1milliGRAM(s) IntraMuscular once PRN Glucose LESS THAN 70 milligrams/deciliter  ALBUTerol/ipratropium for Nebulization 3milliLiter(s) Nebulizer every 6 hours PRN Bronchospasm  oxyCODONE  5 mG/acetaminophen 325 mG 2Tablet(s) Oral every 4 hours PRN Moderate Pain (4 - 6)        LABS:                                   8.7    x     )-----------( x        ( 05 Jun 2017 08:12 )             26.6   06-04    138  |  103  |  27.0<H>  ----------------------------<  95  4.6   |  25.0  |  1.80<H>    Ca    8.5<L>      04 Jun 2017 06:12  Phos  3.3     06-04  Mg     2.0     06-04    TPro  6.7  /  Alb  3.4  /  TBili  0.3<L>  /  DBili  x   /  AST  15  /  ALT  7   /  AlkPhos  139<H>  06-04

## 2017-06-05 NOTE — PROGRESS NOTE ADULT - SUBJECTIVE AND OBJECTIVE BOX
Podiatry Post-Op Note    Surgeon: Dr. 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  Surgical pathology and cultures taken and pending  Podiatry will follow while in house.

## 2017-06-05 NOTE — PROGRESS NOTE ADULT - PROBLEM SELECTOR PLAN 5
on multidrug regimen   -meds reviewed - wellbutrin, neurontin, cymbalta and trazodone  -valium decreased from q8 to twice a day

## 2017-06-05 NOTE — PROGRESS NOTE ADULT - SUBJECTIVE AND OBJECTIVE BOX
SONYA LENNON is a 52y Female with HPI:  This 52 y.o. F with diabetes, COPD, smoker,  left foot ulcer in past, prior MSSA bacteremia, who has been referred here for a non healing foot ulcer in the right side.    patient reports that this has only opened up in the recent 3 days. About 1 week ago, she had fevers subj, chill, sweats at home. Has been taking NORCO at home since then. She was sent in here by her podiatrist, now Dr. Sanchez.    Patient was last seen in the hospital in 12/2016, where she was found with MSSA bacteremia.   PT FOR BONE BX      Allergies:  No Known Allergies      Medications:  sodium chloride 0.9% lock flush 3milliLiter(s) IV Push every 8 hours  ceFAZolin   IVPB  IV Intermittent   ceFAZolin   IVPB 1000milliGRAM(s) IV Intermittent every 8 hours  heparin  Injectable 5000Unit(s) SubCutaneous every 12 hours  pantoprazole    Tablet 40milliGRAM(s) Oral before breakfast  buPROPion XL . 300milliGRAM(s) Oral daily  montelukast 10milliGRAM(s) Oral daily  insulin lispro (HumaLOG) corrective regimen sliding scale  SubCutaneous three times a day before meals  dextrose 5%. 1000milliLiter(s) IV Continuous <Continuous>  dextrose Gel 1Dose(s) Oral once PRN  dextrose 50% Injectable 12.5Gram(s) IV Push once  dextrose 50% Injectable 25Gram(s) IV Push once  dextrose 50% Injectable 25Gram(s) IV Push once  glucagon  Injectable 1milliGRAM(s) IntraMuscular once PRN  diazepam    Tablet 5milliGRAM(s) Oral every 8 hours  ALBUTerol/ipratropium for Nebulization 3milliLiter(s) Nebulizer every 6 hours PRN  DULoxetine 60milliGRAM(s) Oral daily  traZODone 100milliGRAM(s) Oral at bedtime  gabapentin 600milliGRAM(s) Oral two times a day  oxyCODONE  5 mG/acetaminophen 325 mG 2Tablet(s) Oral every 4 hours PRN  saccharomyces boulardii 250milliGRAM(s) Oral two times a day  polyethylene glycol 3350 17Gram(s) Oral at bedtime  buDESOnide 160 MICROgram(s)/formoterol 4.5 MICROgram(s) Inhaler 2Puff(s) Inhalation two times a day  insulin glargine Injectable (LANTUS) 10Unit(s) SubCutaneous at bedtime  dextrose 5% + sodium chloride 0.9%. 1000milliLiter(s) IV Continuous <Continuous>      ANTIBIOTICS: KEFZOL        Review of Systems: - Negative except as mentioned above     Physical Exam:  ICU Vital Signs Last 24 Hrs  T(C): 36.7, Max: 36.7 (06-04 @ 22:00)  T(F): 98, Max: 98 (06-04 @ 22:00)  HR: 70 (63 - 78)  BP: 113/78 (90/56 - 113/78)  BP(mean): --  ABP: --  ABP(mean): --  RR: 18 (17 - 18)  SpO2: 95% (92% - 100%)    GEN: NAD, pleasant  HEENT: normocephalic and atraumatic. EOMI. AURORA...  NECK: Supple. No carotid bruits.  No lymphadenopathy or thyromegaly.  LUNGS: Clear to auscultation.  HEART: Regular rate and rhythm without murmur.  ABDOMEN: Soft, nontender, and nondistended.  Positive bowel sounds.  No hepatosplenomegaly was noted.  NO REBOUND NO GUARDING  EXTREMITIES: RIGHT FOOT WRAPPED  NEUROLOGIC: Cranial nerves II through XII are grossly intact.    SKIN: No ulceration or induration present.      Labs:    06-04    138  |  103  |  27.0<H>  ----------------------------<  95  4.6   |  25.0  |  1.80<H>    Ca    8.5<L>      04 Jun 2017 06:12  Phos  3.3     06-04  Mg     2.0     06-04    TPro  6.7  /  Alb  3.4  /  TBili  0.3<L>  /  DBili  x   /  AST  15  /  ALT  7   /  AlkPhos  139<H>  06-04                          8.7    x     )-----------( x        ( 05 Jun 2017 08:12 )             26.6           LIVER FUNCTIONS - ( 04 Jun 2017 06:12 )  Alb: 3.4 g/dL / Pro: 6.7 g/dL / ALK PHOS: 139 U/L / ALT: 7 U/L / AST: 15 U/L / GGT: x               CAPILLARY BLOOD GLUCOSE  158 (04 Jun 2017 22:00)  92 (04 Jun 2017 17:47)  95 (04 Jun 2017 11:59)

## 2017-06-05 NOTE — PROGRESS NOTE ADULT - PROBLEM SELECTOR PLAN 1
with [facility] + blood cultures and repeat negative thus far. Positive cx in only one bottle, likely contaminant   -ordered echo initially for + cx but will keep on for operative clearance   -c/w ancef day #4, florastor - ID on board   -Podiatry to take patient for debriding today, NPO after midnight overnight. Added D5NS today as she is an add on and received lantus overnight   -pain control   -pending weight bearing status after OR

## 2017-06-05 NOTE — PROGRESS NOTE ADULT - PROBLEM SELECTOR PLAN 2
a1c 4.6, am fingerstick 101, c/w lantus 10 units   D5NS today while NPO as she got lantus overnight and is NPO for ? amt of time   c/w sliding scale

## 2017-06-05 NOTE — PROGRESS NOTE ADULT - PROBLEM SELECTOR PLAN 4
today noted to be pancytopenia   hgb stable  occult ordered   anemia panel with mixed pic and iron deficiency as well - likely aocd and inflammatory - add iron and vitamin c

## 2017-06-06 ENCOUNTER — TRANSCRIPTION ENCOUNTER (OUTPATIENT)
Age: 52
End: 2017-06-06

## 2017-06-06 DIAGNOSIS — G89.4 CHRONIC PAIN SYNDROME: ICD-10-CM

## 2017-06-06 DIAGNOSIS — E11.65 TYPE 2 DIABETES MELLITUS WITH HYPERGLYCEMIA: ICD-10-CM

## 2017-06-06 DIAGNOSIS — E11.621 TYPE 2 DIABETES MELLITUS WITH FOOT ULCER: ICD-10-CM

## 2017-06-06 LAB
-  CLINDAMYCIN: SIGNIFICANT CHANGE UP
-  ERYTHROMYCIN: SIGNIFICANT CHANGE UP
-  LEVOFLOXACIN: SIGNIFICANT CHANGE UP
-  PENICILLIN: SIGNIFICANT CHANGE UP
-  VANCOMYCIN: SIGNIFICANT CHANGE UP
ANION GAP SERPL CALC-SCNC: 10 MMOL/L — SIGNIFICANT CHANGE UP (ref 5–17)
BUN SERPL-MCNC: 23 MG/DL — HIGH (ref 8–20)
CALCIUM SERPL-MCNC: 8.6 MG/DL — SIGNIFICANT CHANGE UP (ref 8.6–10.2)
CHLORIDE SERPL-SCNC: 106 MMOL/L — SIGNIFICANT CHANGE UP (ref 98–107)
CO2 SERPL-SCNC: 23 MMOL/L — SIGNIFICANT CHANGE UP (ref 22–29)
CREAT SERPL-MCNC: 1.5 MG/DL — HIGH (ref 0.5–1.3)
GLUCOSE SERPL-MCNC: 123 MG/DL — HIGH (ref 70–115)
GRAM STN FLD: SIGNIFICANT CHANGE UP
HCT VFR BLD CALC: 25.1 % — LOW (ref 37–47)
HGB BLD-MCNC: 8.2 G/DL — LOW (ref 12–16)
MAGNESIUM SERPL-MCNC: 1.9 MG/DL — SIGNIFICANT CHANGE UP (ref 1.6–2.6)
MCHC RBC-ENTMCNC: 29 PG — SIGNIFICANT CHANGE UP (ref 27–31)
MCHC RBC-ENTMCNC: 32.7 G/DL — SIGNIFICANT CHANGE UP (ref 32–36)
MCV RBC AUTO: 88.7 FL — SIGNIFICANT CHANGE UP (ref 81–99)
METHOD TYPE: SIGNIFICANT CHANGE UP
PHOSPHATE SERPL-MCNC: 3.7 MG/DL — SIGNIFICANT CHANGE UP (ref 2.4–4.7)
PLATELET # BLD AUTO: 93 K/UL — LOW (ref 150–400)
POTASSIUM SERPL-MCNC: 4.4 MMOL/L — SIGNIFICANT CHANGE UP (ref 3.5–5.3)
POTASSIUM SERPL-SCNC: 4.4 MMOL/L — SIGNIFICANT CHANGE UP (ref 3.5–5.3)
RBC # BLD: 2.83 M/UL — LOW (ref 4.4–5.2)
RBC # FLD: 13.2 % — SIGNIFICANT CHANGE UP (ref 11–15.6)
SODIUM SERPL-SCNC: 139 MMOL/L — SIGNIFICANT CHANGE UP (ref 135–145)
SPECIMEN SOURCE: SIGNIFICANT CHANGE UP
WBC # BLD: 2.9 K/UL — LOW (ref 4.8–10.8)
WBC # FLD AUTO: 2.9 K/UL — LOW (ref 4.8–10.8)

## 2017-06-06 PROCEDURE — 99233 SBSQ HOSP IP/OBS HIGH 50: CPT

## 2017-06-06 PROCEDURE — 99232 SBSQ HOSP IP/OBS MODERATE 35: CPT

## 2017-06-06 RX ORDER — OXYCODONE HYDROCHLORIDE 5 MG/1
10 TABLET ORAL EVERY 6 HOURS
Qty: 0 | Refills: 0 | Status: DISCONTINUED | OUTPATIENT
Start: 2017-06-06 | End: 2017-06-09

## 2017-06-06 RX ORDER — OXYCODONE HYDROCHLORIDE 5 MG/1
10 TABLET ORAL EVERY 12 HOURS
Qty: 0 | Refills: 0 | Status: DISCONTINUED | OUTPATIENT
Start: 2017-06-06 | End: 2017-06-07

## 2017-06-06 RX ADMIN — Medication 100 MILLIGRAM(S): at 22:34

## 2017-06-06 RX ADMIN — OXYCODONE HYDROCHLORIDE 10 MILLIGRAM(S): 5 TABLET ORAL at 14:08

## 2017-06-06 RX ADMIN — GABAPENTIN 600 MILLIGRAM(S): 400 CAPSULE ORAL at 05:10

## 2017-06-06 RX ADMIN — GABAPENTIN 600 MILLIGRAM(S): 400 CAPSULE ORAL at 17:15

## 2017-06-06 RX ADMIN — SODIUM CHLORIDE 3 MILLILITER(S): 9 INJECTION INTRAMUSCULAR; INTRAVENOUS; SUBCUTANEOUS at 05:41

## 2017-06-06 RX ADMIN — MONTELUKAST 10 MILLIGRAM(S): 4 TABLET, CHEWABLE ORAL at 11:56

## 2017-06-06 RX ADMIN — OXYCODONE HYDROCHLORIDE 10 MILLIGRAM(S): 5 TABLET ORAL at 13:08

## 2017-06-06 RX ADMIN — BUDESONIDE AND FORMOTEROL FUMARATE DIHYDRATE 2 PUFF(S): 160; 4.5 AEROSOL RESPIRATORY (INHALATION) at 20:04

## 2017-06-06 RX ADMIN — Medication 5 MILLIGRAM(S): at 05:10

## 2017-06-06 RX ADMIN — HEPARIN SODIUM 5000 UNIT(S): 5000 INJECTION INTRAVENOUS; SUBCUTANEOUS at 17:15

## 2017-06-06 RX ADMIN — SODIUM CHLORIDE 3 MILLILITER(S): 9 INJECTION INTRAMUSCULAR; INTRAVENOUS; SUBCUTANEOUS at 13:12

## 2017-06-06 RX ADMIN — DULOXETINE HYDROCHLORIDE 60 MILLIGRAM(S): 30 CAPSULE, DELAYED RELEASE ORAL at 11:56

## 2017-06-06 RX ADMIN — Medication 500 MILLIGRAM(S): at 11:56

## 2017-06-06 RX ADMIN — BUPROPION HYDROCHLORIDE 300 MILLIGRAM(S): 150 TABLET, EXTENDED RELEASE ORAL at 09:33

## 2017-06-06 RX ADMIN — OXYCODONE HYDROCHLORIDE 10 MILLIGRAM(S): 5 TABLET ORAL at 13:09

## 2017-06-06 RX ADMIN — SODIUM CHLORIDE 3 MILLILITER(S): 9 INJECTION INTRAMUSCULAR; INTRAVENOUS; SUBCUTANEOUS at 22:26

## 2017-06-06 RX ADMIN — Medication 100 MILLIGRAM(S): at 05:10

## 2017-06-06 RX ADMIN — BUDESONIDE AND FORMOTEROL FUMARATE DIHYDRATE 2 PUFF(S): 160; 4.5 AEROSOL RESPIRATORY (INHALATION) at 08:09

## 2017-06-06 RX ADMIN — Medication 100 MILLIGRAM(S): at 13:09

## 2017-06-06 RX ADMIN — Medication 325 MILLIGRAM(S): at 11:56

## 2017-06-06 RX ADMIN — Medication 5 MILLIGRAM(S): at 17:14

## 2017-06-06 RX ADMIN — Medication 2: at 11:55

## 2017-06-06 RX ADMIN — HEPARIN SODIUM 5000 UNIT(S): 5000 INJECTION INTRAVENOUS; SUBCUTANEOUS at 05:10

## 2017-06-06 RX ADMIN — PANTOPRAZOLE SODIUM 40 MILLIGRAM(S): 20 TABLET, DELAYED RELEASE ORAL at 05:10

## 2017-06-06 NOTE — PROGRESS NOTE ADULT - SUBJECTIVE AND OBJECTIVE BOX
HPI:  52 to F s.p right foot exostectomy secondary to Charcot deformity. Relates to feeling well. No complaints at this time.  DOS: 5/5/17.        PMH:Fibromyalgia  DM (diabetes mellitus)  Foot ulcer  Osteopenia  Chronic pain  Back ache  Arthritis  Shoulder joint stiffness, left  [place] esophagus  Stomach ulcer  Diabetes  Asthma    Allergies: No Known Allergies    FH:No pertinent family history in first degree relatives    PSX: S/P knee surgery  S/P hysterectomy  S/P cholecystectomy      PHYSICAL EXAM  GEN: SONYA LENNON is a pleasant well-nourished, well developed 52y Female in no acute distress, alert awake, and oriented to person, place and time.   LE Focused:  right foot  Vasc:  pedal pulses non palpable, CFT 2 sec x 4, TG WNL  Derm: Surgical site noted with intact sutures. No erythema, minimal edema, No malodor. Skin edges well approximated. Superficial ulceration noted on medial plantar foot with no signs of acute infection.   Neuro: protective sensation diminished   MSK: muscle power 4/5 b/l all groups.  S/p right 1st ray amputation w/ severe midfoot collapse secondary to charcot deformity     Imaging: Severe rocker-bottom deformity with disassociation and dislocation of the   first metatarsal and tarsal articulation. An  Soft tissue swelling.  Osteolysis versus osteopenic changes of the second cuneiform bone and   increased periosteal reaction of the base of the remnant first metatarsal   bone. Overlap of the second through fifth metatarsal calcaneal joint.  Surgical culture/pathology: Pending. HPI:  52 to F s.p right foot exostectomy secondary to Charcot deformity. Relates to feeling well. No complaints at this time.  DOS: 6/5/17.        PMH:Fibromyalgia  DM (diabetes mellitus)  Foot ulcer  Osteopenia  Chronic pain  Back ache  Arthritis  Shoulder joint stiffness, left  [place] esophagus  Stomach ulcer  Diabetes  Asthma    Allergies: No Known Allergies    FH:No pertinent family history in first degree relatives    PSX: S/P knee surgery  S/P hysterectomy  S/P cholecystectomy      PHYSICAL EXAM  GEN: SONYA LENNON is a pleasant well-nourished, well developed 52y Female in no acute distress, alert awake, and oriented to person, place and time.   LE Focused:  right foot  Vasc:  pedal pulses non palpable, CFT 2 sec x 4, TG WNL  Derm: Surgical site noted with intact sutures. No erythema, minimal edema, No malodor. Skin edges well approximated. Superficial ulceration noted on medial plantar foot with no signs of acute infection.   Neuro: protective sensation diminished   MSK: muscle power 4/5 b/l all groups.  S/p right 1st ray amputation w/ severe midfoot collapse secondary to charcot deformity     Imaging: Severe rocker-bottom deformity with disassociation and dislocation of the   first metatarsal and tarsal articulation. An  Soft tissue swelling.  Osteolysis versus osteopenic changes of the second cuneiform bone and   increased periosteal reaction of the base of the remnant first metatarsal   bone. Overlap of the second through fifth metatarsal calcaneal joint.  Surgical culture/pathology: Pending.     A  Right foot OM w/ ulceration  Charcot right foot  s/p Right foot 1st ray amputation    P  Patient evaluate and chart reviewed.  Xeroform/DSD dressing applied.   Patient is podiatrically stable for discharge on antibiotics per ID pending surgical culture/pathology results.   No further surgical intervention at this time.   Podiatry will monitor in house.     Home wound care instructions: Right foot.   1. Apply xeroform on surgical site and plantar medial foot wound.   2. Cover with DSD/Kerlix/Ace (non compressive).  3. Change daily.     Pt to fu in Dr. Sanchez office within one week post discharge.

## 2017-06-06 NOTE — PROGRESS NOTE ADULT - SUBJECTIVE AND OBJECTIVE BOX
Pt seen, chart reviewed.  S/p Right Foot Debridement of Ulcer, Exostectomy, Bone Biopsy, POD#1.  VSS.  Adequate pain control.  Resting comfortably.   Tolerating PO intake.  No N/V.    No anesthesia problems noted.

## 2017-06-06 NOTE — PROGRESS NOTE ADULT - SUBJECTIVE AND OBJECTIVE BOX
is a 53 y/o female who presents with a chief complaint of Persistent foot infection (02 Jun 2017 16:50) and is on IV antibiotics and had a bedside debridement on 06/05/17. She reports feeling "fine today," but is complaining of pain in her foot. I've started a long acting oxycontin to help with pain control and continued oxycodone 10mg po Q6H prn for severe pain.     Summary:   REVIEW OF SYSTEMS    General: "I'm having some foot pain."	    Skin/Breast:  	  Ophthalmologic:  	  ENMT:	    Respiratory and Thorax:  	  Cardiovascular:	    Gastrointestinal:	    Genitourinary:	    Musculoskeletal:	"+right foot pain"    Neurological:	    Psychiatric:	    Hematology/Lymphatics:	    Endocrine:	    Allergic/Immunologic:	  Vital Signs Last 24 Hrs  T(C): 36.3, Max: 36.6 (06-05 @ 23:11)  T(F): 97.3, Max: 97.9 (06-05 @ 23:11)  HR: 75 (70 - 98)  BP: 106/66 (99/79 - 155/77)  BP(mean): 81 (72 - 98)  RR: 18 (10 - 18)  SpO2: 97% (95% - 100%)  PHYSICAL EXAM:  GEN: mild distress, comfortable, speaking full sentences, sitting up in bed  HEENT: MMM  CVS: S1S2 RRR  PULM: CTABL, good breath sounds  ABD: soft, nontender, no rebound, no guarding  EXTREM: +right foot  bandages  NEURO: intact  PSYCH  SKIN:                          8.2    2.9   )-----------( 93       ( 06 Jun 2017 07:28 )             25.1     06-06    139  |  106  |  23.0<H>  ----------------------------<  123<H>  4.4   |  23.0  |  1.50<H>    Ca    8.6      06 Jun 2017 07:28  Phos  3.7     06-06  Mg     1.9     06-06      Radiology:     MEDICATIONS  (STANDING):  sodium chloride 0.9% lock flush 3milliLiter(s) IV Push every 8 hours  pantoprazole    Tablet 40milliGRAM(s) Oral before breakfast  buPROPion XL . 300milliGRAM(s) Oral daily  montelukast 10milliGRAM(s) Oral daily  insulin lispro (HumaLOG) corrective regimen sliding scale  SubCutaneous three times a day before meals  dextrose 50% Injectable 12.5Gram(s) IV Push once  dextrose 50% Injectable 25Gram(s) IV Push once  dextrose 50% Injectable 25Gram(s) IV Push once  DULoxetine 60milliGRAM(s) Oral daily  traZODone 100milliGRAM(s) Oral at bedtime  gabapentin 600milliGRAM(s) Oral two times a day  polyethylene glycol 3350 17Gram(s) Oral at bedtime  buDESOnide 160 MICROgram(s)/formoterol 4.5 MICROgram(s) Inhaler 2Puff(s) Inhalation two times a day  insulin glargine Injectable (LANTUS) 10Unit(s) SubCutaneous at bedtime  diazepam    Tablet 5milliGRAM(s) Oral two times a day  ferrous    sulfate 325milliGRAM(s) Oral daily  ascorbic acid 500milliGRAM(s) Oral daily  dextrose 5%. 1000milliLiter(s) IV Continuous <Continuous>  dextrose 5% + sodium chloride 0.9%. 1000milliLiter(s) IV Continuous <Continuous>  ceFAZolin   IVPB 1000milliGRAM(s) IV Intermittent every 8 hours  heparin  Injectable 5000Unit(s) SubCutaneous every 12 hours  oxyCODONE ER Tablet 10milliGRAM(s) Oral every 12 hours    MEDICATIONS  (PRN):  oxyCODONE  5 mG/acetaminophen 325 mG 1Tablet(s) Oral once PRN Moderate Pain (4 - 6)  dextrose Gel 1Dose(s) Oral once PRN Blood Glucose LESS THAN 70 milliGRAM(s)/deciliter  glucagon  Injectable 1milliGRAM(s) IntraMuscular once PRN Glucose LESS THAN 70 milligrams/deciliter  ALBUTerol/ipratropium for Nebulization 3milliLiter(s) Nebulizer every 6 hours PRN Bronchospasm  oxyCODONE IR 10milliGRAM(s) Oral every 6 hours PRN Severe Pain (7 - 10)

## 2017-06-07 LAB
ANION GAP SERPL CALC-SCNC: 11 MMOL/L — SIGNIFICANT CHANGE UP (ref 5–17)
BUN SERPL-MCNC: 19 MG/DL — SIGNIFICANT CHANGE UP (ref 8–20)
CALCIUM SERPL-MCNC: 8.8 MG/DL — SIGNIFICANT CHANGE UP (ref 8.6–10.2)
CHLORIDE SERPL-SCNC: 105 MMOL/L — SIGNIFICANT CHANGE UP (ref 98–107)
CO2 SERPL-SCNC: 24 MMOL/L — SIGNIFICANT CHANGE UP (ref 22–29)
CREAT SERPL-MCNC: 1.43 MG/DL — HIGH (ref 0.5–1.3)
CULTURE RESULTS: SIGNIFICANT CHANGE UP
CULTURE RESULTS: SIGNIFICANT CHANGE UP
GLUCOSE SERPL-MCNC: 127 MG/DL — HIGH (ref 70–115)
HCT VFR BLD CALC: 26.4 % — LOW (ref 37–47)
HGB BLD-MCNC: 8.2 G/DL — LOW (ref 12–16)
MAGNESIUM SERPL-MCNC: 1.9 MG/DL — SIGNIFICANT CHANGE UP (ref 1.6–2.6)
MCHC RBC-ENTMCNC: 27.9 PG — SIGNIFICANT CHANGE UP (ref 27–31)
MCHC RBC-ENTMCNC: 31.1 G/DL — LOW (ref 32–36)
MCV RBC AUTO: 89.8 FL — SIGNIFICANT CHANGE UP (ref 81–99)
ORGANISM # SPEC MICROSCOPIC CNT: SIGNIFICANT CHANGE UP
PHOSPHATE SERPL-MCNC: 3.1 MG/DL — SIGNIFICANT CHANGE UP (ref 2.4–4.7)
PLATELET # BLD AUTO: 119 K/UL — LOW (ref 150–400)
POTASSIUM SERPL-MCNC: 4.6 MMOL/L — SIGNIFICANT CHANGE UP (ref 3.5–5.3)
POTASSIUM SERPL-SCNC: 4.6 MMOL/L — SIGNIFICANT CHANGE UP (ref 3.5–5.3)
RBC # BLD: 2.94 M/UL — LOW (ref 4.4–5.2)
RBC # FLD: 12.5 % — SIGNIFICANT CHANGE UP (ref 11–15.6)
SODIUM SERPL-SCNC: 140 MMOL/L — SIGNIFICANT CHANGE UP (ref 135–145)
SPECIMEN SOURCE: SIGNIFICANT CHANGE UP
SPECIMEN SOURCE: SIGNIFICANT CHANGE UP
WBC # BLD: 2.9 K/UL — LOW (ref 4.8–10.8)
WBC # FLD AUTO: 2.9 K/UL — LOW (ref 4.8–10.8)

## 2017-06-07 PROCEDURE — 99232 SBSQ HOSP IP/OBS MODERATE 35: CPT

## 2017-06-07 PROCEDURE — 99232 SBSQ HOSP IP/OBS MODERATE 35: CPT | Mod: GC

## 2017-06-07 RX ORDER — GABAPENTIN 400 MG/1
300 CAPSULE ORAL THREE TIMES A DAY
Qty: 0 | Refills: 0 | Status: DISCONTINUED | OUTPATIENT
Start: 2017-06-07 | End: 2017-06-09

## 2017-06-07 RX ORDER — DIAZEPAM 5 MG
5 TABLET ORAL THREE TIMES A DAY
Qty: 0 | Refills: 0 | Status: DISCONTINUED | OUTPATIENT
Start: 2017-06-07 | End: 2017-06-09

## 2017-06-07 RX ORDER — OXYCODONE HYDROCHLORIDE 5 MG/1
20 TABLET ORAL EVERY 12 HOURS
Qty: 0 | Refills: 0 | Status: DISCONTINUED | OUTPATIENT
Start: 2017-06-07 | End: 2017-06-09

## 2017-06-07 RX ORDER — DULOXETINE HYDROCHLORIDE 30 MG/1
120 CAPSULE, DELAYED RELEASE ORAL DAILY
Qty: 0 | Refills: 0 | Status: DISCONTINUED | OUTPATIENT
Start: 2017-06-07 | End: 2017-06-07

## 2017-06-07 RX ORDER — DULOXETINE HYDROCHLORIDE 30 MG/1
120 CAPSULE, DELAYED RELEASE ORAL DAILY
Qty: 0 | Refills: 0 | Status: DISCONTINUED | OUTPATIENT
Start: 2017-06-08 | End: 2017-06-09

## 2017-06-07 RX ORDER — OXYCODONE HYDROCHLORIDE 5 MG/1
20 TABLET ORAL EVERY 12 HOURS
Qty: 0 | Refills: 0 | Status: DISCONTINUED | OUTPATIENT
Start: 2017-06-07 | End: 2017-06-07

## 2017-06-07 RX ORDER — DULOXETINE HYDROCHLORIDE 30 MG/1
60 CAPSULE, DELAYED RELEASE ORAL ONCE
Qty: 0 | Refills: 0 | Status: COMPLETED | OUTPATIENT
Start: 2017-06-07 | End: 2017-06-07

## 2017-06-07 RX ADMIN — Medication 325 MILLIGRAM(S): at 11:20

## 2017-06-07 RX ADMIN — Medication 5 MILLIGRAM(S): at 12:17

## 2017-06-07 RX ADMIN — OXYCODONE HYDROCHLORIDE 10 MILLIGRAM(S): 5 TABLET ORAL at 22:53

## 2017-06-07 RX ADMIN — Medication 100 MILLIGRAM(S): at 21:53

## 2017-06-07 RX ADMIN — PANTOPRAZOLE SODIUM 40 MILLIGRAM(S): 20 TABLET, DELAYED RELEASE ORAL at 05:26

## 2017-06-07 RX ADMIN — POLYETHYLENE GLYCOL 3350 17 GRAM(S): 17 POWDER, FOR SOLUTION ORAL at 21:53

## 2017-06-07 RX ADMIN — BUDESONIDE AND FORMOTEROL FUMARATE DIHYDRATE 2 PUFF(S): 160; 4.5 AEROSOL RESPIRATORY (INHALATION) at 21:18

## 2017-06-07 RX ADMIN — Medication 2: at 17:19

## 2017-06-07 RX ADMIN — MONTELUKAST 10 MILLIGRAM(S): 4 TABLET, CHEWABLE ORAL at 11:18

## 2017-06-07 RX ADMIN — Medication 100 MILLIGRAM(S): at 05:25

## 2017-06-07 RX ADMIN — OXYCODONE HYDROCHLORIDE 10 MILLIGRAM(S): 5 TABLET ORAL at 11:18

## 2017-06-07 RX ADMIN — DULOXETINE HYDROCHLORIDE 60 MILLIGRAM(S): 30 CAPSULE, DELAYED RELEASE ORAL at 17:18

## 2017-06-07 RX ADMIN — OXYCODONE HYDROCHLORIDE 20 MILLIGRAM(S): 5 TABLET ORAL at 17:18

## 2017-06-07 RX ADMIN — Medication 500 MILLIGRAM(S): at 11:20

## 2017-06-07 RX ADMIN — GABAPENTIN 300 MILLIGRAM(S): 400 CAPSULE ORAL at 17:17

## 2017-06-07 RX ADMIN — GABAPENTIN 300 MILLIGRAM(S): 400 CAPSULE ORAL at 12:18

## 2017-06-07 RX ADMIN — Medication 5 MILLIGRAM(S): at 05:25

## 2017-06-07 RX ADMIN — OXYCODONE HYDROCHLORIDE 10 MILLIGRAM(S): 5 TABLET ORAL at 12:18

## 2017-06-07 RX ADMIN — Medication 100 MILLIGRAM(S): at 11:21

## 2017-06-07 RX ADMIN — Medication 5 MILLIGRAM(S): at 21:53

## 2017-06-07 RX ADMIN — OXYCODONE HYDROCHLORIDE 10 MILLIGRAM(S): 5 TABLET ORAL at 01:28

## 2017-06-07 RX ADMIN — HEPARIN SODIUM 5000 UNIT(S): 5000 INJECTION INTRAVENOUS; SUBCUTANEOUS at 05:25

## 2017-06-07 RX ADMIN — DULOXETINE HYDROCHLORIDE 60 MILLIGRAM(S): 30 CAPSULE, DELAYED RELEASE ORAL at 11:18

## 2017-06-07 RX ADMIN — SODIUM CHLORIDE 3 MILLILITER(S): 9 INJECTION INTRAMUSCULAR; INTRAVENOUS; SUBCUTANEOUS at 08:01

## 2017-06-07 RX ADMIN — OXYCODONE HYDROCHLORIDE 10 MILLIGRAM(S): 5 TABLET ORAL at 00:28

## 2017-06-07 RX ADMIN — SODIUM CHLORIDE 3 MILLILITER(S): 9 INJECTION INTRAMUSCULAR; INTRAVENOUS; SUBCUTANEOUS at 22:05

## 2017-06-07 RX ADMIN — SODIUM CHLORIDE 3 MILLILITER(S): 9 INJECTION INTRAMUSCULAR; INTRAVENOUS; SUBCUTANEOUS at 17:20

## 2017-06-07 RX ADMIN — BUPROPION HYDROCHLORIDE 300 MILLIGRAM(S): 150 TABLET, EXTENDED RELEASE ORAL at 11:18

## 2017-06-07 RX ADMIN — GABAPENTIN 300 MILLIGRAM(S): 400 CAPSULE ORAL at 21:53

## 2017-06-07 RX ADMIN — GABAPENTIN 600 MILLIGRAM(S): 400 CAPSULE ORAL at 05:25

## 2017-06-07 RX ADMIN — BUDESONIDE AND FORMOTEROL FUMARATE DIHYDRATE 2 PUFF(S): 160; 4.5 AEROSOL RESPIRATORY (INHALATION) at 08:30

## 2017-06-07 RX ADMIN — OXYCODONE HYDROCHLORIDE 10 MILLIGRAM(S): 5 TABLET ORAL at 21:53

## 2017-06-07 RX ADMIN — HEPARIN SODIUM 5000 UNIT(S): 5000 INJECTION INTRAVENOUS; SUBCUTANEOUS at 17:20

## 2017-06-07 NOTE — PHYSICAL THERAPY INITIAL EVALUATION ADULT - ADDITIONAL COMMENTS
pt. reports that she has surgical shoes at home. Pt. also reports that her boyfriend is at home with her.

## 2017-06-07 NOTE — PROGRESS NOTE ADULT - PROBLEM SELECTOR PLAN 2
cont ADA diet  -cont IV cefazolin  -appreciate podiatry input  -appreciate ID input, bone bx culture is negative, may not need long term IV abx

## 2017-06-07 NOTE — PHYSICAL THERAPY INITIAL EVALUATION ADULT - RANGE OF MOTION EXAMINATION, REHAB EVAL
deficits as listed below/no ROM deficits were identified/difficulty moving the right ankle due to surgery and ace wrap

## 2017-06-07 NOTE — PROGRESS NOTE ADULT - PROBLEM SELECTOR PLAN 1
-start long acting oxycontin 20mg po Q12H  -cont percocet prn, and cont oxycodone 10mg po Q6H prn  -increasing valium to 5mg TID (home dose)

## 2017-06-07 NOTE — PROGRESS NOTE ADULT - SUBJECTIVE AND OBJECTIVE BOX
is a 53 y/o female who presents with a chief complaint of Persistent foot infection (02 Jun 2017 16:50) and is on IV antibiotics and had a bedside debridement on 06/05/17. She reports feeling "fine today," but is complaining of pain in her foot. I'm increasing long acting oxycontin to help with pain control and am increasing oxycodone 10mg po Q6H prn for severe pain.     Summary:   REVIEW OF SYSTEMS    General: "I'm having some foot pain."	    Skin/Breast:  	  Ophthalmologic:  	  ENMT:	    Respiratory and Thorax:  	  Cardiovascular:	    Gastrointestinal:	    Genitourinary:	    Musculoskeletal:	"+right foot pain"    Neurological:	    Psychiatric:	    Hematology/Lymphatics:	    Endocrine:	    Allergic/Immunologic:	  Vital Signs Last 24 Hrs  T(C): 36.3, Max: 36.6 (06-05 @ 23:11)  T(F): 97.3, Max: 97.9 (06-05 @ 23:11)  HR: 75 (70 - 98)  BP: 106/66 (99/79 - 155/77)  BP(mean): 81 (72 - 98)  RR: 18 (10 - 18)  SpO2: 97% (95% - 100%)  PHYSICAL EXAM:  GEN: mild distress, comfortable, speaking full sentences, sitting up in bed, eating well  HEENT: MMM  CVS: S1S2 RRR  PULM: CTABL, good breath sounds  ABD: soft, nontender, no rebound, no guarding  EXTREM: +right foot  bandages  NEURO: intact  PSYCH  SKIN: warm, pink                          8.2    2.9   )-----------( 119      ( 07 Jun 2017 06:24 )             26.4     06-07    140  |  105  |  19.0  ----------------------------<  127<H>  4.6   |  24.0  |  1.43<H>    Ca    8.8      07 Jun 2017 06:24  Phos  3.1     06-07  Mg     1.9     06-07      Radiology:     MEDICATIONS  (STANDING):  sodium chloride 0.9% lock flush 3milliLiter(s) IV Push every 8 hours  pantoprazole    Tablet 40milliGRAM(s) Oral before breakfast  buPROPion XL . 300milliGRAM(s) Oral daily  montelukast 10milliGRAM(s) Oral daily  insulin lispro (HumaLOG) corrective regimen sliding scale  SubCutaneous three times a day before meals  dextrose 50% Injectable 12.5Gram(s) IV Push once  dextrose 50% Injectable 25Gram(s) IV Push once  dextrose 50% Injectable 25Gram(s) IV Push once  DULoxetine 60milliGRAM(s) Oral daily  traZODone 100milliGRAM(s) Oral at bedtime  polyethylene glycol 3350 17Gram(s) Oral at bedtime  buDESOnide 160 MICROgram(s)/formoterol 4.5 MICROgram(s) Inhaler 2Puff(s) Inhalation two times a day  ferrous    sulfate 325milliGRAM(s) Oral daily  ascorbic acid 500milliGRAM(s) Oral daily  dextrose 5%. 1000milliLiter(s) IV Continuous <Continuous>  ceFAZolin   IVPB 1000milliGRAM(s) IV Intermittent every 8 hours  heparin  Injectable 5000Unit(s) SubCutaneous every 12 hours  oxyCODONE ER Tablet 20milliGRAM(s) Oral every 12 hours  diazepam    Tablet 5milliGRAM(s) Oral three times a day  gabapentin 300milliGRAM(s) Oral three times a day    MEDICATIONS  (PRN):  oxyCODONE  5 mG/acetaminophen 325 mG 1Tablet(s) Oral once PRN Moderate Pain (4 - 6)  dextrose Gel 1Dose(s) Oral once PRN Blood Glucose LESS THAN 70 milliGRAM(s)/deciliter  glucagon  Injectable 1milliGRAM(s) IntraMuscular once PRN Glucose LESS THAN 70 milligrams/deciliter  ALBUTerol/ipratropium for Nebulization 3milliLiter(s) Nebulizer every 6 hours PRN Bronchospasm  oxyCODONE IR 10milliGRAM(s) Oral every 6 hours PRN Severe Pain (7 - 10)

## 2017-06-07 NOTE — PHYSICAL THERAPY INITIAL EVALUATION ADULT - GAIT DISTANCE, PT EVAL
75 feet/pt. insisted on ambulation even without the surgical shoe; pt. ambulated on right heel with RW

## 2017-06-07 NOTE — PROGRESS NOTE ADULT - SUBJECTIVE AND OBJECTIVE BOX
SONYA LENNON is a 52y Female with HPI:  This 52 y.o. F with diabetes, COPD, smoker,  left foot ulcer in past, prior MSSA bacteremia, who has been referred here for a non healing foot ulcer in the right side.    patient reports that this has only opened up in the recent 3 days. About 1 week ago, she had fevers subj, chill, sweats at home. Has been taking NORCO at home since then. She was sent in here by her podiatrist, now Dr. Sanchez.    Patient was last seen in the hospital in 12/2016, where she was found with MSSA bacteremia.   S/P  BONE BX  CX SO FAR NEGATIVE      Allergies:  No Known Allergies      Medications:  sodium chloride 0.9% lock flush 3milliLiter(s) IV Push every 8 hours  ceFAZolin   IVPB  IV Intermittent   ceFAZolin   IVPB 1000milliGRAM(s) IV Intermittent every 8 hours  heparin  Injectable 5000Unit(s) SubCutaneous every 12 hours  pantoprazole    Tablet 40milliGRAM(s) Oral before breakfast  buPROPion XL . 300milliGRAM(s) Oral daily  montelukast 10milliGRAM(s) Oral daily  insulin lispro (HumaLOG) corrective regimen sliding scale  SubCutaneous three times a day before meals  dextrose 5%. 1000milliLiter(s) IV Continuous <Continuous>  dextrose Gel 1Dose(s) Oral once PRN  dextrose 50% Injectable 12.5Gram(s) IV Push once  dextrose 50% Injectable 25Gram(s) IV Push once  dextrose 50% Injectable 25Gram(s) IV Push once  glucagon  Injectable 1milliGRAM(s) IntraMuscular once PRN  diazepam    Tablet 5milliGRAM(s) Oral every 8 hours  ALBUTerol/ipratropium for Nebulization 3milliLiter(s) Nebulizer every 6 hours PRN  DULoxetine 60milliGRAM(s) Oral daily  traZODone 100milliGRAM(s) Oral at bedtime  gabapentin 600milliGRAM(s) Oral two times a day  oxyCODONE  5 mG/acetaminophen 325 mG 2Tablet(s) Oral every 4 hours PRN  saccharomyces boulardii 250milliGRAM(s) Oral two times a day  polyethylene glycol 3350 17Gram(s) Oral at bedtime  buDESOnide 160 MICROgram(s)/formoterol 4.5 MICROgram(s) Inhaler 2Puff(s) Inhalation two times a day  insulin glargine Injectable (LANTUS) 10Unit(s) SubCutaneous at bedtime  dextrose 5% + sodium chloride 0.9%. 1000milliLiter(s) IV Continuous <Continuous>      ANTIBIOTICS: KEFZOL        Review of Systems: - Negative except as mentioned above     Physical Exam:   ICU Vital Signs Last 24 Hrs  T(C): 36.6, Max: 36.6 (06-06 @ 23:13)  T(F): 97.9, Max: 97.9 (06-07 @ 07:30)  HR: 81 (76 - 81)  BP: 135/71 (109/68 - 135/71)  BP(mean): --  ABP: --  ABP(mean): --  RR: 18 (18 - 18)  SpO2: 94% (94% - 98%)      GEN: NAD, pleasant  HEENT: normocephalic and atraumatic. EOMI. [place]A...  NECK: Supple. No carotid bruits.  No lymphadenopathy or thyromegaly.  LUNGS: Clear to auscultation.  HEART: Regular rate and rhythm without murmur.  ABDOMEN: Soft, nontender, and nondistended.  Positive bowel sounds.  No hepatosplenomegaly was noted.  NO REBOUND NO GUARDING  EXTREMITIES: RIGHT FOOT WRAPPED  NEUROLOGIC: Cranial nerves II through XII are grossly intact.    SKIN: No ulceration or induration present.      Labs:    06-04    138  |  103  |  27.0<H>  ----------------------------<  95  4.6   |  25.0  |  1.80<H>    Ca    8.5<L>      04 Jun 2017 06:12  Phos  3.3     06-04  Mg     2.0     06-04    TPro  6.7  /  Alb  3.4  /  TBili  0.3<L>  /  DBili  x   /  AST  15  /  ALT  7   /  AlkPhos  139<H>  06-04                          8.7    x     )-----------( x        ( 05 Jun 2017 08:12 )             26.6           LIVER FUNCTIONS - ( 04 Jun 2017 06:12 )  Alb: 3.4 g/dL / Pro: 6.7 g/dL / ALK PHOS: 139 U/L / ALT: 7 U/L / AST: 15 U/L / GGT: x               CAPILLARY BLOOD GLUCOSE  158 (04 Jun 2017 22:00)  92 (04 Jun 2017 17:47)  95 (04 Jun 2017 11:59)

## 2017-06-07 NOTE — PHYSICAL THERAPY INITIAL EVALUATION ADULT - GAIT DEVIATIONS NOTED, PT EVAL
decreased mey/decreased weight-shifting ability/decreased step length/increased time in double stance

## 2017-06-08 LAB
CULTURE RESULTS: SIGNIFICANT CHANGE UP
ORGANISM # SPEC MICROSCOPIC CNT: SIGNIFICANT CHANGE UP
ORGANISM # SPEC MICROSCOPIC CNT: SIGNIFICANT CHANGE UP
SPECIMEN SOURCE: SIGNIFICANT CHANGE UP

## 2017-06-08 PROCEDURE — 73630 X-RAY EXAM OF FOOT: CPT | Mod: 26,RT

## 2017-06-08 PROCEDURE — 99232 SBSQ HOSP IP/OBS MODERATE 35: CPT

## 2017-06-08 PROCEDURE — 99233 SBSQ HOSP IP/OBS HIGH 50: CPT

## 2017-06-08 RX ORDER — INSULIN GLARGINE 100 [IU]/ML
10 INJECTION, SOLUTION SUBCUTANEOUS AT BEDTIME
Qty: 0 | Refills: 0 | Status: DISCONTINUED | OUTPATIENT
Start: 2017-06-08 | End: 2017-06-09

## 2017-06-08 RX ADMIN — OXYCODONE HYDROCHLORIDE 10 MILLIGRAM(S): 5 TABLET ORAL at 09:52

## 2017-06-08 RX ADMIN — INSULIN GLARGINE 10 UNIT(S): 100 INJECTION, SOLUTION SUBCUTANEOUS at 22:29

## 2017-06-08 RX ADMIN — Medication 100 MILLIGRAM(S): at 05:51

## 2017-06-08 RX ADMIN — OXYCODONE HYDROCHLORIDE 20 MILLIGRAM(S): 5 TABLET ORAL at 17:45

## 2017-06-08 RX ADMIN — Medication 500 MILLIGRAM(S): at 09:51

## 2017-06-08 RX ADMIN — SODIUM CHLORIDE 3 MILLILITER(S): 9 INJECTION INTRAMUSCULAR; INTRAVENOUS; SUBCUTANEOUS at 14:32

## 2017-06-08 RX ADMIN — OXYCODONE HYDROCHLORIDE 20 MILLIGRAM(S): 5 TABLET ORAL at 06:52

## 2017-06-08 RX ADMIN — Medication 100 MILLIGRAM(S): at 12:17

## 2017-06-08 RX ADMIN — GABAPENTIN 300 MILLIGRAM(S): 400 CAPSULE ORAL at 05:52

## 2017-06-08 RX ADMIN — HEPARIN SODIUM 5000 UNIT(S): 5000 INJECTION INTRAVENOUS; SUBCUTANEOUS at 17:14

## 2017-06-08 RX ADMIN — Medication 5 MILLIGRAM(S): at 05:52

## 2017-06-08 RX ADMIN — HEPARIN SODIUM 5000 UNIT(S): 5000 INJECTION INTRAVENOUS; SUBCUTANEOUS at 05:52

## 2017-06-08 RX ADMIN — OXYCODONE HYDROCHLORIDE 20 MILLIGRAM(S): 5 TABLET ORAL at 17:12

## 2017-06-08 RX ADMIN — SODIUM CHLORIDE 3 MILLILITER(S): 9 INJECTION INTRAMUSCULAR; INTRAVENOUS; SUBCUTANEOUS at 05:52

## 2017-06-08 RX ADMIN — Medication 2: at 12:16

## 2017-06-08 RX ADMIN — OXYCODONE HYDROCHLORIDE 20 MILLIGRAM(S): 5 TABLET ORAL at 05:52

## 2017-06-08 RX ADMIN — Medication 325 MILLIGRAM(S): at 09:51

## 2017-06-08 RX ADMIN — POLYETHYLENE GLYCOL 3350 17 GRAM(S): 17 POWDER, FOR SOLUTION ORAL at 22:25

## 2017-06-08 RX ADMIN — MONTELUKAST 10 MILLIGRAM(S): 4 TABLET, CHEWABLE ORAL at 09:51

## 2017-06-08 RX ADMIN — Medication 100 MILLIGRAM(S): at 22:29

## 2017-06-08 RX ADMIN — GABAPENTIN 300 MILLIGRAM(S): 400 CAPSULE ORAL at 12:17

## 2017-06-08 RX ADMIN — OXYCODONE HYDROCHLORIDE 10 MILLIGRAM(S): 5 TABLET ORAL at 10:30

## 2017-06-08 RX ADMIN — BUDESONIDE AND FORMOTEROL FUMARATE DIHYDRATE 2 PUFF(S): 160; 4.5 AEROSOL RESPIRATORY (INHALATION) at 10:30

## 2017-06-08 RX ADMIN — Medication 5 MILLIGRAM(S): at 22:29

## 2017-06-08 RX ADMIN — BUDESONIDE AND FORMOTEROL FUMARATE DIHYDRATE 2 PUFF(S): 160; 4.5 AEROSOL RESPIRATORY (INHALATION) at 20:56

## 2017-06-08 RX ADMIN — BUPROPION HYDROCHLORIDE 300 MILLIGRAM(S): 150 TABLET, EXTENDED RELEASE ORAL at 09:51

## 2017-06-08 RX ADMIN — Medication 5 MILLIGRAM(S): at 12:17

## 2017-06-08 RX ADMIN — OXYCODONE HYDROCHLORIDE 10 MILLIGRAM(S): 5 TABLET ORAL at 17:13

## 2017-06-08 RX ADMIN — PANTOPRAZOLE SODIUM 40 MILLIGRAM(S): 20 TABLET, DELAYED RELEASE ORAL at 05:53

## 2017-06-08 RX ADMIN — GABAPENTIN 300 MILLIGRAM(S): 400 CAPSULE ORAL at 22:29

## 2017-06-08 RX ADMIN — OXYCODONE HYDROCHLORIDE 10 MILLIGRAM(S): 5 TABLET ORAL at 17:45

## 2017-06-08 RX ADMIN — DULOXETINE HYDROCHLORIDE 120 MILLIGRAM(S): 30 CAPSULE, DELAYED RELEASE ORAL at 09:51

## 2017-06-08 RX ADMIN — SODIUM CHLORIDE 3 MILLILITER(S): 9 INJECTION INTRAMUSCULAR; INTRAVENOUS; SUBCUTANEOUS at 22:25

## 2017-06-08 NOTE — DIETITIAN INITIAL EVALUATION ADULT. - OTHER INFO
Pt admitted for diabetic foot ulcer infection. Pt tolerating cons cho diet with good appetite/intake >75% PO. Pt follows diet restrictions at home, in control HgbA1C 4.6%. Pt has history of GI issues, however not currently exacerbated, not affecting intake as in the past.

## 2017-06-08 NOTE — PROGRESS NOTE ADULT - ATTENDING COMMENTS
Patient seen and examined at bedside. Agree with above. Note addended where needed. Plan discussed with pt

## 2017-06-08 NOTE — PROGRESS NOTE ADULT - SUBJECTIVE AND OBJECTIVE BOX
CHIEF COMPLAINT/INTERVAL HISTORY:    Patient is a 52y old  Female who presents with a chief complaint of Persistent foot infection (02 Jun 2017 16:50)      HPI:  52F referred to the hospital by her podiatrist for a non-healing foot infection. The patient reported that she had been following with her podiatrist and on oral ciprofloxacin. She denied any fevers or chills at home. She did note discomfort at the site as well as some drainage. She reports that the foot ulceration was present since the prior discharge from the hospital. She is unaware of any relieving factors. The patient also reported that she was treated with a cast on her foot which was recently taken off. She also noted difficulty with ambulation and has had episodes of falling at home.  Documentation from the patient's prior admission was obtained and reviewed. The patient was noted to have MSSA bacteremia on the prior admission as well as acute kidney injury due to Vancomycin. (02 Jun 2017 16:50)      SUBJECTIVE & OBJECTIVE: Pt seen and examined at bedside. No chest pain, palpitations, sob, light headedness/dizziness, difficulty breathing/cough, fevers/chills, abdominal pain, n/v, diarrhea/constipation, dysuria or increased urinary frequency. C/o pain in right foot controlled by pain meds.     ICU Vital Signs Last 24 Hrs  T(C): 36.8, Max: 37.1 (06-07 @ 15:11)  T(F): 98.3, Max: 98.8 (06-07 @ 15:11)  HR: 73 (70 - 76)  BP: 135/80 (109/68 - 135/80)  BP(mean): --  ABP: --  ABP(mean): --  RR: 18 (18 - 18)  SpO2: 100% (97% - 100%)        MEDICATIONS  (STANDING):  sodium chloride 0.9% lock flush 3milliLiter(s) IV Push every 8 hours  pantoprazole    Tablet 40milliGRAM(s) Oral before breakfast  buPROPion XL . 300milliGRAM(s) Oral daily  montelukast 10milliGRAM(s) Oral daily  insulin lispro (HumaLOG) corrective regimen sliding scale  SubCutaneous three times a day before meals  dextrose 50% Injectable 12.5Gram(s) IV Push once  dextrose 50% Injectable 25Gram(s) IV Push once  dextrose 50% Injectable 25Gram(s) IV Push once  traZODone 100milliGRAM(s) Oral at bedtime  polyethylene glycol 3350 17Gram(s) Oral at bedtime  buDESOnide 160 MICROgram(s)/formoterol 4.5 MICROgram(s) Inhaler 2Puff(s) Inhalation two times a day  ferrous    sulfate 325milliGRAM(s) Oral daily  ascorbic acid 500milliGRAM(s) Oral daily  dextrose 5%. 1000milliLiter(s) IV Continuous <Continuous>  ceFAZolin   IVPB 1000milliGRAM(s) IV Intermittent every 8 hours  heparin  Injectable 5000Unit(s) SubCutaneous every 12 hours  oxyCODONE ER Tablet 20milliGRAM(s) Oral every 12 hours  diazepam    Tablet 5milliGRAM(s) Oral three times a day  gabapentin 300milliGRAM(s) Oral three times a day  DULoxetine 120milliGRAM(s) Oral daily    MEDICATIONS  (PRN):  dextrose Gel 1Dose(s) Oral once PRN Blood Glucose LESS THAN 70 milliGRAM(s)/deciliter  glucagon  Injectable 1milliGRAM(s) IntraMuscular once PRN Glucose LESS THAN 70 milligrams/deciliter  ALBUTerol/ipratropium for Nebulization 3milliLiter(s) Nebulizer every 6 hours PRN Bronchospasm  oxyCODONE IR 10milliGRAM(s) Oral every 6 hours PRN Severe Pain (7 - 10)      LABS:                        8.2    2.9   )-----------( 119      ( 07 Jun 2017 06:24 )             26.4     06-07    140  |  105  |  19.0  ----------------------------<  127<H>  4.6   |  24.0  |  1.43<H>    Ca    8.8      07 Jun 2017 06:24  Phos  3.1     06-07  Mg     1.9     06-07            CAPILLARY BLOOD GLUCOSE  180 (08 Jun 2017 11:30)  139 (08 Jun 2017 07:30)  152 (07 Jun 2017 21:03)  153 (07 Jun 2017 16:00)      RECENT CULTURES:      RADIOLOGY & ADDITIONAL TESTS:      PHYSICAL EXAM:    GENERAL: NAD, well-groomed, well-developed  HEAD:  Atraumatic, Normocephalic  EYES: EOMI, PERRLA, conjunctiva and sclera clear  ENMT: Moist mucous membranes  NECK: Supple, No JVD  NERVOUS SYSTEM:  Alert & Oriented X3, non focal  CHEST/LUNG: Clear to auscultation bilaterally; No rales, rhonchi, wheezing, or rubs  HEART: Regular rate and rhythm; No murmurs, rubs, or gallops  ABDOMEN: Soft, Nontender, Nondistended; Bowel sounds present  EXTREMITIES:  2+ Peripheral Pulses, right foot dressing

## 2017-06-08 NOTE — PROGRESS NOTE ADULT - PROBLEM SELECTOR PLAN 2
with OM s/p bone bx (bone removed)    -appreciate podiatry input  Daily dressing changes as per podiatry  -appreciate ID input, bone bx culture is negative to date, may not need long term IV abx, cont IV cefazolin

## 2017-06-08 NOTE — PROGRESS NOTE ADULT - PROBLEM SELECTOR PLAN 3
-A1C is 4.6, uncontrolled in the past  FS controlled, c/w  lantus 10U (home med Tresiba 75U qHS & ISS), will need to cut back on home med on d/c.   -cont RAISS

## 2017-06-08 NOTE — PROGRESS NOTE ADULT - NSHPATTENDINGPLANDISCUSS_GEN_ALL_CORE
pt, rn
patient and nurse
patient - asked me not to call anyone in her family. plan d/w nurse also
patient. Lives with her friend -declined my offer to call and update him

## 2017-06-08 NOTE — PROGRESS NOTE ADULT - PROBLEM SELECTOR PLAN 1
c/w long acting oxycontin 20mg po Q12H  -cont percocet prn, and cont oxycodone 10mg po Q6H prn  -increasing valium to 5mg TID (home dose)

## 2017-06-08 NOTE — PROGRESS NOTE ADULT - SUBJECTIVE AND OBJECTIVE BOX
SONYA LENNON is a 52y Female with HPI:  This 52 y.o. F with diabetes, COPD, smoker,  left foot ulcer in past, prior MSSA bacteremia, who has been referred here for a non healing foot ulcer in the right side.    patient reports that this has only opened up in the recent 3 days. About 1 week ago, she had fevers subj, chill, sweats at home. Has been taking NORCO at home since then. She was sent in here by her podiatrist, now Dr. Sanchez.    Patient was last seen in the hospital in 12/2016, where she was found with MSSA bacteremia.   S/P  BONE BX  CX SO FAR NEGATIVE at 48hrs      Allergies:  No Known Allergies      Medications:  sodium chloride 0.9% lock flush 3milliLiter(s) IV Push every 8 hours  ceFAZolin   IVPB  IV Intermittent   ceFAZolin   IVPB 1000milliGRAM(s) IV Intermittent every 8 hours  heparin  Injectable 5000Unit(s) SubCutaneous every 12 hours  pantoprazole    Tablet 40milliGRAM(s) Oral before breakfast  buPROPion XL . 300milliGRAM(s) Oral daily  montelukast 10milliGRAM(s) Oral daily  insulin lispro (HumaLOG) corrective regimen sliding scale  SubCutaneous three times a day before meals  dextrose 5%. 1000milliLiter(s) IV Continuous <Continuous>  dextrose Gel 1Dose(s) Oral once PRN  dextrose 50% Injectable 12.5Gram(s) IV Push once  dextrose 50% Injectable 25Gram(s) IV Push once  dextrose 50% Injectable 25Gram(s) IV Push once  glucagon  Injectable 1milliGRAM(s) IntraMuscular once PRN  diazepam    Tablet 5milliGRAM(s) Oral every 8 hours  ALBUTerol/ipratropium for Nebulization 3milliLiter(s) Nebulizer every 6 hours PRN  DULoxetine 60milliGRAM(s) Oral daily  traZODone 100milliGRAM(s) Oral at bedtime  gabapentin 600milliGRAM(s) Oral two times a day  oxyCODONE  5 mG/acetaminophen 325 mG 2Tablet(s) Oral every 4 hours PRN  saccharomyces boulardii 250milliGRAM(s) Oral two times a day  polyethylene glycol 3350 17Gram(s) Oral at bedtime  buDESOnide 160 MICROgram(s)/formoterol 4.5 MICROgram(s) Inhaler 2Puff(s) Inhalation two times a day  insulin glargine Injectable (LANTUS) 10Unit(s) SubCutaneous at bedtime  dextrose 5% + sodium chloride 0.9%. 1000milliLiter(s) IV Continuous <Continuous>      ANTIBIOTICS: KEFZOL        Review of Systems: - Negative except as mentioned above     Physical Exam:   ICU Vital Signs Last 24 Hrs  T(C): 36.6, Max: 36.6 (06-06 @ 23:13)  T(F): 97.9, Max: 97.9 (06-07 @ 07:30)  HR: 81 (76 - 81)  BP: 135/71 (109/68 - 135/71)  BP(mean): --  ABP: --  ABP(mean): --  RR: 18 (18 - 18)  SpO2: 94% (94% - 98%)      GEN: NAD, pleasant  HEENT: normocephalic and atraumatic. EOMI. AURORA...  NECK: Supple. No carotid bruits.  No lymphadenopathy or thyromegaly.  LUNGS: Clear to auscultation.  HEART: Regular rate and rhythm without murmur.  ABDOMEN: Soft, nontender, and nondistended.  Positive bowel sounds.  No hepatosplenomegaly was noted.  NO REBOUND NO GUARDING  EXTREMITIES: RIGHT FOOT WRAPPED  NEUROLOGIC: Cranial nerves II through XII are grossly intact.    SKIN: No ulceration or induration present.      Labs:    06-04    138  |  103  |  27.0<H>  ----------------------------<  95  4.6   |  25.0  |  1.80<H>    Ca    8.5<L>      04 Jun 2017 06:12  Phos  3.3     06-04  Mg     2.0     06-04    TPro  6.7  /  Alb  3.4  /  TBili  0.3<L>  /  DBili  x   /  AST  15  /  ALT  7   /  AlkPhos  139<H>  06-04                          8.7    x     )-----------( x        ( 05 Jun 2017 08:12 )             26.6           LIVER FUNCTIONS - ( 04 Jun 2017 06:12 )  Alb: 3.4 g/dL / Pro: 6.7 g/dL / ALK PHOS: 139 U/L / ALT: 7 U/L / AST: 15 U/L / GGT: x               CAPILLARY BLOOD GLUCOSE  158 (04 Jun 2017 22:00)  92 (04 Jun 2017 17:47)  95 (04 Jun 2017 11:59)

## 2017-06-09 VITALS
OXYGEN SATURATION: 100 % | DIASTOLIC BLOOD PRESSURE: 67 MMHG | HEART RATE: 80 BPM | RESPIRATION RATE: 18 BRPM | TEMPERATURE: 97 F | SYSTOLIC BLOOD PRESSURE: 99 MMHG

## 2017-06-09 LAB
ANION GAP SERPL CALC-SCNC: 12 MMOL/L — SIGNIFICANT CHANGE UP (ref 5–17)
BASOPHILS # BLD AUTO: 0 K/UL — SIGNIFICANT CHANGE UP (ref 0–0.2)
BASOPHILS NFR BLD AUTO: 0.4 % — SIGNIFICANT CHANGE UP (ref 0–2)
BUN SERPL-MCNC: 21 MG/DL — HIGH (ref 8–20)
CALCIUM SERPL-MCNC: 8.8 MG/DL — SIGNIFICANT CHANGE UP (ref 8.6–10.2)
CHLORIDE SERPL-SCNC: 101 MMOL/L — SIGNIFICANT CHANGE UP (ref 98–107)
CO2 SERPL-SCNC: 27 MMOL/L — SIGNIFICANT CHANGE UP (ref 22–29)
CREAT SERPL-MCNC: 1.32 MG/DL — HIGH (ref 0.5–1.3)
EOSINOPHIL # BLD AUTO: 0.1 K/UL — SIGNIFICANT CHANGE UP (ref 0–0.5)
EOSINOPHIL NFR BLD AUTO: 4.5 % — SIGNIFICANT CHANGE UP (ref 0–6)
GLUCOSE SERPL-MCNC: 123 MG/DL — HIGH (ref 70–115)
HCT VFR BLD CALC: 24.7 % — LOW (ref 37–47)
HGB BLD-MCNC: 8 G/DL — LOW (ref 12–16)
LYMPHOCYTES # BLD AUTO: 0.9 K/UL — LOW (ref 1–4.8)
LYMPHOCYTES # BLD AUTO: 38 % — SIGNIFICANT CHANGE UP (ref 20–55)
MAGNESIUM SERPL-MCNC: 1.8 MG/DL — SIGNIFICANT CHANGE UP (ref 1.6–2.6)
MCHC RBC-ENTMCNC: 28.8 PG — SIGNIFICANT CHANGE UP (ref 27–31)
MCHC RBC-ENTMCNC: 32.4 G/DL — SIGNIFICANT CHANGE UP (ref 32–36)
MCV RBC AUTO: 88.8 FL — SIGNIFICANT CHANGE UP (ref 81–99)
MONOCYTES # BLD AUTO: 0.2 K/UL — SIGNIFICANT CHANGE UP (ref 0–0.8)
MONOCYTES NFR BLD AUTO: 7.9 % — SIGNIFICANT CHANGE UP (ref 3–10)
NEUTROPHILS # BLD AUTO: 1.2 K/UL — LOW (ref 1.8–8)
NEUTROPHILS NFR BLD AUTO: 48.8 % — SIGNIFICANT CHANGE UP (ref 37–73)
PHOSPHATE SERPL-MCNC: 3.9 MG/DL — SIGNIFICANT CHANGE UP (ref 2.4–4.7)
PLATELET # BLD AUTO: 98 K/UL — LOW (ref 150–400)
POTASSIUM SERPL-MCNC: 4.4 MMOL/L — SIGNIFICANT CHANGE UP (ref 3.5–5.3)
POTASSIUM SERPL-SCNC: 4.4 MMOL/L — SIGNIFICANT CHANGE UP (ref 3.5–5.3)
RBC # BLD: 2.78 M/UL — LOW (ref 4.4–5.2)
RBC # FLD: 13 % — SIGNIFICANT CHANGE UP (ref 11–15.6)
SODIUM SERPL-SCNC: 140 MMOL/L — SIGNIFICANT CHANGE UP (ref 135–145)
SURGICAL PATHOLOGY FINAL REPORT - CH: SIGNIFICANT CHANGE UP
WBC # BLD: 2.4 K/UL — LOW (ref 4.8–10.8)
WBC # FLD AUTO: 2.4 K/UL — LOW (ref 4.8–10.8)

## 2017-06-09 PROCEDURE — 86901 BLOOD TYPING SEROLOGIC RH(D): CPT

## 2017-06-09 PROCEDURE — 88311 DECALCIFY TISSUE: CPT

## 2017-06-09 PROCEDURE — 88304 TISSUE EXAM BY PATHOLOGIST: CPT

## 2017-06-09 PROCEDURE — 86140 C-REACTIVE PROTEIN: CPT

## 2017-06-09 PROCEDURE — 80053 COMPREHEN METABOLIC PANEL: CPT

## 2017-06-09 PROCEDURE — 86850 RBC ANTIBODY SCREEN: CPT

## 2017-06-09 PROCEDURE — 85027 COMPLETE CBC AUTOMATED: CPT

## 2017-06-09 PROCEDURE — 36415 COLL VENOUS BLD VENIPUNCTURE: CPT

## 2017-06-09 PROCEDURE — 99232 SBSQ HOSP IP/OBS MODERATE 35: CPT

## 2017-06-09 PROCEDURE — 85652 RBC SED RATE AUTOMATED: CPT

## 2017-06-09 PROCEDURE — 85045 AUTOMATED RETICULOCYTE COUNT: CPT

## 2017-06-09 PROCEDURE — 87040 BLOOD CULTURE FOR BACTERIA: CPT

## 2017-06-09 PROCEDURE — 83550 IRON BINDING TEST: CPT

## 2017-06-09 PROCEDURE — 74020: CPT

## 2017-06-09 PROCEDURE — 85018 HEMOGLOBIN: CPT

## 2017-06-09 PROCEDURE — 82746 ASSAY OF FOLIC ACID SERUM: CPT

## 2017-06-09 PROCEDURE — 87186 SC STD MICRODIL/AGAR DIL: CPT

## 2017-06-09 PROCEDURE — 82607 VITAMIN B-12: CPT

## 2017-06-09 PROCEDURE — 86900 BLOOD TYPING SEROLOGIC ABO: CPT

## 2017-06-09 PROCEDURE — 99239 HOSP IP/OBS DSCHRG MGMT >30: CPT

## 2017-06-09 PROCEDURE — 73620 X-RAY EXAM OF FOOT: CPT

## 2017-06-09 PROCEDURE — 80048 BASIC METABOLIC PNL TOTAL CA: CPT

## 2017-06-09 PROCEDURE — 85610 PROTHROMBIN TIME: CPT

## 2017-06-09 PROCEDURE — 99285 EMERGENCY DEPT VISIT HI MDM: CPT | Mod: 25

## 2017-06-09 PROCEDURE — 94640 AIRWAY INHALATION TREATMENT: CPT

## 2017-06-09 PROCEDURE — 93306 TTE W/DOPPLER COMPLETE: CPT

## 2017-06-09 PROCEDURE — 96374 THER/PROPH/DIAG INJ IV PUSH: CPT

## 2017-06-09 PROCEDURE — 83036 HEMOGLOBIN GLYCOSYLATED A1C: CPT

## 2017-06-09 PROCEDURE — 73718 MRI LOWER EXTREMITY W/O DYE: CPT

## 2017-06-09 PROCEDURE — 82728 ASSAY OF FERRITIN: CPT

## 2017-06-09 PROCEDURE — 97163 PT EVAL HIGH COMPLEX 45 MIN: CPT

## 2017-06-09 PROCEDURE — C1889: CPT

## 2017-06-09 PROCEDURE — 71045 X-RAY EXAM CHEST 1 VIEW: CPT

## 2017-06-09 PROCEDURE — 93005 ELECTROCARDIOGRAM TRACING: CPT

## 2017-06-09 PROCEDURE — 76775 US EXAM ABDO BACK WALL LIM: CPT

## 2017-06-09 PROCEDURE — 84100 ASSAY OF PHOSPHORUS: CPT

## 2017-06-09 PROCEDURE — 87070 CULTURE OTHR SPECIMN AEROBIC: CPT

## 2017-06-09 PROCEDURE — 84466 ASSAY OF TRANSFERRIN: CPT

## 2017-06-09 PROCEDURE — 73630 X-RAY EXAM OF FOOT: CPT

## 2017-06-09 PROCEDURE — 85730 THROMBOPLASTIN TIME PARTIAL: CPT

## 2017-06-09 PROCEDURE — 83735 ASSAY OF MAGNESIUM: CPT

## 2017-06-09 RX ORDER — ASCORBIC ACID 60 MG
1 TABLET,CHEWABLE ORAL
Qty: 0 | Refills: 0 | COMMUNITY
Start: 2017-06-09

## 2017-06-09 RX ORDER — OXYCODONE HYDROCHLORIDE 5 MG/1
1 TABLET ORAL
Qty: 20 | Refills: 0 | OUTPATIENT
Start: 2017-06-09 | End: 2017-06-14

## 2017-06-09 RX ORDER — MONTELUKAST 4 MG/1
1 TABLET, CHEWABLE ORAL
Qty: 30 | Refills: 0 | OUTPATIENT
Start: 2017-06-09 | End: 2017-07-09

## 2017-06-09 RX ORDER — INSULIN DEGLUDEC 100 U/ML
75 INJECTION, SOLUTION SUBCUTANEOUS
Qty: 0 | Refills: 0 | COMMUNITY

## 2017-06-09 RX ORDER — CEFDINIR 250 MG/5ML
300 POWDER, FOR SUSPENSION ORAL
Qty: 0 | Refills: 0 | Status: DISCONTINUED | OUTPATIENT
Start: 2017-06-09 | End: 2017-06-09

## 2017-06-09 RX ORDER — FERROUS SULFATE 325(65) MG
1 TABLET ORAL
Qty: 30 | Refills: 0 | OUTPATIENT
Start: 2017-06-09 | End: 2017-07-09

## 2017-06-09 RX ADMIN — Medication 325 MILLIGRAM(S): at 11:09

## 2017-06-09 RX ADMIN — OXYCODONE HYDROCHLORIDE 10 MILLIGRAM(S): 5 TABLET ORAL at 10:12

## 2017-06-09 RX ADMIN — Medication 100 MILLIGRAM(S): at 05:22

## 2017-06-09 RX ADMIN — HEPARIN SODIUM 5000 UNIT(S): 5000 INJECTION INTRAVENOUS; SUBCUTANEOUS at 05:22

## 2017-06-09 RX ADMIN — PANTOPRAZOLE SODIUM 40 MILLIGRAM(S): 20 TABLET, DELAYED RELEASE ORAL at 05:22

## 2017-06-09 RX ADMIN — SODIUM CHLORIDE 3 MILLILITER(S): 9 INJECTION INTRAMUSCULAR; INTRAVENOUS; SUBCUTANEOUS at 05:20

## 2017-06-09 RX ADMIN — Medication 500 MILLIGRAM(S): at 11:09

## 2017-06-09 RX ADMIN — OXYCODONE HYDROCHLORIDE 20 MILLIGRAM(S): 5 TABLET ORAL at 06:22

## 2017-06-09 RX ADMIN — DULOXETINE HYDROCHLORIDE 120 MILLIGRAM(S): 30 CAPSULE, DELAYED RELEASE ORAL at 11:09

## 2017-06-09 RX ADMIN — MONTELUKAST 10 MILLIGRAM(S): 4 TABLET, CHEWABLE ORAL at 11:09

## 2017-06-09 RX ADMIN — SODIUM CHLORIDE 3 MILLILITER(S): 9 INJECTION INTRAMUSCULAR; INTRAVENOUS; SUBCUTANEOUS at 14:10

## 2017-06-09 RX ADMIN — GABAPENTIN 300 MILLIGRAM(S): 400 CAPSULE ORAL at 05:22

## 2017-06-09 RX ADMIN — OXYCODONE HYDROCHLORIDE 10 MILLIGRAM(S): 5 TABLET ORAL at 09:22

## 2017-06-09 RX ADMIN — BUDESONIDE AND FORMOTEROL FUMARATE DIHYDRATE 2 PUFF(S): 160; 4.5 AEROSOL RESPIRATORY (INHALATION) at 08:37

## 2017-06-09 RX ADMIN — OXYCODONE HYDROCHLORIDE 20 MILLIGRAM(S): 5 TABLET ORAL at 05:22

## 2017-06-09 RX ADMIN — BUPROPION HYDROCHLORIDE 300 MILLIGRAM(S): 150 TABLET, EXTENDED RELEASE ORAL at 11:09

## 2017-06-09 RX ADMIN — Medication 5 MILLIGRAM(S): at 05:22

## 2017-06-09 NOTE — PROGRESS NOTE ADULT - SUBJECTIVE AND OBJECTIVE BOX
SONYA LENNON is a 52y Female with HPI:  This 52 y.o. F with diabetes, COPD, smoker,  left foot ulcer in past, prior MSSA bacteremia, who has been referred here for a non healing foot ulcer in the right side.    patient reports that this has only opened up in the recent 3 days. About 1 week ago, she had fevers subj, chill, sweats at home. Has been taking NORCO at home since then. She was sent in here by her podiatrist, now Dr. Sanchez.    Patient was last seen in the hospital in 12/2016, where she was found with MSSA bacteremia.   S/P  BONE BX  CX SO FAR NEGATIVE at 48hrs      Allergies:  No Known Allergies      Medications:  sodium chloride 0.9% lock flush 3milliLiter(s) IV Push every 8 hours  ceFAZolin   IVPB  IV Intermittent   ceFAZolin   IVPB 1000milliGRAM(s) IV Intermittent every 8 hours  heparin  Injectable 5000Unit(s) SubCutaneous every 12 hours  pantoprazole    Tablet 40milliGRAM(s) Oral before breakfast  buPROPion XL . 300milliGRAM(s) Oral daily  montelukast 10milliGRAM(s) Oral daily  insulin lispro (HumaLOG) corrective regimen sliding scale  SubCutaneous three times a day before meals  dextrose 5%. 1000milliLiter(s) IV Continuous <Continuous>  dextrose Gel 1Dose(s) Oral once PRN  dextrose 50% Injectable 12.5Gram(s) IV Push once  dextrose 50% Injectable 25Gram(s) IV Push once  dextrose 50% Injectable 25Gram(s) IV Push once  glucagon  Injectable 1milliGRAM(s) IntraMuscular once PRN  diazepam    Tablet 5milliGRAM(s) Oral every 8 hours  ALBUTerol/ipratropium for Nebulization 3milliLiter(s) Nebulizer every 6 hours PRN  DULoxetine 60milliGRAM(s) Oral daily  traZODone 100milliGRAM(s) Oral at bedtime  gabapentin 600milliGRAM(s) Oral two times a day  oxyCODONE  5 mG/acetaminophen 325 mG 2Tablet(s) Oral every 4 hours PRN  saccharomyces boulardii 250milliGRAM(s) Oral two times a day  polyethylene glycol 3350 17Gram(s) Oral at bedtime  buDESOnide 160 MICROgram(s)/formoterol 4.5 MICROgram(s) Inhaler 2Puff(s) Inhalation two times a day  insulin glargine Injectable (LANTUS) 10Unit(s) SubCutaneous at bedtime  dextrose 5% + sodium chloride 0.9%. 1000milliLiter(s) IV Continuous <Continuous>      ANTIBIOTICS: KEFZOL        Review of Systems: - Negative except as mentioned above     Physical Exam:   Vital Signs Last 24 Hrs  T(C): 36.3, Max: 36.3 (06-08 @ 23:37)  T(F): 97.3, Max: 97.3 (06-08 @ 23:37)  HR: 70 (70 - 74)  BP: 122/72 (112/65 - 124/79)  BP(mean): --  RR: 18 (18 - 18)  SpO2: 100% (96% - 100%)    GEN: NAD, pleasant  HEENT: normocephalic and atraumatic. EOMI. AURORA...  NECK: Supple. No carotid bruits.  No lymphadenopathy or thyromegaly.  LUNGS: Clear to auscultation.  HEART: Regular rate and rhythm without murmur.  ABDOMEN: Soft, nontender, and nondistended.  Positive bowel sounds.  No hepatosplenomegaly was noted.  NO REBOUND NO GUARDING  EXTREMITIES: RIGHT FOOT WRAPPED  NEUROLOGIC: Cranial nerves II through XII are grossly intact.    SKIN: No ulceration or induration present.      Labs:                          8.0    2.4   )-----------( 98       ( 09 Jun 2017 07:46 )             24.7     06-09    140  |  101  |  21.0<H>  ----------------------------<  123<H>  4.4   |  27.0  |  1.32<H>    Ca    8.8      09 Jun 2017 07:46  Phos  3.9     06-09  Mg     1.8     06-09

## 2017-06-09 NOTE — DISCHARGE NOTE ADULT - CARE PROVIDERS DIRECT ADDRESSES
[email or web address],DirectAddress_Unknown [email or web address],DirectAddress_Unknown,DirectAddress_Unknown

## 2017-06-09 NOTE — DISCHARGE NOTE ADULT - MEDICATION SUMMARY - MEDICATIONS TO STOP TAKING
I will STOP taking the medications listed below when I get home from the hospital:    Norco 10 mg-325 mg oral tablet  -- 1-2 tab(s) by mouth every 4-6 hours

## 2017-06-09 NOTE — DISCHARGE NOTE ADULT - MEDICATION SUMMARY - MEDICATIONS TO TAKE
I will START or STAY ON the medications listed below when I get home from the hospital:    Duricef  -- 500 milligram(s) by mouth 2 times a day  -- Indication: For Diabetic foot ulcer    oxyCODONE 10 mg oral tablet  -- 1 tab(s) by mouth every 6 hours, As needed, Severe Pain (7 - 10) MDD:4tabs  -- Indication: For Pain    gabapentin 800 mg oral tablet  -- 1 tab(s) by mouth 3 times a day, As Needed -neuropathic pain  -- Indication: For Pain    diazePAM 5 mg oral tablet  -- 1 tab(s) by mouth every 6 hours  -- Indication: For Anxiety    DULoxetine 60 mg oral delayed release capsule  -- 2 cap(s) by mouth once a day  -- Indication: For Depression    traZODone 100 mg oral tablet  -- 1 tab(s) by mouth once a day (at bedtime)  -- Indication: For Depression    Tresiba FlexTouch 100 units/mL subcutaneous solution  -- 70 unit(s) subcutaneous once a day (at bedtime)  -- Indication: For Diabetes    cetirizine 10 mg oral tablet  -- 1 tab(s) by mouth once a day  -- Indication: For Home med    budesonide-formoterol 80 mcg-4.5 mcg/inh inhalation aerosol  -- 1 dose(s) inhaled 2 times a day  -- Indication: For Home med    ipratropium-albuterol 0.5 mg-2.5 mg/3 mLinhalation solution  -- 3 milliliter(s) inhaled every 6 hours, As Needed  -- Indication: For Home med    ferrous sulfate 325 mg oral tablet  -- 1 tab(s) by mouth once a day  -- Check with your doctor before becoming pregnant.  Do not chew, break, or crush.  May discolor urine or feces.    -- Indication: For Anemia    montelukast 10 mg oral tablet  -- 1 tab(s) by mouth once a day  -- Indication: For Asthma    pantoprazole 40 mg oral delayed release tablet  -- 1 tab(s) by mouth 2 times a day  -- Indication: For GERD    buPROPion 300 mg/24 hours (XL) oral tablet, extended release  -- 1 tab(s) by mouth once a day  -- Indication: For Depression    ergocalciferol 50,000 intl units (1.25 mg) oral capsule  -- 1 cap(s) by mouth once a week  -- Indication: For Supplement    ascorbic acid 500 mg oral tablet  -- 1 tab(s) by mouth once a day  -- Indication: For Supplement

## 2017-06-09 NOTE — PROGRESS NOTE ADULT - PROBLEM SELECTOR PLAN 1
c/w long acting oxycontin 20mg po Q12H  -cont oxycodone 10mg po Q6H prn  -increasing valium to 5mg TID (home dose)

## 2017-06-09 NOTE — PROGRESS NOTE ADULT - SUBJECTIVE AND OBJECTIVE BOX
HPI:  52 to F s.p right foot exostectomy secondary to Charcot deformity. Relates to feeling well. No complaints at this time.  DOS: 6/5/17.                            8.0    2.4   )-----------( 98       ( 09 Jun 2017 07:46 )             24.7           PMH:Fibromyalgia  DM (diabetes mellitus)  Foot ulcer  Osteopenia  Chronic pain  Back ache  Arthritis  Shoulder joint stiffness, left  [place] esophagus  Stomach ulcer  Diabetes  Asthma    Allergies: No Known Allergies    FH:No pertinent family history in first degree relatives    PSX: S/P knee surgery  S/P hysterectomy  S/P cholecystectomy      PHYSICAL EXAM  GEN: SONYA LENNON is a pleasant well-nourished, well developed 52y Female in no acute distress, alert awake, and oriented to person, place and time.   LE Focused:  right foot  Vasc:  pedal pulses non palpable, CFT 2 sec x 4, TG WNL  Derm: Surgical site noted with intact sutures. No erythema, minimal edema, No malodor. Skin edges well approximated. Superficial ulceration noted on medial plantar foot with no signs of acute infection.   Neuro: protective sensation diminished   MSK: muscle power 4/5 b/l all groups.  S/p right 1st ray amputation w/ severe midfoot collapse secondary to charcot deformity     Imaging: Severe rocker-bottom deformity with disassociation and dislocation of the   first metatarsal and tarsal articulation. An  Soft tissue swelling.  Osteolysis versus osteopenic changes of the second cuneiform bone and   increased periosteal reaction of the base of the remnant first metatarsal   bone. Overlap of the second through fifth metatarsal calcaneal joint.  Surgical culture/pathology: No growth    A  Right foot OM w/ ulceration  Charcot right foot  s/p Right foot 1st ray amputation    P  Patient evaluate and chart reviewed.  Xeroform/DSD dressing applied.   Patient is podiatrically stable for discharge on PO Abx  No further surgical intervention at this time.   Patient will f/u w/ Dr. Sanchez next week   Podiatry will monitor in house.     Home wound care instructions: Right foot.   1. Apply xeroform on surgical site and plantar medial foot wound.   2. Cover with DSD/Kerlix/Ace (non compressive).  3. Change daily.     Pt to fu in Dr. Sanchez office within one week post discharge.

## 2017-06-09 NOTE — DISCHARGE NOTE ADULT - CARE PLAN
Principal Discharge DX:	Diabetic foot ulcer  Goal:	Improved  Instructions for follow-up, activity and diet:	Continue current medications as prescribed.  Follow up with Dr. Sanchez in 1 week.  Wound care with Xeroform and dry dressing daily.  Secondary Diagnosis:	DM (diabetes mellitus)  Instructions for follow-up, activity and diet:	Continue current medications as prescribed.  Decreased Tresiba to 70units qHS.  Continue check blood sugars daily.  Secondary Diagnosis:	Chronic pain syndrome  Instructions for follow-up, activity and diet:	Continue current medications as prescribed.  Follow up with pain management.  Secondary Diagnosis:	Pancytopenia  Instructions for follow-up, activity and diet:	Follow up with Dr. Guzman after completion of antibiotics.  Secondary Diagnosis:	Normocytic anemia  Instructions for follow-up, activity and diet:	Repeat CBC in 1 week with primary doctor.  Continue Iron.  Secondary Diagnosis:	Matamoros esophagus  Instructions for follow-up, activity and diet:	Continue current medications as prescribed.  Follow up with primary doctor.  Secondary Diagnosis:	Asthma  Instructions for follow-up, activity and diet:	Continue current medications as prescribed.  Follow up with primary doctor. Principal Discharge DX:	Diabetic foot ulcer  Goal:	Improved  Instructions for follow-up, activity and diet:	likely Charcots foot, likely no osteomyelitis. Bone bx results negative. Continue current medications as prescribed.  Follow up with Dr. Sanchez in 1 week.  Wound care with Xeroform and dry dressing daily.  Secondary Diagnosis:	DM (diabetes mellitus)  Instructions for follow-up, activity and diet:	Continue current medications as prescribed.  Decreased Tresiba to 70units qHS.  Continue check blood sugars daily.  A1c 4. Decreased home insulin dose. Follow up with PMD within 1 week of readjust dose. Check FS daily.  Secondary Diagnosis:	Chronic pain syndrome  Instructions for follow-up, activity and diet:	Continue current medications as prescribed.  Follow up with pain management.  Do not drive while on pain meds.  Secondary Diagnosis:	Pancytopenia  Instructions for follow-up, activity and diet:	Follow up with Dr. Guzman after completion of antibiotics for CBC check.  Secondary Diagnosis:	Normocytic anemia  Instructions for follow-up, activity and diet:	Repeat CBC in 1 week with primary doctor.  Continue Iron.  Secondary Diagnosis:	Matamoros esophagus  Instructions for follow-up, activity and diet:	Continue current medications as prescribed.  Follow up with primary doctor.  Secondary Diagnosis:	Asthma  Instructions for follow-up, activity and diet:	Continue current medications as prescribed.  Follow up with primary doctor.

## 2017-06-09 NOTE — DISCHARGE NOTE ADULT - PROVIDER TOKENS
TOKEN:'[id number]:'[id number]' TOKEN:'[id number]:'[id number]:'[id number]'

## 2017-06-09 NOTE — PROGRESS NOTE ADULT - PROBLEM SELECTOR PLAN 2
with OM s/p bone bx (bone removed)    -appreciate podiatry input  Daily dressing changes as per podiatry  -appreciate ID input, bone bx culture is negative to date, IV cefazolin changed to Omnicef for 10 more days per podiatry.

## 2017-06-09 NOTE — PROGRESS NOTE ADULT - PROBLEM SELECTOR PROBLEM 3
Type 2 diabetes mellitus with hyperglycemia, unspecified long term insulin use status
CKD (chronic kidney disease), stage 3 (moderate)
CKD (chronic kidney disease), stage 3 (moderate)
Type 2 diabetes mellitus with hyperglycemia, unspecified long term insulin use status
Type 2 diabetes mellitus with hyperglycemia, unspecified long term insulin use status
CKD (chronic kidney disease), stage 3 (moderate)
Type 2 diabetes mellitus with hyperglycemia, unspecified long term insulin use status

## 2017-06-09 NOTE — DISCHARGE NOTE ADULT - CARE PROVIDER_API CALL
Enmanuel Serrano), [facility]  [address]  [place] [address]  Phone: [phone]  Fax: [phone]    Alf Sanchez (LUIZA), Podiatric Medicine and Surgery  [address] [place] [address]  Phone: [phone]  Fax: [phone] Enmanuel Serrano), [facility]  [address]  [place] [address]  Phone: [phone]  Fax: [phone]    Alf Sanchez (LUIZA), Podiatric Medicine and Surgery  [address] [place] [address]  Phone: [phone]  Fax: [phone]    Tylor Guzman), Hematology  [address]  [place] [address]  Phone: [phone]  Fax: [phone]

## 2017-06-09 NOTE — DISCHARGE NOTE ADULT - HOSPITAL COURSE
53 y/o female with pmhx of arthritis, asthma, cbp, COPD, smoker,  left foot ulcer in past, prior MSSA bacteremia, DM with neuropathy and chronic foot ulcer to right foot, Fibromalygia, osteopenia and barretts esophagus who was sent by podiatry for a chronic non healing foot ulcer.  Patient underwent ray amputation with cultures showing no OM.  Patient followed by podiatry and ID.  Patient stable for discharge to home with outpatient follow up with Dr. Sanchez and to complete 10day course of antibiotics.  Patient started on Iron for anemia.  Patient has pancytopenia likely secondary to antibiotic.  Patient advised to follow up with Dr. Guzman for further evaluation.  Patient's Hgb A1c was 4.  Tresiba decreased on discharge.

## 2017-06-09 NOTE — DISCHARGE NOTE ADULT - SECONDARY DIAGNOSIS.
DM (diabetes mellitus) Chronic pain syndrome Pancytopenia Normocytic anemia Matamoros esophagus Asthma

## 2017-06-09 NOTE — PROGRESS NOTE ADULT - PROBLEM SELECTOR PROBLEM 1
Chronic pain syndrome
Diabetic foot ulcer
Diabetic foot ulcer
Chronic pain syndrome
Diabetic foot ulcer

## 2017-06-09 NOTE — PROGRESS NOTE ADULT - PROBLEM SELECTOR PROBLEM 2
Diabetic ulcer of right foot associated with type 2 diabetes mellitus, with fat layer exposed, unspecified part of foot
DM (diabetes mellitus)
DM (diabetes mellitus)
Diabetic ulcer of right foot associated with type 2 diabetes mellitus, with fat layer exposed, unspecified part of foot
Diabetic ulcer of right foot associated with type 2 diabetes mellitus, with fat layer exposed, unspecified part of foot
DM (diabetes mellitus)
Diabetic ulcer of right foot associated with type 2 diabetes mellitus, with fat layer exposed, unspecified part of foot

## 2017-06-09 NOTE — DISCHARGE NOTE ADULT - PLAN OF CARE
Improved Continue current medications as prescribed.  Follow up with Dr. Sanchez in 1 week.  Wound care with Xeroform and dry dressing daily. Continue current medications as prescribed.  Decreased Tresiba to 70units qHS.  Continue check blood sugars daily. Continue current medications as prescribed.  Follow up with pain management. Follow up with Dr. Guzman after completion of antibiotics. Repeat CBC in 1 week with primary doctor.  Continue Iron. Continue current medications as prescribed.  Follow up with primary doctor. likely Charcots foot, likely no osteomyelitis. Bone bx results negative. Continue current medications as prescribed.  Follow up with Dr. Sanchez in 1 week.  Wound care with Xeroform and dry dressing daily. Continue current medications as prescribed.  Decreased Tresiba to 70units qHS.  Continue check blood sugars daily.  A1c 4. Decreased home insulin dose. Follow up with PMD within 1 week of readjust dose. Check FS daily. Continue current medications as prescribed.  Follow up with pain management.  Do not drive while on pain meds. Follow up with Dr. Guzman after completion of antibiotics for CBC check.

## 2017-06-09 NOTE — PROGRESS NOTE ADULT - ASSESSMENT
has:
 has:
51 y/o female with pmhx of arthritis, asthma, cbp, COPD, smoker,  left foot ulcer in past, prior MSSA bacteremia, DM with neuropathy and chronic foot ulcer to right foot, Fibromalygia, osteopenia and barretts esophagus who was sent by podiatry for a chronic non healing foot ulcer pending MRI.
53 y/o female with pmhx of arthritis, asthma, cbp, COPD, smoker,  left foot ulcer in past, prior MSSA bacteremia, DM with neuropathy and chronic foot ulcer to right foot, Fibromalygia, osteopenia and barretts esophagus who was sent by podiatry for a chronic non healing foot ulcer pending MRI.
A  Right foot OM w/ ulceration  Charcot right foot  s/p Right foot 1st ray amputation    P  Patient evaluate and chart reviewed.  Xeroform/DSD dressing applied.   Patient is podiatrically stable for discharge on antibiotics per ID pending surgical culture/pathology results.   No further surgical intervention at this time.   Podiatry will monitor in house.
Patient is a 51 y/o female with pmhx of arhiritis, asthma, cbp, DM with neuropathy and chronic foot ulcer to right foot, Fibromalygia, osteopenia and barretts esophagus who was sent by podiatry for a chronic non healing foot ulcer s/p acute OM on MRI foot (right). Planned for surgical intervention today with podiatry
Patient is a 53 y/o female with pmhx of arhiritis, asthma, cbp, DM with neuropathy and chronic foot ulcer to right foot, Fibromalygia, osteopenia and barretts esophagus who was sent by podiatry for a chronic non healing foot ulcer pending MRI.
Patient is a 53 y/o female with pmhx of arhiritis, asthma, cbp, DM with neuropathy and chronic foot ulcer to right foot, Fibromalygia, osteopenia and barretts esophagus who was sent by podiatry for a chronic non healing foot ulcer pending MRI.
This 52 y.o. F with diabetes, COPD, smoker,  left foot ulcer in past, prior MSSA bacteremia, who has been referred here for a non healing foot ulcer in the right side.    patient reports that this has only opened up in the recent 3 days. About 1 week ago, she had fevers subj, chill, sweats at home. Has been taking NORCO at home since then. She was sent in here by her podiatrist, now Dr. Sanchez.  Patient was last seen in the hospital in 12/2016, where she was found with MSSA bacteremia.   PT FOR BONE BX  WILL FOLLOW UP CONTINUE   PRESENT TREATMENT
This 52 y.o. F with diabetes, COPD, smoker,  left foot ulcer in past, prior MSSA bacteremia, who has been referred here for a non healing foot ulcer in the right side.    patient reports that this has only opened up in the recent 3 days. About 1 week ago, she had fevers subj, chill, sweats at home. Has been taking NORCO at home since then. She was sent in here by her podiatrist, now Dr. Sanchez.  Patient was last seen in the hospital in 12/2016, where she was found with MSSA bacteremia.   S/P BONE BX  WILL FOLLOW UP CONTINUE  AWAIT BX   PRESENT TREATMENT
This 52 y.o. F with diabetes, COPD, smoker,  left foot ulcer in past, prior MSSA bacteremia, who has been referred here for a non healing foot ulcer in the right side.    patient reports that this has only opened up in the recent 3 days. About 1 week ago, she had fevers subj, chill, sweats at home. Has been taking NORCO at home since then. She was sent in here by her podiatrist, now Dr. Sanchez.  Patient was last seen in the hospital in 12/2016, where she was found with MSSA bacteremia.   S/P BONE BX  WILL FOLLOW UP CONTINUE  CX SO FAR NEGATIVE  BONE REMOVED   MAY NOT REQUIRE LONG TERM ABX WILL FOLLOW UP
This 52 y.o. F with diabetes, COPD, smoker,  left foot ulcer in past, prior MSSA bacteremia, who has been referred here for a non healing foot ulcer in the right side.    patient reports that this has only opened up in the recent 3 days. About 1 week ago, she had fevers subj, chill, sweats at home. Has been taking NORCO at home since then. She was sent in here by her podiatrist, now Dr. Sanchez.  Patient was last seen in the hospital in 12/2016, where she was found with MSSA bacteremia.   S/P BONE BX  WILL FOLLOW UP CONTINUE  CX SO FAR NEGATIVE  BONE REMOVED   MAY NOT REQUIRE LONG TERM ABX WILL FOLLOW UP  AWAIT BX CX RESULTS
This 52 y.o. F with diabetes, COPD, smoker,  left foot ulcer in past, prior MSSA bacteremia, who has been referred here for a non healing foot ulcer in the right side.    patient reports that this has only opened up in the recent 3 days. About 1 week ago, she had fevers subj, chill, sweats at home. Has been taking NORCO at home since then. She was sent in here by her podiatrist, now Dr. Sanchez.  Patient was last seen in the hospital in 12/2016, where she was found with MSSA bacteremia.   S/P BONE BX  WILL FOLLOW UP CONTINUE  CX SO FAR NEGATIVE  BONE REMOVED   OK FOR ORAL ABX   SUGGEST DURICEF 500BID X 1 WEEK   STREP AND STAPH PREOP  MAY NOT REQUIRE LONG TERM ABX WILL FOLLOW UP  AWAIT BX CX RESULTS

## 2017-06-09 NOTE — DISCHARGE NOTE ADULT - PATIENT PORTAL LINK FT
“You can access the Follow[employer] Patient Portal, offered by [facility], by registering with the following website: [email or web address]”

## 2017-06-09 NOTE — PROGRESS NOTE ADULT - SUBJECTIVE AND OBJECTIVE BOX
CC: F/u Charcot foot    HPI:  52F referred to the hospital by her podiatrist for a non-healing foot infection. The patient reported that she had been following with her podiatrist and on oral ciprofloxacin.  She did note discomfort at the site as well as some drainage. She reports that the foot ulceration was present since the prior discharge from the hospital. She also noted difficulty with ambulation and has had episodes of falling at home.  Documentation from the patient's prior admission was obtained and reviewed. The patient was noted to have MSSA bacteremia on the prior admission as well as acute kidney injury due to Vancomycin.     INTERVAL HPI/OVERNIGHT EVENTS: Patient seen and examined.  Patient denies any headache, dizziness, SOB, CP, abdominal pain, nausea, vomiting, dysuria, diarrhea.  Right foot pain controlled.  Other ROS reviewed and are negative.    Vital Signs Last 24 Hrs  T(C): 36.3, Max: 36.3 (06-08 @ 23:37)  T(F): 97.3, Max: 97.3 (06-08 @ 23:37)  HR: 70 (70 - 74)  BP: 122/72 (112/65 - 124/79)  BP(mean): --  RR: 18 (18 - 18)  SpO2: 100% (96% - 100%)  I&O's Detail      PHYSICAL EXAM:  GENERAL: NAD, well-groomed, well-developed  HEAD:  Atraumatic, Normocephalic  EYES: EOMI, PERRLA, conjunctiva and sclera clear  NECK: Supple, No JVD, Normal thyroid  NERVOUS SYSTEM:  Alert & Oriented X3, Good concentration; Motor Strength 5/5 B/L upper and lower extremities  CHEST/LUNG: Clear to auscultation bilaterally; No rales, rhonchi, wheezing, or rubs  HEART: Regular rate and rhythm; No murmurs, rubs, or gallops  ABDOMEN: Soft, Nontender, Nondistended; Bowel sounds present  EXTREMITIES:  2+ Peripheral Pulses, No clubbing, cyanosis, or edema; Right foot dressing clean  SKIN: No rashes or lesions                            8.0    2.4   )-----------( 98       ( 09 Jun 2017 07:46 )             24.7     09 Jun 2017 07:46    140    |  101    |  21.0   ----------------------------<  123    4.4     |  27.0   |  1.32     Ca    8.8        09 Jun 2017 07:46  Phos  3.9       09 Jun 2017 07:46  Mg     1.8       09 Jun 2017 07:46        CAPILLARY BLOOD GLUCOSE  125 (09 Jun 2017 11:06)  131 (09 Jun 2017 07:42)  145 (08 Jun 2017 21:59)  113 (08 Jun 2017 16:30)          MEDICATIONS  (STANDING):  sodium chloride 0.9% lock flush 3milliLiter(s) IV Push every 8 hours  pantoprazole    Tablet 40milliGRAM(s) Oral before breakfast  buPROPion XL . 300milliGRAM(s) Oral daily  montelukast 10milliGRAM(s) Oral daily  insulin lispro (HumaLOG) corrective regimen sliding scale  SubCutaneous three times a day before meals  dextrose 50% Injectable 12.5Gram(s) IV Push once  dextrose 50% Injectable 25Gram(s) IV Push once  dextrose 50% Injectable 25Gram(s) IV Push once  traZODone 100milliGRAM(s) Oral at bedtime  polyethylene glycol 3350 17Gram(s) Oral at bedtime  buDESOnide 160 MICROgram(s)/formoterol 4.5 MICROgram(s) Inhaler 2Puff(s) Inhalation two times a day  ferrous    sulfate 325milliGRAM(s) Oral daily  ascorbic acid 500milliGRAM(s) Oral daily  dextrose 5%. 1000milliLiter(s) IV Continuous <Continuous>  ceFAZolin   IVPB 1000milliGRAM(s) IV Intermittent every 8 hours  heparin  Injectable 5000Unit(s) SubCutaneous every 12 hours  oxyCODONE ER Tablet 20milliGRAM(s) Oral every 12 hours  diazepam    Tablet 5milliGRAM(s) Oral three times a day  gabapentin 300milliGRAM(s) Oral three times a day  DULoxetine 120milliGRAM(s) Oral daily  insulin glargine Injectable (LANTUS) 10Unit(s) SubCutaneous at bedtime    MEDICATIONS  (PRN):  dextrose Gel 1Dose(s) Oral once PRN Blood Glucose LESS THAN 70 milliGRAM(s)/deciliter  glucagon  Injectable 1milliGRAM(s) IntraMuscular once PRN Glucose LESS THAN 70 milligrams/deciliter  ALBUTerol/ipratropium for Nebulization 3milliLiter(s) Nebulizer every 6 hours PRN Bronchospasm  oxyCODONE IR 10milliGRAM(s) Oral every 6 hours PRN Severe Pain (7 - 10)

## 2017-06-11 LAB
CULTURE RESULTS: SIGNIFICANT CHANGE UP
SPECIMEN SOURCE: SIGNIFICANT CHANGE UP

## 2017-06-14 ENCOUNTER — APPOINTMENT (OUTPATIENT)
Dept: INTERNAL MEDICINE | Facility: CLINIC | Age: 52
End: 2017-06-14

## 2017-06-14 VITALS
TEMPERATURE: 97.9 F | BODY MASS INDEX: 22.6 KG/M2 | HEIGHT: 67 IN | DIASTOLIC BLOOD PRESSURE: 80 MMHG | HEART RATE: 88 BPM | WEIGHT: 144 LBS | SYSTOLIC BLOOD PRESSURE: 124 MMHG | OXYGEN SATURATION: 97 %

## 2017-06-14 RX ORDER — GLYCOPYRROLATE AND FORMOTEROL FUMARATE 9; 4.8 UG/1; UG/1
9-4.8 AEROSOL, METERED RESPIRATORY (INHALATION) TWICE DAILY
Qty: 1 | Refills: 0 | Status: DISCONTINUED | COMMUNITY
Start: 2017-03-29 | End: 2017-06-14

## 2017-06-16 ENCOUNTER — CLINICAL ADVICE (OUTPATIENT)
Age: 52
End: 2017-06-16

## 2017-06-16 LAB
ANION GAP SERPL CALC-SCNC: 17 MMOL/L
BASOPHILS # BLD AUTO: 0.02 K/UL
BASOPHILS NFR BLD AUTO: 0.4 %
BUN SERPL-MCNC: 18 MG/DL
CALCIUM SERPL-MCNC: 9 MG/DL
CHLORIDE SERPL-SCNC: 106 MMOL/L
CO2 SERPL-SCNC: 21 MMOL/L
CREAT SERPL-MCNC: 1.68 MG/DL
EOSINOPHIL # BLD AUTO: 0.19 K/UL
EOSINOPHIL NFR BLD AUTO: 3.7 %
GLUCOSE SERPL-MCNC: 166 MG/DL
HCT VFR BLD CALC: 33.3 %
HGB BLD-MCNC: 10.2 G/DL
IMM GRANULOCYTES NFR BLD AUTO: 0.4 %
IRON SATN MFR SERPL: 17 %
IRON SERPL-MCNC: 42 UG/DL
LYMPHOCYTES # BLD AUTO: 1.25 K/UL
LYMPHOCYTES NFR BLD AUTO: 24.5 %
MAN DIFF?: NORMAL
MCHC RBC-ENTMCNC: 27.8 PG
MCHC RBC-ENTMCNC: 30.6 GM/DL
MCV RBC AUTO: 90.7 FL
MONOCYTES # BLD AUTO: 0.2 K/UL
MONOCYTES NFR BLD AUTO: 3.9 %
NEUTROPHILS # BLD AUTO: 3.43 K/UL
NEUTROPHILS NFR BLD AUTO: 67.1 %
PLATELET # BLD AUTO: 150 K/UL
POTASSIUM SERPL-SCNC: 5.5 MMOL/L
RBC # BLD: 3.67 M/UL
RBC # BLD: 3.67 M/UL
RBC # FLD: 14 %
RETICS # AUTO: 3.4 %
RETICS AGGREG/RBC NFR: 121.3 K/UL
SODIUM SERPL-SCNC: 144 MMOL/L
TIBC SERPL-MCNC: 252 UG/DL
UIBC SERPL-MCNC: 210 UG/DL
WBC # FLD AUTO: 5.11 K/UL

## 2017-06-28 ENCOUNTER — APPOINTMENT (OUTPATIENT)
Dept: INTERNAL MEDICINE | Facility: CLINIC | Age: 52
End: 2017-06-28

## 2017-06-28 ENCOUNTER — OTHER (OUTPATIENT)
Age: 52
End: 2017-06-28

## 2017-06-28 VITALS
DIASTOLIC BLOOD PRESSURE: 86 MMHG | SYSTOLIC BLOOD PRESSURE: 120 MMHG | BODY MASS INDEX: 23.54 KG/M2 | OXYGEN SATURATION: 96 % | TEMPERATURE: 97.5 F | HEIGHT: 67 IN | WEIGHT: 150 LBS | HEART RATE: 91 BPM

## 2017-06-28 LAB
GLUCOSE BLDC GLUCOMTR-MCNC: 94
HBA1C MFR BLD HPLC: 5

## 2017-06-28 RX ORDER — CEFADROXIL 500 MG/5ML
500 POWDER, FOR SUSPENSION ORAL
Qty: 100 | Refills: 0 | Status: DISCONTINUED | COMMUNITY
Start: 2017-06-14 | End: 2017-06-28

## 2017-06-29 LAB
ANION GAP SERPL CALC-SCNC: 18 MMOL/L
BASOPHILS # BLD AUTO: 0.01 K/UL
BASOPHILS NFR BLD AUTO: 0.1 %
BUN SERPL-MCNC: 19 MG/DL
CALCIUM SERPL-MCNC: 8.6 MG/DL
CHLORIDE SERPL-SCNC: 101 MMOL/L
CO2 SERPL-SCNC: 20 MMOL/L
CREAT SERPL-MCNC: 1.78 MG/DL
EOSINOPHIL # BLD AUTO: 0.24 K/UL
EOSINOPHIL NFR BLD AUTO: 3.5 %
GLUCOSE SERPL-MCNC: 120 MG/DL
HCT VFR BLD CALC: 34 %
HGB BLD-MCNC: 10.4 G/DL
IMM GRANULOCYTES NFR BLD AUTO: 0.3 %
LYMPHOCYTES # BLD AUTO: 1.56 K/UL
LYMPHOCYTES NFR BLD AUTO: 22.6 %
MAN DIFF?: NORMAL
MCHC RBC-ENTMCNC: 28 PG
MCHC RBC-ENTMCNC: 30.6 GM/DL
MCV RBC AUTO: 91.4 FL
MONOCYTES # BLD AUTO: 0.41 K/UL
MONOCYTES NFR BLD AUTO: 5.9 %
NEUTROPHILS # BLD AUTO: 4.67 K/UL
NEUTROPHILS NFR BLD AUTO: 67.6 %
PLATELET # BLD AUTO: 123 K/UL
POTASSIUM SERPL-SCNC: 5.1 MMOL/L
RBC # BLD: 3.72 M/UL
RBC # FLD: 14 %
SODIUM SERPL-SCNC: 139 MMOL/L
WBC # FLD AUTO: 6.91 K/UL

## 2017-07-04 NOTE — PATIENT PROFILE ADULT. - NSTOBACCOQUITATTEMPT_GEN_A_CORE_SD
EXAM DESCRIPTION: 



Abdomen Series



CLINICAL HISTORY: 



no bm



COMPARISON: 



None



FINDINGS: 



Frontal view of the chest and supine and upright images of the

abdomen were submitted. 

Cardiac silhouette is within normal limits. There is no focal

parenchymal or pleural disease. 

There is no free air in the abdomen. There is no evidence of

bowel obstruction. Calcification at the level of the upper pelvis

could represent a calcified fibroid. Prominent stools within the

colon, please correlate for constipation. Surgical clips in the

right upper quadrant compatible prior cholecystectomy.



IMPRESSION: 



Prominent stools within the colon, please correlate for

constipation. 



Electronically signed by:  Zachary Bryan MD  7/4/2017 12:02 PM

CDT Workstation: [id number] none

## 2017-08-01 ENCOUNTER — APPOINTMENT (OUTPATIENT)
Dept: INTERNAL MEDICINE | Facility: CLINIC | Age: 52
End: 2017-08-01
Payer: MEDICAID

## 2017-08-01 VITALS
DIASTOLIC BLOOD PRESSURE: 70 MMHG | HEART RATE: 90 BPM | BODY MASS INDEX: 24.96 KG/M2 | HEIGHT: 67 IN | TEMPERATURE: 97.9 F | WEIGHT: 159 LBS | OXYGEN SATURATION: 98 % | SYSTOLIC BLOOD PRESSURE: 100 MMHG

## 2017-08-01 DIAGNOSIS — R53.1 WEAKNESS: ICD-10-CM

## 2017-08-01 DIAGNOSIS — R41.3 OTHER AMNESIA: ICD-10-CM

## 2017-08-01 DIAGNOSIS — R42 DIZZINESS AND GIDDINESS: ICD-10-CM

## 2017-08-01 PROCEDURE — 99214 OFFICE O/P EST MOD 30 MIN: CPT

## 2017-08-09 ENCOUNTER — FORM ENCOUNTER (OUTPATIENT)
Age: 52
End: 2017-08-09

## 2017-08-10 ENCOUNTER — OUTPATIENT (OUTPATIENT)
Dept: OUTPATIENT SERVICES | Facility: HOSPITAL | Age: 52
LOS: 1 days | End: 2017-08-10
Payer: MEDICAID

## 2017-08-10 ENCOUNTER — APPOINTMENT (OUTPATIENT)
Dept: MRI IMAGING | Facility: CLINIC | Age: 52
End: 2017-08-10
Payer: MEDICAID

## 2017-08-10 DIAGNOSIS — R53.1 WEAKNESS: ICD-10-CM

## 2017-08-10 PROCEDURE — 70551 MRI BRAIN STEM W/O DYE: CPT

## 2017-08-10 PROCEDURE — 70551 MRI BRAIN STEM W/O DYE: CPT | Mod: 26

## 2017-08-10 PROCEDURE — 82565 ASSAY OF CREATININE: CPT

## 2017-08-30 ENCOUNTER — APPOINTMENT (OUTPATIENT)
Dept: INTERNAL MEDICINE | Facility: CLINIC | Age: 52
End: 2017-08-30
Payer: MEDICAID

## 2017-08-30 ENCOUNTER — LABORATORY RESULT (OUTPATIENT)
Age: 52
End: 2017-08-30

## 2017-08-30 VITALS
TEMPERATURE: 97.6 F | HEIGHT: 67 IN | HEART RATE: 87 BPM | SYSTOLIC BLOOD PRESSURE: 120 MMHG | OXYGEN SATURATION: 97 % | WEIGHT: 158 LBS | BODY MASS INDEX: 24.8 KG/M2 | DIASTOLIC BLOOD PRESSURE: 76 MMHG

## 2017-08-30 PROCEDURE — 99214 OFFICE O/P EST MOD 30 MIN: CPT

## 2017-09-05 ENCOUNTER — CLINICAL ADVICE (OUTPATIENT)
Age: 52
End: 2017-09-05

## 2017-09-05 LAB
APPEARANCE: ABNORMAL
BACTERIA UR CULT: ABNORMAL
BILIRUBIN URINE: NEGATIVE
BLOOD URINE: ABNORMAL
COLOR: YELLOW
GLUCOSE QUALITATIVE U: NORMAL MG/DL
KETONES URINE: NEGATIVE
LEUKOCYTE ESTERASE URINE: ABNORMAL
NITRITE URINE: NEGATIVE
PH URINE: 5
PROTEIN URINE: 100 MG/DL
SPECIFIC GRAVITY URINE: 1.02
UROBILINOGEN URINE: NORMAL MG/DL

## 2017-09-07 ENCOUNTER — CLINICAL ADVICE (OUTPATIENT)
Age: 52
End: 2017-09-07

## 2017-09-07 LAB
AMPHET UR-MCNC: NEGATIVE
BARBITURATES UR-MCNC: NEGATIVE
BENZODIAZ UR-MCNC: NORMAL
COCAINE METAB.OTHER UR-MCNC: NEGATIVE
CREATININE, URINE: 138.2 MG/DL
METHADONE UR-MCNC: NEGATIVE
METHAQUALONE UR-MCNC: NEGATIVE
OPIATES UR-MCNC: NORMAL
PCP UR-MCNC: NEGATIVE
PROPOXYPH UR QL: NEGATIVE
THC UR QL: NORMAL

## 2017-09-08 ENCOUNTER — CHART COPY (OUTPATIENT)
Age: 52
End: 2017-09-08

## 2017-09-21 ENCOUNTER — APPOINTMENT (OUTPATIENT)
Dept: NEUROLOGY | Facility: CLINIC | Age: 52
End: 2017-09-21
Payer: MEDICAID

## 2017-09-21 VITALS
WEIGHT: 158 LBS | SYSTOLIC BLOOD PRESSURE: 125 MMHG | HEIGHT: 67 IN | BODY MASS INDEX: 24.8 KG/M2 | DIASTOLIC BLOOD PRESSURE: 75 MMHG

## 2017-09-21 DIAGNOSIS — R51 HEADACHE: ICD-10-CM

## 2017-09-21 PROCEDURE — 70551 MRI BRAIN STEM W/O DYE: CPT | Mod: 26

## 2017-09-21 PROCEDURE — 99204 OFFICE O/P NEW MOD 45 MIN: CPT

## 2017-09-25 ENCOUNTER — APPOINTMENT (OUTPATIENT)
Dept: ULTRASOUND IMAGING | Facility: CLINIC | Age: 52
End: 2017-09-25
Payer: MEDICAID

## 2017-09-27 ENCOUNTER — FORM ENCOUNTER (OUTPATIENT)
Age: 52
End: 2017-09-27

## 2017-09-27 ENCOUNTER — MEDICATION RENEWAL (OUTPATIENT)
Age: 52
End: 2017-09-27

## 2017-09-28 ENCOUNTER — OUTPATIENT (OUTPATIENT)
Dept: OUTPATIENT SERVICES | Facility: HOSPITAL | Age: 52
LOS: 1 days | End: 2017-09-28
Payer: MEDICAID

## 2017-09-28 ENCOUNTER — APPOINTMENT (OUTPATIENT)
Dept: ULTRASOUND IMAGING | Facility: CLINIC | Age: 52
End: 2017-09-28
Payer: MEDICAID

## 2017-09-28 DIAGNOSIS — R31.29 OTHER MICROSCOPIC HEMATURIA: ICD-10-CM

## 2017-09-28 PROCEDURE — 76770 US EXAM ABDO BACK WALL COMP: CPT | Mod: 26

## 2017-09-28 PROCEDURE — 76770 US EXAM ABDO BACK WALL COMP: CPT

## 2017-10-02 ENCOUNTER — APPOINTMENT (OUTPATIENT)
Dept: INTERNAL MEDICINE | Facility: CLINIC | Age: 52
End: 2017-10-02
Payer: MEDICAID

## 2017-10-02 DIAGNOSIS — Z92.29 PERSONAL HISTORY OF OTHER DRUG THERAPY: ICD-10-CM

## 2017-10-02 DIAGNOSIS — Z23 ENCOUNTER FOR IMMUNIZATION: ICD-10-CM

## 2017-10-02 PROCEDURE — G0008: CPT

## 2017-10-02 PROCEDURE — 99213 OFFICE O/P EST LOW 20 MIN: CPT

## 2017-10-02 PROCEDURE — 90688 IIV4 VACCINE SPLT 0.5 ML IM: CPT

## 2017-10-04 PROBLEM — Z23 FLU VACCINE NEED: Status: ACTIVE | Noted: 2017-10-04

## 2017-10-25 ENCOUNTER — RX RENEWAL (OUTPATIENT)
Age: 52
End: 2017-10-25

## 2017-11-01 ENCOUNTER — APPOINTMENT (OUTPATIENT)
Dept: INTERNAL MEDICINE | Facility: CLINIC | Age: 52
End: 2017-11-01
Payer: MEDICAID

## 2017-11-01 ENCOUNTER — OTHER (OUTPATIENT)
Age: 52
End: 2017-11-01

## 2017-11-01 VITALS
HEART RATE: 82 BPM | DIASTOLIC BLOOD PRESSURE: 84 MMHG | SYSTOLIC BLOOD PRESSURE: 128 MMHG | WEIGHT: 168 LBS | BODY MASS INDEX: 26.37 KG/M2 | TEMPERATURE: 97.3 F | OXYGEN SATURATION: 97 % | HEIGHT: 67 IN

## 2017-11-01 PROCEDURE — 99214 OFFICE O/P EST MOD 30 MIN: CPT | Mod: 25

## 2017-11-01 PROCEDURE — 96372 THER/PROPH/DIAG INJ SC/IM: CPT

## 2017-11-01 RX ORDER — CEPHALEXIN 500 MG/1
500 TABLET ORAL EVERY 8 HOURS
Qty: 90 | Refills: 0 | Status: DISCONTINUED | COMMUNITY
Start: 2017-09-07 | End: 2017-11-01

## 2017-11-01 RX ORDER — FERROUS SULFATE 325(65) MG
200 (65 FE) TABLET ORAL
Qty: 30 | Refills: 0 | Status: ACTIVE | COMMUNITY
Start: 2017-08-30 | End: 1900-01-01

## 2017-11-01 RX ORDER — CYANOCOBALAMIN 1000 UG/ML
1000 INJECTION INTRAMUSCULAR; SUBCUTANEOUS
Qty: 0 | Refills: 0 | Status: COMPLETED | OUTPATIENT
Start: 2017-11-01

## 2017-11-01 RX ADMIN — Medication 0 MCG/ML: at 00:00

## 2017-12-01 ENCOUNTER — APPOINTMENT (OUTPATIENT)
Dept: INTERNAL MEDICINE | Facility: CLINIC | Age: 52
End: 2017-12-01
Payer: MEDICAID

## 2017-12-01 VITALS
DIASTOLIC BLOOD PRESSURE: 80 MMHG | OXYGEN SATURATION: 97 % | BODY MASS INDEX: 25.58 KG/M2 | HEART RATE: 86 BPM | SYSTOLIC BLOOD PRESSURE: 120 MMHG | TEMPERATURE: 97.9 F | WEIGHT: 163 LBS | HEIGHT: 67 IN

## 2017-12-01 DIAGNOSIS — K57.92 DIVERTICULITIS OF INTESTINE, PART UNSPECIFIED, W/OUT PERFORATION OR ABSCESS W/OUT BLEEDING: ICD-10-CM

## 2017-12-01 DIAGNOSIS — Z87.898 PERSONAL HISTORY OF OTHER SPECIFIED CONDITIONS: ICD-10-CM

## 2017-12-01 DIAGNOSIS — Z86.69 PERSONAL HISTORY OF OTHER DISEASES OF THE NERVOUS SYSTEM AND SENSE ORGANS: ICD-10-CM

## 2017-12-01 DIAGNOSIS — N30.01 ACUTE CYSTITIS WITH HEMATURIA: ICD-10-CM

## 2017-12-01 DIAGNOSIS — R60.0 LOCALIZED EDEMA: ICD-10-CM

## 2017-12-01 DIAGNOSIS — R07.89 OTHER CHEST PAIN: ICD-10-CM

## 2017-12-01 PROCEDURE — 99214 OFFICE O/P EST MOD 30 MIN: CPT | Mod: 25

## 2017-12-01 PROCEDURE — 83036 HEMOGLOBIN GLYCOSYLATED A1C: CPT | Mod: QW

## 2017-12-01 PROCEDURE — 82962 GLUCOSE BLOOD TEST: CPT

## 2017-12-01 NOTE — PROGRESS NOTE ADULT - SUBJECTIVE AND OBJECTIVE BOX
Subjective   Shamika Gillespie is a 62 y.o. female.   CC dizziness  History of Present Illness   Since she's never had dizziness before but had acute dizziness when she was having MRI scan yesterday and had to sit up.  She had some spinning down but has none now just feels off balance and dizzy like she can't stand up.  Is worse when she goes from sitting to standing.  Not have any fluctuating hearing loss has no ear pain has no ear drainage not having pressure fullness in her ears.  Not having true vertigo.     She was referred from her primary physician's office for an Epley maneuver to be done by us.        The following portions of the patient's history were reviewed and updated as appropriate: allergies, current medications, past family history, past medical history, past social history, past surgical history and problem list.      Shamika Gillespie reports that she has never smoked. She has never used smokeless tobacco. She reports that she does not drink alcohol.  Patient is not a tobacco user and has not been counseled for use of tobacco products    Family History   Problem Relation Age of Onset   • Diabetes Mother    • Breast cancer Sister    • Colon cancer Sister    • Diabetes Sister    • Heart disease Sister    • Colon cancer Brother          Current Outpatient Prescriptions:   •  allopurinol (ZYLOPRIM) 100 MG tablet, , Disp: , Rfl:   •  aspirin 81 MG tablet, Take 81 mg by mouth Daily., Disp: , Rfl:   •  BRILINTA 90 MG tablet tablet, , Disp: , Rfl:   •  cetirizine (zyrTEC) 10 MG tablet, Take 10 mg by mouth Daily., Disp: , Rfl:   •  doxercalciferol (HECTOROL) 1 MCG capsule, , Disp: , Rfl:   •  esomeprazole (nexIUM) 40 MG capsule, Take 40 mg by mouth Every Morning Before Breakfast., Disp: , Rfl:   •  furosemide (LASIX) 20 MG tablet, , Disp: , Rfl:   •  gabapentin (NEURONTIN) 300 MG capsule, , Disp: , Rfl:   •  HUMALOG 100 UNIT/ML injection, , Disp: , Rfl:   •  hydrALAZINE (APRESOLINE) 25 MG tablet, 50 mg.,  Disp: , Rfl:   •  LANTUS 100 UNIT/ML injection, , Disp: , Rfl:   •  metoprolol tartrate (LOPRESSOR) 25 MG tablet, , Disp: , Rfl:   •  traMADol (ULTRAM) 50 MG tablet, , Disp: , Rfl:   •  vitamin D (ERGOCALCIFEROL) 10473 units capsule capsule, Take 50,000 Units by mouth 1 (One) Time Per Week., Disp: , Rfl:   •  meclizine (ANTIVERT) 25 MG tablet, Take 1 tablet by mouth 3 (Three) Times a Day As Needed for dizziness., Disp: 15 tablet, Rfl: 0  No current facility-administered medications for this visit.     Allergies   Allergen Reactions   • Penicillins Hives   • Iodine Other (See Comments)     IV Dye  Patient can not remember the reaction   • Codeine Rash   • Tetanus Toxoids Rash       Past Medical History:   Diagnosis Date   • Diabetes    • Heart problem    • Hypertension          Review of Systems   Constitutional: Negative for fever.   HENT: Negative for congestion, ear discharge, ear pain and hearing loss.    Musculoskeletal: Positive for arthralgias, back pain, myalgias, neck pain and neck stiffness.   Neurological: Positive for dizziness and light-headedness. Negative for facial asymmetry and headaches.   All other systems reviewed and are negative.          Objective   Physical Exam   Constitutional: She is oriented to person, place, and time. She appears well-developed and well-nourished.   HENT:   Head: Normocephalic and atraumatic.   Right Ear: Hearing, tympanic membrane, external ear and ear canal normal.   Left Ear: Hearing, tympanic membrane, external ear and ear canal normal.   Nose: Nose normal. No mucosal edema, rhinorrhea, nasal deformity or septal deviation. No epistaxis. Right sinus exhibits no maxillary sinus tenderness and no frontal sinus tenderness. Left sinus exhibits no maxillary sinus tenderness and no frontal sinus tenderness.   Mouth/Throat: Uvula is midline, oropharynx is clear and moist and mucous membranes are normal. No trismus in the jaw. Normal dentition. No oropharyngeal exudate or  SOYNA LENNON is a 52y Female with HPI:  This 52 y.o. F with diabetes, COPD, smoker,  left foot ulcer in past, prior MSSA bacteremia, who has been referred here for a non healing foot ulcer in the right side.    patient reports that this has only opened up in the recent 3 days. About 1 week ago, she had fevers subj, chill, sweats at home. Has been taking NORCO at home since then. She was sent in here by her podiatrist, now Dr. Sanchez.    Patient was last seen in the hospital in 12/2016, where she was found with MSSA bacteremia.   S/P  BONE BX      Allergies:  No Known Allergies      Medications:  sodium chloride 0.9% lock flush 3milliLiter(s) IV Push every 8 hours  ceFAZolin   IVPB  IV Intermittent   ceFAZolin   IVPB 1000milliGRAM(s) IV Intermittent every 8 hours  heparin  Injectable 5000Unit(s) SubCutaneous every 12 hours  pantoprazole    Tablet 40milliGRAM(s) Oral before breakfast  buPROPion XL . 300milliGRAM(s) Oral daily  montelukast 10milliGRAM(s) Oral daily  insulin lispro (HumaLOG) corrective regimen sliding scale  SubCutaneous three times a day before meals  dextrose 5%. 1000milliLiter(s) IV Continuous <Continuous>  dextrose Gel 1Dose(s) Oral once PRN  dextrose 50% Injectable 12.5Gram(s) IV Push once  dextrose 50% Injectable 25Gram(s) IV Push once  dextrose 50% Injectable 25Gram(s) IV Push once  glucagon  Injectable 1milliGRAM(s) IntraMuscular once PRN  diazepam    Tablet 5milliGRAM(s) Oral every 8 hours  ALBUTerol/ipratropium for Nebulization 3milliLiter(s) Nebulizer every 6 hours PRN  DULoxetine 60milliGRAM(s) Oral daily  traZODone 100milliGRAM(s) Oral at bedtime  gabapentin 600milliGRAM(s) Oral two times a day  oxyCODONE  5 mG/acetaminophen 325 mG 2Tablet(s) Oral every 4 hours PRN  saccharomyces boulardii 250milliGRAM(s) Oral two times a day  polyethylene glycol 3350 17Gram(s) Oral at bedtime  buDESOnide 160 MICROgram(s)/formoterol 4.5 MICROgram(s) Inhaler 2Puff(s) Inhalation two times a day  insulin glargine Injectable (LANTUS) 10Unit(s) SubCutaneous at bedtime  dextrose 5% + sodium chloride 0.9%. 1000milliLiter(s) IV Continuous <Continuous>      ANTIBIOTICS: KEFZOL        Review of Systems: - Negative except as mentioned above     Physical Exam:  Vital Signs Last 24 Hrs  T(C): 36.3, Max: 36.6 (06-05 @ 23:11)  T(F): 97.3, Max: 97.9 (06-05 @ 23:11)  HR: 75 (70 - 98)  BP: 106/66 (99/79 - 155/77)  BP(mean): 81 (72 - 98)  RR: 18 (10 - 18)  SpO2: 97% (95% - 100%)    GEN: NAD, pleasant  HEENT: normocephalic and atraumatic. EOMI. AURORA...  NECK: Supple. No carotid bruits.  No lymphadenopathy or thyromegaly.  LUNGS: Clear to auscultation.  HEART: Regular rate and rhythm without murmur.  ABDOMEN: Soft, nontender, and nondistended.  Positive bowel sounds.  No hepatosplenomegaly was noted.  NO REBOUND NO GUARDING  EXTREMITIES: RIGHT FOOT WRAPPED  NEUROLOGIC: Cranial nerves II through XII are grossly intact.    SKIN: No ulceration or induration present.      Labs:    06-04    138  |  103  |  27.0<H>  ----------------------------<  95  4.6   |  25.0  |  1.80<H>    Ca    8.5<L>      04 Jun 2017 06:12  Phos  3.3     06-04  Mg     2.0     06-04    TPro  6.7  /  Alb  3.4  /  TBili  0.3<L>  /  DBili  x   /  AST  15  /  ALT  7   /  AlkPhos  139<H>  06-04                          8.7    x     )-----------( x        ( 05 Jun 2017 08:12 )             26.6           LIVER FUNCTIONS - ( 04 Jun 2017 06:12 )  Alb: 3.4 g/dL / Pro: 6.7 g/dL / ALK PHOS: 139 U/L / ALT: 7 U/L / AST: 15 U/L / GGT: x               CAPILLARY BLOOD GLUCOSE  158 (04 Jun 2017 22:00)  92 (04 Jun 2017 17:47)  95 (04 Jun 2017 11:59) posterior oropharyngeal edema. No tonsillar exudate.   Eyes: Conjunctivae and EOM are normal. Pupils are equal, round, and reactive to light.   Neck: Normal range of motion. Neck supple. No JVD present. No tracheal deviation present. No thyromegaly present.   Cardiovascular: Normal rate.    Pulmonary/Chest: Effort normal.   Musculoskeletal: Normal range of motion.   Lymphadenopathy:        Head (right side): No submental, no submandibular, no tonsillar, no preauricular, no posterior auricular and no occipital adenopathy present.        Head (left side): No submental, no submandibular, no tonsillar, no preauricular, no posterior auricular and no occipital adenopathy present.     She has no cervical adenopathy.        Right cervical: No superficial cervical, no deep cervical and no posterior cervical adenopathy present.       Left cervical: No superficial cervical, no deep cervical and no posterior cervical adenopathy present.   Neurological: She is alert and oriented to person, place, and time. No cranial nerve deficit. Coordination normal.   Normal finger nose with right arm normal alternating movements both arms normal heal to shin normal impulse testing negative Dicks Hallpike on either side normal extraocular motions without nystagmus   Skin: Skin is warm.   Psychiatric: She has a normal mood and affect. Her speech is normal and behavior is normal. Thought content normal.   Nursing note and vitals reviewed.      Finger to nose and right is normal left could not be done because of the shoulder problem.  Alternating movements are normal.  Impulse testing is normal  Zander Hallpike testing is completely normal there is no nystagmus no subjective dizziness examination is normal gait and stance    Blood pressure lying down was 145/70  Having blood pressure was 135/80    Assessment/Plan     Meclizine is given help her symptomatically but she should be worked up her primary physician for neck or other causes.  Patient was  told that she had BPPV   by her primary physician referred for Epley maneuver..  Patient does not have BPPV by examination or history.  She needs further workup for her primary physician.  We'll give her something to use symptomatically and she should avoid dangers activities inside her see her primary physician as soon as possible.     She is not consistent with otitis, her history and exam is not consistent with Ménière's disease, her history is not consistent with labyrinthitis.    Symptoms persist an ENG could be done to look for other causes.

## 2017-12-02 LAB
GLUCOSE BLDC GLUCOMTR-MCNC: 104
HBA1C MFR BLD HPLC: 5.3

## 2018-01-02 ENCOUNTER — APPOINTMENT (OUTPATIENT)
Dept: INTERNAL MEDICINE | Facility: CLINIC | Age: 53
End: 2018-01-02
Payer: MEDICAID

## 2018-01-02 ENCOUNTER — OTHER (OUTPATIENT)
Age: 53
End: 2018-01-02

## 2018-01-02 VITALS
OXYGEN SATURATION: 98 % | DIASTOLIC BLOOD PRESSURE: 90 MMHG | HEIGHT: 67 IN | BODY MASS INDEX: 26.68 KG/M2 | TEMPERATURE: 98.2 F | HEART RATE: 81 BPM | SYSTOLIC BLOOD PRESSURE: 150 MMHG | WEIGHT: 170 LBS

## 2018-01-02 PROCEDURE — 99214 OFFICE O/P EST MOD 30 MIN: CPT

## 2018-01-04 ENCOUNTER — APPOINTMENT (OUTPATIENT)
Dept: INTERNAL MEDICINE | Facility: CLINIC | Age: 53
End: 2018-01-04

## 2018-01-11 ENCOUNTER — APPOINTMENT (OUTPATIENT)
Dept: RADIOLOGY | Facility: CLINIC | Age: 53
End: 2018-01-11
Payer: MEDICAID

## 2018-01-11 ENCOUNTER — OUTPATIENT (OUTPATIENT)
Dept: OUTPATIENT SERVICES | Facility: HOSPITAL | Age: 53
LOS: 1 days | End: 2018-01-11
Payer: MEDICAID

## 2018-01-11 DIAGNOSIS — Z00.8 ENCOUNTER FOR OTHER GENERAL EXAMINATION: ICD-10-CM

## 2018-01-11 PROCEDURE — 73630 X-RAY EXAM OF FOOT: CPT | Mod: 26,LT

## 2018-01-11 PROCEDURE — 73630 X-RAY EXAM OF FOOT: CPT

## 2018-02-03 ENCOUNTER — MEDICATION RENEWAL (OUTPATIENT)
Age: 53
End: 2018-02-03

## 2018-02-09 ENCOUNTER — LABORATORY RESULT (OUTPATIENT)
Age: 53
End: 2018-02-09

## 2018-02-09 ENCOUNTER — NON-APPOINTMENT (OUTPATIENT)
Age: 53
End: 2018-02-09

## 2018-02-09 ENCOUNTER — APPOINTMENT (OUTPATIENT)
Dept: INTERNAL MEDICINE | Facility: CLINIC | Age: 53
End: 2018-02-09
Payer: MEDICAID

## 2018-02-09 VITALS
OXYGEN SATURATION: 98 % | SYSTOLIC BLOOD PRESSURE: 140 MMHG | DIASTOLIC BLOOD PRESSURE: 84 MMHG | TEMPERATURE: 97.8 F | BODY MASS INDEX: 25.9 KG/M2 | WEIGHT: 165 LBS | HEART RATE: 90 BPM | HEIGHT: 67 IN

## 2018-02-09 DIAGNOSIS — E11.621 TYPE 2 DIABETES MELLITUS WITH FOOT ULCER: ICD-10-CM

## 2018-02-09 DIAGNOSIS — K31.84 GASTROPARESIS: ICD-10-CM

## 2018-02-09 DIAGNOSIS — L97.509 TYPE 2 DIABETES MELLITUS WITH FOOT ULCER: ICD-10-CM

## 2018-02-09 DIAGNOSIS — E11.40 TYPE 2 DIABETES MELLITUS WITH DIABETIC NEUROPATHY, UNSPECIFIED: ICD-10-CM

## 2018-02-09 PROCEDURE — 92552 PURE TONE AUDIOMETRY AIR: CPT

## 2018-02-09 PROCEDURE — 99396 PREV VISIT EST AGE 40-64: CPT | Mod: 25

## 2018-02-09 PROCEDURE — 83036 HEMOGLOBIN GLYCOSYLATED A1C: CPT | Mod: QW

## 2018-02-09 PROCEDURE — 36415 COLL VENOUS BLD VENIPUNCTURE: CPT

## 2018-02-09 PROCEDURE — 82043 UR ALBUMIN QUANTITATIVE: CPT | Mod: QW

## 2018-02-09 PROCEDURE — 94010 BREATHING CAPACITY TEST: CPT

## 2018-02-09 PROCEDURE — 93000 ELECTROCARDIOGRAM COMPLETE: CPT

## 2018-02-09 RX ADMIN — CYANOCOBALAMIN 0 MCG/ML: 1000 INJECTION INTRAMUSCULAR; SUBCUTANEOUS at 00:00

## 2018-02-12 ENCOUNTER — CHART COPY (OUTPATIENT)
Age: 53
End: 2018-02-12

## 2018-02-12 LAB
25(OH)D3 SERPL-MCNC: 21.4 NG/ML
ALBUMIN SERPL ELPH-MCNC: 4.1 G/DL
ALBUMIN: 150
ALP BLD-CCNC: 132 U/L
ALT SERPL-CCNC: 23 U/L
ANION GAP SERPL CALC-SCNC: 17 MMOL/L
APPEARANCE: ABNORMAL
AST SERPL-CCNC: 28 U/L
BASOPHILS # BLD AUTO: 0.06 K/UL
BASOPHILS NFR BLD AUTO: 0.9 %
BILIRUB SERPL-MCNC: 0.4 MG/DL
BILIRUBIN URINE: NEGATIVE
BLOOD URINE: ABNORMAL
BUN SERPL-MCNC: 25 MG/DL
CALCIUM SERPL-MCNC: 8.7 MG/DL
CHLORIDE SERPL-SCNC: 100 MMOL/L
CHOLEST SERPL-MCNC: 227 MG/DL
CHOLEST/HDLC SERPL: 9.1 RATIO
CK SERPL-CCNC: 116 U/L
CO2 SERPL-SCNC: 21 MMOL/L
COLOR: ABNORMAL
CREAT SERPL-MCNC: 2.25 MG/DL
CREATININE: 300
EOSINOPHIL # BLD AUTO: 0.14 K/UL
EOSINOPHIL NFR BLD AUTO: 2.2 %
GLUCOSE QUALITATIVE U: 100 MG/DL
GLUCOSE SERPL-MCNC: 139 MG/DL
HBA1C MFR BLD HPLC: 5
HCT VFR BLD CALC: 36.4 %
HCV AB SER QL: NONREACTIVE
HCV S/CO RATIO: 0.21 S/CO
HDLC SERPL-MCNC: 25 MG/DL
HGB BLD-MCNC: 11.8 G/DL
IMM GRANULOCYTES NFR BLD AUTO: 0.3 %
KETONES URINE: NEGATIVE
LDLC SERPL CALC-MCNC: 136 MG/DL
LEUKOCYTE ESTERASE URINE: NEGATIVE
LYMPHOCYTES # BLD AUTO: 1.81 K/UL
LYMPHOCYTES NFR BLD AUTO: 28 %
MAN DIFF?: NORMAL
MCHC RBC-ENTMCNC: 29.7 PG
MCHC RBC-ENTMCNC: 32.4 GM/DL
MCV RBC AUTO: 91.7 FL
MICROALBUMIN/CREAT UR TEST STR-RTO: NORMAL
MONOCYTES # BLD AUTO: 0.29 K/UL
MONOCYTES NFR BLD AUTO: 4.5 %
NEUTROPHILS # BLD AUTO: 4.15 K/UL
NEUTROPHILS NFR BLD AUTO: 64.1 %
NITRITE URINE: NEGATIVE
PH URINE: 5
PLATELET # BLD AUTO: 127 K/UL
POTASSIUM SERPL-SCNC: 4.9 MMOL/L
PROT SERPL-MCNC: 7.2 G/DL
PROTEIN URINE: >300 MG/DL
RBC # BLD: 3.97 M/UL
RBC # FLD: 13.1 %
SODIUM SERPL-SCNC: 138 MMOL/L
SPECIFIC GRAVITY URINE: 1.02
TRIGL SERPL-MCNC: 328 MG/DL
TSH SERPL-ACNC: 3.48 UIU/ML
URATE SERPL-MCNC: 7.6 MG/DL
UROBILINOGEN URINE: NEGATIVE MG/DL
WBC # FLD AUTO: 6.47 K/UL

## 2018-03-05 ENCOUNTER — MEDICATION RENEWAL (OUTPATIENT)
Age: 53
End: 2018-03-05

## 2018-03-05 ENCOUNTER — APPOINTMENT (OUTPATIENT)
Dept: INTERNAL MEDICINE | Facility: CLINIC | Age: 53
End: 2018-03-05
Payer: MEDICAID

## 2018-03-05 VITALS
SYSTOLIC BLOOD PRESSURE: 140 MMHG | BODY MASS INDEX: 27.15 KG/M2 | HEIGHT: 67 IN | HEART RATE: 74 BPM | TEMPERATURE: 97.6 F | WEIGHT: 173 LBS | DIASTOLIC BLOOD PRESSURE: 90 MMHG | OXYGEN SATURATION: 94 %

## 2018-03-05 DIAGNOSIS — R29.6 REPEATED FALLS: ICD-10-CM

## 2018-03-05 DIAGNOSIS — R06.02 SHORTNESS OF BREATH: ICD-10-CM

## 2018-03-05 PROCEDURE — 99214 OFFICE O/P EST MOD 30 MIN: CPT

## 2018-03-22 ENCOUNTER — FORM ENCOUNTER (OUTPATIENT)
Age: 53
End: 2018-03-22

## 2018-03-23 ENCOUNTER — APPOINTMENT (OUTPATIENT)
Dept: RADIOLOGY | Facility: CLINIC | Age: 53
End: 2018-03-23

## 2018-03-23 ENCOUNTER — OUTPATIENT (OUTPATIENT)
Dept: OUTPATIENT SERVICES | Facility: HOSPITAL | Age: 53
LOS: 1 days | End: 2018-03-23
Payer: MEDICAID

## 2018-03-23 DIAGNOSIS — J44.9 CHRONIC OBSTRUCTIVE PULMONARY DISEASE, UNSPECIFIED: ICD-10-CM

## 2018-03-23 PROCEDURE — 71046 X-RAY EXAM CHEST 2 VIEWS: CPT | Mod: 26

## 2018-03-23 PROCEDURE — 71046 X-RAY EXAM CHEST 2 VIEWS: CPT

## 2018-03-26 ENCOUNTER — LABORATORY RESULT (OUTPATIENT)
Age: 53
End: 2018-03-26

## 2018-03-27 ENCOUNTER — APPOINTMENT (OUTPATIENT)
Dept: INTERNAL MEDICINE | Facility: CLINIC | Age: 53
End: 2018-03-27
Payer: MEDICAID

## 2018-03-27 ENCOUNTER — OTHER (OUTPATIENT)
Age: 53
End: 2018-03-27

## 2018-03-27 VITALS
TEMPERATURE: 98.3 F | DIASTOLIC BLOOD PRESSURE: 80 MMHG | OXYGEN SATURATION: 98 % | SYSTOLIC BLOOD PRESSURE: 130 MMHG | WEIGHT: 170 LBS | HEIGHT: 67 IN | HEART RATE: 89 BPM | BODY MASS INDEX: 26.68 KG/M2

## 2018-03-27 DIAGNOSIS — K21.9 GASTRO-ESOPHAGEAL REFLUX DISEASE W/OUT ESOPHAGITIS: ICD-10-CM

## 2018-03-27 DIAGNOSIS — R39.9 UNSPECIFIED SYMPTOMS AND SIGNS INVOLVING THE GENITOURINARY SYSTEM: ICD-10-CM

## 2018-03-27 DIAGNOSIS — R11.2 NAUSEA WITH VOMITING, UNSPECIFIED: ICD-10-CM

## 2018-03-27 PROCEDURE — 36415 COLL VENOUS BLD VENIPUNCTURE: CPT

## 2018-03-27 PROCEDURE — 99214 OFFICE O/P EST MOD 30 MIN: CPT | Mod: 25

## 2018-03-28 LAB
ANION GAP SERPL CALC-SCNC: 12 MMOL/L
APPEARANCE: CLEAR
BASOPHILS # BLD AUTO: 0.01 K/UL
BASOPHILS NFR BLD AUTO: 0.2 %
BILIRUBIN URINE: NEGATIVE
BLOOD URINE: ABNORMAL
BUN SERPL-MCNC: 34 MG/DL
CALCIUM SERPL-MCNC: 8.1 MG/DL
CHLORIDE SERPL-SCNC: 102 MMOL/L
CO2 SERPL-SCNC: 22 MMOL/L
COLOR: YELLOW
CREAT SERPL-MCNC: 2.97 MG/DL
EOSINOPHIL # BLD AUTO: 0.08 K/UL
EOSINOPHIL NFR BLD AUTO: 1.7 %
GLUCOSE QUALITATIVE U: 500 MG/DL
GLUCOSE SERPL-MCNC: 292 MG/DL
HCT VFR BLD CALC: 30.6 %
HGB BLD-MCNC: 9.8 G/DL
IMM GRANULOCYTES NFR BLD AUTO: 0.6 %
KETONES URINE: NEGATIVE
LEUKOCYTE ESTERASE URINE: NEGATIVE
LYMPHOCYTES # BLD AUTO: 0.8 K/UL
LYMPHOCYTES NFR BLD AUTO: 16.6 %
MAN DIFF?: NORMAL
MCHC RBC-ENTMCNC: 29.5 PG
MCHC RBC-ENTMCNC: 32 GM/DL
MCV RBC AUTO: 92.2 FL
MONOCYTES # BLD AUTO: 0.22 K/UL
MONOCYTES NFR BLD AUTO: 4.6 %
NEUTROPHILS # BLD AUTO: 3.69 K/UL
NEUTROPHILS NFR BLD AUTO: 76.3 %
NITRITE URINE: NEGATIVE
PH URINE: 5.5
PLATELET # BLD AUTO: 97 K/UL
POTASSIUM SERPL-SCNC: 5.2 MMOL/L
PROTEIN URINE: 100 MG/DL
RBC # BLD: 3.32 M/UL
RBC # FLD: 13.1 %
SODIUM SERPL-SCNC: 136 MMOL/L
SPECIFIC GRAVITY URINE: 1.01
UROBILINOGEN URINE: NEGATIVE MG/DL
WBC # FLD AUTO: 4.83 K/UL

## 2018-03-30 ENCOUNTER — OTHER (OUTPATIENT)
Age: 53
End: 2018-03-30

## 2018-03-30 ENCOUNTER — LABORATORY RESULT (OUTPATIENT)
Age: 53
End: 2018-03-30

## 2018-03-30 ENCOUNTER — APPOINTMENT (OUTPATIENT)
Dept: INTERNAL MEDICINE | Facility: CLINIC | Age: 53
End: 2018-03-30
Payer: MEDICAID

## 2018-03-30 VITALS
DIASTOLIC BLOOD PRESSURE: 80 MMHG | WEIGHT: 174 LBS | OXYGEN SATURATION: 98 % | SYSTOLIC BLOOD PRESSURE: 130 MMHG | TEMPERATURE: 97.8 F | HEIGHT: 67 IN | BODY MASS INDEX: 27.31 KG/M2 | HEART RATE: 78 BPM

## 2018-03-30 DIAGNOSIS — R53.1 WEAKNESS: ICD-10-CM

## 2018-03-30 PROCEDURE — 36415 COLL VENOUS BLD VENIPUNCTURE: CPT

## 2018-03-30 PROCEDURE — 99214 OFFICE O/P EST MOD 30 MIN: CPT | Mod: 25

## 2018-04-01 ENCOUNTER — OUTPATIENT (OUTPATIENT)
Dept: OUTPATIENT SERVICES | Facility: HOSPITAL | Age: 53
LOS: 1 days | End: 2018-04-01

## 2018-04-06 ENCOUNTER — APPOINTMENT (OUTPATIENT)
Dept: NEPHROLOGY | Facility: CLINIC | Age: 53
End: 2018-04-06
Payer: MEDICAID

## 2018-04-06 VITALS
HEIGHT: 67 IN | SYSTOLIC BLOOD PRESSURE: 120 MMHG | HEART RATE: 80 BPM | BODY MASS INDEX: 28.09 KG/M2 | WEIGHT: 179 LBS | DIASTOLIC BLOOD PRESSURE: 70 MMHG

## 2018-04-06 DIAGNOSIS — R31.29 OTHER MICROSCOPIC HEMATURIA: ICD-10-CM

## 2018-04-06 DIAGNOSIS — E86.0 DEHYDRATION: ICD-10-CM

## 2018-04-06 DIAGNOSIS — D64.9 ANEMIA, UNSPECIFIED: ICD-10-CM

## 2018-04-06 PROCEDURE — 36415 COLL VENOUS BLD VENIPUNCTURE: CPT

## 2018-04-06 PROCEDURE — 99245 OFF/OP CONSLTJ NEW/EST HI 55: CPT | Mod: 25

## 2018-04-07 LAB
25(OH)D3 SERPL-MCNC: 31.3 NG/ML
ALBUMIN SERPL ELPH-MCNC: 3.8 G/DL
ANION GAP SERPL CALC-SCNC: 11 MMOL/L
APPEARANCE: CLEAR
BACTERIA: NEGATIVE
BASOPHILS # BLD AUTO: 0.02 K/UL
BASOPHILS NFR BLD AUTO: 0.5 %
BILIRUBIN URINE: NEGATIVE
BLOOD URINE: ABNORMAL
BUN SERPL-MCNC: 28 MG/DL
CALCIUM SERPL-MCNC: 8.1 MG/DL
CALCIUM SERPL-MCNC: 8.1 MG/DL
CHLORIDE SERPL-SCNC: 102 MMOL/L
CO2 SERPL-SCNC: 22 MMOL/L
COLOR: YELLOW
CREAT SERPL-MCNC: 2.64 MG/DL
CREAT SPEC-SCNC: 52 MG/DL
CREAT/PROT UR: 3.5 RATIO
EOSINOPHIL # BLD AUTO: 0.08 K/UL
EOSINOPHIL NFR BLD AUTO: 2 %
GLUCOSE QUALITATIVE U: 100 MG/DL
GLUCOSE SERPL-MCNC: 128 MG/DL
HBA1C MFR BLD HPLC: 5.3 %
HCT VFR BLD CALC: 27.4 %
HGB BLD-MCNC: 8.9 G/DL
HYALINE CASTS: 2 /LPF
IMM GRANULOCYTES NFR BLD AUTO: 0.3 %
KETONES URINE: NEGATIVE
LEUKOCYTE ESTERASE URINE: NEGATIVE
LYMPHOCYTES # BLD AUTO: 0.82 K/UL
LYMPHOCYTES NFR BLD AUTO: 20.6 %
MAN DIFF?: NORMAL
MCHC RBC-ENTMCNC: 29.3 PG
MCHC RBC-ENTMCNC: 32.5 GM/DL
MCV RBC AUTO: 90.1 FL
MICROSCOPIC-UA: NORMAL
MONOCYTES # BLD AUTO: 0.26 K/UL
MONOCYTES NFR BLD AUTO: 6.5 %
NEUTROPHILS # BLD AUTO: 2.79 K/UL
NEUTROPHILS NFR BLD AUTO: 70.1 %
NITRITE URINE: NEGATIVE
PARATHYROID HORMONE INTACT: 234 PG/ML
PH URINE: 5.5
PHOSPHATE SERPL-MCNC: 4.2 MG/DL
PLATELET # BLD AUTO: 96 K/UL
POTASSIUM SERPL-SCNC: 5.9 MMOL/L
PROT UR-MCNC: 181 MG/DL
PROTEIN URINE: 100 MG/DL
RBC # BLD: 3.04 M/UL
RBC # FLD: 13.7 %
RED BLOOD CELLS URINE: 50 /HPF
SODIUM SERPL-SCNC: 135 MMOL/L
SPECIFIC GRAVITY URINE: 1.01
SQUAMOUS EPITHELIAL CELLS: 2 /HPF
URINE COMMENTS: NORMAL
UROBILINOGEN URINE: NEGATIVE MG/DL
WBC # FLD AUTO: 3.98 K/UL
WHITE BLOOD CELLS URINE: 2 /HPF

## 2018-04-08 LAB
ANION GAP SERPL CALC-SCNC: 12 MMOL/L
APPEARANCE: CLEAR
BASOPHILS # BLD AUTO: 0.02 K/UL
BASOPHILS NFR BLD AUTO: 0.4 %
BILIRUBIN URINE: NEGATIVE
BLOOD URINE: ABNORMAL
BUN SERPL-MCNC: 28 MG/DL
CALCIUM SERPL-MCNC: 8.5 MG/DL
CHLORIDE SERPL-SCNC: 102 MMOL/L
CO2 SERPL-SCNC: 24 MMOL/L
COLOR: YELLOW
CREAT SERPL-MCNC: 2.55 MG/DL
EOSINOPHIL # BLD AUTO: 0.1 K/UL
EOSINOPHIL NFR BLD AUTO: 2.2 %
GLUCOSE QUALITATIVE U: 100 MG/DL
GLUCOSE SERPL-MCNC: 135 MG/DL
HCT VFR BLD CALC: 30.5 %
HGB BLD-MCNC: 9.7 G/DL
IMM GRANULOCYTES NFR BLD AUTO: 0.2 %
KETONES URINE: NEGATIVE
LEUKOCYTE ESTERASE URINE: NEGATIVE
LYMPHOCYTES # BLD AUTO: 1.24 K/UL
LYMPHOCYTES NFR BLD AUTO: 27 %
MAN DIFF?: NORMAL
MCHC RBC-ENTMCNC: 28.5 PG
MCHC RBC-ENTMCNC: 31.8 GM/DL
MCV RBC AUTO: 89.7 FL
MONOCYTES # BLD AUTO: 0.25 K/UL
MONOCYTES NFR BLD AUTO: 5.4 %
NEUTROPHILS # BLD AUTO: 2.98 K/UL
NEUTROPHILS NFR BLD AUTO: 64.8 %
NITRITE URINE: NEGATIVE
PH URINE: 5
PLATELET # BLD AUTO: 117 K/UL
POTASSIUM SERPL-SCNC: 4.9 MMOL/L
PROTEIN URINE: 100 MG/DL
RBC # BLD: 3.4 M/UL
RBC # FLD: 13.2 %
SODIUM SERPL-SCNC: 138 MMOL/L
SPECIFIC GRAVITY URINE: 1.01
UROBILINOGEN URINE: NEGATIVE MG/DL
WBC # FLD AUTO: 4.6 K/UL

## 2018-04-13 ENCOUNTER — APPOINTMENT (OUTPATIENT)
Dept: NEPHROLOGY | Facility: CLINIC | Age: 53
End: 2018-04-13
Payer: MEDICAID

## 2018-04-13 DIAGNOSIS — N25.81 SECONDARY HYPERPARATHYROIDISM OF RENAL ORIGIN: ICD-10-CM

## 2018-04-13 PROCEDURE — 96372 THER/PROPH/DIAG INJ SC/IM: CPT

## 2018-04-13 PROCEDURE — 36415 COLL VENOUS BLD VENIPUNCTURE: CPT

## 2018-04-13 PROCEDURE — 99215 OFFICE O/P EST HI 40 MIN: CPT | Mod: 25

## 2018-04-13 RX ORDER — SULFAMETHOXAZOLE AND TRIMETHOPRIM 800; 160 MG/1; MG/1
800-160 TABLET ORAL TWICE DAILY
Qty: 20 | Refills: 0 | Status: DISCONTINUED | COMMUNITY
Start: 2017-08-30 | End: 2018-04-13

## 2018-04-13 RX ORDER — ERYTHROPOIETIN 40000 [IU]/ML
40000 INJECTION, SOLUTION INTRAVENOUS; SUBCUTANEOUS
Qty: 1 | Refills: 0 | Status: COMPLETED | OUTPATIENT
Start: 2018-04-13

## 2018-04-13 RX ADMIN — ERYTHROPOIETIN 1 UNIT/ML: 20000 INJECTION, SOLUTION INTRAVENOUS; SUBCUTANEOUS at 00:00

## 2018-04-16 ENCOUNTER — RX RENEWAL (OUTPATIENT)
Age: 53
End: 2018-04-16

## 2018-04-19 ENCOUNTER — INPATIENT (INPATIENT)
Facility: HOSPITAL | Age: 53
LOS: 14 days | Discharge: HOME | DRG: 616 | End: 2018-05-04
Attending: INTERNAL MEDICINE | Admitting: HOSPITALIST
Payer: MEDICAID

## 2018-04-19 VITALS
RESPIRATION RATE: 18 BRPM | TEMPERATURE: 98 F | SYSTOLIC BLOOD PRESSURE: 143 MMHG | OXYGEN SATURATION: 97 % | HEART RATE: 87 BPM | DIASTOLIC BLOOD PRESSURE: 84 MMHG

## 2018-04-19 DIAGNOSIS — E11.621 TYPE 2 DIABETES MELLITUS WITH FOOT ULCER: ICD-10-CM

## 2018-04-19 LAB
ALBUMIN SERPL ELPH-MCNC: 3.7 G/DL — SIGNIFICANT CHANGE UP (ref 3.3–5.2)
ALP SERPL-CCNC: 108 U/L — SIGNIFICANT CHANGE UP (ref 40–120)
ALT FLD-CCNC: 13 U/L — SIGNIFICANT CHANGE UP
ANION GAP SERPL CALC-SCNC: 11 MMOL/L — SIGNIFICANT CHANGE UP (ref 5–17)
ANION GAP SERPL CALC-SCNC: 13 MMOL/L — SIGNIFICANT CHANGE UP (ref 5–17)
APPEARANCE UR: CLEAR — SIGNIFICANT CHANGE UP
APTT BLD: 32.9 SEC — SIGNIFICANT CHANGE UP (ref 27.5–37.4)
AST SERPL-CCNC: 26 U/L — SIGNIFICANT CHANGE UP
BACTERIA # UR AUTO: ABNORMAL
BASOPHILS # BLD AUTO: 0 K/UL — SIGNIFICANT CHANGE UP (ref 0–0.2)
BASOPHILS NFR BLD AUTO: 1 % — SIGNIFICANT CHANGE UP (ref 0–2)
BILIRUB SERPL-MCNC: 0.2 MG/DL — LOW (ref 0.4–2)
BILIRUB UR-MCNC: NEGATIVE — SIGNIFICANT CHANGE UP
BLD GP AB SCN SERPL QL: SIGNIFICANT CHANGE UP
BUN SERPL-MCNC: 25 MG/DL — HIGH (ref 8–20)
BUN SERPL-MCNC: 26 MG/DL — HIGH (ref 8–20)
CALCIUM SERPL-MCNC: 7.5 MG/DL — LOW (ref 8.6–10.2)
CALCIUM SERPL-MCNC: 7.8 MG/DL — LOW (ref 8.6–10.2)
CHLORIDE SERPL-SCNC: 98 MMOL/L — SIGNIFICANT CHANGE UP (ref 98–107)
CHLORIDE SERPL-SCNC: 99 MMOL/L — SIGNIFICANT CHANGE UP (ref 98–107)
CO2 SERPL-SCNC: 24 MMOL/L — SIGNIFICANT CHANGE UP (ref 22–29)
CO2 SERPL-SCNC: 24 MMOL/L — SIGNIFICANT CHANGE UP (ref 22–29)
COLOR SPEC: YELLOW — SIGNIFICANT CHANGE UP
CREAT SERPL-MCNC: 2.88 MG/DL — HIGH (ref 0.5–1.3)
CREAT SERPL-MCNC: 2.95 MG/DL — HIGH (ref 0.5–1.3)
DIFF PNL FLD: ABNORMAL
EOSINOPHIL # BLD AUTO: 0 K/UL — SIGNIFICANT CHANGE UP (ref 0–0.5)
EOSINOPHIL NFR BLD AUTO: 1 % — SIGNIFICANT CHANGE UP (ref 0–5)
EPI CELLS # UR: SIGNIFICANT CHANGE UP
GAS PNL BLDA: SIGNIFICANT CHANGE UP
GLUCOSE BLDC GLUCOMTR-MCNC: 109 MG/DL — HIGH (ref 70–99)
GLUCOSE SERPL-MCNC: 124 MG/DL — HIGH (ref 70–115)
GLUCOSE SERPL-MCNC: 132 MG/DL — HIGH (ref 70–115)
GLUCOSE UR QL: NEGATIVE MG/DL — SIGNIFICANT CHANGE UP
HCT VFR BLD CALC: 26.5 % — LOW (ref 37–47)
HGB BLD-MCNC: 8.5 G/DL — LOW (ref 12–16)
INR BLD: 1.35 RATIO — HIGH (ref 0.88–1.16)
KETONES UR-MCNC: NEGATIVE — SIGNIFICANT CHANGE UP
LEUKOCYTE ESTERASE UR-ACNC: NEGATIVE — SIGNIFICANT CHANGE UP
LYMPHOCYTES # BLD AUTO: 0.6 K/UL — LOW (ref 1–4.8)
LYMPHOCYTES # BLD AUTO: 14 % — LOW (ref 20–55)
MAGNESIUM SERPL-MCNC: 1.3 MG/DL — LOW (ref 1.6–2.6)
MAGNESIUM SERPL-MCNC: 1.7 MG/DL — SIGNIFICANT CHANGE UP (ref 1.6–2.6)
MCHC RBC-ENTMCNC: 28.8 PG — SIGNIFICANT CHANGE UP (ref 27–31)
MCHC RBC-ENTMCNC: 32.1 G/DL — SIGNIFICANT CHANGE UP (ref 32–36)
MCV RBC AUTO: 89.8 FL — SIGNIFICANT CHANGE UP (ref 81–99)
MONOCYTES # BLD AUTO: 0.3 K/UL — SIGNIFICANT CHANGE UP (ref 0–0.8)
MONOCYTES NFR BLD AUTO: 6 % — SIGNIFICANT CHANGE UP (ref 3–10)
NEUTROPHILS # BLD AUTO: 1.2 K/UL — LOW (ref 1.8–8)
NEUTROPHILS NFR BLD AUTO: 73 % — SIGNIFICANT CHANGE UP (ref 37–73)
NEUTS BAND # BLD: 2 % — SIGNIFICANT CHANGE UP (ref 0–8)
NITRITE UR-MCNC: NEGATIVE — SIGNIFICANT CHANGE UP
NT-PROBNP SERPL-SCNC: 2903 PG/ML — HIGH (ref 0–300)
PH UR: 6 — SIGNIFICANT CHANGE UP (ref 5–8)
PHOSPHATE SERPL-MCNC: 3.5 MG/DL — SIGNIFICANT CHANGE UP (ref 2.4–4.7)
PLAT MORPH BLD: NORMAL — SIGNIFICANT CHANGE UP
PLATELET # BLD AUTO: 67 K/UL — LOW (ref 150–400)
POTASSIUM SERPL-MCNC: 4 MMOL/L — SIGNIFICANT CHANGE UP (ref 3.5–5.3)
POTASSIUM SERPL-MCNC: 4.2 MMOL/L — SIGNIFICANT CHANGE UP (ref 3.5–5.3)
POTASSIUM SERPL-SCNC: 4 MMOL/L — SIGNIFICANT CHANGE UP (ref 3.5–5.3)
POTASSIUM SERPL-SCNC: 4.2 MMOL/L — SIGNIFICANT CHANGE UP (ref 3.5–5.3)
PROT SERPL-MCNC: 6.6 G/DL — SIGNIFICANT CHANGE UP (ref 6.6–8.7)
PROT UR-MCNC: 100 MG/DL
PROTHROM AB SERPL-ACNC: 14.9 SEC — HIGH (ref 9.8–12.7)
RBC # BLD: 2.95 M/UL — LOW (ref 4.4–5.2)
RBC # FLD: 13.7 % — SIGNIFICANT CHANGE UP (ref 11–15.6)
RBC BLD AUTO: NORMAL — SIGNIFICANT CHANGE UP
RBC CASTS # UR COMP ASSIST: ABNORMAL /HPF (ref 0–4)
SODIUM SERPL-SCNC: 133 MMOL/L — LOW (ref 135–145)
SODIUM SERPL-SCNC: 136 MMOL/L — SIGNIFICANT CHANGE UP (ref 135–145)
SP GR SPEC: 1.01 — SIGNIFICANT CHANGE UP (ref 1.01–1.02)
TYPE + AB SCN PNL BLD: SIGNIFICANT CHANGE UP
UROBILINOGEN FLD QL: NEGATIVE MG/DL — SIGNIFICANT CHANGE UP
VARIANT LYMPHS # BLD: 3 % — SIGNIFICANT CHANGE UP (ref 0–6)
WBC # BLD: 2.2 K/UL — LOW (ref 4.8–10.8)
WBC # FLD AUTO: 2.2 K/UL — LOW (ref 4.8–10.8)
WBC UR QL: SIGNIFICANT CHANGE UP

## 2018-04-19 PROCEDURE — 93971 EXTREMITY STUDY: CPT | Mod: 26,LT

## 2018-04-19 PROCEDURE — 99285 EMERGENCY DEPT VISIT HI MDM: CPT

## 2018-04-19 PROCEDURE — 99223 1ST HOSP IP/OBS HIGH 75: CPT

## 2018-04-19 PROCEDURE — 71045 X-RAY EXAM CHEST 1 VIEW: CPT | Mod: 26

## 2018-04-19 PROCEDURE — 73630 X-RAY EXAM OF FOOT: CPT | Mod: 26,LT

## 2018-04-19 PROCEDURE — 93923 UPR/LXTR ART STDY 3+ LVLS: CPT | Mod: 26

## 2018-04-19 RX ORDER — HEPARIN SODIUM 5000 [USP'U]/ML
5000 INJECTION INTRAVENOUS; SUBCUTANEOUS EVERY 12 HOURS
Qty: 0 | Refills: 0 | Status: DISCONTINUED | OUTPATIENT
Start: 2018-04-19 | End: 2018-04-26

## 2018-04-19 RX ORDER — INSULIN GLARGINE 100 [IU]/ML
30 INJECTION, SOLUTION SUBCUTANEOUS AT BEDTIME
Qty: 0 | Refills: 0 | Status: DISCONTINUED | OUTPATIENT
Start: 2018-04-19 | End: 2018-04-20

## 2018-04-19 RX ORDER — ERYTHROPOIETIN 10000 [IU]/ML
8000 INJECTION, SOLUTION INTRAVENOUS; SUBCUTANEOUS
Qty: 0 | Refills: 0 | Status: DISCONTINUED | OUTPATIENT
Start: 2018-04-19 | End: 2018-04-26

## 2018-04-19 RX ORDER — OXYCODONE HYDROCHLORIDE 5 MG/1
5 TABLET ORAL EVERY 6 HOURS
Qty: 0 | Refills: 0 | Status: DISCONTINUED | OUTPATIENT
Start: 2018-04-19 | End: 2018-04-26

## 2018-04-19 RX ORDER — SODIUM CHLORIDE 9 MG/ML
1000 INJECTION, SOLUTION INTRAVENOUS
Qty: 0 | Refills: 0 | Status: DISCONTINUED | OUTPATIENT
Start: 2018-04-19 | End: 2018-04-26

## 2018-04-19 RX ORDER — GLUCAGON INJECTION, SOLUTION 0.5 MG/.1ML
1 INJECTION, SOLUTION SUBCUTANEOUS ONCE
Qty: 0 | Refills: 0 | Status: DISCONTINUED | OUTPATIENT
Start: 2018-04-19 | End: 2018-04-26

## 2018-04-19 RX ORDER — ZINC SULFATE TAB 220 MG (50 MG ZINC EQUIVALENT) 220 (50 ZN) MG
220 TAB ORAL DAILY
Qty: 0 | Refills: 0 | Status: DISCONTINUED | OUTPATIENT
Start: 2018-04-19 | End: 2018-04-26

## 2018-04-19 RX ORDER — METOCLOPRAMIDE HCL 10 MG
10 TABLET ORAL ONCE
Qty: 0 | Refills: 0 | Status: COMPLETED | OUTPATIENT
Start: 2018-04-19 | End: 2018-04-19

## 2018-04-19 RX ORDER — BUPROPION HYDROCHLORIDE 150 MG/1
300 TABLET, EXTENDED RELEASE ORAL DAILY
Qty: 0 | Refills: 0 | Status: DISCONTINUED | OUTPATIENT
Start: 2018-04-19 | End: 2018-04-26

## 2018-04-19 RX ORDER — NICOTINE 21 MG/24HR
21 PATCH, TRANSDERMAL 24 HOURS TRANSDERMAL
Qty: 1 | Refills: 2 | Status: DISCONTINUED | COMMUNITY
Start: 2018-01-02 | End: 2018-04-19

## 2018-04-19 RX ORDER — SODIUM CHLORIDE 9 MG/ML
3 INJECTION INTRAMUSCULAR; INTRAVENOUS; SUBCUTANEOUS ONCE
Qty: 0 | Refills: 0 | Status: COMPLETED | OUTPATIENT
Start: 2018-04-19 | End: 2018-04-19

## 2018-04-19 RX ORDER — DULOXETINE HYDROCHLORIDE 30 MG/1
60 CAPSULE, DELAYED RELEASE ORAL DAILY
Qty: 0 | Refills: 0 | Status: DISCONTINUED | OUTPATIENT
Start: 2018-04-19 | End: 2018-04-25

## 2018-04-19 RX ORDER — INSULIN LISPRO 100/ML
VIAL (ML) SUBCUTANEOUS
Qty: 0 | Refills: 0 | Status: DISCONTINUED | OUTPATIENT
Start: 2018-04-19 | End: 2018-04-26

## 2018-04-19 RX ORDER — DEXTROSE 50 % IN WATER 50 %
12.5 SYRINGE (ML) INTRAVENOUS ONCE
Qty: 0 | Refills: 0 | Status: DISCONTINUED | OUTPATIENT
Start: 2018-04-19 | End: 2018-04-26

## 2018-04-19 RX ORDER — ACETAMINOPHEN 500 MG
1000 TABLET ORAL ONCE
Qty: 0 | Refills: 0 | Status: COMPLETED | OUTPATIENT
Start: 2018-04-19 | End: 2018-04-19

## 2018-04-19 RX ORDER — CEPHALEXIN 500 MG/1
500 TABLET ORAL EVERY 8 HOURS
Qty: 30 | Refills: 0 | Status: DISCONTINUED | COMMUNITY
Start: 2018-03-27 | End: 2018-04-19

## 2018-04-19 RX ORDER — DEXTROSE 50 % IN WATER 50 %
25 SYRINGE (ML) INTRAVENOUS ONCE
Qty: 0 | Refills: 0 | Status: DISCONTINUED | OUTPATIENT
Start: 2018-04-19 | End: 2018-04-26

## 2018-04-19 RX ORDER — MONTELUKAST 4 MG/1
10 TABLET, CHEWABLE ORAL DAILY
Qty: 0 | Refills: 0 | Status: DISCONTINUED | OUTPATIENT
Start: 2018-04-19 | End: 2018-04-26

## 2018-04-19 RX ORDER — PANTOPRAZOLE SODIUM 20 MG/1
40 TABLET, DELAYED RELEASE ORAL
Qty: 0 | Refills: 0 | Status: DISCONTINUED | OUTPATIENT
Start: 2018-04-19 | End: 2018-04-26

## 2018-04-19 RX ORDER — DEXTROSE 50 % IN WATER 50 %
1 SYRINGE (ML) INTRAVENOUS ONCE
Qty: 0 | Refills: 0 | Status: DISCONTINUED | OUTPATIENT
Start: 2018-04-19 | End: 2018-04-26

## 2018-04-19 RX ORDER — IPRATROPIUM/ALBUTEROL SULFATE 18-103MCG
3 AEROSOL WITH ADAPTER (GRAM) INHALATION
Qty: 0 | Refills: 0 | Status: COMPLETED | OUTPATIENT
Start: 2018-04-19 | End: 2018-04-19

## 2018-04-19 RX ORDER — TRAZODONE HCL 50 MG
100 TABLET ORAL AT BEDTIME
Qty: 0 | Refills: 0 | Status: DISCONTINUED | OUTPATIENT
Start: 2018-04-19 | End: 2018-04-25

## 2018-04-19 RX ORDER — FUROSEMIDE 40 MG
10 TABLET ORAL
Qty: 500 | Refills: 0 | Status: DISCONTINUED | OUTPATIENT
Start: 2018-04-19 | End: 2018-04-20

## 2018-04-19 RX ORDER — FERROUS SULFATE 325(65) MG
325 TABLET ORAL DAILY
Qty: 0 | Refills: 0 | Status: DISCONTINUED | OUTPATIENT
Start: 2018-04-19 | End: 2018-04-21

## 2018-04-19 RX ORDER — BUDESONIDE AND FORMOTEROL FUMARATE DIHYDRATE 160; 4.5 UG/1; UG/1
2 AEROSOL RESPIRATORY (INHALATION)
Qty: 0 | Refills: 0 | Status: DISCONTINUED | OUTPATIENT
Start: 2018-04-19 | End: 2018-04-23

## 2018-04-19 RX ORDER — MAGNESIUM SULFATE 500 MG/ML
1 VIAL (ML) INJECTION ONCE
Qty: 0 | Refills: 0 | Status: COMPLETED | OUTPATIENT
Start: 2018-04-19 | End: 2018-04-19

## 2018-04-19 RX ORDER — ASCORBIC ACID 60 MG
500 TABLET,CHEWABLE ORAL DAILY
Qty: 0 | Refills: 0 | Status: DISCONTINUED | OUTPATIENT
Start: 2018-04-19 | End: 2018-04-26

## 2018-04-19 RX ADMIN — BUDESONIDE AND FORMOTEROL FUMARATE DIHYDRATE 2 PUFF(S): 160; 4.5 AEROSOL RESPIRATORY (INHALATION) at 21:14

## 2018-04-19 RX ADMIN — Medication 1000 MILLIGRAM(S): at 17:24

## 2018-04-19 RX ADMIN — Medication 3 MILLILITER(S): at 15:29

## 2018-04-19 RX ADMIN — INSULIN GLARGINE 30 UNIT(S): 100 INJECTION, SOLUTION SUBCUTANEOUS at 21:42

## 2018-04-19 RX ADMIN — Medication 100 MILLIGRAM(S): at 21:21

## 2018-04-19 RX ADMIN — Medication 10 MILLIGRAM(S): at 15:28

## 2018-04-19 RX ADMIN — Medication 100 GRAM(S): at 19:06

## 2018-04-19 RX ADMIN — Medication 400 MILLIGRAM(S): at 15:16

## 2018-04-19 RX ADMIN — OXYCODONE HYDROCHLORIDE 5 MILLIGRAM(S): 5 TABLET ORAL at 21:21

## 2018-04-19 RX ADMIN — OXYCODONE HYDROCHLORIDE 5 MILLIGRAM(S): 5 TABLET ORAL at 22:00

## 2018-04-19 RX ADMIN — SODIUM CHLORIDE 3 MILLILITER(S): 9 INJECTION INTRAMUSCULAR; INTRAVENOUS; SUBCUTANEOUS at 17:24

## 2018-04-19 RX ADMIN — Medication 5 MG/HR: at 15:29

## 2018-04-19 RX ADMIN — ERYTHROPOIETIN 8000 UNIT(S): 10000 INJECTION, SOLUTION INTRAVENOUS; SUBCUTANEOUS at 19:07

## 2018-04-19 NOTE — H&P ADULT - ASSESSMENT
Assessment:     Plan: Assessment: Give above patient info patient is having worsening renal resulting into volume over load, leading to generalized weakness and fatigue, patient has left foot chronic wound.     Plan:     Acute on chronic renal failure stage 4 with volume overload: Will admit the patient under medicine, hook up with cardiac monitor, patient has been seen by nephrology and has been started on IV Lasix drip, will closely monitor BMP, strict I/O and daily weight, patient is well know to Nephrology service.     Generalized weakness: due to worsening renal failure and rapid weight gain from the fluid retention, will continue with above treatment, will get PT consult.    DM: Will give Lantus and FS monitoring with coverage insulin.    Fibromyalgia:  Pain meds     Foot Ulcer: as per Podiatry team, imaging reviewed, PERNELL index pending.     DVT prophylaxis: Heparin SC. Assessment: Give above patient info patient is having worsening renal resulting into volume over load, leading to generalized weakness and fatigue, patient has left foot chronic wound.     Plan:     Acute on chronic renal failure stage 4 with volume overload: Will admit the patient under medicine, hook up with cardiac monitor, patient has been seen by nephrology and has been started on IV Lasix drip, will closely monitor BMP, strict I/O and daily weight, patient is well know to Nephrology service.     Generalized weakness: due to worsening renal failure and rapid weight gain from the fluid retention, will continue with above treatment, will get PT consult.    DM: Will give Lantus and FS monitoring with coverage insulin.    Fibromyalgia:  Pain meds     Foot Ulcer: as per Podiatry team, imaging reviewed, PERNELL index pending.     Anemia: likely in setting of renal failure, will send iron studies, if low will replace, will monitor CBC.     DVT prophylaxis: Heparin SC.

## 2018-04-19 NOTE — ED STATDOCS - PROGRESS NOTE DETAILS
53 year old female with PMh DM, HTN, COPD, anemia, Diabetic foot ulcers s/p multiple amputations presents with generalized weakness. Pt states that for several weaks she has had generalized fatigue and weakness with nausea, vomiting, and decreased PO intake. She states that per pmd sent her to nephrology Dr. christianson for "kidney problems" and anemia, where she recently received a procrit injection. At her recent visit, she was told that her Potassium was elevated to 5.9. However, she has been feeling progressively worse and more lethargic, and feels bloated and states she is retaining fluid. She went to her podiatrist today for a scheduled viit, who told her she appeared pale and ill and directed her to go to the ED. She deneis chest pain, HA, focal weakness, abd pain, diarrhea.  Gen: pale, NAD CV: RRR, no m/c/r Pul: scattered wheezing Abd: soft, non-tender.  Given Hx of renal disease and hyperkalemia in the setting of worsening lethargy, stat ekg ordered and upgraded to main for monitored spot. 53 year old female with PMh DM, HTN, COPD, anemia, Diabetic foot ulcers s/p multiple amputations presents with generalized weakness. Pt states that for several weaks she has had generalized fatigue and weakness with nausea, vomiting, and decreased PO intake. She states that per pmd sent her to nephrology Dr. christianson for "kidney problems" and anemia, where she recently received a procrit injection. At her recent visit, she was told that her Potassium was elevated to 5.9. However, she has been feeling progressively worse and more lethargic, and feels bloated and states she is retaining fluid. She went to her podiatrist today for a scheduled visit, who told her she appeared pale and ill and directed her to go to the ED. She deneis chest pain, HA, focal weakness, abd pain, diarrhea.  Gen: pale, NAD CV: RRR, no m/c/r Pul: scattered wheezing Abd: soft, non-tender.  Given Hx of renal disease and hyperkalemia in the setting of worsening lethargy, stat ekg ordered and upgraded to main for monitored spot.

## 2018-04-19 NOTE — H&P ADULT - NSHPPHYSICALEXAM_GEN_ALL_CORE
Vital Signs Last 24 Hrs  T(C): 36.6 (19 Apr 2018 13:20), Max: 36.6 (19 Apr 2018 13:20)  T(F): 97.8 (19 Apr 2018 13:20), Max: 97.8 (19 Apr 2018 13:20)  HR: 87 (19 Apr 2018 13:20) (87 - 87)  BP: 143/84 (19 Apr 2018 13:20) (143/84 - 143/84)  BP(mean): --  RR: 18 (19 Apr 2018 13:20) (18 - 18)  SpO2: 97% (19 Apr 2018 13:20) (97% - 97%)    PHYSICAL EXAM:    GENERAL: Middle age female looking comfortable  HEENT: PERRL, +EOMI  NECK: soft, Supple, No JVD,   CHEST/LUNG: Decrease air entry bilaterally; No wheezing  HEART: S1S2+, Regular rate and rhythm; No murmurs  ABDOMEN: Soft, Nontender, Nondistended; Bowel sounds present  EXTREMITIES:  1+ Peripheral Pulses, has edema  SKIN: No rashes or lesions  NEURO: AAOX3, no focal deficits, no motor r sensory loss  PSYCH: normal mood Vital Signs Last 24 Hrs  T(C): 36.6 (19 Apr 2018 13:20), Max: 36.6 (19 Apr 2018 13:20)  T(F): 97.8 (19 Apr 2018 13:20), Max: 97.8 (19 Apr 2018 13:20)  HR: 87 (19 Apr 2018 13:20) (87 - 87)  BP: 143/84 (19 Apr 2018 13:20) (143/84 - 143/84)  BP(mean): --  RR: 18 (19 Apr 2018 13:20) (18 - 18)  SpO2: 97% (19 Apr 2018 13:20) (97% - 97%)    PHYSICAL EXAM:    GENERAL: Middle age female looking comfortable  HEENT: PERRL, puffiness around the face  NECK: soft, Supple, No JVD,   CHEST/LUNG: Decrease air entry bilaterally; No wheezing  HEART: S1S2+, Regular rate and rhythm; No murmurs  ABDOMEN: Soft, Nontender, Nondistended; Bowel sounds present  EXTREMITIES:  1+ Peripheral Pulses, some edema  SKIN: No rashes or lesions  NEURO: AAOX3, no focal deficits, no motor r sensory loss  PSYCH: normal mood

## 2018-04-19 NOTE — CONSULT NOTE ADULT - SUBJECTIVE AND OBJECTIVE BOX
Patient is a 53y old  Female who presents with a chief complaint of     HPI:    sent in by md for eval of worsening pain to ribs, and abnormal labs. pt recently dx with kidney disease and just not feeling well, reports has put on over 20 lbs in a few weeks,	  medical evaluation, 	    PAST MEDICAL & SURGICAL HISTORY:  Fibromyalgia  DM (diabetes mellitus)  Foot ulcer: Left Foot  Osteopenia  Chronic pain  Back ache  Arthritis  Shoulder joint stiffness, left  [place]oros esophagus  Stomach ulcer  Diabetes  Asthma  S/P knee surgery  S/P hysterectomy  S/P cholecystectomy      FAMILY HISTORY:  No pertinent family history in first degree relatives      Social History:    MEDICATIONS  (STANDING):  epoetin krunal Injectable 8000 Unit(s) SubCutaneous <User Schedule>  furosemide Infusion 10 mG/Hr (5 mL/Hr) IV Continuous <Continuous>    MEDICATIONS  (PRN):    Allergies    No Known Allergies    Intolerances    REVIEW OF SYSTEMS:    CONSTITUTIONAL: No fever,  + weight  Gain , or fatigue  EYES: No eye pain, visual disturbances, or discharge  ENMT:  No difficulty hearing, tinnitus, vertigo; No sinus or throat pain  NECK: No pain or stiffness  BREASTS: No pain, masses, or nipple discharge  RESPIRATORY: No cough, wheezing, chills or hemoptysis;  + shortness of breath  CARDIOVASCULAR: No chest pain, palpitations, dizziness, +  leg swelling  GASTROINTESTINAL: No abdominal or epigastric pain. +  Nausea,  No vomiting, or hematemesis; No diarrhea or constipation. No melena or hematochezia.  GENITOURINARY: No dysuria, frequency, hematuria, or incontinence  NEUROLOGICAL: No headaches, memory loss, loss of strength, numbness, or tremors  SKIN: No itching, burning, rashes, or lesions   LYMPH NODES: No enlarged glands  ENDOCRINE: No heat or cold intolerance; No hair loss  MUSCULOSKELETAL: No joint pain or swelling; No muscle, back, or extremity pain  PSYCHIATRIC: No depression, anxiety, mood swings, or difficulty sleeping  HEME/LYMPH: No easy bruising, or bleeding gums  ALLERGY  AND IMMUNOLOGIC: No hives or eczema      Vital Signs Last 24 Hrs  T(C): 36.6 (19 Apr 2018 13:20), Max: 36.6 (19 Apr 2018 13:20)  T(F): 97.8 (19 Apr 2018 13:20), Max: 97.8 (19 Apr 2018 13:20)  HR: 87 (19 Apr 2018 13:20) (87 - 87)  BP: 143/84 (19 Apr 2018 13:20) (143/84 - 143/84)  BP(mean): --  RR: 18 (19 Apr 2018 13:20) (18 - 18)  SpO2: 97% (19 Apr 2018 13:20) (97% - 97%)    PHYSICAL EXAM:    GENERAL: NAD, well-groomed, well-developed, Pale , Sallow Complexion,  HEAD:  Atraumatic, Normocephalic  EYES: EOMI, PERRLA, conjunctiva and sclera clear  ENMT: No tonsillar erythema, exudates, or enlargement; Moist mucous membranes, Good dentition, No lesions  NECK: Supple, No JVD, Normal thyroid  NERVOUS SYSTEM:  Alert & Oriented X3, Good concentration; Motor Strength 5/5 B/L upper and lower extremities; DTRs 2+ intact and symmetric  CHEST/LUNG: Clear to percussion bilaterally; No rales, rhonchi, wheezing, or rubs  HEART: Regular rate and rhythm; No murmurs, rubs, or gallops  ABDOMEN: Soft, Nontender, Nondistended; Bowel sounds present  EXTREMITIES:  2+ Peripheral Pulses, No clubbing, cyanosis, or edema  LYMPH: No lymphadenopathy noted  SKIN: No rashes or lesions      LABS:                         8.5    2.2   )-----------( x        ( 19 Apr 2018 15:25 )             26.5     04-19    136  |  99  |  25.0<H>  ----------------------------<  132<H>  4.2   |  24.0  |  2.88<H>    Ca    7.8<L>      19 Apr 2018 15:25  Phos  3.5     04-19  Mg     1.3     04-19    TPro  6.6  /  Alb  3.7  /  TBili  0.2<L>  /  DBili  x   /  AST  26  /  ALT  13  /  AlkPhos  108  04-19    PT/INR - ( 19 Apr 2018 15:25 )   PT: 14.9 sec;   INR: 1.35 ratio         PTT - ( 19 Apr 2018 15:25 )  PTT:32.9 sec    Magnesium, Serum: 1.3 mg/dL (04-19 @ 15:25)  Phosphorus Level, Serum: 3.5 mg/dL (04-19 @ 15:25)      RADIOLOGY & ADDITIONAL TESTS:    Xray Chest 1 View AP/PA. (04.19.18 @ 14:32)     EXAM:  XR CHEST AP OR PA 1V                          PROCEDURE DATE:  04/19/2018      INTERPRETATION:  CHEST AP PORTABLE:    History: Wheezing.    Date and time of exam: 4/19/2018 2:04 PM.    Comparison: 3/23/2018.    Technique: A single AP view of the chest was obtained.    Findings:  The lungs are clear. The heart and mediastinum are unremarkable.  No   hilar abnormality is seen.  There is no evidence of pleural effusion. The   visualized osseous structures demonstrate degenerative changes in the   spine.    Impression:    Unremarkable exam. No change since 3/23/2018.      GABO YANG M.D., ATTENDING RADIOLOGIST  This document has been electronically signed. Apr 19 2018  3:01PM

## 2018-04-19 NOTE — ED PROVIDER NOTE - NS ED ROS FT
Const: Denies fever, chills  HEENT: + sore throat. Denies blurry vision  Neck: Denies neck pain/stiffness  Resp: + coughing, + SOB  Cardiovascular: + CP, + LE edema (left) Denies palpitations  GI: + nausea, + vomiting, + abdominal pain, Denies diarrhea, constipation, blood in stool  : Denies urinary frequency/urgency/dysuria, hematuria  MSK: + back pain (chronic)  Neuro: + HA, + weakness, + muscle twitches. Denies dizziness, numbness.  Skin: + Diabetic foot ulcer. Denies rashes.

## 2018-04-19 NOTE — H&P ADULT - NSHPLABSRESULTS_GEN_ALL_CORE
8.5    2.2   )-----------( 67       ( 19 Apr 2018 15:25 )             26.5     04-19    136  |  99  |  25.0<H>  ----------------------------<  132<H>  4.2   |  24.0  |  2.88<H>    Ca    7.8<L>      19 Apr 2018 15:25  Phos  3.5     04-19  Mg     1.3     04-19    TPro  6.6  /  Alb  3.7  /  TBili  0.2<L>  /  DBili  x   /  AST  26  /  ALT  13  /  AlkPhos  108  04-19    PT/INR - ( 19 Apr 2018 15:25 )   PT: 14.9 sec;   INR: 1.35 ratio         PTT - ( 19 Apr 2018 15:25 )  PTT:32.9 sec 8.5    2.2   )-----------( 67       ( 19 Apr 2018 15:25 )             26.5     04-19    136  |  99  |  25.0<H>  ----------------------------<  132<H>  4.2   |  24.0  |  2.88<H>    Ca    7.8<L>      19 Apr 2018 15:25  Phos  3.5     04-19  Mg     1.3     04-19    TPro  6.6  /  Alb  3.7  /  TBili  0.2<L>  /  DBili  x   /  AST  26  /  ALT  13  /  AlkPhos  108  04-19    PT/INR - ( 19 Apr 2018 15:25 )   PT: 14.9 sec;   INR: 1.35 ratio         PTT - ( 19 Apr 2018 15:25 )  PTT:32.9 sec    < from: Xray Chest 1 View AP/PA. (04.19.18 @ 14:32) >    Unremarkable exam.    < end of copied text >    < from: Xray Foot AP + Lateral + Oblique, Left (01.11.18 @ 13:29) >    Patient again noted to be status post partial 1st and 2nd ray amputations   through mid metatarsal shafts with well corticated osseous stump margins.    Soft tissue defect/ulceration overlies 1st ray amputation stump however   without gross radiographic evidence for underlying osteomyelitis. No   proximally tracking gas collections beyond the ulcer margins.    Generalized osteopenia again noted.     Remainder of the foot is stable and unchanged.    < end of copied text >

## 2018-04-19 NOTE — CONSULT NOTE ADULT - SUBJECTIVE AND OBJECTIVE BOX
52 yo female with PMHx of Fibromyalgia, DM, Osteopenia, [place] Esophagus seen in Ed for Left Foot Plantar Ulcer. Pt states earlier today she went to her Podiatrist (Dr. Sanchez) for her wound check on the left foot. Her podiatrist send her to ED because she was looking pale and weak. Pt states her left foot is fine and she does weekly follow up with Dr. Sanchez. Pt denies any discharge or pus noted from the wound. Pt states she is diabetic. PT denies N/V/C/F/SOB     Allergies: NKDA  PMHX: Fibromyalgia, DM, Osteopenia, [place]os Esophagus    PE: B/L Foot  Vascular: DP/PT: 1/4 b/l; CFT< 3 sec x 8; TG: wnl; no edema noted  Derm: granular base ulceration noted with hyperkeratotic rim noted at the left plantar foot at submet 1 site; no pus noted; no drainage noted; no clinical sign of infection noted; no edema noted; no pain upon palpation  Neuro: Protective sensation is slightly diminished   Ortho: Partial 1st ray amputation noted bilaterally     A: Left Foot Stable Plantar Ulcer    P:  Patient evaluated and chart reviewed  xray ordered; results pending  cx obtained; results pending  elsa ordered; results pending  DSD applied to the left foot. Instructed patient to keep dressing clean, dry, and intact to the left foot.   Pt needs to be heel weight bearing to the left foot.  Pt is podiatrically stable at this time  Wound Care Order is in placed  Podiatry will follow patient inhouse.

## 2018-04-19 NOTE — ED PROVIDER NOTE - OBJECTIVE STATEMENT
Patient with PMH DM, diabetic foot ulcer (recently finished coarse of Keflex 3 days ago), Matamoros's Esophagus, COPD/Asthma, Fibromyalgia and chronic pain presents for evaluation s/p being sent in by her poditatrist for appearing "unwell" during her visit today. Per the patient, she has had a headache for about a week, which is sharp, shooting, from temporal to temporal region, chest pain which she describes as tightness, SOB, worse with any exertion or lying backwards not improved with using her inhaler, abdominal pain which is cramping, nausea and vomiting of bilious substance, and unintentional weight gain. She was recently evaluated by Dr. Sandra (Renal) for worsening CKD and received two shots of Procrit. She was also told her K was 5.9. She also notes pain and edema of her left lower extremity, especially around the knee. She has a non-healing diabetic foot ulcer on her left foot. She smokes (has not smoked since yesterday), denies EtOH or illicit drugs.  PMD: Gissell.

## 2018-04-19 NOTE — H&P ADULT - NSHPREVIEWOFSYSTEMS_GEN_ALL_CORE
CONSTITUTIONAL: No fever, has  fatigue  RESPIRATORY: No cough,  No shortness of breath  CARDIOVASCULAR: No chest pain, palpitations  GASTROINTESTINAL: No abdominal, No nausea, vomiting  NEUROLOGICAL: No headaches,  loss of strength.  MISCELLANEOUS: No joint swelling or pain

## 2018-04-19 NOTE — ED PROVIDER NOTE - PHYSICAL EXAMINATION
Const: Awake, alert and oriented. In no acute distress. Appears pale, disheveled, appears uncomfortable.  HEENT: NC/AT. Dry mucous membranes. Poor dentition.  Eyes: No scleral icterus. EOMI.  Neck:. Soft and supple. Full ROM without pain.  Cardiac: Regular rate and rhythm. +S1/S2. No murmurs. Peripheral pulses 2+ and symmetric. Trace left LE edema.  Resp: Speaking in full sentences. No evidence of respiratory distress. Diffuse wheezing with prolonged expiratory phase. No rales or rhonchi.  Abd: Soft, mildly diffusely tender, non-distended. Normal bowel sounds in all 4 quadrants. No guarding or rebound.  Back: Spine midline and non-tender. No CVAT.  Skin: No rashes, abrasions or lacerations. 2 cm diabetic foot ulcer on plantar surface of distal left foot without purulence, surrounding erythema.  Extremities: s/p amputation of left great & second toes.  Lymph: No cervical lymphadenopathy.   Neuro: Awake, alert, oriented, speech clear. Appreciate muscle spasm of the 2nd finger of the right hand which appears painful to the patient.

## 2018-04-19 NOTE — ED ADULT NURSE REASSESSMENT NOTE - NS ED NURSE REASSESS COMMENT FT1
Received patient alert skin warm and dry color good on cardiac monitor awaiting bed placement my whole body hurts needs xray of foot willing to go now will transport to radiology

## 2018-04-19 NOTE — ED PROVIDER NOTE - MEDICAL DECISION MAKING DETAILS
Patient with multiple medical problems presents for evaluation of reported hyperkalemia, appears pale and uncomfortable, no distress. Will check EKG, labs, CXR, troponin and BNP and patient will likely require admission.

## 2018-04-19 NOTE — ED ADULT TRIAGE NOTE - CHIEF COMPLAINT QUOTE
sent in by md for eval of worsening pain to ribs, and abnormal labs. pt recently dx with kidney disease and just not feeling well, reports has put on over 20 lbs in a few weeks

## 2018-04-19 NOTE — H&P ADULT - HISTORY OF PRESENT ILLNESS
54 y/o F with Hx of Fibromyalgia, DM, Osteopenia, Matamoros Esophagus seen in Ed for Left Foot Plantar Ulcer, worsening kidney function who is well know to Nephrology service, she went to her Podiatrist (Dr. Sanchez) for her wound check for her left foot wound, Her podiatrist send her to ED because she was looking pale, weak and was having swelling and edema every where, as per Dr Sanchez her wound looks ok, she has no discharge or pus noted from the wound.  Patient denies nausea, vomiting, fever, chills, chest pain, SOB. 52 y/o F with Hx of Fibromyalgia, DM, Osteopenia, Matamoros Esophagus seen in Ed for Left Foot Plantar Ulcer, worsening kidney function who is well know to Nephrology service, she went to her Podiatrist (Dr. Sanchez) for her wound check for her left foot wound, Her podiatrist send her to ED because she was looking pale, weak and was having swelling and edema every where, as per Dr Sanchez her wound looks ok, she has no discharge or pus noted from the  wound, Patient denies nausea, vomiting, fever, chills, chest pain, SOB, She has been noticing weight gain for about 20 pounds over the course of 2 months, she also feel puffiness of the face, she denies SOB, PND.

## 2018-04-19 NOTE — PROGRESS NOTE ADULT - SUBJECTIVE AND OBJECTIVE BOX
Nephrology OP Records :      Reason For Visit  SONYA LENNON is a 53 year old female being seen for a consultation visit.      History of Present Illness  Long standing DM, Charcot's feet, CKD - 4, Anemia,    D.W ,      Active Problems  Anemia (285.9) (D64.9)  Asthenia (780.79) (R53.1)  Asthma with chronic obstructive pulmonary disease (COPD) (493.20) (J44.9)  Atypical chest pain (786.59) (R07.89)  Bursitis of shoulder (726.10) (M75.50)  Chest pain (786.50) (R07.9)  CKD (chronic kidney disease), stage V (585.5) (N18.5)  Dehydration (276.51) (E86.0)  Depression (311) (F32.9)  Diabetic Charcot foot (250.60,713.5) (E11.610)  Diabetic foot ulcer (250.80,707.15) (E11.621,L97.509)  Dysuria (788.1) (R30.0)  Encounter for routine gynecological examination (V72.31) (Z01.419)  Flu vaccine need (V04.81) (Z23)  Functional memory problem (780.93) (R41.3)  Gastroparesis (536.3) (K31.84)  GERD (gastroesophageal reflux disease) (530.81) (K21.9)  Head ache (784.0) (R51)  Hypovitaminosis D (268.9) (E55.9)  IBS (irritable bowel syndrome) (564.1) (K58.9)  IDDM (insulin dependent diabetes mellitus) (250.00,V58.67) (E11.9,Z79.4)  Medication management (V58.69) (Z79.899)  Microscopic hematuria (599.72) (R31.29)  Multiple falls (V15.88) (R29.6)  Muscle spasm of left shoulder (728.85) (M62.838)  Nausea and/or vomiting (787.01) (R11.2)  Neuropathy, diabetic (250.60,357.2) (E11.40)  Nicotine dependence (305.1) (F17.200)  Pain syndrome, chronic (338.4) (G89.4)  Restrictive lung disease (518.89) (J98.4)  Secondary hyperparathyroidism (588.81) (N25.81)  Shortness of breath (786.05) (R06.02)  Toe infection (686.9) (L08.9)  UTI symptoms (788.99) (R39.9)  Vertigo (780.4) (R42)  Weakness of left side of body (728.87) (R53.1)    Past Medical History  History of Acute cystitis with hematuria (595.0) (N30.01)  History of Acute diverticulitis (562.11) (K57.92)  History of Acute intractable headache, unspecified headache type (784.0) (R51)  History of Acute vaginitis (616.10) (N76.0)  History of Acute vaginitis (616.10) (N76.0)  History of ADHD, predominantly hyperactive-impulsive subtype (314.01) (F90.1)  History of Anxiety (300.00) (F41.9)  History of Cerebrovascular accident (CVA) due to occlusion of right middle cerebral artery  (434.91) (I63.511)  History of DDD (degenerative disc disease), cervical (722.4) (M50.30)  History of Diverticulitis of intestine without perforation or abscess without bleeding  (562.11) (K57.92)  History of Edema of right lower extremity (782.3) (R60.0)  History of Fibromyalgia, primary (729.1) (M79.7)  History of Flu vaccine need (V04.81) (Z23)  History of Flu vaccine need (V04.81) (Z23)  History of Flu vaccine need (V04.81) (Z23)  History of Flu vaccine need (V04.81) (Z23)  History of acute sinusitis (V12.69) (Z87.09)  History of acute sinusitis (V12.69) (Z87.09)  History of arthritis (V13.4) (Z87.39)  History of asthma (V12.69) (Z87.09)  History of attention deficit disorder (V11.8) (Z86.59)  History of bipolar disorder (V11.1) (Z86.59)  History of candidal vulvovaginitis (V13.29) (Z86.19)  History of candidiasis of mouth (V12.09) (Z86.19)  History of chronic obstructive lung disease (V12.69) (Z87.09)  History of constipation (V12.79) (Z87.19)  History of diabetic neuropathy (V12.29) (Z86.39)  History of diarrhea (V12.79) (Z87.898)  History of ear pain (V12.49) (Z86.69)  History of hematuria (V13.09) (Z87.448)  History of influenza vaccination (V49.89) (Z92.29)  History of insomnia (V13.89) (Z87.898)  History of methicillin resistant Staphylococcus aureus infection (V12.04) (Z86.14)  History of nausea (V12.79) (Z87.898)  History of nausea (V12.79) (Z87.898)  History of nausea (V12.79) (Z87.898)  History of uterine leiomyoma (V13.29) (Z86.018)  History of vaginitis (V13.29) (Z87.42)  History of vaginitis (V13.29) (Z87.42)  History of weight gain (V15.89) (Z87.898)  History of wheezing (V12.69) (Z87.898)  History of HNP (herniated nucleus pulposus), lumbar (722.10) (M51.26)  History of Left wrist fracture (814.00) (S62.102A)  History of Muscle spasm (728.85) (M62.838)  History of Need for DTaP vaccination (V06.1) (Z23)  History of Osteomyelitis of toe (730.27) (M86.9)  Personal history of asthma (V12.69) (Z87.09)  History of Rhinosinusitis (473.9) (J32.9)  History of Throat pain (784.1) (R07.0)  History of TMJ arthralgia (524.62) (M26.629)  History of Upper airway cough syndrome (786.2) (R05)    Surgical History  History of Cholecystectomy  History of Knee Arthroscopy  History of Supracervical Hysterectomy  History of Surgery Left Foot Amputation MTP First Toe    Family History  Family history of Cancer of bone : Mother  Family history of COPD, severe : Father  Family history of chronic obstructive pulmonary disease (V17.6) (Z82.5) : Brother    Social History  Current every day smoker (305.1) (F17.200)  Feels safe at home  Household: Domestic partner  Lives with domestic partner  No alcohol use  Physical disability  Significant other  Unemployed (V62.0) (Z56.0)    Current Meds  Aspirin 325 MG Oral Tablet; TAKE 1 TABLET DAILY  BD Insulin Syringe Ultrafine 30G X 1/2" 1 ML Miscellaneous; USE AS DIRECTED  BD Pen Needle Short U/F 31G X 8 MM Miscellaneous; use TID as directed  Breo Ellipta 200-25 MCG/INH Inhalation Aerosol Powder Breath Activated; INHALE 1  PUFFS Daily  BuPROPion HCl ER (XL) 300 MG Oral Tablet Extended Release 24 Hour; TAKE 1  TABLET DAILY  BusPIRone HCl - 30 MG Oral Tablet; TAKE 1 TABLET TWICE DAILY  Calcitriol 0.25 MCG Oral Capsule; TAKE 1 CAPSULE Daily  Cephalexin 500 MG Oral Tablet; TAKE 1 TABLET EVERY 8 HOURS UNTIL ALL TAKEN  Cyanocobalamin 1000 MCG/ML Injection Solution; INJECT 1 ML INTRAMUSCULARLY  ONCE A MONTH  DiazePAM 5 MG Oral Tablet; 1 tab po tid and 2 at hs. MDD:5 tabs  DULoxetine HCl - 60 MG Oral Capsule Delayed Release Particles; TAKE 2 CAPSULES  AT BEDTIME  Feosol 200 (65 Fe) MG Oral Tablet; Take 1 tablet daily  Fluticasone Propionate 50 MCG/ACT Nasal Suspension; USE 2 SPRAYS IN EACH  NOSTRIL ONCE DAILY  Gabapentin 800 MG Oral Tablet; TAKE 1 TABLET 3 TIMES DAILY  Hydrocodone-Acetaminophen  MG Oral Tablet; TAKE 1 TO 2 TABLETS EVERY 4  TO 6 HOURS AS NEEDED FOR PAIN. MDD:6 tabs  Ipratropium-Albuterol 0.5-2.5 (3) MG/3ML Inhalation Solution; USE 1 UNIT DOSE IN  NEBULIZER EVERY 4 HOURS AS NEEDED  Lidocaine 5 % External Patch; APPLY 1 PATCH TO THE AFFECTED AREA AND LEAVE IN  PLACE FOR 12 HOURS, THEN REMOVE AND LEAVE OFF FOR 12 HOURS  Nicotine 21 MG/24HR Transdermal Patch 24 Hour; APPLY 1 PATCH DAILY AS DIRECTED  NovoLOG 100 UNIT/ML Subcutaneous Solution; INJECT 25 UNIT 3 times daily before  meals  Ondansetron HCl - 8 MG Oral Tablet; TAKE 1 TABLET 3 times daily  Pantoprazole Sodium 40 MG Oral Tablet Delayed Release; TAKE 1 TABLET TWICE DAILY  30 MINUTES BEFORE BREAKFAST AND DINNER  Polyethylene Glycol 3350 Oral Powder; MIX 1 CAPFUL (17GM) IN 8 OUNCES OF WATER,  JUICE, OR TEA AND DRINK DAILY  Proventil  (90 Base) MCG/ACT Inhalation Aerosol Solution; INHALE 1 TO 2  PUFFS EVERY 4 TO 6 HOURS AS NEEDED  Simethicone 80 MG Oral Tablet Chewable  Sulfamethoxazole-Trimethoprim 800-160 MG Oral Tablet; TAKE 1 TABLET TWICE DAILY  UNTIL FINISHED  Terconazole 0.8 % Vaginal Cream; INSERT 1 APPLICATORFUL INTRAVAGINALLY AT  BEDTIME  TraZODone HCl - 100 MG Oral Tablet; TAKE 2 TABLETS AT BEDTIME  Tresiba FlexTouch 200 UNIT/ML Subcutaneous Solution Pen-injector; INJECT 35 UNIT  AT BEDTIME  Vitamin D3 33952 UNIT Oral Capsule; 1 tablet weekly  ZyrTEC Allergy 10 MG Oral Tablet; Take 1 tablet twice daily    Allergies  No Known Drug Allergies    Review of Systems    Constitutional, Eyes, ENT, Cardiovascular, Respiratory, Gastrointestinal, Genitourinary, Musculoskeletal, Integumentary, Neurological, Psychiatric, Endocrine and Heme/Lymph are otherwise negative.      Results/Data  Results   06Apr2018 11:37AM   Vitamin D, 25-Hydroxy   Vitamin D 25 Hydroxy: 31.3 ng/mL Reference Range 30.0-80.0  Urinalysis + Microscopic Examination   Color: Yellow   Urine Appearance: Clear  Reference Range Clear  Nitrite: Negative  Reference Range Negative  Leukocyte Esterase Concentration: Negative  Reference Range Negative  Epithelial Cells: 2 /HPF Reference Range 0-5  U WBC: 2 /HPF Reference Range 0-5  Red Blood Cell - Urine: 50 /HPF Abnormal High Reference Range 0-4  Bacteria: Negative  Reference Range Negative  Hyaline Casts: 2 /LPF Reference Range 0-7  Specific Gravity: 1.012  Reference Range 1.010-1.025  Comment - Urine: Many Yeast   MICRO-UA: DONE   pH: 5.5  Reference Range 5.0-8.0  Protein, Urine: 100 mg/dL Abnormal Reference Range Negative  Glucose, Qual Urine: 100 mg/dL Abnormal Reference Range Negative  Ketone - Urine: Negative  Reference Range Negative  Bilirubin: Negative  Reference Range Negative  Blood: Large  Abnormal Reference Range Negative  Urobilinogen: Negative mg/dL Reference Range <2.0  Complete Blood Count w DIFF   WBC: 3.98 K/uL Reference Range 3.80-10.50  RBC Count: 3.04 M/uL Abnormal Low Reference Range 3.80-5.20  HGB: 8.9 g/dL Abnormal Low Reference Range 11.5-15.5  HCT: 27.4 % Abnormal Low Reference Range 34.5-45.0  Mean Cell Volume: 90.1 fl Reference Range 80.0-100.0  Mean Cell Hemoglobin: 29.3 pg Reference Range 27.0-34.0  Mean Cell Hemoglobin Conc: 32.5 gm/dL Reference Range 32.0-36.0  Red Cell Distrib Width: 13.7 % Reference Range 10.3-14.5  PLT: 96 K/uL Abnormal Low Reference Range 150-400  Neutrophil #: 2.79 K/uL Reference Range 1.80-7.40  Lymphocyte #: 0.82 K/uL Abnormal Low Reference Range 1.00-3.30  Monocyte #: 0.26 K/uL Reference Range 0.00-0.90  Eosinophil #: 0.08 K/uL Reference Range 0.00-0.50  Basophil #: 0.02 K/uL Reference Range 0.00-0.20  Neutrophil %: 70.1 % Reference Range 43.0-77.0  Lymphocyte %: 20.6 % Reference Range 13.0-44.0  Monocyte %: 6.5 % Reference Range 2.0-14.0  Eosinophil %: 2.0 % Reference Range 0.0-6.0  Basophil %: 0.5 % Reference Range 0.0-2.0  Auto Immature Granulocyte %: 0.3 % Reference Range 0.0-1.5  MAN DIFF?: N/A  Reference Range No  Hemoglobin A1C, Whole Blood   HGB A1C: 5.3 % Reference Range 4.0-5.6  Renal Panel   Sodium: 135 mmol/L Reference Range 135-145  Potassium: 5.9 mmol/L Abnormal High Reference Range 3.5-5.3  Chloride: 102 mmol/L Reference Range   CO2: 22 mmol/L Reference Range 22-31  Anion Gap, Serum: 11 mmol/L Reference Range 5-17  Glucose: 128 mg/dL Abnormal High Reference Range 70-99  BUN: 28 mg/dL Abnormal High Reference Range 7-23  Creatinine: 2.64 mg/dL Abnormal High Reference Range 0.50-1.30  Albumin: 3.8 g/dL Reference Range 3.3-5.0  Calcium, Serum: 8.1 mg/dL Abnormal Low Reference Range 8.4-10.5  Phosphorous: 4.2 mg/dL Reference Range 2.5-4.5  eGFR Non African-American: 20 mL/min/1.73M2 Abnormal Low Reference Range >=60  eGFR -American: 23 mL/min/1.73M2 Abnormal Low Reference Range >=60    Physical Exam  Constitutional: alert and in no acute distress . Chronically ill, pale, Sallow complexion,.     Eyes: the sclera and conjunctiva were normal, pupils were equal in size, round, and reactive to light, extraocular movements were intact and no strabismus was seen.   Right fundus: microaneurysms noted, narrowing of the arterioles noted, exudates noted.   Left fundus: microaneurysms noted, narrowing of the arterioles noted, exudates noted.     ENT: the ears and nose were normal in appearance, hearing was normal, both tympanic membranes were normal and the nasal mucosa and septum were normal . the oropharynx was normal. Dry MM,.     Neck: the appearance of the neck was normal, the neck was supple, no neck mass was observed, the thyroid was not enlarged and there were no palpable thyroid nodules . there was no jugular-venous distention. neck circumference is <17.     Pulmonary: no respiratory distress, normal respiratory rhythm and effort, no accessory muscle use, lungs were clear to auscultation bilaterally and palpation of the chest revealed no abnormalities . the lungs were normal to percussion.     Heart: the apical impulse was normal, heart rate was normal and rhythm regular, no gallops and no pericardial rub. A grade 2/6 systolic murmur was heard.     Vascular: carotid pulses were normal with no bruits, femoral pulses were normal without bruits and there were no varicosital changes. Edema: pitting edema present, bilateral 1+ pitting edema to the ankles.     Breasts: normal in appearance and no palpable masses.     Abdomen: normal bowel sounds, non-tender, no hepato-splenomegaly and no abdominal mass palpated. The abdomen was distended and tympanitic on palpation.     Lymphatics: The posterior cervical, anterior cervical, supraclavicular, axillary, femoral and inguinal nodes were non-tender and normal size.     Back: no costovertebral angle tenderness and no spinal tenderness.     Musculoskeletal: normal gait, no clubbing or cyanosis of the fingernails, no involuntary movements were seen, no joint swelling seen and muscle strength and tone were normal.     Skin: normal skin color and pigmentation, normal skin turgor and no rash.     Neurological: deep tendon reflexes were 2+ and symmetric, the sensory exam was normal to light touch and pinprick and no focal deficits.     Psychiatric: oriented to person, place, and time, insight and judgment were intact and the affect was normal.       Constitutional:  no acute distress and ill-appearing.   Eyes:  normal sclera/conjunctiva.   ENT:  the outer ears and nose were normal in appearance.   Neck:  no jugular venous distention.   Pulmonary:  no respiratory distress, lungs were clear to auscultation bilaterally.   Cardiac:  normal rate.   Psychiatric:  oriented to person, place, and time.      Assessment  Anemia in stage 5 chronic kidney disease, not on chronic dialysis (285.21,585.5)  (N18.5,D63.1)  CKD (chronic kidney disease), stage V (585.5) (N18.5)  Secondary hyperparathyroidism (588.81) (N25.81)  IDDM (insulin dependent diabetes mellitus) (250.00,V58.67) (E11.9,Z79.4)    DX : DMN , CKD - 4, Anemia,    Advanced vascular disease,     SHPT,    Enrolled in H.T. On RHEA,.        Plan  Anemia in stage 5 chronic kidney disease, not on chronic dialysis, CKD (chronic kidney  disease), stage V, IDDM (insulin dependent diabetes mellitus), Secondary  hyperparathyroidism   Administer: Procrit 74408 UNIT/ML Injection Solution; INJECT 1 ML SUBCUTANEOUSLY  WEEKLY; To Be Done: 13Apr2018  Asthenia   Administer: Cyanocobalamin 1000 MCG/ML Injection Solution; INJECT 1 ML  INTRAMUSCULARLY ONCE A MONTH; To Be Done: [id number]  IDDM (insulin dependent diabetes mellitus)   Stop: Lidocaine 5 % External Patch (Lidoderm)  Renew: BD Insulin Syringe Ultrafine 30G X 1/2" 1 ML Miscellaneous; USE AS DIRECTED  PMH: Acute cystitis with hematuria   Stop: Sulfamethoxazole-Trimethoprim 800-160 MG Oral Tablet (Bactrim DS)  PMH: History of acute sinusitis   Temporarily Stop: Fluticasone Propionate 50 MCG/ACT Nasal Suspension  PMH: History of asthma   Temporarily Stop: Ipratropium-Albuterol 0.5-2.5 (3) MG/3ML Inhalation Solution (DuoNeb)  PMH: History of wheezing, Nicotine dependence   Temporarily Stop: Breo Ellipta 200-25 MCG/INH Inhalation Aerosol Powder Breath  Activated    Electronically signed by : DALIA VARELA MD; Apr 13 2018 12:54PM EST (Author)

## 2018-04-19 NOTE — ED PROVIDER NOTE - CARE PLAN
Principal Discharge DX:	Diabetic foot ulcer  Secondary Diagnosis:	CKD (chronic kidney disease), stage V  Secondary Diagnosis:	Failure to thrive in adult

## 2018-04-19 NOTE — H&P ADULT - NSHPOUTPATIENTPROVIDERS_GEN_ALL_CORE
Alf Fischer (DPM)   [facility] Alf [facility] ([facility]   [address]

## 2018-04-19 NOTE — ED ADULT NURSE NOTE - OBJECTIVE STATEMENT
53 yof presents to ed stating she has uncontrolled pain with generalized edema. necrotic ulcer on foot, difficulty breathing with wheezing noted bilateral lower lobes. pt given nebulizer treatment. moves all extremities ambulates with assistance. denies changes in urination.

## 2018-04-20 DIAGNOSIS — D61.818 OTHER PANCYTOPENIA: ICD-10-CM

## 2018-04-20 DIAGNOSIS — N18.5 CHRONIC KIDNEY DISEASE, STAGE 5: ICD-10-CM

## 2018-04-20 DIAGNOSIS — E13.621 OTHER SPECIFIED DIABETES MELLITUS WITH FOOT ULCER: ICD-10-CM

## 2018-04-20 DIAGNOSIS — E11.9 TYPE 2 DIABETES MELLITUS WITHOUT COMPLICATIONS: ICD-10-CM

## 2018-04-20 DIAGNOSIS — G93.41 METABOLIC ENCEPHALOPATHY: ICD-10-CM

## 2018-04-20 DIAGNOSIS — K75.81 NONALCOHOLIC STEATOHEPATITIS (NASH): ICD-10-CM

## 2018-04-20 LAB
AMMONIA BLD-MCNC: 22 UMOL/L — SIGNIFICANT CHANGE UP (ref 11–55)
ANION GAP SERPL CALC-SCNC: 13 MMOL/L — SIGNIFICANT CHANGE UP (ref 5–17)
BUN SERPL-MCNC: 26 MG/DL — HIGH (ref 8–20)
CALCIUM SERPL-MCNC: 7.7 MG/DL — LOW (ref 8.6–10.2)
CHLORIDE SERPL-SCNC: 98 MMOL/L — SIGNIFICANT CHANGE UP (ref 98–107)
CO2 SERPL-SCNC: 25 MMOL/L — SIGNIFICANT CHANGE UP (ref 22–29)
CREAT SERPL-MCNC: 2.92 MG/DL — HIGH (ref 0.5–1.3)
FERRITIN SERPL-MCNC: 57 NG/ML — SIGNIFICANT CHANGE UP (ref 15–150)
GLUCOSE BLDC GLUCOMTR-MCNC: 122 MG/DL — HIGH (ref 70–99)
GLUCOSE BLDC GLUCOMTR-MCNC: 125 MG/DL — HIGH (ref 70–99)
GLUCOSE BLDC GLUCOMTR-MCNC: 136 MG/DL — HIGH (ref 70–99)
GLUCOSE BLDC GLUCOMTR-MCNC: 151 MG/DL — HIGH (ref 70–99)
GLUCOSE SERPL-MCNC: 118 MG/DL — HIGH (ref 70–115)
HBA1C BLD-MCNC: 5.6 % — SIGNIFICANT CHANGE UP (ref 4–5.6)
HCT VFR BLD CALC: 25 % — LOW (ref 34–45)
HCT VFR BLD CALC: 25.5 % — LOW (ref 37–47)
HGB BLD-MCNC: 8.1 G/DL — LOW (ref 12–16)
IRON SATN MFR SERPL: 22 UG/DL — LOW (ref 37–145)
IRON SATN MFR SERPL: 7 % — LOW (ref 14–50)
MAGNESIUM SERPL-MCNC: 1.5 MG/DL — LOW (ref 1.6–2.6)
MCHC RBC-ENTMCNC: 28.6 PG — SIGNIFICANT CHANGE UP (ref 27–31)
MCHC RBC-ENTMCNC: 31.8 G/DL — LOW (ref 32–36)
MCV RBC AUTO: 90.1 FL — SIGNIFICANT CHANGE UP (ref 81–99)
PHOSPHATE SERPL-MCNC: 4.5 MG/DL — SIGNIFICANT CHANGE UP (ref 2.4–4.7)
PLATELET # BLD AUTO: 65 K/UL — LOW (ref 150–400)
POTASSIUM SERPL-MCNC: 3.8 MMOL/L — SIGNIFICANT CHANGE UP (ref 3.5–5.3)
POTASSIUM SERPL-SCNC: 3.8 MMOL/L — SIGNIFICANT CHANGE UP (ref 3.5–5.3)
RBC # BLD: 2.83 M/UL — LOW (ref 4.4–5.2)
RBC # BLD: 2.83 M/UL — LOW (ref 4.4–5.2)
RBC # FLD: 13.8 % — SIGNIFICANT CHANGE UP (ref 11–15.6)
RETICS #: 12.5 K/UL — LOW (ref 25–125)
RETICS/RBC NFR: 4.4 % — HIGH (ref 0.5–2.5)
SODIUM SERPL-SCNC: 136 MMOL/L — SIGNIFICANT CHANGE UP (ref 135–145)
TIBC SERPL-MCNC: 303 UG/DL — SIGNIFICANT CHANGE UP (ref 220–430)
TRANSFERRIN SERPL-MCNC: 212 MG/DL — SIGNIFICANT CHANGE UP (ref 192–382)
TSH SERPL-MCNC: 4.32 UIU/ML — HIGH (ref 0.27–4.2)
VIT B12 SERPL-MCNC: 857 PG/ML — SIGNIFICANT CHANGE UP (ref 232–1245)
WBC # BLD: 1.8 K/UL — LOW (ref 4.8–10.8)
WBC # FLD AUTO: 1.8 K/UL — LOW (ref 4.8–10.8)

## 2018-04-20 PROCEDURE — 99233 SBSQ HOSP IP/OBS HIGH 50: CPT

## 2018-04-20 RX ORDER — IRON SUCROSE 20 MG/ML
200 INJECTION, SOLUTION INTRAVENOUS EVERY 24 HOURS
Qty: 0 | Refills: 0 | Status: COMPLETED | OUTPATIENT
Start: 2018-04-20 | End: 2018-04-24

## 2018-04-20 RX ORDER — GABAPENTIN 400 MG/1
800 CAPSULE ORAL THREE TIMES A DAY
Qty: 0 | Refills: 0 | Status: DISCONTINUED | OUTPATIENT
Start: 2018-04-20 | End: 2018-04-26

## 2018-04-20 RX ORDER — FUROSEMIDE 40 MG
40 TABLET ORAL DAILY
Qty: 0 | Refills: 0 | Status: DISCONTINUED | OUTPATIENT
Start: 2018-04-21 | End: 2018-04-21

## 2018-04-20 RX ORDER — MAGNESIUM OXIDE 400 MG ORAL TABLET 241.3 MG
400 TABLET ORAL
Qty: 0 | Refills: 0 | Status: DISCONTINUED | OUTPATIENT
Start: 2018-04-20 | End: 2018-04-26

## 2018-04-20 RX ORDER — MAGNESIUM SULFATE 500 MG/ML
2 VIAL (ML) INJECTION EVERY 4 HOURS
Qty: 0 | Refills: 0 | Status: COMPLETED | OUTPATIENT
Start: 2018-04-20 | End: 2018-04-20

## 2018-04-20 RX ORDER — LACTULOSE 10 G/15ML
15 SOLUTION ORAL
Qty: 0 | Refills: 0 | Status: DISCONTINUED | OUTPATIENT
Start: 2018-04-20 | End: 2018-04-21

## 2018-04-20 RX ADMIN — OXYCODONE HYDROCHLORIDE 5 MILLIGRAM(S): 5 TABLET ORAL at 11:33

## 2018-04-20 RX ADMIN — Medication 50 GRAM(S): at 13:14

## 2018-04-20 RX ADMIN — MONTELUKAST 10 MILLIGRAM(S): 4 TABLET, CHEWABLE ORAL at 11:33

## 2018-04-20 RX ADMIN — BUDESONIDE AND FORMOTEROL FUMARATE DIHYDRATE 2 PUFF(S): 160; 4.5 AEROSOL RESPIRATORY (INHALATION) at 09:54

## 2018-04-20 RX ADMIN — Medication 325 MILLIGRAM(S): at 11:34

## 2018-04-20 RX ADMIN — OXYCODONE HYDROCHLORIDE 5 MILLIGRAM(S): 5 TABLET ORAL at 04:02

## 2018-04-20 RX ADMIN — OXYCODONE HYDROCHLORIDE 5 MILLIGRAM(S): 5 TABLET ORAL at 08:36

## 2018-04-20 RX ADMIN — DULOXETINE HYDROCHLORIDE 60 MILLIGRAM(S): 30 CAPSULE, DELAYED RELEASE ORAL at 11:34

## 2018-04-20 RX ADMIN — BUDESONIDE AND FORMOTEROL FUMARATE DIHYDRATE 2 PUFF(S): 160; 4.5 AEROSOL RESPIRATORY (INHALATION) at 20:24

## 2018-04-20 RX ADMIN — LACTULOSE 15 GRAM(S): 10 SOLUTION ORAL at 13:14

## 2018-04-20 RX ADMIN — GABAPENTIN 800 MILLIGRAM(S): 400 CAPSULE ORAL at 22:20

## 2018-04-20 RX ADMIN — Medication 50 GRAM(S): at 17:38

## 2018-04-20 RX ADMIN — MAGNESIUM OXIDE 400 MG ORAL TABLET 400 MILLIGRAM(S): 241.3 TABLET ORAL at 17:38

## 2018-04-20 RX ADMIN — ZINC SULFATE TAB 220 MG (50 MG ZINC EQUIVALENT) 220 MILLIGRAM(S): 220 (50 ZN) TAB at 11:34

## 2018-04-20 RX ADMIN — PANTOPRAZOLE SODIUM 40 MILLIGRAM(S): 20 TABLET, DELAYED RELEASE ORAL at 05:13

## 2018-04-20 RX ADMIN — HEPARIN SODIUM 5000 UNIT(S): 5000 INJECTION INTRAVENOUS; SUBCUTANEOUS at 17:38

## 2018-04-20 RX ADMIN — IRON SUCROSE 110 MILLIGRAM(S): 20 INJECTION, SOLUTION INTRAVENOUS at 11:35

## 2018-04-20 RX ADMIN — Medication 100 MILLIGRAM(S): at 22:20

## 2018-04-20 RX ADMIN — Medication 500 MILLIGRAM(S): at 11:34

## 2018-04-20 RX ADMIN — OXYCODONE HYDROCHLORIDE 5 MILLIGRAM(S): 5 TABLET ORAL at 17:38

## 2018-04-20 RX ADMIN — MAGNESIUM OXIDE 400 MG ORAL TABLET 400 MILLIGRAM(S): 241.3 TABLET ORAL at 11:34

## 2018-04-20 RX ADMIN — GABAPENTIN 800 MILLIGRAM(S): 400 CAPSULE ORAL at 11:33

## 2018-04-20 RX ADMIN — LACTULOSE 15 GRAM(S): 10 SOLUTION ORAL at 22:23

## 2018-04-20 RX ADMIN — HEPARIN SODIUM 5000 UNIT(S): 5000 INJECTION INTRAVENOUS; SUBCUTANEOUS at 05:13

## 2018-04-20 RX ADMIN — OXYCODONE HYDROCHLORIDE 5 MILLIGRAM(S): 5 TABLET ORAL at 12:52

## 2018-04-20 RX ADMIN — BUPROPION HYDROCHLORIDE 300 MILLIGRAM(S): 150 TABLET, EXTENDED RELEASE ORAL at 11:33

## 2018-04-20 NOTE — PROGRESS NOTE ADULT - ASSESSMENT
1) CKD IV  2) Diabetic Nephropathy  3) Anemia of renal disease  4) ?Liver cirrhosis    Renally optimized; SCr stable 2.7-2.9  CKD IV; consult Dr. Ward for AVF on this admission  Can give lasix 40mg IVP daily  Anemia not at goal; on iron and RHEA  d/w Dr. Millard

## 2018-04-20 NOTE — PROGRESS NOTE ADULT - SUBJECTIVE AND OBJECTIVE BOX
[facility] DIVISION OF KIDNEY DISEASES AND HYPERTENSION -- FOLLOW UP NOTE  --------------------------------------------------------------------------------  Chief Complaint: Diabetic nephropathy    24 hour events/subjective:  Pt seen and examined;  No distress; eating lunch  Renal fx relatively stable; not grossly uremic      PAST HISTORY  --------------------------------------------------------------------------------  No significant changes to PMH, PSH, FHx, SHx, unless otherwise noted    ALLERGIES & MEDICATIONS  --------------------------------------------------------------------------------  Allergies    No Known Allergies    Intolerances      Standing Inpatient Medications  ascorbic acid 500 milliGRAM(s) Oral daily  buDESOnide  80 MICROgram(s)/formoterol 4.5 MICROgram(s) Inhaler 2 Puff(s) Inhalation two times a day  buPROPion XL . 300 milliGRAM(s) Oral daily  dextrose 5%. 1000 milliLiter(s) IV Continuous <Continuous>  dextrose 50% Injectable 12.5 Gram(s) IV Push once  dextrose 50% Injectable 25 Gram(s) IV Push once  dextrose 50% Injectable 25 Gram(s) IV Push once  DULoxetine 60 milliGRAM(s) Oral daily  epoetin krunal Injectable 8000 Unit(s) SubCutaneous <User Schedule>  ferrous    sulfate 325 milliGRAM(s) Oral daily  gabapentin 800 milliGRAM(s) Oral three times a day  heparin  Injectable 5000 Unit(s) SubCutaneous every 12 hours  insulin lispro (HumaLOG) corrective regimen sliding scale   SubCutaneous three times a day before meals  iron sucrose IVPB 200 milliGRAM(s) IV Intermittent every 24 hours  lactulose Syrup 15 Gram(s) Oral four times a day  magnesium oxide 400 milliGRAM(s) Oral three times a day with meals  magnesium sulfate  IVPB 2 Gram(s) IV Intermittent every 4 hours  montelukast 10 milliGRAM(s) Oral daily  pantoprazole    Tablet 40 milliGRAM(s) Oral before breakfast  rifaximin 550 milliGRAM(s) Oral two times a day  traZODone 100 milliGRAM(s) Oral at bedtime  zinc sulfate 220 milliGRAM(s) Oral daily    PRN Inpatient Medications  dextrose Gel 1 Dose(s) Oral once PRN  glucagon  Injectable 1 milliGRAM(s) IntraMuscular once PRN  oxyCODONE    IR 5 milliGRAM(s) Oral every 6 hours PRN      REVIEW OF SYSTEMS  --------------------------------------------------------------------------------  Gen: No weight changes, fatigue, fevers/chills, weakness  Skin: No rashes  Head/Eyes/Ears/Mouth: No headache; Normal hearing; Normal vision w/o blurriness; No sinus pain/discomfort, sore throat  Respiratory: No dyspnea, cough, wheezing, hemoptysis  CV: No chest pain, PND, orthopnea  GI: No abdominal pain, diarrhea, constipation, nausea, vomiting, melena, hematochezia  : No increased frequency, dysuria, hematuria, nocturia  MSK: No joint pain/swelling; no back pain; no edema  Neuro: No dizziness/lightheadedness, weakness, seizures, numbness, tingling  Heme: No easy bruising or bleeding  Endo: No heat/cold intolerance  Psych: No significant nervousness, anxiety, stress, depression    All other systems were reviewed and are negative, except as noted.    VITALS/PHYSICAL EXAM  --------------------------------------------------------------------------------  T(C): 36.4 (04-20-18 @ 11:21), Max: 36.8 (04-20-18 @ 10:02)  HR: 73 (04-20-18 @ 11:21) (70 - 79)  BP: 134/60 (04-20-18 @ 11:21) (102/66 - 134/60)  RR: 13 (04-20-18 @ 11:21) (13 - 20)  SpO2: 89% (04-20-18 @ 11:21) (89% - 97%)  Wt(kg): --  Height (cm): 170.18 (04-19-18 @ 13:21)  Weight (kg): 77.1 (04-19-18 @ 13:21)  BMI (kg/m2): 26.6 (04-19-18 @ 13:21)  BSA (m2): 1.89 (04-19-18 @ 13:21)      Physical Exam:  	Gen: NAD  	HEENT: PERRL, supple neck, clear oropharynx  	Pulm: CTA B/L  	CV: RRR, S1S2; no rub  	Back: No spinal or CVA tenderness; no sacral edema  	Abd: +BS, soft, nontender/nondistended  	: No suprapubic tenderness  	UE: Warm, FROM, no clubbing, intact strength; no edema; no asterixis  	LE: Warm, FROM, no clubbing, intact strength; + edema  	Neuro: No focal deficits, intact gait  	Psych: Normal affect and mood  	Skin: Warm, without rashes  	  LABS/STUDIES  --------------------------------------------------------------------------------              8.1    1.8   >-----------<  65       [04-20-18 @ 08:37]              25.5     136  |  98  |  26.0  ----------------------------<  118      [04-20-18 @ 08:37]  3.8   |  25.0  |  2.92        Ca     7.7     [04-20-18 @ 08:37]      Mg     1.5     [04-20-18 @ 08:37]      Phos  4.5     [04-20-18 @ 08:37]    TPro  6.6  /  Alb  3.7  /  TBili  0.2  /  DBili  x   /  AST  26  /  ALT  13  /  AlkPhos  108  [04-19-18 @ 15:25]    PT/INR: PT 14.9 , INR 1.35       [04-19-18 @ 15:25]  PTT: 32.9       [04-19-18 @ 15:25]      Creatinine Trend:  SCr 2.92 [04-20 @ 08:37]  SCr 2.95 [04-19 @ 22:40]  SCr 2.88 [04-19 @ 15:25]    Urinalysis - [04-19-18 @ 21:30]      Color Yellow / Appearance Clear / SG 1.010 / pH 6.0      Gluc Negative / Ketone Negative  / Bili Negative / Urobili Negative       Blood Large / Protein 100 / Leuk Est Negative / Nitrite Negative      RBC 11-25 / WBC 0-2 / Hyaline  / Gran  / Sq Epi  / Non Sq Epi Few / Bacteria Occasional      Iron 22, TIBC 303, %sat 7      [04-20-18 @ 08:37]  Ferritin 57      [04-20-18 @ 08:37]  HbA1c 5.6      [04-20-18 @ 08:37]  TSH 4.32      [04-20-18 @ 11:11]

## 2018-04-20 NOTE — CONSULT NOTE ADULT - SUBJECTIVE AND OBJECTIVE BOX
[facility]  MD Petros Ch MD  [phone]  Answering Sevice : [phone]        SONYA [id number]      HPI:  54 y/o F with Hx of Fibromyalgia, DM, Osteopenia, Matamoros Esophagus seen in Ed for Left Foot Plantar Ulcer, worsening kidney function who is well know to Nephrology service, she went to her Podiatrist (Dr. Sanchez) for her wound check for her left foot wound, Her podiatrist send her to ED because she was looking pale, weak and was having swelling and edema every where, as per Dr Sanchez her wound looks ok, she has no discharge or pus noted from the  wound, Patient denies nausea, vomiting, fever, chills, chest pain, SOB, She has been noticing weight gain for about 20 pounds over the course of 2 months, she also feel puffiness of the face, she denies SOB, PND. (2018 17:59). Noted to have moderatley severe pancytopenia. Also there is a history of liver cirrhosis and prior history of having a "low blood count" in the past.      PAST MEDICAL & SURGICAL HISTORY:  Fibromyalgia  DM (diabetes mellitus)  Foot ulcer: Left Foot  Osteopenia  Chronic pain  Back ache  Arthritis  Shoulder joint stiffness, left  Matamoros esophagus  Stomach ulcer  Diabetes  Asthma  S/P knee surgery  S/P hysterectomy  S/P cholecystectomy      ANTIBIOTICS  rifaximin 550 milliGRAM(s) Oral two times a day      Allergies    No Known Allergies    Intolerances        SOCIAL HISTORY:    FAMILY HISTORY:  No pertinent family history in first degree relatives      Vital Signs Last 24 Hrs  T(C): 36.4 (2018 11:21), Max: 36.8 (2018 10:02)  T(F): 97.5 (2018 11:21), Max: 98.2 (2018 10:02)  HR: 73 (2018 11:21) (70 - 79)  BP: 134/60 (2018 11:21) (102/66 - 134/60)  BP(mean): --  RR: 13 (2018 11:21) (13 - 20)  SpO2: 89% (2018 11:21) (89% - 97%)  Drug Dosing Weight  Height (cm): 170.18 (2018 13:21)  Weight (kg): 77.1 (2018 13:21)  BMI (kg/m2): 26.6 (2018 13:21)  BSA (m2): 1.89 (2018 13:21)      REVIEW OF SYSTEMS:    CONSTITUTIONAL:  As per HPI.    HEENT:  Eyes:  No diplopia or blurred vision. ENT:  No earache, sore throat or runny nose.    CARDIOVASCULAR:  No pressure, squeezing, strangling, tightness, heaviness or aching about the chest, neck, axilla or epigastrium.    RESPIRATORY:  No cough, shortness of breath, PND or orthopnea.    GASTROINTESTINAL:  No nausea, vomiting or diarrhea.    GENITOURINARY:  No dysuria, frequency or urgency.    MUSCULOSKELETAL:  As per HPI.    SKIN:  No change in skin, hair or nails.    NEUROLOGIC:  No paresthesias, fasciculations, seizures or weakness.    PSYCHIATRIC:  No disorder of thought or mood.    ENDOCRINE:  No heat or cold intolerance, polyuria or polydipsia.    HEMATOLOGICAL:  No easy bruising or bleeding.           PHYSICAL EXAMINATION:    GENERAL: The patient is a well-developed, well-nourished _____in no apparent distress. ___ is alert and oriented x3.    VITAL SIGNS:     HEENT: Head is normocephalic and atraumatic. Extraocular muscles are intact. Pupils are equal, round, and reactive to light and accommodation. Nares appeared normal. Mouth is well hydrated and without lesions. Mucous membranes are moist. Posterior pharynx clear of any exudate or lesions.    NECK: Supple. No carotid bruits.  No lymphadenopathy or thyromegaly.    LUNGS: Clear to auscultation.    HEART: Regular rate and rhythm without murmur.    ABDOMEN: Soft, nontender, and nondistended.  Positive bowel sounds.  No hepatosplenomegaly was noted.    EXTREMITIES: Without any cyanosis, clubbing, rash, lesions or edema. dressed left foot    NEUROLOGIC: Cranial nerves II through XII are grossly intact.    PSYCHIATRIC: Flat affect, but denies suicidal or homicidal ideations.  SKIN: No ulceration or induration present.    MICROBIOLOGY:      LABS:                        8.1    1.8   )-----------( 65       ( 2018 08:37 )             25.5     04-20    136  |  98  |  26.0<H>  ----------------------------<  118<H>  3.8   |  25.0  |  2.92<H>    Ca    7.7<L>      2018 08:37  Phos  4.5     04-20  Mg     1.5     -20    TPro  6.6  /  Alb  3.7  /  TBili  0.2<L>  /  DBili  x   /  AST  26  /  ALT  13  /  AlkPhos  108  -19    PT/INR - ( 2018 15:25 )   PT: 14.9 sec;   INR: 1.35 ratio         PTT - ( 2018 15:25 )  PTT:32.9 sec  Urinalysis Basic - ( 2018 21:30 )    Color: Yellow / Appearance: Clear / S.010 / pH: x  Gluc: x / Ketone: Negative  / Bili: Negative / Urobili: Negative mg/dL   Blood: x / Protein: 100 mg/dL / Nitrite: Negative   Leuk Esterase: Negative / RBC: 11-25 /HPF / WBC 0-2   Sq Epi: x / Non Sq Epi: Few / Bacteria: Occasional      ASSESSMENT:  pancytopenia  non healing left foot ulcer  History of liver cirrhosis??  ESRD    PLAN:  since her ANC is low, consider starting on Granix 400 mcg if develops fever.  Antibiotics for foot ulcer.  clarify her cirrhosis.  the severity of her pancytopenia may improve with resolution of infection.      Alex Handley M.D.

## 2018-04-20 NOTE — PROGRESS NOTE ADULT - ASSESSMENT
52 y/o F with Hx of Fibromyalgia, DM, Osteopenia, Matamoros Esophagus seen in Ed for Left Foot Plantar Ulcer, worsening kidney function who is well know to Nephrology service, she went to her Podiatrist (Dr. Sanchez) for her wound check for her left foot wound, Her podiatrist send her to ED because she was looking pale, weak and was having swelling and edema

## 2018-04-20 NOTE — PROGRESS NOTE ADULT - SUBJECTIVE AND OBJECTIVE BOX
seen for shortness of breath    complains of abd distension, poor appetite, weight gain  ROS otherwise negative     MEDICATIONS  (STANDING):  ascorbic acid 500 milliGRAM(s) Oral daily  buDESOnide  80 MICROgram(s)/formoterol 4.5 MICROgram(s) Inhaler 2 Puff(s) Inhalation two times a day  buPROPion XL . 300 milliGRAM(s) Oral daily  dextrose 5%. 1000 milliLiter(s) (50 mL/Hr) IV Continuous <Continuous>  dextrose 50% Injectable 12.5 Gram(s) IV Push once  dextrose 50% Injectable 25 Gram(s) IV Push once  dextrose 50% Injectable 25 Gram(s) IV Push once  DULoxetine 60 milliGRAM(s) Oral daily  epoetin krunal Injectable 8000 Unit(s) SubCutaneous <User Schedule>  ferrous    sulfate 325 milliGRAM(s) Oral daily  furosemide Infusion 10 mG/Hr (5 mL/Hr) IV Continuous <Continuous>  gabapentin 800 milliGRAM(s) Oral three times a day  heparin  Injectable 5000 Unit(s) SubCutaneous every 12 hours  insulin lispro (HumaLOG) corrective regimen sliding scale   SubCutaneous three times a day before meals  iron sucrose IVPB 200 milliGRAM(s) IV Intermittent every 24 hours  lactulose Syrup 15 Gram(s) Oral four times a day  magnesium oxide 400 milliGRAM(s) Oral three times a day with meals  magnesium sulfate  IVPB 2 Gram(s) IV Intermittent every 4 hours  montelukast 10 milliGRAM(s) Oral daily  pantoprazole    Tablet 40 milliGRAM(s) Oral before breakfast  rifaximin 550 milliGRAM(s) Oral two times a day  silver sulfADIAZINE 1% Cream 1 Application(s) Topical daily  traZODone 100 milliGRAM(s) Oral at bedtime  zinc sulfate 220 milliGRAM(s) Oral daily    MEDICATIONS  (PRN):  dextrose Gel 1 Dose(s) Oral once PRN Blood Glucose LESS THAN 70 milliGRAM(s)/deciliter  glucagon  Injectable 1 milliGRAM(s) IntraMuscular once PRN Glucose LESS THAN 70 milligrams/deciliter  oxyCODONE    IR 5 milliGRAM(s) Oral every 6 hours PRN Moderate to Severe Pain (7 - 10)      Allergies    No Known Allergies      Vital Signs Last 24 Hrs  T(C): 36.8 (2018 10:02), Max: 36.8 (2018 10:02)  T(F): 98.2 (2018 10:02), Max: 98.2 (2018 10:02)  HR: 71 (2018 10:02) (70 - 87)  BP: 109/72 (2018 10:02) (102/66 - 143/84)  BP(mean): --  RR: 20 (2018 10:02) (18 - 20)  SpO2: 97% (2018 10:02) (94% - 97%)    PHYSICAL EXAM:    GENERAL: NAD pale  CHEST/LUNG: crackles base. intermittent wheeze  HEART: Regular rate and rhythm; S1 S2  ABDOMEN: Softly distended Bowel sounds present  EXTREMITIES:  no edema.  NERVOUS SYSTEM:  Alert & Oriented X3, slow responses. nonfocal neuro    LABS:                        8.1    1.8   )-----------( 65       ( 2018 08:37 )             25.5     04-20    136  |  98  |  26.0<H>  ----------------------------<  118<H>  3.8   |  25.0  |  2.92<H>    Ca    7.7<L>      2018 08:37  Phos  4.5     04-20  Mg     1.5     04-20    TPro  6.6  /  Alb  3.7  /  TBili  0.2<L>  /  DBili  x   /  AST  26  /  ALT  13  /  AlkPhos  108  04-19    PT/INR - ( 2018 15:25 )   PT: 14.9 sec;   INR: 1.35 ratio         PTT - ( 2018 15:25 )  PTT:32.9 sec  Urinalysis Basic - ( 2018 21:30 )    Color: Yellow / Appearance: Clear / S.010 / pH: x  Gluc: x / Ketone: Negative  / Bili: Negative / Urobili: Negative mg/dL   Blood: x / Protein: 100 mg/dL / Nitrite: Negative   Leuk Esterase: Negative / RBC: 11-25 /HPF / WBC 0-2   Sq Epi: x / Non Sq Epi: Few / Bacteria: Occasional        CAPILLARY BLOOD GLUCOSE      POCT Blood Glucose.: 122 mg/dL (2018 08:35)  POCT Blood Glucose.: 109 mg/dL (2018 21:16)        RADIOLOGY & ADDITIONAL TESTS:

## 2018-04-21 DIAGNOSIS — K76.0 FATTY (CHANGE OF) LIVER, NOT ELSEWHERE CLASSIFIED: ICD-10-CM

## 2018-04-21 LAB
ALBUMIN SERPL ELPH-MCNC: 3.5 G/DL — SIGNIFICANT CHANGE UP (ref 3.3–5.2)
ALP SERPL-CCNC: 109 U/L — SIGNIFICANT CHANGE UP (ref 40–120)
ALT FLD-CCNC: 12 U/L — SIGNIFICANT CHANGE UP
ANION GAP SERPL CALC-SCNC: 16 MMOL/L — SIGNIFICANT CHANGE UP (ref 5–17)
AST SERPL-CCNC: 21 U/L — SIGNIFICANT CHANGE UP
BILIRUB SERPL-MCNC: 0.3 MG/DL — LOW (ref 0.4–2)
BLD GP AB SCN SERPL QL: SIGNIFICANT CHANGE UP
BUN SERPL-MCNC: 27 MG/DL — HIGH (ref 8–20)
CALCIUM SERPL-MCNC: 7.8 MG/DL — LOW (ref 8.6–10.2)
CHLORIDE SERPL-SCNC: 99 MMOL/L — SIGNIFICANT CHANGE UP (ref 98–107)
CO2 SERPL-SCNC: 23 MMOL/L — SIGNIFICANT CHANGE UP (ref 22–29)
CREAT SERPL-MCNC: 3.18 MG/DL — HIGH (ref 0.5–1.3)
FOLATE RBC-MCNC: 1236 NG/ML — SIGNIFICANT CHANGE UP (ref 499–1504)
GLUCOSE BLDC GLUCOMTR-MCNC: 126 MG/DL — HIGH (ref 70–99)
GLUCOSE BLDC GLUCOMTR-MCNC: 129 MG/DL — HIGH (ref 70–99)
GLUCOSE BLDC GLUCOMTR-MCNC: 146 MG/DL — HIGH (ref 70–99)
GLUCOSE BLDC GLUCOMTR-MCNC: 156 MG/DL — HIGH (ref 70–99)
GLUCOSE SERPL-MCNC: 131 MG/DL — HIGH (ref 70–115)
HCT VFR BLD CALC: 27.6 % — LOW (ref 37–47)
HGB BLD-MCNC: 8.6 G/DL — LOW (ref 12–16)
MCHC RBC-ENTMCNC: 28.1 PG — SIGNIFICANT CHANGE UP (ref 27–31)
MCHC RBC-ENTMCNC: 31.2 G/DL — LOW (ref 32–36)
MCV RBC AUTO: 90.2 FL — SIGNIFICANT CHANGE UP (ref 81–99)
PLATELET # BLD AUTO: 78 K/UL — LOW (ref 150–400)
POTASSIUM SERPL-MCNC: 4.3 MMOL/L — SIGNIFICANT CHANGE UP (ref 3.5–5.3)
POTASSIUM SERPL-SCNC: 4.3 MMOL/L — SIGNIFICANT CHANGE UP (ref 3.5–5.3)
PROT SERPL-MCNC: 6.4 G/DL — LOW (ref 6.6–8.7)
RBC # BLD: 3.06 M/UL — LOW (ref 4.4–5.2)
RBC # FLD: 13.5 % — SIGNIFICANT CHANGE UP (ref 11–15.6)
SODIUM SERPL-SCNC: 138 MMOL/L — SIGNIFICANT CHANGE UP (ref 135–145)
TYPE + AB SCN PNL BLD: SIGNIFICANT CHANGE UP
WBC # BLD: 2.3 K/UL — LOW (ref 4.8–10.8)
WBC # FLD AUTO: 2.3 K/UL — LOW (ref 4.8–10.8)

## 2018-04-21 PROCEDURE — 99233 SBSQ HOSP IP/OBS HIGH 50: CPT

## 2018-04-21 PROCEDURE — 99222 1ST HOSP IP/OBS MODERATE 55: CPT

## 2018-04-21 PROCEDURE — G0365: CPT | Mod: 26

## 2018-04-21 PROCEDURE — 76700 US EXAM ABDOM COMPLETE: CPT | Mod: 26

## 2018-04-21 RX ORDER — ONDANSETRON 8 MG/1
4 TABLET, FILM COATED ORAL EVERY 6 HOURS
Qty: 0 | Refills: 0 | Status: DISCONTINUED | OUTPATIENT
Start: 2018-04-21 | End: 2018-04-26

## 2018-04-21 RX ADMIN — MAGNESIUM OXIDE 400 MG ORAL TABLET 400 MILLIGRAM(S): 241.3 TABLET ORAL at 18:18

## 2018-04-21 RX ADMIN — GABAPENTIN 800 MILLIGRAM(S): 400 CAPSULE ORAL at 05:28

## 2018-04-21 RX ADMIN — OXYCODONE HYDROCHLORIDE 5 MILLIGRAM(S): 5 TABLET ORAL at 22:39

## 2018-04-21 RX ADMIN — PANTOPRAZOLE SODIUM 40 MILLIGRAM(S): 20 TABLET, DELAYED RELEASE ORAL at 05:27

## 2018-04-21 RX ADMIN — MONTELUKAST 10 MILLIGRAM(S): 4 TABLET, CHEWABLE ORAL at 14:15

## 2018-04-21 RX ADMIN — ERYTHROPOIETIN 8000 UNIT(S): 10000 INJECTION, SOLUTION INTRAVENOUS; SUBCUTANEOUS at 18:18

## 2018-04-21 RX ADMIN — Medication 500 MILLIGRAM(S): at 14:15

## 2018-04-21 RX ADMIN — BUDESONIDE AND FORMOTEROL FUMARATE DIHYDRATE 2 PUFF(S): 160; 4.5 AEROSOL RESPIRATORY (INHALATION) at 08:57

## 2018-04-21 RX ADMIN — GABAPENTIN 800 MILLIGRAM(S): 400 CAPSULE ORAL at 14:15

## 2018-04-21 RX ADMIN — IRON SUCROSE 110 MILLIGRAM(S): 20 INJECTION, SOLUTION INTRAVENOUS at 18:18

## 2018-04-21 RX ADMIN — OXYCODONE HYDROCHLORIDE 5 MILLIGRAM(S): 5 TABLET ORAL at 23:39

## 2018-04-21 RX ADMIN — BUDESONIDE AND FORMOTEROL FUMARATE DIHYDRATE 2 PUFF(S): 160; 4.5 AEROSOL RESPIRATORY (INHALATION) at 20:18

## 2018-04-21 RX ADMIN — DULOXETINE HYDROCHLORIDE 60 MILLIGRAM(S): 30 CAPSULE, DELAYED RELEASE ORAL at 14:15

## 2018-04-21 RX ADMIN — MAGNESIUM OXIDE 400 MG ORAL TABLET 400 MILLIGRAM(S): 241.3 TABLET ORAL at 08:10

## 2018-04-21 RX ADMIN — ONDANSETRON 4 MILLIGRAM(S): 8 TABLET, FILM COATED ORAL at 08:08

## 2018-04-21 RX ADMIN — ZINC SULFATE TAB 220 MG (50 MG ZINC EQUIVALENT) 220 MILLIGRAM(S): 220 (50 ZN) TAB at 14:15

## 2018-04-21 RX ADMIN — BUPROPION HYDROCHLORIDE 300 MILLIGRAM(S): 150 TABLET, EXTENDED RELEASE ORAL at 14:14

## 2018-04-21 RX ADMIN — GABAPENTIN 800 MILLIGRAM(S): 400 CAPSULE ORAL at 21:40

## 2018-04-21 RX ADMIN — OXYCODONE HYDROCHLORIDE 5 MILLIGRAM(S): 5 TABLET ORAL at 05:27

## 2018-04-21 RX ADMIN — Medication 40 MILLIGRAM(S): at 05:27

## 2018-04-21 RX ADMIN — HEPARIN SODIUM 5000 UNIT(S): 5000 INJECTION INTRAVENOUS; SUBCUTANEOUS at 05:26

## 2018-04-21 RX ADMIN — Medication 100 MILLIGRAM(S): at 21:40

## 2018-04-21 RX ADMIN — HEPARIN SODIUM 5000 UNIT(S): 5000 INJECTION INTRAVENOUS; SUBCUTANEOUS at 18:18

## 2018-04-21 RX ADMIN — OXYCODONE HYDROCHLORIDE 5 MILLIGRAM(S): 5 TABLET ORAL at 05:57

## 2018-04-21 RX ADMIN — MAGNESIUM OXIDE 400 MG ORAL TABLET 400 MILLIGRAM(S): 241.3 TABLET ORAL at 14:15

## 2018-04-21 NOTE — CONSULT NOTE ADULT - SUBJECTIVE AND OBJECTIVE BOX
[facility]  INFECTIOUS DISEASES AND INTERNAL MEDICINE at [place]  =======================================================  Dwight Vick MD  Diplomates American Board of Internal Medicine and Infectious Diseases  =======================================================      [id number]  SONYA LENNON     CC: Patient is a 53y old  Female who presents with a chief complaint of Swelling all around the body and foot wound (2018 17:59)    52y/o  Female with h/o Fibromyalgia, DM, Osteopenia, Matamoros Esophagus. Patient presents with Left Foot Ulcer. Patient reports she went to see her podiatrist and was sent to the ER since patient was pale and weak. Patient did not report and fevers at home. Reports an occasional chill. No pain at the left foot ulcer site. In the ER patient was afebrile. No leukocytosis. Patient was not started on antibiotics. Patient was seen by podiatry and a superficial culture was taken which is polymicrobial. ID input requested.       Past Medical & Surgical Hx:  Fibromyalgia  DM (diabetes mellitus)  Foot ulcer: Left Foot  Osteopenia  Chronic pain  Back ache  Arthritis  Shoulder joint stiffness, left  Matamoros esophagus  Stomach ulcer  Diabetes  Asthma  S/P knee surgery  S/P hysterectomy  S/P cholecystectomy      Social Hx:  Denies smoking, ETOH or drug use      FAMILY HISTORY:  No pertinent family history in first degree relatives      Allergies  No Known Allergies      ANTIBIOTICS:   None       REVIEW OF SYSTEMS:  CONSTITUTIONAL:  No Fever. occasional chills  HEENT:  No diplopia or blurred vision.  No earache, sore throat or runny nose.  CARDIOVASCULAR:  No pressure, squeezing, strangling, tightness, heaviness or aching about the chest, neck, axilla or epigastrium.  RESPIRATORY:  No cough, shortness of breath  GASTROINTESTINAL:  No nausea, vomiting or diarrhea.  GENITOURINARY:  No dysuria, frequency or urgency  MUSCULOSKELETAL:  no joint aches, no muscle pain  SKIN:  No change in skin, hair or nails.  NEUROLOGIC:  No paresthesias, fasciculations  PSYCHIATRIC:  No disorder of thought or mood.  ENDOCRINE:  No heat or cold intolerance  HEMATOLOGICAL:  No easy bruising or bleeding.       Physical Exam:  Vital Signs Last 24 Hrs  T(C): 36.3 (2018 08:26), Max: 36.4 (2018 20:00)  T(F): 97.4 (2018 08:26), Max: 97.6 (2018 22:02)  HR: 87 (2018 08:59) (74 - 87)  BP: 126/67 (2018 08:26) (111/61 - 151/72)  RR: 19 (2018 08:26) (17 - 19)  SpO2: 93% (2018 08:59) (93% - 98%)      GEN: NAD, pleasant  HEENT: normocephalic and atraumatic. EOMI. PERRL.    NECK: Supple.   LUNGS: Clear to auscultation.  HEART: Regular rate and rhythm  ABDOMEN: Soft, nontender, and nondistended.  Positive bowel sounds.    : No CVA tenderness  EXTREMITIES: Left foot with ulcer, no drainage, surrounding erythema or warmth  MSK: No joint swelling  NEUROLOGIC: Cranial nerves II through XII are grossly intact.  PSYCHIATRIC: Appropriate affect .  SKIN: Left foot with ulcer, no drainage, surrounding erythema or warmth      Labs:      138  |  99  |  27.0<H>  ----------------------------<  131<H>  4.3   |  23.0  |  3.18<H>    Ca    7.8<L>      2018 09:28  Phos  4.5       Mg     1.5         TPro  6.4<L>  /  Alb  3.5  /  TBili  0.3<L>  /  DBili  x   /  AST  21  /  ALT  12  /  AlkPhos  109                 8.6    2.3   )-----------( 78       ( 2018 09:28 )             27.6       PT/INR - ( 2018 15:25 )   PT: 14.9 sec;   INR: 1.35 ratio         PTT - ( 2018 15:25 )  PTT:32.9 sec  Urinalysis Basic - ( 2018 21:30 )    Color: Yellow / Appearance: Clear / S.010 / pH: x  Gluc: x / Ketone: Negative  / Bili: Negative / Urobili: Negative mg/dL   Blood: x / Protein: 100 mg/dL / Nitrite: Negative   Leuk Esterase: Negative / RBC: 11-25 /HPF / WBC 0-2   Sq Epi: x / Non Sq Epi: Few / Bacteria: Occasional      LIVER FUNCTIONS - ( 2018 09:28 )  Alb: 3.5 g/dL / Pro: 6.4 g/dL / ALK PHOS: 109 U/L / ALT: 12 U/L / AST: 21 U/L / GGT: x             ABG - ( 2018 14:23 )  pH: 7.33  /  pCO2: 44    /  pO2: 68    / HCO3: 22    / Base Excess: -2.9  /  SaO2: 96          RECENT CULTURES:   @ 14:40 .Other left foot ulcer Enterobacter cloacae  Methicillin resistant Staphylococcus aureus    Numerous Enterobacter cloacae  Numerous Methicillin resistant Staphylococcus aureus  Few Streptococcus agalactiae (Group B)  Culture in progress        EXAM:  FOOT-LEFT                        PROCEDURE DATE:  2018    INTERPRETATION:  CLINICAL INFORMATION: Left foot plantar ulcer.  TECHNIQUE: AP, lateral and oblique views of the left foot were obtained.   COMPARISON: 2018.  FINDINGS:  BONES: Amputation of the left first and second digits at the level of the   mid metatarsal. No periosteal elevation or osseus erosion to suggest   osteomyelitis.  No evidence of acute fracture or dislocation.   SOFT TISSUES: Soft tissue ulcer along the first metatarsal stump.  IMPRESSION:  Amputation of the left first and second digits with soft tissue ulcer   along the first metatarsal stump. No definite radiographic evidence of   osteomyelitis. If clinically indicated, anMRI or bone scan may be obtained.

## 2018-04-21 NOTE — PROGRESS NOTE ADULT - SUBJECTIVE AND OBJECTIVE BOX
(coverage for Dr Handley)    Pt seen for: pancytopenia    Interval history : 54 y/o F with Hx of Fibromyalgia, DM, Osteopenia, Matamoros Esophagus seen in Ed for Left Foot Plantar Ulcer, worsening kidney function.  She went to her Podiatrist (Dr. Sanchez) for her wound check for her left foot wound, Her podiatrist send her to ED because she was looking pale, weak and was having swelling and edema. Pt noted to have moderatley severe pancytopenia. There is a history of liver cirrhosis and prior history of having a "low blood count" in the past.      T(C): 36.3 (04-21-18 @ 08:26), Max: 36.4 (04-20-18 @ 20:00)  HR: 87 (04-21-18 @ 08:59) (74 - 87)  BP: 126/67 (04-21-18 @ 08:26) (111/61 - 151/72)  RR: 19 (04-21-18 @ 08:26) (17 - 19)  SpO2: 93% (04-21-18 @ 08:59) (93% - 98%)  Wt(kg): --    PHYSICAL EXAM:    Constitutional: pt alert and oriented in NAD  Neck:  supple, no adenopathy  Respiratory:  clear  Cardiovascular:  RRR S1S2  Gastrointestinal:  soft, nontender,  +BS  Extremities:  left foot bandage in place    LABS:                        8.6    2.3   )-----------( 78       ( 21 Apr 2018 09:28 )             27.6       04-21    138  |  99  |  27.0<H>  ----------------------------<  131<H>  4.3   |  23.0  |  3.18<H>    Ca    7.8<L>      21 Apr 2018 09:28  Phos  4.5     04-20  Mg     1.5     04-20    TPro  6.4<L>  /  Alb  3.5  /  TBili  0.3<L>  /  DBili  x   /  AST  21  /  ALT  12  /  AlkPhos  109  04-21

## 2018-04-21 NOTE — PROGRESS NOTE ADULT - ASSESSMENT
1) CKD IV  2) Diabetic Nephropathy  3) Anemia of renal disease  4) ?Liver cirrhosis    Slight elevation in SCr; likely prerenal; had diarrhea overnight now on contact precautions  CKD IV; consult Dr. Ward for AVF on this admission  Can give lasix 40mg IVP daily  Anemia not at goal; on iron and RHEA  d/w Dr. Millard 1) CKD IV  2) Diabetic Nephropathy  3) Anemia of renal disease  4) ?Liver cirrhosis    Slight elevation in SCr; likely prerenal; had diarrhea overnight now on contact precautions  CKD IV; consult Dr. Ward for AVF on this admission  Hold lasix now that she has diarrhea; will worsen her prerenal ERIC  Anemia not at goal; on iron and RHEA  d/w Dr. Millard

## 2018-04-21 NOTE — PROGRESS NOTE ADULT - SUBJECTIVE AND OBJECTIVE BOX
[facility] DIVISION OF KIDNEY DISEASES AND HYPERTENSION -- FOLLOW UP NOTE  --------------------------------------------------------------------------------  Chief Complaint: Diabetic nephropathy    24 hour events/subjective:  Pt seen and examined;  No distress;   SCr elevated today  Diarrhea overnight    PAST HISTORY  --------------------------------------------------------------------------------  No significant changes to PMH, PSH, FHx, SHx, unless otherwise noted    ALLERGIES & MEDICATIONS  --------------------------------------------------------------------------------  Allergies    No Known Allergies    Intolerances      Standing Inpatient Medications  ascorbic acid 500 milliGRAM(s) Oral daily  buDESOnide  80 MICROgram(s)/formoterol 4.5 MICROgram(s) Inhaler 2 Puff(s) Inhalation two times a day  buPROPion XL . 300 milliGRAM(s) Oral daily  dextrose 5%. 1000 milliLiter(s) IV Continuous <Continuous>  dextrose 50% Injectable 12.5 Gram(s) IV Push once  dextrose 50% Injectable 25 Gram(s) IV Push once  dextrose 50% Injectable 25 Gram(s) IV Push once  DULoxetine 60 milliGRAM(s) Oral daily  epoetin krunal Injectable 8000 Unit(s) SubCutaneous <User Schedule>  furosemide   Injectable 40 milliGRAM(s) IV Push daily  gabapentin 800 milliGRAM(s) Oral three times a day  heparin  Injectable 5000 Unit(s) SubCutaneous every 12 hours  insulin lispro (HumaLOG) corrective regimen sliding scale   SubCutaneous three times a day before meals  iron sucrose IVPB 200 milliGRAM(s) IV Intermittent every 24 hours  magnesium oxide 400 milliGRAM(s) Oral three times a day with meals  montelukast 10 milliGRAM(s) Oral daily  pantoprazole    Tablet 40 milliGRAM(s) Oral before breakfast  traZODone 100 milliGRAM(s) Oral at bedtime  zinc sulfate 220 milliGRAM(s) Oral daily    PRN Inpatient Medications  dextrose Gel 1 Dose(s) Oral once PRN  glucagon  Injectable 1 milliGRAM(s) IntraMuscular once PRN  HYDROcodone  5 mG/acetaminophen 325 mG 1 Tablet(s) Oral every 4 hours PRN  ondansetron Injectable 4 milliGRAM(s) IV Push every 6 hours PRN  oxyCODONE    IR 5 milliGRAM(s) Oral every 6 hours PRN      REVIEW OF SYSTEMS  --------------------------------------------------------------------------------  Gen: No weight changes, fatigue, fevers/chills, weakness  Skin: No rashes  Head/Eyes/Ears/Mouth: No headache; Normal hearing; Normal vision w/o blurriness; No sinus pain/discomfort, sore throat  Respiratory: No dyspnea, cough, wheezing, hemoptysis  CV: No chest pain, PND, orthopnea  GI: No abdominal pain, diarrhea, constipation, nausea, vomiting, melena, hematochezia  : No increased frequency, dysuria, hematuria, nocturia  MSK: No joint pain/swelling; no back pain; no edema  Neuro: No dizziness/lightheadedness, weakness, seizures, numbness, tingling  Heme: No easy bruising or bleeding  Endo: No heat/cold intolerance  Psych: No significant nervousness, anxiety, stress, depression    All other systems were reviewed and are negative, except as noted.    VITALS/PHYSICAL EXAM  --------------------------------------------------------------------------------  T(C): 36.3 (04-21-18 @ 08:26), Max: 36.4 (04-20-18 @ 20:00)  HR: 87 (04-21-18 @ 08:59) (74 - 87)  BP: 126/67 (04-21-18 @ 08:26) (111/61 - 151/72)  RR: 19 (04-21-18 @ 08:26) (17 - 19)  SpO2: 93% (04-21-18 @ 08:59) (93% - 98%)  Wt(kg): --        Physical Exam:  	Gen: NAD, well-appearing  	HEENT: PERRL, supple neck, clear oropharynx  	Pulm: CTA B/L  	CV: RRR, S1S2; no rub  	Back: No spinal or CVA tenderness; no sacral edema  	Abd: +BS, soft, nontender/nondistended  	: No suprapubic tenderness  	UE: Warm, FROM, no clubbing, intact strength; no edema; no asterixis  	LE: Warm, FROM, no clubbing, intact strength; no edema  	Neuro: No focal deficits, intact gait  	Psych: Normal affect and mood  	Skin: Warm, without rashes  	Vascular access:    LABS/STUDIES  --------------------------------------------------------------------------------              8.6    2.3   >-----------<  78       [04-21-18 @ 09:28]              27.6     138  |  99  |  27.0  ----------------------------<  131      [04-21-18 @ 09:28]  4.3   |  23.0  |  3.18        Ca     7.8     [04-21-18 @ 09:28]      Mg     1.5     [04-20-18 @ 08:37]      Phos  4.5     [04-20-18 @ 08:37]    TPro  6.4  /  Alb  3.5  /  TBili  0.3  /  DBili  x   /  AST  21  /  ALT  12  /  AlkPhos  109  [04-21-18 @ 09:28]    PT/INR: PT 14.9 , INR 1.35       [04-19-18 @ 15:25]  PTT: 32.9       [04-19-18 @ 15:25]      Creatinine Trend:  SCr 3.18 [04-21 @ 09:28]  SCr 2.92 [04-20 @ 08:37]  SCr 2.95 [04-19 @ 22:40]  SCr 2.88 [04-19 @ 15:25]    Urinalysis - [04-19-18 @ 21:30]      Color Yellow / Appearance Clear / SG 1.010 / pH 6.0      Gluc Negative / Ketone Negative  / Bili Negative / Urobili Negative       Blood Large / Protein 100 / Leuk Est Negative / Nitrite Negative      RBC 11-25 / WBC 0-2 / Hyaline  / Gran  / Sq Epi  / Non Sq Epi Few / Bacteria Occasional      Iron 22, TIBC 303, %sat 7      [04-20-18 @ 08:37]  Ferritin 57      [04-20-18 @ 08:37]  HbA1c 5.6      [04-20-18 @ 08:37]  TSH 4.32      [04-20-18 @ 11:11]

## 2018-04-21 NOTE — PROGRESS NOTE ADULT - SUBJECTIVE AND OBJECTIVE BOX
seen for foot ulcer, pancytopenia.    complaining of headache, diffuse aches/pains  +BM/diarrhea from laxatives  ROS otherwise negative     MEDICATIONS  (STANDING):  ascorbic acid 500 milliGRAM(s) Oral daily  buDESOnide  80 MICROgram(s)/formoterol 4.5 MICROgram(s) Inhaler 2 Puff(s) Inhalation two times a day  buPROPion XL . 300 milliGRAM(s) Oral daily  dextrose 5%. 1000 milliLiter(s) (50 mL/Hr) IV Continuous <Continuous>  dextrose 50% Injectable 12.5 Gram(s) IV Push once  dextrose 50% Injectable 25 Gram(s) IV Push once  dextrose 50% Injectable 25 Gram(s) IV Push once  DULoxetine 60 milliGRAM(s) Oral daily  epoetin krunal Injectable 8000 Unit(s) SubCutaneous <User Schedule>  furosemide   Injectable 40 milliGRAM(s) IV Push daily  gabapentin 800 milliGRAM(s) Oral three times a day  heparin  Injectable 5000 Unit(s) SubCutaneous every 12 hours  insulin lispro (HumaLOG) corrective regimen sliding scale   SubCutaneous three times a day before meals  iron sucrose IVPB 200 milliGRAM(s) IV Intermittent every 24 hours  magnesium oxide 400 milliGRAM(s) Oral three times a day with meals  montelukast 10 milliGRAM(s) Oral daily  pantoprazole    Tablet 40 milliGRAM(s) Oral before breakfast  traZODone 100 milliGRAM(s) Oral at bedtime  zinc sulfate 220 milliGRAM(s) Oral daily    MEDICATIONS  (PRN):  dextrose Gel 1 Dose(s) Oral once PRN Blood Glucose LESS THAN 70 milliGRAM(s)/deciliter  glucagon  Injectable 1 milliGRAM(s) IntraMuscular once PRN Glucose LESS THAN 70 milligrams/deciliter  HYDROcodone  5 mG/acetaminophen 325 mG 1 Tablet(s) Oral every 4 hours PRN Moderate Pain (4 - 6)  ondansetron Injectable 4 milliGRAM(s) IV Push every 6 hours PRN Nausea and/or Vomiting  oxyCODONE    IR 5 milliGRAM(s) Oral every 6 hours PRN Moderate to Severe Pain (7 - 10)      Allergies    No Known Allergies    Vital Signs Last 24 Hrs  T(C): 36.3 (2018 08:26), Max: 36.4 (2018 20:00)  T(F): 97.4 (2018 08:26), Max: 97.6 (2018 22:02)  HR: 87 (2018 08:59) (74 - 87)  BP: 126/67 (2018 08:26) (111/61 - 151/72)  BP(mean): --  RR: 19 (2018 08:26) (17 - 19)  SpO2: 93% (2018 08:59) (93% - 98%)    PHYSICAL EXAM:    GENERAL: NAD looks comfortab  CHEST/LUNG: ctab  HEART: Regular rate and rhythm; S1 S2  ABDOMEN: Soft, Nontender, Nondistended; Bowel sounds present  EXTREMITIES: no LE edema.     left plantar ulcer 1x1cm  no surrounding erythema/discharge noted. no foul smell.   NERVOUS SYSTEM:  Alert & Oriented X3, nonfocal    LABS:                        8.6    2.3   )-----------( 78       ( 2018 09:28 )             27.6     04-21    138  |  99  |  27.0<H>  ----------------------------<  131<H>  4.3   |  23.0  |  3.18<H>    Ca    7.8<L>      2018 09:28  Phos  4.5     04-20  Mg     1.5     04-20    TPro  6.4<L>  /  Alb  3.5  /  TBili  0.3<L>  /  DBili  x   /  AST  21  /  ALT  12  /  AlkPhos  109  04-21    PT/INR - ( 2018 15:25 )   PT: 14.9 sec;   INR: 1.35 ratio         PTT - ( 2018 15:25 )  PTT:32.9 sec  Urinalysis Basic - ( 2018 21:30 )    Color: Yellow / Appearance: Clear / S.010 / pH: x  Gluc: x / Ketone: Negative  / Bili: Negative / Urobili: Negative mg/dL   Blood: x / Protein: 100 mg/dL / Nitrite: Negative   Leuk Esterase: Negative / RBC: 11-25 /HPF / WBC 0-2   Sq Epi: x / Non Sq Epi: Few / Bacteria: Occasional        CAPILLARY BLOOD GLUCOSE      POCT Blood Glucose.: 129 mg/dL (2018 11:58)  POCT Blood Glucose.: 146 mg/dL (2018 07:26)  POCT Blood Glucose.: 151 mg/dL (2018 22:18)  POCT Blood Glucose.: 125 mg/dL (2018 17:36)        RADIOLOGY & ADDITIONAL TESTS:

## 2018-04-21 NOTE — CONSULT NOTE ADULT - SUBJECTIVE AND OBJECTIVE BOX
VASCULAR SURGERY CONSULT NOTE  --------------------------------------------------------------------------------------------    Patient is a 53y old  Female who presents with a chief complaint of Swelling all around the body and foot wound (2018 17:59)      HPI: 54 y/o F with Hx of Fibromyalgia, DM, pancytopenia, Osteopenia, [place] Esophagus seen in Ed for Left Foot Plantar Ulcer, worsening kidney function who is well know to Nephrology service, she went to her Podiatrist (Dr. Sanchez) for her wound check for her left foot wound.  Her podiatrist send her to ED because she was looking pale, weak and was having swelling and edema every where, as per Dr Sanchez her wound looks ok, she has no discharge or pus noted from the  wound, Patient denies nausea, vomiting, fever, chills, chest pain, SOB, She has been noticing weight gain for about 20 pounds over the course of 2 months,   she also feel puffiness of the face, she denies SOB, PND.      Vascular surgery was consulted for possible creation of AVF. Patient is right handed. States she is unsure if she wants HD and is asking how long she would live if she did not received HD.     Patient denies fevers/chills, denies lightheadedness/dizziness, denies SOB/chest pain, denies nausea/vomiting, denies constipation/diarrhea.      ROS: 10-system review is otherwise negative except HPI above.      PAST MEDICAL & SURGICAL HISTORY:  Fibromyalgia  DM (diabetes mellitus)  Foot ulcer: Left Foot  Osteopenia  Chronic pain  Back ache  Arthritis  Shoulder joint stiffness, left  [place] esophagus  Stomach ulcer  Diabetes  Asthma  S/P knee surgery  S/P hysterectomy  S/P cholecystectomy    FAMILY HISTORY:  No pertinent family history in first degree relatives    [] Family history not pertinent as reviewed with the patient and family    ALLERGIES: No Known Allergies    CURRENT MEDICATIONS  MEDICATIONS (STANDING): ascorbic acid 500 milliGRAM(s) Oral daily  buDESOnide  80 MICROgram(s)/formoterol 4.5 MICROgram(s) Inhaler 2 Puff(s) Inhalation two times a day  buPROPion XL . 300 milliGRAM(s) Oral daily  dextrose 5%. 1000 milliLiter(s) IV Continuous <Continuous>  dextrose 50% Injectable 12.5 Gram(s) IV Push once  dextrose 50% Injectable 25 Gram(s) IV Push once  dextrose 50% Injectable 25 Gram(s) IV Push once  DULoxetine 60 milliGRAM(s) Oral daily  epoetin krunal Injectable 8000 Unit(s) SubCutaneous <User Schedule>  ferrous    sulfate 325 milliGRAM(s) Oral daily  furosemide   Injectable 40 milliGRAM(s) IV Push daily  gabapentin 800 milliGRAM(s) Oral three times a day  heparin  Injectable 5000 Unit(s) SubCutaneous every 12 hours  insulin lispro (HumaLOG) corrective regimen sliding scale   SubCutaneous three times a day before meals  iron sucrose IVPB 200 milliGRAM(s) IV Intermittent every 24 hours  lactulose Syrup 15 Gram(s) Oral four times a day  magnesium oxide 400 milliGRAM(s) Oral three times a day with meals  montelukast 10 milliGRAM(s) Oral daily  pantoprazole    Tablet 40 milliGRAM(s) Oral before breakfast  rifaximin 550 milliGRAM(s) Oral two times a day  traZODone 100 milliGRAM(s) Oral at bedtime  zinc sulfate 220 milliGRAM(s) Oral daily    MEDICATIONS (PRN):dextrose Gel 1 Dose(s) Oral once PRN Blood Glucose LESS THAN 70 milliGRAM(s)/deciliter  glucagon  Injectable 1 milliGRAM(s) IntraMuscular once PRN Glucose LESS THAN 70 milligrams/deciliter  ondansetron Injectable 4 milliGRAM(s) IV Push every 6 hours PRN Nausea and/or Vomiting  oxyCODONE    IR 5 milliGRAM(s) Oral every 6 hours PRN Moderate to Severe Pain (7 - 10)    --------------------------------------------------------------------------------------------    Vitals:   T(C): 36.3 (18 @ 08:26), Max: 36.8 (18 @ 10:02)  HR: 74 (18 @ 08:26) (71 - 83)  BP: 126/67 (18 @ 08:26) (109/72 - 151/72)  BP(mean): --  RR: 19 (18 @ 08:26) (13 - 20)  SpO2: 98% (18 @ 22:02) (89% - 98%)  Wt(kg): --  CAPILLARY BLOOD GLUCOSE      POCT Blood Glucose.: 146 mg/dL (2018 07:26)  POCT Blood Glucose.: 151 mg/dL (2018 22:18)  POCT Blood Glucose.: 125 mg/dL (2018 17:36)  POCT Blood Glucose.: 136 mg/dL (2018 11:33)    CAPILLARY BLOOD GLUCOSE      POCT Blood Glucose.: 146 mg/dL (2018 07:26)  POCT Blood Glucose.: 151 mg/dL (2018 22:18)  POCT Blood Glucose.: 125 mg/dL (2018 17:36)  POCT Blood Glucose.: 136 mg/dL (2018 11:33)      Height (cm): 170.18 ( @ 13:21)  Weight (kg): 77.1 ( @ 13:21)  BMI (kg/m2): 26.6 ( @ 13:21)  BSA (m2): 1.89 ( @ 13:21)    PHYSICAL EXAM:   General: NAD, Lying in bed comfortably  Neuro: A+Ox3  HEENT: NC/AT, EOMI  Neck: Soft, supple  Cardio: S1/S2  Resp: Good effort, non-labored, no accessory muscle use.  GI/Abd: Soft, NT/ND, no rebound/guarding, no masses palpated  Vascular: All 4 extremities warm, B/l radial pulses palpable 2+, AVF in UE with palpable thrill, b/l Femoral pulse palpable,  b/l DP/PT palpable, weak signals at b/l DP/PT, palpable pulsatile abdominal mass.  ***  Musculoskeletal: All 4 extremities moving spontaneously, no limitations.    --------------------------------------------------------------------------------------------    LABS  CBC ( @ 16:58)                              --                             --      )----------------(  --         --    % Neutrophils, --    % Lymphocytes, ANC: --                                  25<L>   CBC ( @ 08:37)                              8.1<L>                         1.8<L>  )----------------(  65<L>      --    % Neutrophils, --    % Lymphocytes, ANC: --                                  25.5<L>    BMP ( @ 08:37)             136     |  98      |  26.0<H>		Ca++ --      Ca 7.7<L>             ---------------------------------( 118<H>		Mg 1.5<L>             3.8     |  25.0    |  2.92<H>			Ph 4.5     BMP ( @ 22:40)             133<L>  |  98      |  26.0<H>		Ca++ --      Ca 7.5<L>             ---------------------------------( 124<H>		Mg 1.7                4.0     |  24.0    |  2.95<H>			Ph --                  --------------------------------------------------------------------------------------------    MICROBIOLOGY  Urinalysis ( @ 21:30):     Color: Yellow / Appearance: Clear / S.010 / pH: 6.0 / Gluc: Negative / Ketones: Negative / Bili: Negative / Urobili: Negative / Protein :100<!> / Nitrites: Negative / Leuk.Est: Negative / RBC: 11-25<!> / WBC: 0-2 / Sq Epi:  / Non Sq Epi: Few / Bacteria Occasional<!>       -> .Other left foot ulcer Culture ( @ 14:40)     NG    NG    Numerous Gram Negative Rods Identification and susceptibility to follow.  Numerous Staphylococcus species Identification and susceptibility to  follow.  Culture in progress

## 2018-04-21 NOTE — PROGRESS NOTE ADULT - ASSESSMENT
54 y/o F with Hx of Fibromyalgia, DM, Osteopenia, [place] Esophagus admitted with Left Foot  Ulcer, worsening kidney function.  abx as per ID  monitor cbc  pancytopenia ? secondary underlying liver disease  AOCD secondary CKD may also be contributing factor to anemia - pt on iron and RHEA  the severity of her pancytopenia may improve with resolution of infection.

## 2018-04-22 DIAGNOSIS — R19.7 DIARRHEA, UNSPECIFIED: ICD-10-CM

## 2018-04-22 DIAGNOSIS — J44.1 CHRONIC OBSTRUCTIVE PULMONARY DISEASE WITH (ACUTE) EXACERBATION: ICD-10-CM

## 2018-04-22 LAB
ANION GAP SERPL CALC-SCNC: 17 MMOL/L — SIGNIFICANT CHANGE UP (ref 5–17)
BUN SERPL-MCNC: 26 MG/DL — HIGH (ref 8–20)
CALCIUM SERPL-MCNC: 7.8 MG/DL — LOW (ref 8.6–10.2)
CHLORIDE SERPL-SCNC: 95 MMOL/L — LOW (ref 98–107)
CO2 SERPL-SCNC: 21 MMOL/L — LOW (ref 22–29)
CREAT SERPL-MCNC: 3.69 MG/DL — HIGH (ref 0.5–1.3)
CRP SERPL-MCNC: 0.8 MG/DL — HIGH (ref 0–0.4)
GLUCOSE BLDC GLUCOMTR-MCNC: 125 MG/DL — HIGH (ref 70–99)
GLUCOSE BLDC GLUCOMTR-MCNC: 150 MG/DL — HIGH (ref 70–99)
GLUCOSE BLDC GLUCOMTR-MCNC: 155 MG/DL — HIGH (ref 70–99)
GLUCOSE BLDC GLUCOMTR-MCNC: 163 MG/DL — HIGH (ref 70–99)
GLUCOSE SERPL-MCNC: 159 MG/DL — HIGH (ref 70–115)
HCT VFR BLD CALC: 28 % — LOW (ref 37–47)
HGB BLD-MCNC: 9 G/DL — LOW (ref 12–16)
MCHC RBC-ENTMCNC: 29.2 PG — SIGNIFICANT CHANGE UP (ref 27–31)
MCHC RBC-ENTMCNC: 32.1 G/DL — SIGNIFICANT CHANGE UP (ref 32–36)
MCV RBC AUTO: 90.9 FL — SIGNIFICANT CHANGE UP (ref 81–99)
OSMOLALITY UR: 244 MOSM/KG — LOW (ref 300–1000)
PLATELET # BLD AUTO: 77 K/UL — LOW (ref 150–400)
POTASSIUM SERPL-MCNC: 4.1 MMOL/L — SIGNIFICANT CHANGE UP (ref 3.5–5.3)
POTASSIUM SERPL-SCNC: 4.1 MMOL/L — SIGNIFICANT CHANGE UP (ref 3.5–5.3)
RBC # BLD: 3.08 M/UL — LOW (ref 4.4–5.2)
RBC # FLD: 13.8 % — SIGNIFICANT CHANGE UP (ref 11–15.6)
SODIUM SERPL-SCNC: 133 MMOL/L — LOW (ref 135–145)
SODIUM UR-SCNC: <30 MMOL/L — SIGNIFICANT CHANGE UP
WBC # BLD: 2.7 K/UL — LOW (ref 4.8–10.8)
WBC # FLD AUTO: 2.7 K/UL — LOW (ref 4.8–10.8)

## 2018-04-22 PROCEDURE — 99233 SBSQ HOSP IP/OBS HIGH 50: CPT

## 2018-04-22 PROCEDURE — 71045 X-RAY EXAM CHEST 1 VIEW: CPT | Mod: 26

## 2018-04-22 RX ORDER — SODIUM CHLORIDE 9 MG/ML
1000 INJECTION INTRAMUSCULAR; INTRAVENOUS; SUBCUTANEOUS
Qty: 0 | Refills: 0 | Status: DISCONTINUED | OUTPATIENT
Start: 2018-04-22 | End: 2018-04-24

## 2018-04-22 RX ORDER — BUDESONIDE, MICRONIZED 100 %
0.5 POWDER (GRAM) MISCELLANEOUS
Qty: 0 | Refills: 0 | Status: DISCONTINUED | OUTPATIENT
Start: 2018-04-22 | End: 2018-04-25

## 2018-04-22 RX ORDER — IPRATROPIUM/ALBUTEROL SULFATE 18-103MCG
3 AEROSOL WITH ADAPTER (GRAM) INHALATION EVERY 6 HOURS
Qty: 0 | Refills: 0 | Status: DISCONTINUED | OUTPATIENT
Start: 2018-04-22 | End: 2018-04-26

## 2018-04-22 RX ADMIN — ONDANSETRON 4 MILLIGRAM(S): 8 TABLET, FILM COATED ORAL at 08:08

## 2018-04-22 RX ADMIN — GABAPENTIN 800 MILLIGRAM(S): 400 CAPSULE ORAL at 05:07

## 2018-04-22 RX ADMIN — MONTELUKAST 10 MILLIGRAM(S): 4 TABLET, CHEWABLE ORAL at 12:17

## 2018-04-22 RX ADMIN — OXYCODONE HYDROCHLORIDE 5 MILLIGRAM(S): 5 TABLET ORAL at 15:32

## 2018-04-22 RX ADMIN — MAGNESIUM OXIDE 400 MG ORAL TABLET 400 MILLIGRAM(S): 241.3 TABLET ORAL at 17:43

## 2018-04-22 RX ADMIN — Medication 3 MILLILITER(S): at 13:06

## 2018-04-22 RX ADMIN — MAGNESIUM OXIDE 400 MG ORAL TABLET 400 MILLIGRAM(S): 241.3 TABLET ORAL at 12:17

## 2018-04-22 RX ADMIN — SODIUM CHLORIDE 75 MILLILITER(S): 9 INJECTION INTRAMUSCULAR; INTRAVENOUS; SUBCUTANEOUS at 12:16

## 2018-04-22 RX ADMIN — OXYCODONE HYDROCHLORIDE 5 MILLIGRAM(S): 5 TABLET ORAL at 08:10

## 2018-04-22 RX ADMIN — GABAPENTIN 800 MILLIGRAM(S): 400 CAPSULE ORAL at 23:30

## 2018-04-22 RX ADMIN — PANTOPRAZOLE SODIUM 40 MILLIGRAM(S): 20 TABLET, DELAYED RELEASE ORAL at 05:08

## 2018-04-22 RX ADMIN — HEPARIN SODIUM 5000 UNIT(S): 5000 INJECTION INTRAVENOUS; SUBCUTANEOUS at 17:42

## 2018-04-22 RX ADMIN — Medication 0.5 MILLIGRAM(S): at 20:25

## 2018-04-22 RX ADMIN — Medication 100 MILLIGRAM(S): at 23:30

## 2018-04-22 RX ADMIN — ZINC SULFATE TAB 220 MG (50 MG ZINC EQUIVALENT) 220 MILLIGRAM(S): 220 (50 ZN) TAB at 12:18

## 2018-04-22 RX ADMIN — OXYCODONE HYDROCHLORIDE 5 MILLIGRAM(S): 5 TABLET ORAL at 09:00

## 2018-04-22 RX ADMIN — HEPARIN SODIUM 5000 UNIT(S): 5000 INJECTION INTRAVENOUS; SUBCUTANEOUS at 05:07

## 2018-04-22 RX ADMIN — DULOXETINE HYDROCHLORIDE 60 MILLIGRAM(S): 30 CAPSULE, DELAYED RELEASE ORAL at 12:18

## 2018-04-22 RX ADMIN — IRON SUCROSE 110 MILLIGRAM(S): 20 INJECTION, SOLUTION INTRAVENOUS at 12:17

## 2018-04-22 RX ADMIN — OXYCODONE HYDROCHLORIDE 5 MILLIGRAM(S): 5 TABLET ORAL at 23:42

## 2018-04-22 RX ADMIN — Medication 500 MILLIGRAM(S): at 12:17

## 2018-04-22 RX ADMIN — Medication 1: at 08:08

## 2018-04-22 RX ADMIN — GABAPENTIN 800 MILLIGRAM(S): 400 CAPSULE ORAL at 15:33

## 2018-04-22 RX ADMIN — OXYCODONE HYDROCHLORIDE 5 MILLIGRAM(S): 5 TABLET ORAL at 16:00

## 2018-04-22 RX ADMIN — BUDESONIDE AND FORMOTEROL FUMARATE DIHYDRATE 2 PUFF(S): 160; 4.5 AEROSOL RESPIRATORY (INHALATION) at 09:47

## 2018-04-22 RX ADMIN — Medication 3 MILLILITER(S): at 20:25

## 2018-04-22 RX ADMIN — MAGNESIUM OXIDE 400 MG ORAL TABLET 400 MILLIGRAM(S): 241.3 TABLET ORAL at 08:07

## 2018-04-22 NOTE — PROGRESS NOTE ADULT - SUBJECTIVE AND OBJECTIVE BOX
seen for left foot ulcer, dick on CKD5, pancytopenia      pain medications helping.  shortness of breath with nonproductive cough.   ROS otherwise negative.  diarrhea persists but improving.    MEDICATIONS  (STANDING):  ALBUTerol/ipratropium for Nebulization 3 milliLiter(s) Nebulizer every 6 hours  ascorbic acid 500 milliGRAM(s) Oral daily  buDESOnide   0.5 milliGRAM(s) Respule 0.5 milliGRAM(s) Inhalation two times a day  buDESOnide  80 MICROgram(s)/formoterol 4.5 MICROgram(s) Inhaler 2 Puff(s) Inhalation two times a day  buPROPion XL . 300 milliGRAM(s) Oral daily  dextrose 5%. 1000 milliLiter(s) (50 mL/Hr) IV Continuous <Continuous>  dextrose 50% Injectable 12.5 Gram(s) IV Push once  dextrose 50% Injectable 25 Gram(s) IV Push once  dextrose 50% Injectable 25 Gram(s) IV Push once  DULoxetine 60 milliGRAM(s) Oral daily  epoetin krunal Injectable 8000 Unit(s) SubCutaneous <User Schedule>  gabapentin 800 milliGRAM(s) Oral three times a day  heparin  Injectable 5000 Unit(s) SubCutaneous every 12 hours  insulin lispro (HumaLOG) corrective regimen sliding scale   SubCutaneous three times a day before meals  iron sucrose IVPB 200 milliGRAM(s) IV Intermittent every 24 hours  magnesium oxide 400 milliGRAM(s) Oral three times a day with meals  montelukast 10 milliGRAM(s) Oral daily  pantoprazole    Tablet 40 milliGRAM(s) Oral before breakfast  traZODone 100 milliGRAM(s) Oral at bedtime  zinc sulfate 220 milliGRAM(s) Oral daily    MEDICATIONS  (PRN):  dextrose Gel 1 Dose(s) Oral once PRN Blood Glucose LESS THAN 70 milliGRAM(s)/deciliter  glucagon  Injectable 1 milliGRAM(s) IntraMuscular once PRN Glucose LESS THAN 70 milligrams/deciliter  HYDROcodone  5 mG/acetaminophen 325 mG 1 Tablet(s) Oral every 4 hours PRN Moderate Pain (4 - 6)  ondansetron Injectable 4 milliGRAM(s) IV Push every 6 hours PRN Nausea and/or Vomiting  oxyCODONE    IR 5 milliGRAM(s) Oral every 6 hours PRN Moderate to Severe Pain (7 - 10)      Allergies    No Known Allergies    Vital Signs Last 24 Hrs  T(C): 36.7 (22 Apr 2018 00:00), Max: 36.7 (22 Apr 2018 00:00)  T(F): 98.1 (22 Apr 2018 00:00), Max: 98.1 (22 Apr 2018 00:00)  HR: 80 (22 Apr 2018 00:00) (76 - 80)  BP: 111/57 (22 Apr 2018 00:00) (110/76 - 111/57)  BP(mean): --  RR: 18 (22 Apr 2018 00:00) (18 - 18)  SpO2: 97% (22 Apr 2018 00:00) (97% - 98%)    PHYSICAL EXAM:    GENERAL: NAD  CHEST/LUNG: diffuse wheezing.  HEART: Regular rate and rhythm; S1 S2  ABDOMEN: Soft, Nontender, Nondistended; Bowel sounds present  EXTREMITIES: left foot bandaged  NERVOUS SYSTEM:  Alert & Oriented X3, Good concentration; nonfocal  PSYCH: normal mood, appropriate response.    LABS:                        9.0    2.7   )-----------( x        ( 22 Apr 2018 10:08 )             28.0     04-22    133<L>  |  95<L>  |  26.0<H>  ----------------------------<  159<H>  4.1   |  21.0<L>  |  3.69<H>    Ca    7.8<L>      22 Apr 2018 10:08    TPro  6.4<L>  /  Alb  3.5  /  TBili  0.3<L>  /  DBili  x   /  AST  21  /  ALT  12  /  AlkPhos  109  04-21          CAPILLARY BLOOD GLUCOSE      POCT Blood Glucose.: 163 mg/dL (22 Apr 2018 07:49)  POCT Blood Glucose.: 156 mg/dL (21 Apr 2018 22:00)  POCT Blood Glucose.: 126 mg/dL (21 Apr 2018 17:00)  POCT Blood Glucose.: 129 mg/dL (21 Apr 2018 11:58)        RADIOLOGY & ADDITIONAL TESTS:

## 2018-04-22 NOTE — PROGRESS NOTE ADULT - SUBJECTIVE AND OBJECTIVE BOX
[facility] DIVISION OF KIDNEY DISEASES AND HYPERTENSION -- FOLLOW UP NOTE  --------------------------------------------------------------------------------  Chief Complaint:Diabetic nephropathy    24 hour events/subjective:  Pt seen and examined;  No distress;   SCr elevated today  Still with diarrhea    PAST HISTORY  --------------------------------------------------------------------------------  No significant changes to PMH, PSH, FHx, SHx, unless otherwise noted    ALLERGIES & MEDICATIONS  --------------------------------------------------------------------------------  Allergies    No Known Allergies    Intolerances      Standing Inpatient Medications  ALBUTerol/ipratropium for Nebulization 3 milliLiter(s) Nebulizer every 6 hours  ascorbic acid 500 milliGRAM(s) Oral daily  buDESOnide   0.5 milliGRAM(s) Respule 0.5 milliGRAM(s) Inhalation two times a day  buDESOnide  80 MICROgram(s)/formoterol 4.5 MICROgram(s) Inhaler 2 Puff(s) Inhalation two times a day  buPROPion XL . 300 milliGRAM(s) Oral daily  dextrose 5%. 1000 milliLiter(s) IV Continuous <Continuous>  dextrose 50% Injectable 12.5 Gram(s) IV Push once  dextrose 50% Injectable 25 Gram(s) IV Push once  dextrose 50% Injectable 25 Gram(s) IV Push once  DULoxetine 60 milliGRAM(s) Oral daily  epoetin krunal Injectable 8000 Unit(s) SubCutaneous <User Schedule>  gabapentin 800 milliGRAM(s) Oral three times a day  heparin  Injectable 5000 Unit(s) SubCutaneous every 12 hours  insulin lispro (HumaLOG) corrective regimen sliding scale   SubCutaneous three times a day before meals  iron sucrose IVPB 200 milliGRAM(s) IV Intermittent every 24 hours  magnesium oxide 400 milliGRAM(s) Oral three times a day with meals  montelukast 10 milliGRAM(s) Oral daily  pantoprazole    Tablet 40 milliGRAM(s) Oral before breakfast  sodium chloride 0.9%. 1000 milliLiter(s) IV Continuous <Continuous>  traZODone 100 milliGRAM(s) Oral at bedtime  zinc sulfate 220 milliGRAM(s) Oral daily    PRN Inpatient Medications  dextrose Gel 1 Dose(s) Oral once PRN  glucagon  Injectable 1 milliGRAM(s) IntraMuscular once PRN  HYDROcodone  5 mG/acetaminophen 325 mG 1 Tablet(s) Oral every 4 hours PRN  ondansetron Injectable 4 milliGRAM(s) IV Push every 6 hours PRN  oxyCODONE    IR 5 milliGRAM(s) Oral every 6 hours PRN      REVIEW OF SYSTEMS  --------------------------------------------------------------------------------  Gen: No weight changes, fatigue, fevers/chills, weakness  Skin: No rashes  Head/Eyes/Ears/Mouth: No headache; Normal hearing; Normal vision w/o blurriness; No sinus pain/discomfort, sore throat  Respiratory: No dyspnea, cough, wheezing, hemoptysis  CV: No chest pain, PND, orthopnea  GI: diarrhea  : No increased frequency, dysuria, hematuria, nocturia  MSK: No joint pain/swelling; no back pain; no edema  Neuro: No dizziness/lightheadedness, weakness, seizures, numbness, tingling  Heme: No easy bruising or bleeding  Endo: No heat/cold intolerance  Psych: No significant nervousness, anxiety, stress, depression    All other systems were reviewed and are negative, except as noted.    VITALS/PHYSICAL EXAM  --------------------------------------------------------------------------------  T(C): 36.7 (04-22-18 @ 00:00), Max: 36.7 (04-22-18 @ 00:00)  HR: 80 (04-22-18 @ 00:00) (76 - 80)  BP: 111/57 (04-22-18 @ 00:00) (110/76 - 111/57)  RR: 18 (04-22-18 @ 00:00) (18 - 18)  SpO2: 97% (04-22-18 @ 00:00) (97% - 98%)  Wt(kg): --        Physical Exam:  	Gen: NAD, well-appearing  	HEENT: PERRL, supple neck, clear oropharynx  	Pulm: CTA B/L  	CV: RRR, S1S2; no rub  	Back: No spinal or CVA tenderness; no sacral edema  	Abd: +BS, soft, nontender/nondistended  	: No suprapubic tenderness  	UE: Warm, FROM, no clubbing, intact strength; no edema; no asterixis  	LE: Warm, FROM, no clubbing, intact strength; no edema  	Neuro: No focal deficits, intact gait  	Psych: Normal affect and mood  	Skin: Warm, without rashes  	Vascular access:    LABS/STUDIES  --------------------------------------------------------------------------------              9.0    2.7   >-----------<  77       [04-22-18 @ 10:08]              28.0     133  |  95  |  26.0  ----------------------------<  159      [04-22-18 @ 10:08]  4.1   |  21.0  |  3.69        Ca     7.8     [04-22-18 @ 10:08]    TPro  6.4  /  Alb  3.5  /  TBili  0.3  /  DBili  x   /  AST  21  /  ALT  12  /  AlkPhos  109  [04-21-18 @ 09:28]          Creatinine Trend:  SCr 3.69 [04-22 @ 10:08]  SCr 3.18 [04-21 @ 09:28]  SCr 2.92 [04-20 @ 08:37]  SCr 2.95 [04-19 @ 22:40]  SCr 2.88 [04-19 @ 15:25]    Urinalysis - [04-19-18 @ 21:30]      Color Yellow / Appearance Clear / SG 1.010 / pH 6.0      Gluc Negative / Ketone Negative  / Bili Negative / Urobili Negative       Blood Large / Protein 100 / Leuk Est Negative / Nitrite Negative      RBC 11-25 / WBC 0-2 / Hyaline  / Gran  / Sq Epi  / Non Sq Epi Few / Bacteria Occasional      Iron 22, TIBC 303, %sat 7      [04-20-18 @ 08:37]  Ferritin 57      [04-20-18 @ 08:37]  HbA1c 5.6      [04-20-18 @ 08:37]  TSH 4.32      [04-20-18 @ 11:11]

## 2018-04-22 NOTE — PROGRESS NOTE ADULT - SUBJECTIVE AND OBJECTIVE BOX
Vascular Follow up     Patient seen with Dr. Chin     Worsening renal function, request for AVF    Patient with suspected Mrsa to left foot ulceration    Left vein mapping reviewed     - Will plan for left avf on this current admission if cleared from ID standpoint.  - If Patient requires antibiotic treatment for a specific duration, will defer AV_fistula creation for outpatient planning  - please remove IV from left upper extremtiy and place limb alert

## 2018-04-22 NOTE — PROGRESS NOTE ADULT - ASSESSMENT
1) CKD IV  2) Diabetic Nephropathy  3) Anemia of renal disease  4) ?Liver cirrhosis    CKD IV; AVF on this admission  Worsening SCr; prerenal; diarrhea/losses; decreased po  Will start IVF  Sending urine studies  Anemia not at goal; on iron and RHEA  d/w Dr. Millard

## 2018-04-23 DIAGNOSIS — R69 ILLNESS, UNSPECIFIED: ICD-10-CM

## 2018-04-23 LAB
ANION GAP SERPL CALC-SCNC: 15 MMOL/L — SIGNIFICANT CHANGE UP (ref 5–17)
ANISOCYTOSIS BLD QL: SLIGHT — SIGNIFICANT CHANGE UP
BASO STIPL BLD QL SMEAR: SLIGHT — SIGNIFICANT CHANGE UP
BASOPHILS # BLD AUTO: 0 K/UL — SIGNIFICANT CHANGE UP (ref 0–0.2)
BASOPHILS NFR BLD AUTO: 0.5 % — SIGNIFICANT CHANGE UP (ref 0–2)
BUN SERPL-MCNC: 27 MG/DL — HIGH (ref 8–20)
CALCIUM SERPL-MCNC: 7.4 MG/DL — LOW (ref 8.6–10.2)
CHLORIDE SERPL-SCNC: 99 MMOL/L — SIGNIFICANT CHANGE UP (ref 98–107)
CO2 SERPL-SCNC: 21 MMOL/L — LOW (ref 22–29)
CREAT SERPL-MCNC: 3.44 MG/DL — HIGH (ref 0.5–1.3)
EOSINOPHIL # BLD AUTO: 0.1 K/UL — SIGNIFICANT CHANGE UP (ref 0–0.5)
EOSINOPHIL NFR BLD AUTO: 3.2 % — SIGNIFICANT CHANGE UP (ref 0–5)
GLUCOSE BLDC GLUCOMTR-MCNC: 141 MG/DL — HIGH (ref 70–99)
GLUCOSE BLDC GLUCOMTR-MCNC: 166 MG/DL — HIGH (ref 70–99)
GLUCOSE BLDC GLUCOMTR-MCNC: 188 MG/DL — HIGH (ref 70–99)
GLUCOSE BLDC GLUCOMTR-MCNC: 243 MG/DL — HIGH (ref 70–99)
GLUCOSE SERPL-MCNC: 123 MG/DL — HIGH (ref 70–115)
HCT VFR BLD CALC: 27.3 % — LOW (ref 37–47)
HGB BLD-MCNC: 8.5 G/DL — LOW (ref 12–16)
HYPOCHROMIA BLD QL: SLIGHT — SIGNIFICANT CHANGE UP
IRON SATN MFR SERPL: 30 % — SIGNIFICANT CHANGE UP (ref 14–50)
IRON SATN MFR SERPL: 77 UG/DL — SIGNIFICANT CHANGE UP (ref 37–145)
LYMPHOCYTES # BLD AUTO: 0.7 K/UL — LOW (ref 1–4.8)
LYMPHOCYTES # BLD AUTO: 30.7 % — SIGNIFICANT CHANGE UP (ref 20–55)
MACROCYTES BLD QL: SLIGHT — SIGNIFICANT CHANGE UP
MCHC RBC-ENTMCNC: 28.4 PG — SIGNIFICANT CHANGE UP (ref 27–31)
MCHC RBC-ENTMCNC: 31.1 G/DL — LOW (ref 32–36)
MCV RBC AUTO: 91.3 FL — SIGNIFICANT CHANGE UP (ref 81–99)
MICROCYTES BLD QL: SLIGHT — SIGNIFICANT CHANGE UP
MONOCYTES # BLD AUTO: 0.2 K/UL — SIGNIFICANT CHANGE UP (ref 0–0.8)
MONOCYTES NFR BLD AUTO: 8.3 % — SIGNIFICANT CHANGE UP (ref 3–10)
NEUTROPHILS # BLD AUTO: 1.2 K/UL — LOW (ref 1.8–8)
NEUTROPHILS NFR BLD AUTO: 56.4 % — SIGNIFICANT CHANGE UP (ref 37–73)
OB PNL STL: NEGATIVE — SIGNIFICANT CHANGE UP
OVALOCYTES BLD QL SMEAR: SLIGHT — SIGNIFICANT CHANGE UP
PLAT MORPH BLD: NORMAL — SIGNIFICANT CHANGE UP
PLATELET # BLD AUTO: 63 K/UL — LOW (ref 150–400)
POIKILOCYTOSIS BLD QL AUTO: SLIGHT — SIGNIFICANT CHANGE UP
POTASSIUM SERPL-MCNC: 3.8 MMOL/L — SIGNIFICANT CHANGE UP (ref 3.5–5.3)
POTASSIUM SERPL-SCNC: 3.8 MMOL/L — SIGNIFICANT CHANGE UP (ref 3.5–5.3)
RBC # BLD: 2.99 M/UL — LOW (ref 4.4–5.2)
RBC # FLD: 14.2 % — SIGNIFICANT CHANGE UP (ref 11–15.6)
RBC BLD AUTO: ABNORMAL
SODIUM SERPL-SCNC: 135 MMOL/L — SIGNIFICANT CHANGE UP (ref 135–145)
TIBC SERPL-MCNC: 253 UG/DL — SIGNIFICANT CHANGE UP (ref 220–430)
TRANSFERRIN SERPL-MCNC: 177 MG/DL — LOW (ref 192–382)
WBC # BLD: 2.2 K/UL — LOW (ref 4.8–10.8)
WBC # FLD AUTO: 2.2 K/UL — LOW (ref 4.8–10.8)

## 2018-04-23 PROCEDURE — 99233 SBSQ HOSP IP/OBS HIGH 50: CPT

## 2018-04-23 PROCEDURE — 99223 1ST HOSP IP/OBS HIGH 75: CPT

## 2018-04-23 PROCEDURE — 99222 1ST HOSP IP/OBS MODERATE 55: CPT

## 2018-04-23 PROCEDURE — 73718 MRI LOWER EXTREMITY W/O DYE: CPT | Mod: 26,LT

## 2018-04-23 RX ORDER — AZITHROMYCIN 500 MG/1
500 TABLET, FILM COATED ORAL ONCE
Qty: 0 | Refills: 0 | Status: COMPLETED | OUTPATIENT
Start: 2018-04-23 | End: 2018-04-23

## 2018-04-23 RX ORDER — AZITHROMYCIN 500 MG/1
250 TABLET, FILM COATED ORAL DAILY
Qty: 0 | Refills: 0 | Status: DISCONTINUED | OUTPATIENT
Start: 2018-04-24 | End: 2018-04-26

## 2018-04-23 RX ORDER — SACCHAROMYCES BOULARDII 250 MG
250 POWDER IN PACKET (EA) ORAL
Qty: 0 | Refills: 0 | Status: DISCONTINUED | OUTPATIENT
Start: 2018-04-23 | End: 2018-04-26

## 2018-04-23 RX ADMIN — GABAPENTIN 800 MILLIGRAM(S): 400 CAPSULE ORAL at 06:23

## 2018-04-23 RX ADMIN — PANTOPRAZOLE SODIUM 40 MILLIGRAM(S): 20 TABLET, DELAYED RELEASE ORAL at 06:23

## 2018-04-23 RX ADMIN — Medication 40 MILLIGRAM(S): at 16:11

## 2018-04-23 RX ADMIN — OXYCODONE HYDROCHLORIDE 5 MILLIGRAM(S): 5 TABLET ORAL at 11:40

## 2018-04-23 RX ADMIN — ONDANSETRON 4 MILLIGRAM(S): 8 TABLET, FILM COATED ORAL at 20:45

## 2018-04-23 RX ADMIN — MONTELUKAST 10 MILLIGRAM(S): 4 TABLET, CHEWABLE ORAL at 11:36

## 2018-04-23 RX ADMIN — DULOXETINE HYDROCHLORIDE 60 MILLIGRAM(S): 30 CAPSULE, DELAYED RELEASE ORAL at 11:36

## 2018-04-23 RX ADMIN — Medication 0.5 MILLIGRAM(S): at 20:46

## 2018-04-23 RX ADMIN — HEPARIN SODIUM 5000 UNIT(S): 5000 INJECTION INTRAVENOUS; SUBCUTANEOUS at 06:23

## 2018-04-23 RX ADMIN — MAGNESIUM OXIDE 400 MG ORAL TABLET 400 MILLIGRAM(S): 241.3 TABLET ORAL at 08:01

## 2018-04-23 RX ADMIN — Medication 3 MILLILITER(S): at 08:12

## 2018-04-23 RX ADMIN — Medication 250 MILLIGRAM(S): at 18:29

## 2018-04-23 RX ADMIN — Medication 0.5 MILLIGRAM(S): at 08:12

## 2018-04-23 RX ADMIN — AZITHROMYCIN 500 MILLIGRAM(S): 500 TABLET, FILM COATED ORAL at 18:50

## 2018-04-23 RX ADMIN — Medication 100 MILLIGRAM(S): at 23:37

## 2018-04-23 RX ADMIN — MAGNESIUM OXIDE 400 MG ORAL TABLET 400 MILLIGRAM(S): 241.3 TABLET ORAL at 11:36

## 2018-04-23 RX ADMIN — GABAPENTIN 800 MILLIGRAM(S): 400 CAPSULE ORAL at 23:36

## 2018-04-23 RX ADMIN — ZINC SULFATE TAB 220 MG (50 MG ZINC EQUIVALENT) 220 MILLIGRAM(S): 220 (50 ZN) TAB at 11:36

## 2018-04-23 RX ADMIN — Medication 100 MILLIGRAM(S): at 23:36

## 2018-04-23 RX ADMIN — Medication 1: at 16:14

## 2018-04-23 RX ADMIN — Medication 3 MILLILITER(S): at 14:50

## 2018-04-23 RX ADMIN — OXYCODONE HYDROCHLORIDE 5 MILLIGRAM(S): 5 TABLET ORAL at 00:12

## 2018-04-23 RX ADMIN — Medication 3 MILLILITER(S): at 20:46

## 2018-04-23 RX ADMIN — HEPARIN SODIUM 5000 UNIT(S): 5000 INJECTION INTRAVENOUS; SUBCUTANEOUS at 18:29

## 2018-04-23 RX ADMIN — Medication 500 MILLIGRAM(S): at 11:36

## 2018-04-23 RX ADMIN — OXYCODONE HYDROCHLORIDE 5 MILLIGRAM(S): 5 TABLET ORAL at 19:30

## 2018-04-23 RX ADMIN — OXYCODONE HYDROCHLORIDE 5 MILLIGRAM(S): 5 TABLET ORAL at 18:39

## 2018-04-23 RX ADMIN — GABAPENTIN 800 MILLIGRAM(S): 400 CAPSULE ORAL at 13:54

## 2018-04-23 RX ADMIN — BUPROPION HYDROCHLORIDE 300 MILLIGRAM(S): 150 TABLET, EXTENDED RELEASE ORAL at 11:36

## 2018-04-23 RX ADMIN — MAGNESIUM OXIDE 400 MG ORAL TABLET 400 MILLIGRAM(S): 241.3 TABLET ORAL at 16:17

## 2018-04-23 RX ADMIN — Medication 1: at 11:37

## 2018-04-23 RX ADMIN — IRON SUCROSE 110 MILLIGRAM(S): 20 INJECTION, SOLUTION INTRAVENOUS at 11:36

## 2018-04-23 RX ADMIN — SODIUM CHLORIDE 75 MILLILITER(S): 9 INJECTION INTRAMUSCULAR; INTRAVENOUS; SUBCUTANEOUS at 10:17

## 2018-04-23 RX ADMIN — OXYCODONE HYDROCHLORIDE 5 MILLIGRAM(S): 5 TABLET ORAL at 10:41

## 2018-04-23 RX ADMIN — Medication 40 MILLIGRAM(S): at 23:41

## 2018-04-23 NOTE — PROGRESS NOTE ADULT - SUBJECTIVE AND OBJECTIVE BOX
52 yo female with Left Foot Plantar Ulcer seen bedside. Pt states she is diabetic. 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 Instructed patient to keep dressing clean, dry, and intact to the left foot.   Pt needs to be heel weight bearing to the left foot.  Pt is podiatrically stable at this time  Wound Care Order is in placed

## 2018-04-23 NOTE — CHART NOTE - NSCHARTNOTEFT_GEN_A_CORE
Pt seen in consult for possible AVF creation  Is S/P 1st toe amputation and has MRSA in wound  MRI of foot reveals osteo    Would defer any AVF procedure until after therapy and further management by podiatry  Can be done electively as an out pt  Maintain LUE limb preservation

## 2018-04-23 NOTE — PROGRESS NOTE ADULT - SUBJECTIVE AND OBJECTIVE BOX
[facility]  INFECTIOUS DISEASES AND INTERNAL MEDICINE at [place]  =======================================================  Dwight Pillai MD [facility] MD  Diplomates American Board of Internal Medicine and Infectious Diseases  =======================================================    SONYA LENNON [id number]  follow up on foot ulcer    patient seen and examined in follow up.  Chart and labs reviewed.   =======================================================  Allergies:  No Known Allergies      =======================================================  Medications:  ALBUTerol/ipratropium for Nebulization 3 milliLiter(s) Nebulizer every 6 hours  ascorbic acid 500 milliGRAM(s) Oral daily  buDESOnide   0.5 milliGRAM(s) Respule 0.5 milliGRAM(s) Inhalation two times a day  buPROPion XL . 300 milliGRAM(s) Oral daily  dextrose 5%. 1000 milliLiter(s) IV Continuous <Continuous>  dextrose 50% Injectable 12.5 Gram(s) IV Push once  dextrose 50% Injectable 25 Gram(s) IV Push once  dextrose 50% Injectable 25 Gram(s) IV Push once  dextrose Gel 1 Dose(s) Oral once PRN  DULoxetine 60 milliGRAM(s) Oral daily  epoetin krunal Injectable 8000 Unit(s) SubCutaneous <User Schedule>  gabapentin 800 milliGRAM(s) Oral three times a day  glucagon  Injectable 1 milliGRAM(s) IntraMuscular once PRN  heparin  Injectable 5000 Unit(s) SubCutaneous every 12 hours  HYDROcodone  5 mG/acetaminophen 325 mG 1 Tablet(s) Oral every 4 hours PRN  insulin lispro (HumaLOG) corrective regimen sliding scale   SubCutaneous three times a day before meals  iron sucrose IVPB 200 milliGRAM(s) IV Intermittent every 24 hours  magnesium oxide 400 milliGRAM(s) Oral three times a day with meals  montelukast 10 milliGRAM(s) Oral daily  ondansetron Injectable 4 milliGRAM(s) IV Push every 6 hours PRN  oxyCODONE    IR 5 milliGRAM(s) Oral every 6 hours PRN  pantoprazole    Tablet 40 milliGRAM(s) Oral before breakfast  sodium chloride 0.9%. 1000 milliLiter(s) IV Continuous <Continuous>  traZODone 100 milliGRAM(s) Oral at bedtime  zinc sulfate 220 milliGRAM(s) Oral daily     =======================================================  REVIEW OF SYSTEMS:  as above  all other ROS negative    =======================================================    Physical Exam:    Vital Signs Last 24 Hrs  T(C): 36.6 (23 Apr 2018 08:08), Max: 36.9 (22 Apr 2018 22:57)  T(F): 97.8 (23 Apr 2018 08:08), Max: 98.4 (22 Apr 2018 22:57)  HR: 88 (23 Apr 2018 08:33) (69 - 88)  BP: 121/67 (23 Apr 2018 08:08) (121/67 - 122/62)  RR: 17 (23 Apr 2018 08:08) (17 - 19)  SpO2: 95% (23 Apr 2018 08:08) (95% - 97%)    GEN: NAD, pleasant  HEENT: normocephalic and atraumatic. EOMI. AURORA.    NECK: Supple. No carotid bruits.  No lymphadenopathy or thyromegaly.  LUNGS: Clear to auscultation.  HEART: Regular rate and rhythm without murmur.  ABDOMEN: Soft, nontender, and nondistended.  Positive bowel sounds.    : No CVA tenderness  EXTREMITIES: Without any cyanosis, clubbing, rash, lesions or edema.  MSK: no joint swelling  NEUROLOGIC: Cranial nerves II through XII are grossly intact.  SKIN:  ***    =======================================================    Labs:  04-23    135  |  99  |  27.0<H>  ----------------------------<  123<H>  3.8   |  21.0<L>  |  3.44<H>    Ca    7.4<L>      23 Apr 2018 09:07                            8.5    2.2   )-----------( 63       ( 23 Apr 2018 09:07 )             27.3       RECENT CULTURES:  04-20 @ 08:37 .Blood Blood-Peripheral     No growth at 48 hours        04-19 @ 14:40 .Other left foot ulcer Enterobacter cloacae  Methicillin resistant Staphylococcus aureus    Numerous Enterobacter cloacae  Numerous Methicillin resistant Staphylococcus aureus  Few Streptococcus agalactiae (Group B)  Culture in progress  .  TYPE: (C=Critical, N=Notification, A=Abnormal) c  TESTS:  _ mrsa  DATE/TIME CALLED: _ 04/21/2018 11:10:41  CALLED TO: Evan segura rn  READ BACK (2 Patient Identifiers)(Y/N): _ y  READ BACK VALUES (Y/N): _ y  CALLED BY: _ elvia welge copy to ic pt log [facility]  INFECTIOUS DISEASES AND INTERNAL MEDICINE at [place]  =======================================================  Dwight Pillai MD [facility] Vick MD  Diplomates American Board of Internal Medicine and Infectious Diseases  =======================================================    SONYA LENNON [id number]  follow up on foot ulcer    patient seen and examined in follow up.  Chart and labs reviewed.   no fevers, no chills.     =======================================================  Allergies:  No Known Allergies      =======================================================  Medications:  ALBUTerol/ipratropium for Nebulization 3 milliLiter(s) Nebulizer every 6 hours  ascorbic acid 500 milliGRAM(s) Oral daily  buDESOnide   0.5 milliGRAM(s) Respule 0.5 milliGRAM(s) Inhalation two times a day  buPROPion XL . 300 milliGRAM(s) Oral daily  dextrose 5%. 1000 milliLiter(s) IV Continuous <Continuous>  dextrose 50% Injectable 12.5 Gram(s) IV Push once  dextrose 50% Injectable 25 Gram(s) IV Push once  dextrose 50% Injectable 25 Gram(s) IV Push once  dextrose Gel 1 Dose(s) Oral once PRN  DULoxetine 60 milliGRAM(s) Oral daily  epoetin krunal Injectable 8000 Unit(s) SubCutaneous <User Schedule>  gabapentin 800 milliGRAM(s) Oral three times a day  glucagon  Injectable 1 milliGRAM(s) IntraMuscular once PRN  heparin  Injectable 5000 Unit(s) SubCutaneous every 12 hours  HYDROcodone  5 mG/acetaminophen 325 mG 1 Tablet(s) Oral every 4 hours PRN  insulin lispro (HumaLOG) corrective regimen sliding scale   SubCutaneous three times a day before meals  iron sucrose IVPB 200 milliGRAM(s) IV Intermittent every 24 hours  magnesium oxide 400 milliGRAM(s) Oral three times a day with meals  montelukast 10 milliGRAM(s) Oral daily  ondansetron Injectable 4 milliGRAM(s) IV Push every 6 hours PRN  oxyCODONE    IR 5 milliGRAM(s) Oral every 6 hours PRN  pantoprazole    Tablet 40 milliGRAM(s) Oral before breakfast  sodium chloride 0.9%. 1000 milliLiter(s) IV Continuous <Continuous>  traZODone 100 milliGRAM(s) Oral at bedtime  zinc sulfate 220 milliGRAM(s) Oral daily     =======================================================  REVIEW OF SYSTEMS:  as above  all other ROS negative    =======================================================    Physical Exam:    Vital Signs Last 24 Hrs  T(C): 36.6 (23 Apr 2018 08:08), Max: 36.9 (22 Apr 2018 22:57)  T(F): 97.8 (23 Apr 2018 08:08), Max: 98.4 (22 Apr 2018 22:57)  HR: 88 (23 Apr 2018 08:33) (69 - 88)  BP: 121/67 (23 Apr 2018 08:08) (121/67 - 122/62)  RR: 17 (23 Apr 2018 08:08) (17 - 19)  SpO2: 95% (23 Apr 2018 08:08) (95% - 97%)    GEN: NAD, pleasant; thin  HEENT: normocephalic and atraumatic. EOMI. AURORA.    NECK: Supple. No carotid bruits.  No lymphadenopathy or thyromegaly.  LUNGS: Clear to auscultation.  HEART: Regular rate and rhythm without murmur.  ABDOMEN: Soft, nontender, and nondistended.  Positive bowel sounds.    : No CVA tenderness  EXTREMITIES: Without any cyanosis, clubbing, rash, lesions or edema.  MSK: no joint swelling  NEUROLOGIC: Cranial nerves II through XII are grossly intact.  SKIN:  LEFT foot ulcer under 1st MTP joint; deep.  dark drainage on gauze  s/p amputation of hallux    =======================================================    Labs:  04-23    135  |  99  |  27.0<H>  ----------------------------<  123<H>  3.8   |  21.0<L>  |  3.44<H>    Ca    7.4<L>      23 Apr 2018 09:07                            8.5    2.2   )-----------( 63       ( 23 Apr 2018 09:07 )             27.3       RECENT CULTURES:  04-20 @ 08:37 .Blood Blood-Peripheral     No growth at 48 hours        04-19 @ 14:40 .Other left foot ulcer Enterobacter cloacae  Methicillin resistant Staphylococcus aureus    Numerous Enterobacter cloacae  Numerous Methicillin resistant Staphylococcus aureus  Few Streptococcus agalactiae (Group B)  Culture in progress  .  TYPE: (C=Critical, N=Notification, A=Abnormal) c  TESTS:  _ mrsa  DATE/TIME CALLED: _ 04/21/2018 11:10:41  CALLED TO: Evan segura rn  READ BACK (2 Patient Identifiers)(Y/N): _ y  READ BACK VALUES (Y/N): _ y  CALLED BY: _ elvia welge copy to ic pt log

## 2018-04-23 NOTE — CONSULT NOTE ADULT - SUBJECTIVE AND OBJECTIVE BOX
PULMONARY CONSULT NOTE      SONYA LENNONN-[id number]    Patient is a 53y old  Female who presents with a chief complaint of Swelling all around the body and foot wound (19 Apr 2018 17:59)      HISTORY OF PRESENT ILLNESS:  54yo female with stage 2 copd and >50pk yrs smoking (current smoker) admitted for left foot wound and acute on chronic renal failure.  Noted increased SOB with cough and wheeze for past several days with chest tightness and non-radiating, non-exertional pain. Pt notes nonproductive cough but is not compliant with LABD due to ins coverage and cost    MEDICATIONS  (STANDING):  ALBUTerol/ipratropium for Nebulization 3 milliLiter(s) Nebulizer every 6 hours  ascorbic acid 500 milliGRAM(s) Oral daily  azithromycin   Tablet 500 milliGRAM(s) Oral once  benzonatate 100 milliGRAM(s) Oral three times a day  buDESOnide   0.5 milliGRAM(s) Respule 0.5 milliGRAM(s) Inhalation two times a day  buPROPion XL . 300 milliGRAM(s) Oral daily  dextrose 5%. 1000 milliLiter(s) (50 mL/Hr) IV Continuous <Continuous>  dextrose 50% Injectable 12.5 Gram(s) IV Push once  dextrose 50% Injectable 25 Gram(s) IV Push once  dextrose 50% Injectable 25 Gram(s) IV Push once  DULoxetine 60 milliGRAM(s) Oral daily  epoetin krunal Injectable 8000 Unit(s) SubCutaneous <User Schedule>  gabapentin 800 milliGRAM(s) Oral three times a day  heparin  Injectable 5000 Unit(s) SubCutaneous every 12 hours  insulin lispro (HumaLOG) corrective regimen sliding scale   SubCutaneous three times a day before meals  iron sucrose IVPB 200 milliGRAM(s) IV Intermittent every 24 hours  magnesium oxide 400 milliGRAM(s) Oral three times a day with meals  methylPREDNISolone sodium succinate Injectable 40 milliGRAM(s) IV Push every 8 hours  montelukast 10 milliGRAM(s) Oral daily  pantoprazole    Tablet 40 milliGRAM(s) Oral before breakfast  saccharomyces boulardii 250 milliGRAM(s) Oral two times a day  sodium chloride 0.9%. 1000 milliLiter(s) (75 mL/Hr) IV Continuous <Continuous>  traZODone 100 milliGRAM(s) Oral at bedtime  zinc sulfate 220 milliGRAM(s) Oral daily      MEDICATIONS  (PRN):  dextrose Gel 1 Dose(s) Oral once PRN Blood Glucose LESS THAN 70 milliGRAM(s)/deciliter  glucagon  Injectable 1 milliGRAM(s) IntraMuscular once PRN Glucose LESS THAN 70 milligrams/deciliter  guaiFENesin   Syrup  (Sugar-Free) 200 milliGRAM(s) Oral every 6 hours PRN Cough  HYDROcodone  5 mG/acetaminophen 325 mG 1 Tablet(s) Oral every 4 hours PRN Moderate Pain (4 - 6)  ondansetron Injectable 4 milliGRAM(s) IV Push every 6 hours PRN Nausea and/or Vomiting  oxyCODONE    IR 5 milliGRAM(s) Oral every 6 hours PRN Moderate to Severe Pain (7 - 10)      Allergies    No Known Allergies    Intolerances        PAST MEDICAL & SURGICAL HISTORY:  Fibromyalgia  DM (diabetes mellitus)  Foot ulcer: Left Foot  Osteopenia  Chronic pain  Back ache  Arthritis  Shoulder joint stiffness, left  Matamoros esophagus  Stomach ulcer  Diabetes  Asthma  S/P knee surgery  S/P hysterectomy  S/P cholecystectomy      FAMILY HISTORY:  No pertinent family history in first degree relatives      SOCIAL HISTORY  Smoking History:     REVIEW OF SYSTEMS:    CONSTITUTIONAL:  No fevers, chills, sweats    HEENT:  Eyes:  No diplopia or blurred vision. ENT:  No earache, sore throat or runny nose. sinus headache or postnasl drip    CARDIOVASCULAR:  No pressure, squeezing, tightness, or heaviness about the chest; no palpitations, leg swelling, orthopnea or PND    RESPIRATORY:  above    GASTROINTESTINAL:  No abdominal pain, nausea, vomiting or diarrhea.    GENITOURINARY:  No dysuria, frequency or urgency.    NEUROLOGIC:  No paresthesias, fasciculations, seizures or weakness.    PSYCHIATRIC:  No disorder of thought or mood.    Vital Signs Last 24 Hrs  T(C): 36.6 (23 Apr 2018 08:08), Max: 36.9 (22 Apr 2018 22:57)  T(F): 97.8 (23 Apr 2018 08:08), Max: 98.4 (22 Apr 2018 22:57)  HR: 90 (23 Apr 2018 15:05) (69 - 90)  BP: 121/67 (23 Apr 2018 08:08) (121/67 - 122/62)  BP(mean): --  RR: 17 (23 Apr 2018 08:08) (17 - 19)  SpO2: 95% (23 Apr 2018 08:08) (95% - 97%)    PHYSICAL EXAMINATION:    GENERAL: The patient is a well-developed, well-nourished _____in no apparent distress.     HEENT: Head is normocephalic and atraumatic. Extraocular muscles are intact. Mucous membranes are moist.     NECK: Supple.     LUNGS: diminshed breath sounds with end exp wheezes. Respirations unlabored    HEART: Regular rate and rhythm without murmur.    ABDOMEN: Soft, nontender, and nondistended.  No hepatosplenomegaly is noted.    EXTREMITIES: Without any cyanosis, clubbing, rash, lesions or edema.    NEUROLOGIC: Grossly intact.      LABS:                        8.5    2.2   )-----------( 63       ( 23 Apr 2018 09:07 )             27.3     04-23    135  |  99  |  27.0<H>  ----------------------------<  123<H>  3.8   |  21.0<L>  |  3.44<H>    Ca    7.4<L>      23 Apr 2018 09:07                          MICROBIOLOGY:    RADIOLOGY & ADDITIONAL STUDIES:  < from: Xray Chest 1 View- PORTABLE-Routine (04.22.18 @ 12:22) >   EXAM:  XR CHEST PORTABLE ROUTINE 1V                          PROCEDURE DATE:  04/22/2018          INTERPRETATION:  Chest, frontal portable view    HISTORY: Dyspnea    Comparison: 4/19    Findings:    The mediastinal cardiac silhouette is normal. The lungs are clear.   Osseous structures and soft tissues are unremarkable.    IMPRESSION: No acute findings.                ALBERTO SOLITARIO M.D., ATTENDING RADIOLOGIST  This document has been electronically signed. Apr 22 2018  3:26PM        < end of copied text >

## 2018-04-23 NOTE — PROGRESS NOTE ADULT - SUBJECTIVE AND OBJECTIVE BOX
[facility]  MD Petros Ch MD  [phone]  Answering Lena : [phone]    SONYA LENNONKEVIN-[id number]    INTERVAL HPI/OVERNIGHT EVENTS:    Vital Signs Last 24 Hrs  T(C): 36.6 (23 Apr 2018 08:08), Max: 36.9 (22 Apr 2018 22:57)  T(F): 97.8 (23 Apr 2018 08:08), Max: 98.4 (22 Apr 2018 22:57)  HR: 88 (23 Apr 2018 08:33) (69 - 88)  BP: 121/67 (23 Apr 2018 08:08) (121/67 - 122/62)  BP(mean): --  RR: 17 (23 Apr 2018 08:08) (17 - 19)  SpO2: 95% (23 Apr 2018 08:08) (95% - 97%)  ANTIBIOTICS  epoetin krunal Injectable 8000 Unit(s) SubCutaneous <User Schedule>      Allergies    No Known Allergies    Intolerances        REVIEW OF SYSTEMS:    CONSTITUTIONAL:  As per HPI.    HEENT:  Eyes:  No diplopia or blurred vision. ENT:  No earache, sore throat or runny nose.    CARDIOVASCULAR:  No pressure, squeezing, strangling, tightness, heaviness or aching about the chest, neck, axilla or epigastrium.    RESPIRATORY:  No cough, shortness of breath, PND or orthopnea.    GASTROINTESTINAL:  No nausea, vomiting or diarrhea.    GENITOURINARY:  No dysuria, frequency or urgency.    MUSCULOSKELETAL:  As per HPI.    SKIN:  No change in skin, hair or nails.    NEUROLOGIC:  CNI,MOTOR WNL, SENSORY WNL.    ENDOCRINE:  No heat or cold intolerance, polyuria or polydipsia.    HEMATOLOGICAL:  No easy bruising or bleeding.           PHYSICAL EXAMINATION:    GENERAL: The patient is a well-developed, well-nourished _____in no apparent distress. ___ is alert and oriented x3.    VITAL SIGNS:     HEENT: Head is normocephalic and atraumatic. Extraocular muscles are intact. Pupils are equal, round, and reactive to light and accommodation. Nares appeared normal. Mouth is well hydrated and without lesions. Mucous membranes are moist. Posterior pharynx clear of any exudate or lesions.    NECK: Supple. No carotid bruits.  No lymphadenopathy or thyromegaly.    LUNGS: Clear to auscultation.    HEART: Regular rate and rhythm without murmur.    ABDOMEN: Soft, nontender, and nondistended.  Positive bowel sounds.  No hepatosplenomegaly was noted.    EXTREMITIES: Without any cyanosis, clubbing, rash, lesions or edema.    NEUROLOGIC: Cranial nerves II through XII are grossly intact.    PSYCHIATRIC: Flat affect, but denies suicidal or homicidal ideations.  SKIN: No ulceration or induration present.      LABS:                        8.5    2.2   )-----------( 63       ( 23 Apr 2018 09:07 )             27.3     04-23    135  |  99  |  27.0<H>  ----------------------------<  123<H>  3.8   |  21.0<L>  |  3.44<H>    Ca    7.4<L>      23 Apr 2018 09:07                RADIOLOGY & ADDITIONAL STUDIES:          ASSESSMENT & PLAn:    pancytopenia secondary to cirrhosis of liver and hypersplenism  Non healing left foot ulcer  Abdomen sonogram consistent with cirrhosis of liver with splenomegaly  Afebrile, WBC improved to 2.2   Anemia of chronic diseae  Monitor CBC   Anemia work up ordered  PLAN:        Ria Landeros MD

## 2018-04-23 NOTE — PROGRESS NOTE ADULT - SUBJECTIVE AND OBJECTIVE BOX
[facility] DIVISION OF KIDNEY DISEASES AND HYPERTENSION -- FOLLOW UP NOTE  --------------------------------------------------------------------------------  Chief Complaint:   ERIC on CKD    24 hour events/subjective:  Pt seen and examined today  Lying in bed awake, NAD   SCr improved, but remains elevated today  Still with diarrhea        PAST HISTORY  --------------------------------------------------------------------------------  No significant changes to PMH, PSH, FHx, SHx, unless otherwise noted    ALLERGIES & MEDICATIONS  --------------------------------------------------------------------------------  Allergies    No Known Allergies    Intolerances      Standing Inpatient Medications  ALBUTerol/ipratropium for Nebulization 3 milliLiter(s) Nebulizer every 6 hours  ascorbic acid 500 milliGRAM(s) Oral daily  buDESOnide   0.5 milliGRAM(s) Respule 0.5 milliGRAM(s) Inhalation two times a day  buPROPion XL . 300 milliGRAM(s) Oral daily  dextrose 5%. 1000 milliLiter(s) IV Continuous <Continuous>  dextrose 50% Injectable 12.5 Gram(s) IV Push once  dextrose 50% Injectable 25 Gram(s) IV Push once  dextrose 50% Injectable 25 Gram(s) IV Push once  DULoxetine 60 milliGRAM(s) Oral daily  epoetin krunal Injectable 8000 Unit(s) SubCutaneous <User Schedule>  gabapentin 800 milliGRAM(s) Oral three times a day  heparin  Injectable 5000 Unit(s) SubCutaneous every 12 hours  insulin lispro (HumaLOG) corrective regimen sliding scale   SubCutaneous three times a day before meals  iron sucrose IVPB 200 milliGRAM(s) IV Intermittent every 24 hours  magnesium oxide 400 milliGRAM(s) Oral three times a day with meals  montelukast 10 milliGRAM(s) Oral daily  pantoprazole    Tablet 40 milliGRAM(s) Oral before breakfast  sodium chloride 0.9%. 1000 milliLiter(s) IV Continuous <Continuous>  traZODone 100 milliGRAM(s) Oral at bedtime  zinc sulfate 220 milliGRAM(s) Oral daily    PRN Inpatient Medications  dextrose Gel 1 Dose(s) Oral once PRN  glucagon  Injectable 1 milliGRAM(s) IntraMuscular once PRN  HYDROcodone  5 mG/acetaminophen 325 mG 1 Tablet(s) Oral every 4 hours PRN  ondansetron Injectable 4 milliGRAM(s) IV Push every 6 hours PRN  oxyCODONE    IR 5 milliGRAM(s) Oral every 6 hours PRN      REVIEW OF SYSTEMS  --------------------------------------------------------------------------------  Gen: No weight changes, fatigue, fevers/chills, weakness  Skin: No rashes  Head/Eyes/Ears/Mouth: No headache; Normal hearing; Normal vision w/o blurriness; No sinus pain/discomfort, sore throat  Respiratory: No dyspnea, cough, wheezing, hemoptysis  CV: No chest pain, PND, orthopnea  GI: No abdominal pain, diarrhea, constipation, nausea, vomiting, melena, hematochezia  : No increased frequency, dysuria, hematuria, nocturia  MSK: No joint pain/swelling; no back pain; no edema  Neuro: No dizziness/lightheadedness, weakness, seizures, numbness, tingling  Heme: No easy bruising or bleeding  Endo: No heat/cold intolerance  Psych: No significant nervousness, anxiety, stress, depression    All other systems were reviewed and are negative, except as noted.    VITALS/PHYSICAL EXAM  --------------------------------------------------------------------------------  T(C): 36.6 (04-23-18 @ 08:08), Max: 36.9 (04-22-18 @ 22:57)  HR: 88 (04-23-18 @ 08:33) (69 - 88)  BP: 121/67 (04-23-18 @ 08:08) (121/67 - 122/62)  RR: 17 (04-23-18 @ 08:08) (17 - 19)  SpO2: 95% (04-23-18 @ 08:08) (95% - 97%)  Wt(kg): --        Physical Exam:  	Gen: NAD, well-appearing  	HEENT: PERRL, supple neck, clear oropharynx  	Pulm: CTA B/L  	CV: RRR, S1S2; no rub  	Back: No spinal or CVA tenderness; no sacral edema  	Abd: +BS, soft, nontender/nondistended, +diarrhea  	: No suprapubic tenderness  	UE: Warm, FROM, no clubbing, intact strength; no edema; no asterixis  	LE: Warm, FROM, no clubbing, intact strength; no edema  	Neuro: No focal deficits, intact gait  	Psych: Normal affect, weepy at times  	Skin: Warm, without rashes  	Vascular access:  N/A    LABS/STUDIES  --------------------------------------------------------------------------------              8.5    2.2   >-----------<  63       [04-23-18 @ 09:07]              27.3     135  |  99  |  27.0  ----------------------------<  123      [04-23-18 @ 09:07]  3.8   |  21.0  |  3.44        Ca     7.4     [04-23-18 @ 09:07]      Creatinine Trend:  SCr 3.44 [04-23 @ 09:07]  SCr 3.69 [04-22 @ 10:08]  SCr 3.18 [04-21 @ 09:28]  SCr 2.92 [04-20 @ 08:37]  SCr 2.95 [04-19 @ 22:40]    Urinalysis - [04-19-18 @ 21:30]      Color Yellow / Appearance Clear / SG 1.010 / pH 6.0      Gluc Negative / Ketone Negative  / Bili Negative / Urobili Negative       Blood Large / Protein 100 / Leuk Est Negative / Nitrite Negative      RBC 11-25 / WBC 0-2 / Hyaline  / Gran  / Sq Epi  / Non Sq Epi Few / Bacteria Occasional    Urine Sodium <30      [04-22-18 @ 22:27]  Urine Osmolality 244      [04-22-18 @ 22:27]    Iron 77, TIBC 253, %sat 30      [04-23-18 @ 09:07]  Ferritin 57      [04-20-18 @ 08:37]  HbA1c 5.6      [04-20-18 @ 08:37]  TSH 4.32      [04-20-18 @ 11:11]

## 2018-04-23 NOTE — PROGRESS NOTE ADULT - ASSESSMENT
54 y/o F with Hx of Fibromyalgia, DM, Osteopenia, Matamoros Esophagus seen in Ed for Left Foot Plantar Ulcer, worsening kidney function who is well know to Nephrology service, she went to her Podiatrist (Dr. Sanchez) for her wound check for her left foot wound, Her podiatrist send her to ED because she was looking pale, weak and was having swelling and edema.  Patient found to have pancytopenia along with left foot ulcer, confirmed to have OM on MRI.  Patient developed ERIC on CKD 4 likely due to IV lasix drip and diarrhea. initially treated for hepatic encephalopathy for suspected liver cirrhosis but hepatosplenomegaly on ultrasound with normal ammonia level. Course complicated by COPD exacerbation.

## 2018-04-23 NOTE — PROGRESS NOTE ADULT - ASSESSMENT
1) CKD IV  2) Diabetic Nephropathy ERIC on CKD  3) Anemia of renal disease  4) ?Liver cirrhosis    CKD IV; AVF on this admission  improving, but still elevated SCr; prerenal; diarrhea/losses; decreased po  IVF  Sending urine studies  Anemia not at goal; on iron and RHEA

## 2018-04-24 DIAGNOSIS — M86.9 OSTEOMYELITIS, UNSPECIFIED: ICD-10-CM

## 2018-04-24 LAB
ANION GAP SERPL CALC-SCNC: 10 MMOL/L — SIGNIFICANT CHANGE UP (ref 5–17)
BUN SERPL-MCNC: 32 MG/DL — HIGH (ref 8–20)
CALCIUM SERPL-MCNC: 7.5 MG/DL — LOW (ref 8.6–10.2)
CHLORIDE SERPL-SCNC: 102 MMOL/L — SIGNIFICANT CHANGE UP (ref 98–107)
CO2 SERPL-SCNC: 23 MMOL/L — SIGNIFICANT CHANGE UP (ref 22–29)
CREAT SERPL-MCNC: 3.71 MG/DL — HIGH (ref 0.5–1.3)
FERRITIN SERPL-MCNC: 372 NG/ML — HIGH (ref 15–150)
FOLATE SERPL-MCNC: 4 NG/ML — LOW
GLUCOSE BLDC GLUCOMTR-MCNC: 296 MG/DL — HIGH (ref 70–99)
GLUCOSE BLDC GLUCOMTR-MCNC: 303 MG/DL — HIGH (ref 70–99)
GLUCOSE BLDC GLUCOMTR-MCNC: 304 MG/DL — HIGH (ref 70–99)
GLUCOSE BLDC GLUCOMTR-MCNC: 313 MG/DL — HIGH (ref 70–99)
GLUCOSE SERPL-MCNC: 286 MG/DL — HIGH (ref 70–115)
HCT VFR BLD CALC: 29.2 % — LOW (ref 37–47)
HGB BLD-MCNC: 8.9 G/DL — LOW (ref 12–16)
MCHC RBC-ENTMCNC: 28.3 PG — SIGNIFICANT CHANGE UP (ref 27–31)
MCHC RBC-ENTMCNC: 30.5 G/DL — LOW (ref 32–36)
MCV RBC AUTO: 92.7 FL — SIGNIFICANT CHANGE UP (ref 81–99)
PHOSPHATE SERPL-MCNC: 2.8 MG/DL — SIGNIFICANT CHANGE UP (ref 2.4–4.7)
PLATELET # BLD AUTO: 69 K/UL — LOW (ref 150–400)
POTASSIUM SERPL-MCNC: 4.9 MMOL/L — SIGNIFICANT CHANGE UP (ref 3.5–5.3)
POTASSIUM SERPL-SCNC: 4.9 MMOL/L — SIGNIFICANT CHANGE UP (ref 3.5–5.3)
RBC # BLD: 3.15 M/UL — LOW (ref 4.4–5.2)
RBC # FLD: 13.9 % — SIGNIFICANT CHANGE UP (ref 11–15.6)
SODIUM SERPL-SCNC: 135 MMOL/L — SIGNIFICANT CHANGE UP (ref 135–145)
VIT B12 SERPL-MCNC: 1189 PG/ML — SIGNIFICANT CHANGE UP (ref 232–1245)
WBC # BLD: 2.1 K/UL — LOW (ref 4.8–10.8)
WBC # FLD AUTO: 2.1 K/UL — LOW (ref 4.8–10.8)

## 2018-04-24 PROCEDURE — 99232 SBSQ HOSP IP/OBS MODERATE 35: CPT

## 2018-04-24 PROCEDURE — 99233 SBSQ HOSP IP/OBS HIGH 50: CPT

## 2018-04-24 RX ADMIN — Medication 4: at 11:55

## 2018-04-24 RX ADMIN — Medication 250 MILLIGRAM(S): at 06:22

## 2018-04-24 RX ADMIN — Medication 250 MILLIGRAM(S): at 17:19

## 2018-04-24 RX ADMIN — Medication 500 MILLIGRAM(S): at 11:54

## 2018-04-24 RX ADMIN — MAGNESIUM OXIDE 400 MG ORAL TABLET 400 MILLIGRAM(S): 241.3 TABLET ORAL at 07:48

## 2018-04-24 RX ADMIN — Medication 40 MILLIGRAM(S): at 06:27

## 2018-04-24 RX ADMIN — HEPARIN SODIUM 5000 UNIT(S): 5000 INJECTION INTRAVENOUS; SUBCUTANEOUS at 17:19

## 2018-04-24 RX ADMIN — IRON SUCROSE 110 MILLIGRAM(S): 20 INJECTION, SOLUTION INTRAVENOUS at 11:55

## 2018-04-24 RX ADMIN — Medication 0.5 MILLIGRAM(S): at 21:04

## 2018-04-24 RX ADMIN — Medication 100 MILLIGRAM(S): at 21:17

## 2018-04-24 RX ADMIN — Medication 40 MILLIGRAM(S): at 13:16

## 2018-04-24 RX ADMIN — MONTELUKAST 10 MILLIGRAM(S): 4 TABLET, CHEWABLE ORAL at 11:54

## 2018-04-24 RX ADMIN — Medication 3 MILLILITER(S): at 21:04

## 2018-04-24 RX ADMIN — Medication 3 MILLILITER(S): at 03:54

## 2018-04-24 RX ADMIN — OXYCODONE HYDROCHLORIDE 5 MILLIGRAM(S): 5 TABLET ORAL at 06:22

## 2018-04-24 RX ADMIN — Medication 3 MILLILITER(S): at 08:14

## 2018-04-24 RX ADMIN — GABAPENTIN 800 MILLIGRAM(S): 400 CAPSULE ORAL at 06:18

## 2018-04-24 RX ADMIN — Medication 100 MILLIGRAM(S): at 06:18

## 2018-04-24 RX ADMIN — MAGNESIUM OXIDE 400 MG ORAL TABLET 400 MILLIGRAM(S): 241.3 TABLET ORAL at 11:54

## 2018-04-24 RX ADMIN — Medication 100 MILLIGRAM(S): at 13:16

## 2018-04-24 RX ADMIN — OXYCODONE HYDROCHLORIDE 5 MILLIGRAM(S): 5 TABLET ORAL at 13:30

## 2018-04-24 RX ADMIN — Medication 3: at 16:29

## 2018-04-24 RX ADMIN — BUPROPION HYDROCHLORIDE 300 MILLIGRAM(S): 150 TABLET, EXTENDED RELEASE ORAL at 11:57

## 2018-04-24 RX ADMIN — OXYCODONE HYDROCHLORIDE 5 MILLIGRAM(S): 5 TABLET ORAL at 07:22

## 2018-04-24 RX ADMIN — OXYCODONE HYDROCHLORIDE 5 MILLIGRAM(S): 5 TABLET ORAL at 22:15

## 2018-04-24 RX ADMIN — GABAPENTIN 800 MILLIGRAM(S): 400 CAPSULE ORAL at 21:15

## 2018-04-24 RX ADMIN — AZITHROMYCIN 250 MILLIGRAM(S): 500 TABLET, FILM COATED ORAL at 11:55

## 2018-04-24 RX ADMIN — Medication 0.5 MILLIGRAM(S): at 08:14

## 2018-04-24 RX ADMIN — ZINC SULFATE TAB 220 MG (50 MG ZINC EQUIVALENT) 220 MILLIGRAM(S): 220 (50 ZN) TAB at 11:54

## 2018-04-24 RX ADMIN — OXYCODONE HYDROCHLORIDE 5 MILLIGRAM(S): 5 TABLET ORAL at 21:15

## 2018-04-24 RX ADMIN — HEPARIN SODIUM 5000 UNIT(S): 5000 INJECTION INTRAVENOUS; SUBCUTANEOUS at 06:18

## 2018-04-24 RX ADMIN — GABAPENTIN 800 MILLIGRAM(S): 400 CAPSULE ORAL at 13:16

## 2018-04-24 RX ADMIN — MAGNESIUM OXIDE 400 MG ORAL TABLET 400 MILLIGRAM(S): 241.3 TABLET ORAL at 16:30

## 2018-04-24 RX ADMIN — Medication 40 MILLIGRAM(S): at 21:17

## 2018-04-24 RX ADMIN — Medication 3 MILLILITER(S): at 14:51

## 2018-04-24 RX ADMIN — OXYCODONE HYDROCHLORIDE 5 MILLIGRAM(S): 5 TABLET ORAL at 12:39

## 2018-04-24 RX ADMIN — ERYTHROPOIETIN 8000 UNIT(S): 10000 INJECTION, SOLUTION INTRAVENOUS; SUBCUTANEOUS at 16:30

## 2018-04-24 RX ADMIN — Medication 4: at 07:48

## 2018-04-24 RX ADMIN — DULOXETINE HYDROCHLORIDE 60 MILLIGRAM(S): 30 CAPSULE, DELAYED RELEASE ORAL at 11:54

## 2018-04-24 RX ADMIN — PANTOPRAZOLE SODIUM 40 MILLIGRAM(S): 20 TABLET, DELAYED RELEASE ORAL at 06:18

## 2018-04-24 NOTE — PROGRESS NOTE ADULT - SUBJECTIVE AND OBJECTIVE BOX
52 yo female with Left Foot Plantar Ulcer seen bedside. Pt states she is diabetic. 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 Instructed patient to keep dressing clean, dry, and intact to the left foot.   Pt needs to be heel weight bearing to the left foot.  Podiatry to possibly perform bone biopsy and OR debridement, pending on evaluation by vascular for healing potential s/p OR Procedures.  Podiatry will follow.

## 2018-04-24 NOTE — CONSULT NOTE ADULT - SUBJECTIVE AND OBJECTIVE BOX
[facility], [facility]                                                    [address], [place] [address]                                                         PHONE: [phone]    FAX: [phone] [address], [address], [place] [address]                                                 PHONE: [phone]    FAX: [phone]  *******************************************************************************    Reason for Consult: Pre-operative cardiac risk stratification    HPI:  SONYA LENNON is a 53y Female    PAST MEDICAL & SURGICAL HISTORY:  Fibromyalgia  DM (diabetes mellitus)  Foot ulcer: Left Foot  Osteopenia  Chronic pain  Back ache  Arthritis  Shoulder joint stiffness, left  Matamoros esophagus  Stomach ulcer  Diabetes  Asthma  S/P knee surgery  S/P hysterectomy  S/P cholecystectomy      No Known Allergies      MEDICATIONS  (STANDING):  ALBUTerol/ipratropium for Nebulization 3 milliLiter(s) Nebulizer every 6 hours  ascorbic acid 500 milliGRAM(s) Oral daily  azithromycin   Tablet 250 milliGRAM(s) Oral daily  benzonatate 100 milliGRAM(s) Oral three times a day  buDESOnide   0.5 milliGRAM(s) Respule 0.5 milliGRAM(s) Inhalation two times a day  buPROPion XL . 300 milliGRAM(s) Oral daily  dextrose 5%. 1000 milliLiter(s) (50 mL/Hr) IV Continuous <Continuous>  dextrose 50% Injectable 12.5 Gram(s) IV Push once  dextrose 50% Injectable 25 Gram(s) IV Push once  dextrose 50% Injectable 25 Gram(s) IV Push once  DULoxetine 60 milliGRAM(s) Oral daily  epoetin krunal Injectable 8000 Unit(s) SubCutaneous <User Schedule>  gabapentin 800 milliGRAM(s) Oral three times a day  heparin  Injectable 5000 Unit(s) SubCutaneous every 12 hours  insulin lispro (HumaLOG) corrective regimen sliding scale   SubCutaneous three times a day before meals  magnesium oxide 400 milliGRAM(s) Oral three times a day with meals  methylPREDNISolone sodium succinate Injectable 40 milliGRAM(s) IV Push every 8 hours  montelukast 10 milliGRAM(s) Oral daily  pantoprazole    Tablet 40 milliGRAM(s) Oral before breakfast  saccharomyces boulardii 250 milliGRAM(s) Oral two times a day  traZODone 100 milliGRAM(s) Oral at bedtime  zinc sulfate 220 milliGRAM(s) Oral daily    MEDICATIONS  (PRN):  dextrose Gel 1 Dose(s) Oral once PRN Blood Glucose LESS THAN 70 milliGRAM(s)/deciliter  glucagon  Injectable 1 milliGRAM(s) IntraMuscular once PRN Glucose LESS THAN 70 milligrams/deciliter  guaiFENesin   Syrup  (Sugar-Free) 200 milliGRAM(s) Oral every 6 hours PRN Cough  HYDROcodone  5 mG/acetaminophen 325 mG 1 Tablet(s) Oral every 4 hours PRN Moderate Pain (4 - 6)  ondansetron Injectable 4 milliGRAM(s) IV Push every 6 hours PRN Nausea and/or Vomiting  oxyCODONE    IR 5 milliGRAM(s) Oral every 6 hours PRN Moderate to Severe Pain (7 - 10)      Social History: no active tobacco / EtOH / IVDA    Family History: No pertinent family history in first degree relatives      ROS: As noted above, otherwise unremarkable.    Vital Signs Last 24 Hrs  T(C): 36.8 (24 Apr 2018 08:11), Max: 36.9 (23 Apr 2018 22:49)  T(F): 98.2 (24 Apr 2018 08:11), Max: 98.4 (23 Apr 2018 22:49)  HR: 66 (24 Apr 2018 15:08) (66 - 92)  BP: 151/78 (24 Apr 2018 08:11) (136/82 - 151/78)  BP(mean): --  RR: 19 (24 Apr 2018 08:11) (18 - 19)  SpO2: 95% (23 Apr 2018 22:49) (95% - 95%)    I&O's Detail    23 Apr 2018 07:01  -  24 Apr 2018 07:00  --------------------------------------------------------  IN:    sodium chloride 0.9%: 1800 mL    Solution: 100 mL  Total IN: 1900 mL    OUT:  Total OUT: 0 mL    Total NET: 1900 mL        I&O's Summary    23 Apr 2018 07:01  -  24 Apr 2018 07:00  --------------------------------------------------------  IN: 1900 mL / OUT: 0 mL / NET: 1900 mL            PHYSICAL EXAM:  General: Appears well developed, well nourished, no acute distress  HEENT: Head: normocephalic, atraumatic  Eyes: Pupils equal and reactive  Neck: Supple, no carotid bruit, no JVD, no HJR  CARDIOVASCULAR: Normal S1 and S2, no murmur, rub, or gallop  LUNGS: Clear to auscultation bilaterally, no rales, rhonchi or wheeze  ABDOMEN: Soft, nontender, non-distended, positive bowel sounds, no mass or bruit  EXTREMITIES: No edema, distal pulses WNL  SKIN: Warm and dry with normal turgor  NEURO: Alert & oriented x 3, grossly intact  PSYCH: normal mood and affect    LABS:                        8.9    2.1   )-----------( 69       ( 24 Apr 2018 08:02 )             29.2     04-24    135  |  102  |  32.0<H>  ----------------------------<  286<H>  4.9   |  23.0  |  3.71<H>    Ca    7.5<L>      24 Apr 2018 08:02  Phos  2.8     04-24              RADIOLOGY & ADDITIONAL STUDIES:    ECG:sinus nsst    ECHO:    STRESS TEST:    CARDIAC CATHETERIZATION:    Assessment and Plan:  In summary, SONYA LENNON is a 53y Female with past medical history significant for PAD, HTN, Dyslip, DM, awaiting bone bx possible debridement thursday.  No new cv complaints    - No active chest pain, evidence of ischemia, decompensated CHF, significant valvular abnormality, or unstable arrhythmia if echo ok then may proceed with bone bx and debridement if needed.  pt understands she is HIGH risk for all surgical procedures  should be on STATIN and asa will high pretest prob of vascular disease    We will follow with you.  Thank you for allowing me to participate in the care of your patient.      Sincerely,    Sampson Ritter DO

## 2018-04-24 NOTE — PROGRESS NOTE ADULT - SUBJECTIVE AND OBJECTIVE BOX
[facility] DIVISION OF KIDNEY DISEASES AND HYPERTENSION -- FOLLOW UP NOTE  --------------------------------------------------------------------------------  Chief Complaint:   ERIC on CKD    24 hour events/subjective:  Pt seen and examined today  Lying in bed awake, NAD   SCr remains elevated today          PAST HISTORY  --------------------------------------------------------------------------------  No significant changes to PMH, PSH, FHx, SHx, unless otherwise noted    ALLERGIES & MEDICATIONS  --------------------------------------------------------------------------------  Allergies    No Known Allergies    Intolerances      Standing Inpatient Medications  ALBUTerol/ipratropium for Nebulization 3 milliLiter(s) Nebulizer every 6 hours  ascorbic acid 500 milliGRAM(s) Oral daily  azithromycin   Tablet 250 milliGRAM(s) Oral daily  benzonatate 100 milliGRAM(s) Oral three times a day  buDESOnide   0.5 milliGRAM(s) Respule 0.5 milliGRAM(s) Inhalation two times a day  buPROPion XL . 300 milliGRAM(s) Oral daily  dextrose 5%. 1000 milliLiter(s) IV Continuous <Continuous>  dextrose 50% Injectable 12.5 Gram(s) IV Push once  dextrose 50% Injectable 25 Gram(s) IV Push once  dextrose 50% Injectable 25 Gram(s) IV Push once  DULoxetine 60 milliGRAM(s) Oral daily  epoetin krunal Injectable 8000 Unit(s) SubCutaneous <User Schedule>  gabapentin 800 milliGRAM(s) Oral three times a day  heparin  Injectable 5000 Unit(s) SubCutaneous every 12 hours  insulin lispro (HumaLOG) corrective regimen sliding scale   SubCutaneous three times a day before meals  magnesium oxide 400 milliGRAM(s) Oral three times a day with meals  methylPREDNISolone sodium succinate Injectable 40 milliGRAM(s) IV Push every 8 hours  montelukast 10 milliGRAM(s) Oral daily  pantoprazole    Tablet 40 milliGRAM(s) Oral before breakfast  saccharomyces boulardii 250 milliGRAM(s) Oral two times a day  traZODone 100 milliGRAM(s) Oral at bedtime  zinc sulfate 220 milliGRAM(s) Oral daily    PRN Inpatient Medications  dextrose Gel 1 Dose(s) Oral once PRN  glucagon  Injectable 1 milliGRAM(s) IntraMuscular once PRN  guaiFENesin   Syrup  (Sugar-Free) 200 milliGRAM(s) Oral every 6 hours PRN  HYDROcodone  5 mG/acetaminophen 325 mG 1 Tablet(s) Oral every 4 hours PRN  ondansetron Injectable 4 milliGRAM(s) IV Push every 6 hours PRN  oxyCODONE    IR 5 milliGRAM(s) Oral every 6 hours PRN      REVIEW OF SYSTEMS  --------------------------------------------------------------------------------  Gen: No weight changes, fatigue, fevers/chills, weakness  Skin: No rashes  Head/Eyes/Ears/Mouth: No headache; Normal hearing; Normal vision w/o blurriness; No sinus pain/discomfort, sore throat  Respiratory: No dyspnea, cough, wheezing, hemoptysis  CV: No chest pain, PND, orthopnea  GI: No abdominal pain, diarrhea, constipation, nausea, vomiting, melena, hematochezia  : No increased frequency, dysuria, hematuria, nocturia  MSK: No joint pain/swelling; no back pain; no edema  Neuro: No dizziness/lightheadedness, weakness, seizures, numbness, tingling  Heme: No easy bruising or bleeding  Endo: No heat/cold intolerance  Psych: No significant nervousness, anxiety, stress, depression    All other systems were reviewed and are negative, except as noted.    VITALS/PHYSICAL EXAM  --------------------------------------------------------------------------------  T(C): 36.8 (04-24-18 @ 08:11), Max: 36.9 (04-23-18 @ 22:49)  HR: 72 (04-24-18 @ 08:30) (72 - 92)  BP: 151/78 (04-24-18 @ 08:11) (136/82 - 151/78)  RR: 19 (04-24-18 @ 08:11) (18 - 19)  SpO2: 95% (04-23-18 @ 22:49) (95% - 95%)  Wt(kg): --        04-23-18 @ 07:01  -  04-24-18 @ 07:00  --------------------------------------------------------  IN: 1900 mL / OUT: 0 mL / NET: 1900 mL      Physical Exam:  	Gen: NAD, well-appearing  	HEENT: PERRL, supple neck, clear oropharynx  	Pulm: CTA B/L  	CV: RRR, S1S2; no rub  	Back: No spinal or CVA tenderness; no sacral edema  	Abd: +BS, soft, nontender/nondistended  	: No suprapubic tenderness  	UE: Warm, FROM, no clubbing, intact strength; no edema; no asterixis  	LE: Warm, FROM, no clubbing, intact strength; no edema  	Neuro: No focal deficits, intact gait  	Psych: Normal affect and mood  	Skin: Warm, without rashes  	Vascular access:    LABS/STUDIES  --------------------------------------------------------------------------------              8.9    2.1   >-----------<  69       [04-24-18 @ 08:02]              29.2     135  |  102  |  32.0  ----------------------------<  286      [04-24-18 @ 08:02]  4.9   |  23.0  |  3.71        Ca     7.5     [04-24-18 @ 08:02]      Phos  2.8     [04-24-18 @ 08:02]      Creatinine Trend:  SCr 3.71 [04-24 @ 08:02]  SCr 3.44 [04-23 @ 09:07]  SCr 3.69 [04-22 @ 10:08]  SCr 3.18 [04-21 @ 09:28]  SCr 2.92 [04-20 @ 08:37]    Urinalysis - [04-19-18 @ 21:30]      Color Yellow / Appearance Clear / SG 1.010 / pH 6.0      Gluc Negative / Ketone Negative  / Bili Negative / Urobili Negative       Blood Large / Protein 100 / Leuk Est Negative / Nitrite Negative      RBC 11-25 / WBC 0-2 / Hyaline  / Gran  / Sq Epi  / Non Sq Epi Few / Bacteria Occasional    Urine Sodium <30      [04-22-18 @ 22:27]  Urine Osmolality 244      [04-22-18 @ 22:27]    Iron 77, TIBC 253, %sat 30      [04-23-18 @ 09:07]  Ferritin 372      [04-24-18 @ 08:02]  HbA1c 5.6      [04-20-18 @ 08:37]  TSH 4.32      [04-20-18 @ 11:11]

## 2018-04-24 NOTE — PROGRESS NOTE ADULT - SUBJECTIVE AND OBJECTIVE BOX
seen for pancytopenia, CKD 5, OM, copd exac    improved sob and cough.  more ambulatory/feels overall better.  awaiting plan of care per podiatry.  ROS otherwise negative     MEDICATIONS  (STANDING):  ALBUTerol/ipratropium for Nebulization 3 milliLiter(s) Nebulizer every 6 hours  ascorbic acid 500 milliGRAM(s) Oral daily  azithromycin   Tablet 250 milliGRAM(s) Oral daily  benzonatate 100 milliGRAM(s) Oral three times a day  buDESOnide   0.5 milliGRAM(s) Respule 0.5 milliGRAM(s) Inhalation two times a day  buPROPion XL . 300 milliGRAM(s) Oral daily  dextrose 5%. 1000 milliLiter(s) (50 mL/Hr) IV Continuous <Continuous>  dextrose 50% Injectable 12.5 Gram(s) IV Push once  dextrose 50% Injectable 25 Gram(s) IV Push once  dextrose 50% Injectable 25 Gram(s) IV Push once  DULoxetine 60 milliGRAM(s) Oral daily  epoetin krunal Injectable 8000 Unit(s) SubCutaneous <User Schedule>  gabapentin 800 milliGRAM(s) Oral three times a day  heparin  Injectable 5000 Unit(s) SubCutaneous every 12 hours  insulin lispro (HumaLOG) corrective regimen sliding scale   SubCutaneous three times a day before meals  magnesium oxide 400 milliGRAM(s) Oral three times a day with meals  methylPREDNISolone sodium succinate Injectable 40 milliGRAM(s) IV Push every 8 hours  montelukast 10 milliGRAM(s) Oral daily  pantoprazole    Tablet 40 milliGRAM(s) Oral before breakfast  saccharomyces boulardii 250 milliGRAM(s) Oral two times a day  traZODone 100 milliGRAM(s) Oral at bedtime  zinc sulfate 220 milliGRAM(s) Oral daily    MEDICATIONS  (PRN):  dextrose Gel 1 Dose(s) Oral once PRN Blood Glucose LESS THAN 70 milliGRAM(s)/deciliter  glucagon  Injectable 1 milliGRAM(s) IntraMuscular once PRN Glucose LESS THAN 70 milligrams/deciliter  guaiFENesin   Syrup  (Sugar-Free) 200 milliGRAM(s) Oral every 6 hours PRN Cough  HYDROcodone  5 mG/acetaminophen 325 mG 1 Tablet(s) Oral every 4 hours PRN Moderate Pain (4 - 6)  ondansetron Injectable 4 milliGRAM(s) IV Push every 6 hours PRN Nausea and/or Vomiting  oxyCODONE    IR 5 milliGRAM(s) Oral every 6 hours PRN Moderate to Severe Pain (7 - 10)      Allergies    No Known Allergies    Vital Signs Last 24 Hrs  T(C): 36.8 (24 Apr 2018 08:11), Max: 36.9 (23 Apr 2018 22:49)  T(F): 98.2 (24 Apr 2018 08:11), Max: 98.4 (23 Apr 2018 22:49)  HR: 72 (24 Apr 2018 08:30) (72 - 92)  BP: 151/78 (24 Apr 2018 08:11) (136/82 - 151/78)  BP(mean): --  RR: 19 (24 Apr 2018 08:11) (18 - 19)  SpO2: 95% (23 Apr 2018 22:49) (95% - 95%)    PHYSICAL EXAM:    GENERAL: NAD  CHEST/LUNG: infrequent wheeze  HEART: Regular rate and rhythm; S1 S2  ABDOMEN: Soft, Nontender, Nondistended; Bowel sounds present  EXTREMITIES: left foot bandaged.  NERVOUS SYSTEM:  Alert & Oriented X3,nonfocal  PSYCH: normal mood, appropriate response.    LABS:                        8.9    2.1   )-----------( 69       ( 24 Apr 2018 08:02 )             29.2     04-24    135  |  102  |  32.0<H>  ----------------------------<  286<H>  4.9   |  23.0  |  3.71<H>    Ca    7.5<L>      24 Apr 2018 08:02            CAPILLARY BLOOD GLUCOSE      POCT Blood Glucose.: 313 mg/dL (24 Apr 2018 11:53)  POCT Blood Glucose.: 304 mg/dL (24 Apr 2018 07:47)  POCT Blood Glucose.: 243 mg/dL (23 Apr 2018 21:31)  POCT Blood Glucose.: 166 mg/dL (23 Apr 2018 16:13)        RADIOLOGY & ADDITIONAL TESTS:

## 2018-04-24 NOTE — PROGRESS NOTE ADULT - SUBJECTIVE AND OBJECTIVE BOX
PULMONARY PROGRESS NOTE      [id number]    Patient is a 53y old  Female who presents with a chief complaint of Swelling all around the body and foot wound (19 Apr 2018 17:59)   53yo female with hx COPD presents with exacerbation complicated by stage 4 CKD, Matamoros's esophagus, foot ulcer and pancytopenia    INTERVAL HPI/OVERNIGHT EVENTS:    MEDICATIONS  (STANDING):  ALBUTerol/ipratropium for Nebulization 3 milliLiter(s) Nebulizer every 6 hours  ascorbic acid 500 milliGRAM(s) Oral daily  azithromycin   Tablet 250 milliGRAM(s) Oral daily  benzonatate 100 milliGRAM(s) Oral three times a day  buDESOnide   0.5 milliGRAM(s) Respule 0.5 milliGRAM(s) Inhalation two times a day  buPROPion XL . 300 milliGRAM(s) Oral daily  dextrose 5%. 1000 milliLiter(s) (50 mL/Hr) IV Continuous <Continuous>  dextrose 50% Injectable 12.5 Gram(s) IV Push once  dextrose 50% Injectable 25 Gram(s) IV Push once  dextrose 50% Injectable 25 Gram(s) IV Push once  DULoxetine 60 milliGRAM(s) Oral daily  epoetin krunal Injectable 8000 Unit(s) SubCutaneous <User Schedule>  gabapentin 800 milliGRAM(s) Oral three times a day  heparin  Injectable 5000 Unit(s) SubCutaneous every 12 hours  insulin lispro (HumaLOG) corrective regimen sliding scale   SubCutaneous three times a day before meals  iron sucrose IVPB 200 milliGRAM(s) IV Intermittent every 24 hours  magnesium oxide 400 milliGRAM(s) Oral three times a day with meals  methylPREDNISolone sodium succinate Injectable 40 milliGRAM(s) IV Push every 8 hours  montelukast 10 milliGRAM(s) Oral daily  pantoprazole    Tablet 40 milliGRAM(s) Oral before breakfast  saccharomyces boulardii 250 milliGRAM(s) Oral two times a day  traZODone 100 milliGRAM(s) Oral at bedtime  zinc sulfate 220 milliGRAM(s) Oral daily      MEDICATIONS  (PRN):  dextrose Gel 1 Dose(s) Oral once PRN Blood Glucose LESS THAN 70 milliGRAM(s)/deciliter  glucagon  Injectable 1 milliGRAM(s) IntraMuscular once PRN Glucose LESS THAN 70 milligrams/deciliter  guaiFENesin   Syrup  (Sugar-Free) 200 milliGRAM(s) Oral every 6 hours PRN Cough  HYDROcodone  5 mG/acetaminophen 325 mG 1 Tablet(s) Oral every 4 hours PRN Moderate Pain (4 - 6)  ondansetron Injectable 4 milliGRAM(s) IV Push every 6 hours PRN Nausea and/or Vomiting  oxyCODONE    IR 5 milliGRAM(s) Oral every 6 hours PRN Moderate to Severe Pain (7 - 10)      Allergies    No Known Allergies    Intolerances        PAST MEDICAL & SURGICAL HISTORY:  Fibromyalgia  DM (diabetes mellitus)  Foot ulcer: Left Foot  Osteopenia  Chronic pain  Back ache  Arthritis  Shoulder joint stiffness, left  Matamoros esophagus  Stomach ulcer  Diabetes  Asthma  S/P knee surgery  S/P hysterectomy  S/P cholecystectomy      SOCIAL HISTORY  Smoking History:       REVIEW OF SYSTEMS:    CONSTITUTIONAL:  No distress    HEENT:  Eyes:  No diplopia or blurred vision. ENT:  No earache, sore throat or runny nose.    CARDIOVASCULAR:  No pressure, squeezing, tightness, heaviness or aching about the chest; no palpitations.    RESPIRATORY:  No cough, shortness of breath, PND or orthopnea. Mild SOBOE    GASTROINTESTINAL:  No nausea, vomiting or diarrhea.    GENITOURINARY:  No dysuria, frequency or urgency.    MUSCULOSKELETAL:  No joint pain    SKIN:  No new lesions.    NEUROLOGIC:  No paresthesias, fasciculations, seizures or weakness.    PSYCHIATRIC:  No disorder of thought or mood.    ENDOCRINE:  No heat or cold intolerance, polyuria or polydipsia.    HEMATOLOGICAL:  No easy bruising or bleeding.     Vital Signs Last 24 Hrs  T(C): 36.8 (24 Apr 2018 08:11), Max: 36.9 (23 Apr 2018 22:49)  T(F): 98.2 (24 Apr 2018 08:11), Max: 98.4 (23 Apr 2018 22:49)  HR: 72 (24 Apr 2018 08:30) (72 - 92)  BP: 151/78 (24 Apr 2018 08:11) (136/82 - 151/78)  BP(mean): --  RR: 19 (24 Apr 2018 08:11) (18 - 19)  SpO2: 95% (23 Apr 2018 22:49) (95% - 95%)    PHYSICAL EXAMINATION:    GENERAL: The patient is awake and alert in no apparent distress.     HEENT: Head is normocephalic and atraumatic. Extraocular muscles are intact. Mucous membranes are moist.    NECK: Supple.    LUNGS: Clear to auscultation without wheezing, rales or rhonchi; respirations unlabored    HEART: Regular rate and rhythm without murmur.    ABDOMEN: Soft, nontender, and nondistended.      EXTREMITIES: Without any cyanosis, clubbing, rash, lesions or edema.    NEUROLOGIC: Grossly intact.    SKIN: No ulceration or induration present.      LABS:                        8.9    2.1   )-----------( 69       ( 24 Apr 2018 08:02 )             29.2     04-24    135  |  102  |  32.0<H>  ----------------------------<  286<H>  4.9   |  23.0  |  3.71<H>    Ca    7.5<L>      24 Apr 2018 08:02                          MICROBIOLOGY:    RADIOLOGY & ADDITIONAL STUDIES:

## 2018-04-24 NOTE — PROGRESS NOTE ADULT - ASSESSMENT
COPD exacerbation  Smoking cessation is essential  Continue current approach COPD exacerbation    Patient of Dr. Enmanuel Acuña  I have taken care of her father in 1980's    Continue current approach  Smoking Cessation is essential  Mobilize as possible

## 2018-04-24 NOTE — PROGRESS NOTE ADULT - SUBJECTIVE AND OBJECTIVE BOX
Vascular follow up.    Worsening CKD, patient being followed for Hemodialysis access creation    Patient also with findings of osteomyelitis to her left 1st metatarsal     < from: MR Foot No Cont, Left (04.23.18 @ 13:41) >     EXAM:  MR FOOT LT                          PROCEDURE DATE:  04/23/2018        < end of copied text >  < from: MR Foot No Cont, Left (04.23.18 @ 13:41) >  IMPRESSION: Findings consistent with osteomyelitis of the stump of the   first metatarsal, as above.      < end of copied text >      NonInvasive studies reviewed, PVR exhibits aduquet perfusion to the lower extremities    < from: [facility] Physiol Extremity Lower 3+ Level, BI (04.19.18 @ 18:15) >     EXAM:  US PHYSIOL LWR EXT 3+ LEV BI                          PROCEDURE DATE:  04/19/2018          Findings:  Right lowerextremity: The ankle brachial index is 1.17. The pulse   waveforms are intact throughout the extremity.    Left lower extremity: The ankle brachial index is 1.09. The pulse   waveforms are mildly diminished at the level of the metatarsals.    Impression: Normal bilateral PERNELL. Mild diminishment of oxygenation to the   left metatarsals.                SHERRY FENG M.D., ATTENDING RADIOLOGIST  This document has been electronically signed. Apr 20 2018 11:31AM    < end of copied text >      No plans for any vascular surgical intervention for enhancement of perfusion to the lower extremities    No contraindication from our standpoint for the Podiatry Service to proceed with their plans for surgical intervention to treat osteomyelitis

## 2018-04-24 NOTE — PROGRESS NOTE ADULT - SUBJECTIVE AND OBJECTIVE BOX
[facility]  INFECTIOUS DISEASES AND INTERNAL MEDICINE at [place]  =======================================================  Dwight Pillai MD [facility] Mason HospitalZOE Vick MD  Diplomates American Board of Internal Medicine and Infectious Diseases  =======================================================    SONYA LENNON [id number]  follow up on foot ulcer LEFT  patient seen and examined in follow up.  Chart and labs reviewed.     MRI reviewed. Osteomyelitis left first metatarsal  started on azithromycin for COPD    =======================================================  Allergies:  No Known Allergies      =======================================================  MEDICATIONS  (STANDING):  ALBUTerol/ipratropium for Nebulization 3 milliLiter(s) Nebulizer every 6 hours  ascorbic acid 500 milliGRAM(s) Oral daily  azithromycin   Tablet 250 milliGRAM(s) Oral daily  benzonatate 100 milliGRAM(s) Oral three times a day  buDESOnide   0.5 milliGRAM(s) Respule 0.5 milliGRAM(s) Inhalation two times a day  buPROPion XL . 300 milliGRAM(s) Oral daily  dextrose 5%. 1000 milliLiter(s) (50 mL/Hr) IV Continuous <Continuous>  dextrose 50% Injectable 12.5 Gram(s) IV Push once  dextrose 50% Injectable 25 Gram(s) IV Push once  dextrose 50% Injectable 25 Gram(s) IV Push once  DULoxetine 60 milliGRAM(s) Oral daily  epoetin krunal Injectable 8000 Unit(s) SubCutaneous <User Schedule>  gabapentin 800 milliGRAM(s) Oral three times a day  heparin  Injectable 5000 Unit(s) SubCutaneous every 12 hours  insulin lispro (HumaLOG) corrective regimen sliding scale   SubCutaneous three times a day before meals  iron sucrose IVPB 200 milliGRAM(s) IV Intermittent every 24 hours  magnesium oxide 400 milliGRAM(s) Oral three times a day with meals  methylPREDNISolone sodium succinate Injectable 40 milliGRAM(s) IV Push every 8 hours  montelukast 10 milliGRAM(s) Oral daily  pantoprazole    Tablet 40 milliGRAM(s) Oral before breakfast  saccharomyces boulardii 250 milliGRAM(s) Oral two times a day  traZODone 100 milliGRAM(s) Oral at bedtime  zinc sulfate 220 milliGRAM(s) Oral daily       =======================================================  REVIEW OF SYSTEMS:  as above  all other ROS negative    =======================================================    Physical Exam:    Vital Signs Last 24 Hrs  T(C): 36.8 (24 Apr 2018 08:11), Max: 36.9 (23 Apr 2018 22:49)  T(F): 98.2 (24 Apr 2018 08:11), Max: 98.4 (23 Apr 2018 22:49)  HR: 72 (24 Apr 2018 08:30) (72 - 92)  BP: 151/78 (24 Apr 2018 08:11) (136/82 - 151/78)  RR: 19 (24 Apr 2018 08:11) (18 - 19)  SpO2: 95% (23 Apr 2018 22:49) (95% - 95%)    GEN: NAD, pleasant; thin  HEENT: normocephalic and atraumatic. EOMI. AURORA.    NECK: Supple. No carotid bruits.  No lymphadenopathy or thyromegaly.  LUNGS: Clear to auscultation.  HEART: Regular rate and rhythm without murmur.  ABDOMEN: Soft, nontender, and nondistended.  Positive bowel sounds.    : No CVA tenderness  EXTREMITIES: Without any cyanosis, clubbing, rash, lesions or edema.  MSK: no joint swelling  NEUROLOGIC: Cranial nerves II through XII are grossly intact.  SKIN:  LEFT foot ulcer under 1st MTP joint; deep.  dark drainage on gauze  s/p amputation of hallux    =======================================================      Labs:  04-24    135  |  102  |  32.0<H>  ----------------------------<  286<H>  4.9   |  23.0  |  3.71<H>    Ca    7.5<L>      24 Apr 2018 08:02                            8.9    2.1   )-----------( 69       ( 24 Apr 2018 08:02 )             29.2     RECENT CULTURES:  04-20 @ 08:37 .Blood Blood-Peripheral     No growth at 48 hours    04-19 @ 14:40 .Other left foot ulcer Enterobacter cloacae  Methicillin resistant Staphylococcus aureus    Numerous Enterobacter cloacae  Numerous Methicillin resistant Staphylococcus aureus  Few Streptococcus agalactiae (Group B)  Culture in progress  .  TYPE: (C=Critical, N=Notification, A=Abnormal) c  TESTS:  _ mrsa  DATE/TIME CALLED: _ 04/21/2018 11:10:41  CALLED TO: Evan segura rn  READ BACK (2 Patient Identifiers)(Y/N): _ y  READ BACK VALUES (Y/N): _ y  CALLED BY: _ elvia ashby copy to ic pt log      RECENT CULTURES:  04-20 @ 08:37 .Blood Blood-Peripheral     No growth at 48 hours    04-19 @ 14:40 .Other left foot ulcer Enterobacter cloacae  Methicillin resistant Staphylococcus aureus    Numerous Enterobacter cloacae  Numerous Methicillin resistant Staphylococcus aureus  Few Streptococcus agalactiae (Group B)  Culture in progress  .  TYPE: (C=Critical, N=Notification, A=Abnormal) c  TESTS:  _ mrsa  DATE/TIME CALLED: _ 04/21/2018 11:10:41  CALLED TO: Evan segura rn  READ BACK (2 Patient Identifiers)(Y/N): _ y  READ BACK VALUES (Y/N): _ y  CALLED BY: _ m welge copy to ic pt log          ============       EXAM:  MR FOOT LT                          PROCEDURE DATE:  04/23/2018          INTERPRETATION:  EXAMINATION: MRI left foot without contrast    CLINICAL INFORMATION: Left foot ulcer    TECHNIQUE: Multiplanar, multisequential MR imaging was performed.    Comparison is made to a prior foot MRI from 10/2/2015.    FINDINGS: The patient is status post amputation of the first and second rays at the level of the mid metatarsals. There is a cutaneous ulceration along the plantar aspect of the stump of the first metatarsal with adjacent skin thickening and subcutaneous fat infiltration, consistent with cellulitis. In addition, there is high T2 and mild low T1 signal within the distal aspect of the stump of the first metatarsal, consistent with osteomyelitis.    There is chronic flattening/remodeling of the head of the third metatarsal, consistent with chronic osteonecrosis. There is secondary arthrosis at the third metatarsophalangeal joint. There are no other areas of marrow signal alteration to suggest osteoarthritis. There is diffuse fatty atrophy of the musculature about the foot, consistent with denervation.    IMPRESSION: Findings consistent with osteomyelitis of the stump of the first metatarsal, as above.                NICK RUIZ M.D., ATTENDING RADIOLOGIST  This document has been electronically signed. Apr 23 2018  1:58PM

## 2018-04-24 NOTE — PROGRESS NOTE ADULT - ASSESSMENT
1) CKD IV  2) Diabetic Nephropathy ERIC on CKD  3) Anemia of renal disease  4) ?Liver cirrhosis    CKD IV; No AVF on this admission due to active infection  improving, but still elevated SCr; prerenal; diarrhea/losses; decreased po  IVF D/C'd  urine studies pending  Anemia not at goal; on iron and RHEA

## 2018-04-25 ENCOUNTER — TRANSCRIPTION ENCOUNTER (OUTPATIENT)
Age: 53
End: 2018-04-25

## 2018-04-25 DIAGNOSIS — F33.1 MAJOR DEPRESSIVE DISORDER, RECURRENT, MODERATE: ICD-10-CM

## 2018-04-25 DIAGNOSIS — F32.9 MAJOR DEPRESSIVE DISORDER, SINGLE EPISODE, UNSPECIFIED: ICD-10-CM

## 2018-04-25 LAB
CULTURE RESULTS: SIGNIFICANT CHANGE UP
GLUCOSE BLDC GLUCOMTR-MCNC: 241 MG/DL — HIGH (ref 70–99)
GLUCOSE BLDC GLUCOMTR-MCNC: 271 MG/DL — HIGH (ref 70–99)
GLUCOSE BLDC GLUCOMTR-MCNC: 287 MG/DL — HIGH (ref 70–99)
GLUCOSE BLDC GLUCOMTR-MCNC: 386 MG/DL — HIGH (ref 70–99)
SPECIMEN SOURCE: SIGNIFICANT CHANGE UP
TYPE + AB SCN PNL BLD: SIGNIFICANT CHANGE UP

## 2018-04-25 PROCEDURE — 99233 SBSQ HOSP IP/OBS HIGH 50: CPT

## 2018-04-25 PROCEDURE — 99223 1ST HOSP IP/OBS HIGH 75: CPT

## 2018-04-25 PROCEDURE — 93306 TTE W/DOPPLER COMPLETE: CPT | Mod: 26

## 2018-04-25 RX ORDER — INSULIN GLARGINE 100 [IU]/ML
20 INJECTION, SOLUTION SUBCUTANEOUS AT BEDTIME
Qty: 0 | Refills: 0 | Status: DISCONTINUED | OUTPATIENT
Start: 2018-04-25 | End: 2018-04-26

## 2018-04-25 RX ORDER — TRAZODONE HCL 50 MG
200 TABLET ORAL AT BEDTIME
Qty: 0 | Refills: 0 | Status: DISCONTINUED | OUTPATIENT
Start: 2018-04-25 | End: 2018-04-26

## 2018-04-25 RX ORDER — ACETAMINOPHEN 500 MG
650 TABLET ORAL ONCE
Qty: 0 | Refills: 0 | Status: COMPLETED | OUTPATIENT
Start: 2018-04-25 | End: 2018-04-25

## 2018-04-25 RX ORDER — DULOXETINE HYDROCHLORIDE 30 MG/1
120 CAPSULE, DELAYED RELEASE ORAL DAILY
Qty: 0 | Refills: 0 | Status: DISCONTINUED | OUTPATIENT
Start: 2018-04-25 | End: 2018-04-26

## 2018-04-25 RX ADMIN — Medication 250 MILLIGRAM(S): at 05:26

## 2018-04-25 RX ADMIN — OXYCODONE HYDROCHLORIDE 5 MILLIGRAM(S): 5 TABLET ORAL at 15:13

## 2018-04-25 RX ADMIN — Medication 0.5 MILLIGRAM(S): at 10:19

## 2018-04-25 RX ADMIN — Medication 3 MILLILITER(S): at 03:24

## 2018-04-25 RX ADMIN — AZITHROMYCIN 250 MILLIGRAM(S): 500 TABLET, FILM COATED ORAL at 15:12

## 2018-04-25 RX ADMIN — Medication 40 MILLIGRAM(S): at 05:27

## 2018-04-25 RX ADMIN — Medication 40 MILLIGRAM(S): at 12:42

## 2018-04-25 RX ADMIN — GABAPENTIN 800 MILLIGRAM(S): 400 CAPSULE ORAL at 05:26

## 2018-04-25 RX ADMIN — Medication 650 MILLIGRAM(S): at 04:17

## 2018-04-25 RX ADMIN — MAGNESIUM OXIDE 400 MG ORAL TABLET 400 MILLIGRAM(S): 241.3 TABLET ORAL at 08:34

## 2018-04-25 RX ADMIN — Medication 3 MILLILITER(S): at 10:19

## 2018-04-25 RX ADMIN — HEPARIN SODIUM 5000 UNIT(S): 5000 INJECTION INTRAVENOUS; SUBCUTANEOUS at 18:04

## 2018-04-25 RX ADMIN — Medication 3 MILLILITER(S): at 15:52

## 2018-04-25 RX ADMIN — Medication 3: at 08:34

## 2018-04-25 RX ADMIN — OXYCODONE HYDROCHLORIDE 5 MILLIGRAM(S): 5 TABLET ORAL at 05:28

## 2018-04-25 RX ADMIN — Medication 3 MILLILITER(S): at 22:00

## 2018-04-25 RX ADMIN — MAGNESIUM OXIDE 400 MG ORAL TABLET 400 MILLIGRAM(S): 241.3 TABLET ORAL at 12:42

## 2018-04-25 RX ADMIN — Medication 2: at 12:41

## 2018-04-25 RX ADMIN — HEPARIN SODIUM 5000 UNIT(S): 5000 INJECTION INTRAVENOUS; SUBCUTANEOUS at 05:28

## 2018-04-25 RX ADMIN — PANTOPRAZOLE SODIUM 40 MILLIGRAM(S): 20 TABLET, DELAYED RELEASE ORAL at 05:27

## 2018-04-25 RX ADMIN — Medication 650 MILLIGRAM(S): at 03:17

## 2018-04-25 RX ADMIN — Medication 250 MILLIGRAM(S): at 18:05

## 2018-04-25 RX ADMIN — BUPROPION HYDROCHLORIDE 300 MILLIGRAM(S): 150 TABLET, EXTENDED RELEASE ORAL at 12:41

## 2018-04-25 RX ADMIN — MONTELUKAST 10 MILLIGRAM(S): 4 TABLET, CHEWABLE ORAL at 12:42

## 2018-04-25 RX ADMIN — Medication 500 MILLIGRAM(S): at 12:41

## 2018-04-25 RX ADMIN — Medication 3: at 18:04

## 2018-04-25 RX ADMIN — GABAPENTIN 800 MILLIGRAM(S): 400 CAPSULE ORAL at 12:41

## 2018-04-25 RX ADMIN — GABAPENTIN 800 MILLIGRAM(S): 400 CAPSULE ORAL at 21:46

## 2018-04-25 RX ADMIN — OXYCODONE HYDROCHLORIDE 5 MILLIGRAM(S): 5 TABLET ORAL at 08:33

## 2018-04-25 RX ADMIN — Medication 200 MILLIGRAM(S): at 21:47

## 2018-04-25 RX ADMIN — ZINC SULFATE TAB 220 MG (50 MG ZINC EQUIVALENT) 220 MILLIGRAM(S): 220 (50 ZN) TAB at 12:42

## 2018-04-25 RX ADMIN — Medication 100 MILLIGRAM(S): at 05:27

## 2018-04-25 RX ADMIN — OXYCODONE HYDROCHLORIDE 5 MILLIGRAM(S): 5 TABLET ORAL at 21:46

## 2018-04-25 RX ADMIN — Medication 100 MILLIGRAM(S): at 12:41

## 2018-04-25 RX ADMIN — INSULIN GLARGINE 20 UNIT(S): 100 INJECTION, SOLUTION SUBCUTANEOUS at 21:46

## 2018-04-25 RX ADMIN — Medication 100 MILLIGRAM(S): at 21:47

## 2018-04-25 RX ADMIN — MAGNESIUM OXIDE 400 MG ORAL TABLET 400 MILLIGRAM(S): 241.3 TABLET ORAL at 18:05

## 2018-04-25 RX ADMIN — DULOXETINE HYDROCHLORIDE 60 MILLIGRAM(S): 30 CAPSULE, DELAYED RELEASE ORAL at 12:41

## 2018-04-25 RX ADMIN — OXYCODONE HYDROCHLORIDE 5 MILLIGRAM(S): 5 TABLET ORAL at 16:27

## 2018-04-25 RX ADMIN — Medication 40 MILLIGRAM(S): at 21:47

## 2018-04-25 RX ADMIN — OXYCODONE HYDROCHLORIDE 5 MILLIGRAM(S): 5 TABLET ORAL at 22:20

## 2018-04-25 NOTE — BEHAVIORAL HEALTH ASSESSMENT NOTE - SUMMARY
53 year old woman, chornic hx of depression, multiple medical problems, domiciled with boyfriend, no prior psychiatric hospitalizations or suicide attempts, no hx of substance abuse, admitted found to have volume overload, undergoing evaluation of foot ulcer.  Patient has been chronically depressed for decades, reports response to cymbalta, trazodone added recently has helped with sleep.  Depression worsened with acute medical issues, steroids also may be affecting mood    -patient reports her dose of cymbalta is 120 mg daily and trazodone is 200 mg qhs; this was confirmed with [facility]; would change medications to correct dose  -patient with strong Oriental orthodox beliefs, was interested in being visited by [facility]; would arrange this if possible  -follow up high TSH; can also affect mood  -follow up low folate, replete as indicated 53 year old woman, chornic hx of depression, multiple medical problems, domiciled with boyfriend, no prior psychiatric hospitalizations or suicide attempts, no hx of substance abuse, admitted found to have volume overload, undergoing evaluation of foot ulcer.  Patient has been chronically depressed for decades, reports response to cymbalta, trazodone added recently has helped with sleep.  Depression worsened with acute medical issues, steroids also may be affecting mood message left requesting call back from Bel Cantu at [facility]  -patient reports her dose of cymbalta is 120 mg daily and trazodone is 200 mg qhs; this was confirmed with [facility]; would change medications to correct dose  -patient with strong Adventism beliefs, was interested in being visited by hospital [facility]; would arrange this if possible  -follow up high TSH; can also affect mood  -follow up low folate, replete as indicated

## 2018-04-25 NOTE — BEHAVIORAL HEALTH ASSESSMENT NOTE - OTHER PAST PSYCHIATRIC HISTORY (INCLUDE DETAILS REGARDING ONSET, COURSE OF ILLNESS, INPATIENT/OUTPATIENT TREATMENT)
chronic depression for decades in outpatient treatment with Dr Bel Cantu at [facility]

## 2018-04-25 NOTE — PROGRESS NOTE ADULT - SUBJECTIVE AND OBJECTIVE BOX
PULMONARY PROGRESS NOTE      [id number]    Patient is a 53y old  Female who presents with a chief complaint of Swelling all around the body and foot wound (19 Apr 2018 17:59)  Patient is a 53y old  Female who presents with a chief complaint of Swelling all around the body and foot wound (19 Apr 2018 17:59)   51yo female with hx COPD presents with exacerbation complicated by stage 4 CKD, Matamoros's esophagus, foot ulcer and pancytopenia      INTERVAL HPI/OVERNIGHT EVENTS:    MEDICATIONS  (STANDING):  ALBUTerol/ipratropium for Nebulization 3 milliLiter(s) Nebulizer every 6 hours  ascorbic acid 500 milliGRAM(s) Oral daily  azithromycin   Tablet 250 milliGRAM(s) Oral daily  benzonatate 100 milliGRAM(s) Oral three times a day  buDESOnide   0.5 milliGRAM(s) Respule 0.5 milliGRAM(s) Inhalation two times a day  buPROPion XL . 300 milliGRAM(s) Oral daily  dextrose 5%. 1000 milliLiter(s) (50 mL/Hr) IV Continuous <Continuous>  dextrose 50% Injectable 12.5 Gram(s) IV Push once  dextrose 50% Injectable 25 Gram(s) IV Push once  dextrose 50% Injectable 25 Gram(s) IV Push once  DULoxetine 60 milliGRAM(s) Oral daily  epoetin krunal Injectable 8000 Unit(s) SubCutaneous <User Schedule>  gabapentin 800 milliGRAM(s) Oral three times a day  heparin  Injectable 5000 Unit(s) SubCutaneous every 12 hours  insulin glargine Injectable (LANTUS) 20 Unit(s) SubCutaneous at bedtime  insulin lispro (HumaLOG) corrective regimen sliding scale   SubCutaneous three times a day before meals  magnesium oxide 400 milliGRAM(s) Oral three times a day with meals  methylPREDNISolone sodium succinate Injectable 40 milliGRAM(s) IV Push every 8 hours  montelukast 10 milliGRAM(s) Oral daily  pantoprazole    Tablet 40 milliGRAM(s) Oral before breakfast  saccharomyces boulardii 250 milliGRAM(s) Oral two times a day  traZODone 200 milliGRAM(s) Oral at bedtime  zinc sulfate 220 milliGRAM(s) Oral daily      MEDICATIONS  (PRN):  dextrose Gel 1 Dose(s) Oral once PRN Blood Glucose LESS THAN 70 milliGRAM(s)/deciliter  glucagon  Injectable 1 milliGRAM(s) IntraMuscular once PRN Glucose LESS THAN 70 milligrams/deciliter  HYDROcodone  5 mG/acetaminophen 325 mG 1 Tablet(s) Oral every 4 hours PRN Moderate Pain (4 - 6)  HYDROcodone/homatropine Syrup 5 milliLiter(s) Oral four times a day PRN Cough  ondansetron Injectable 4 milliGRAM(s) IV Push every 6 hours PRN Nausea and/or Vomiting  oxyCODONE    IR 5 milliGRAM(s) Oral every 6 hours PRN Moderate to Severe Pain (7 - 10)      Allergies    No Known Allergies    Intolerances        PAST MEDICAL & SURGICAL HISTORY:  Fibromyalgia  DM (diabetes mellitus)  Foot ulcer: Left Foot  Osteopenia  Chronic pain  Back ache  Arthritis  Shoulder joint stiffness, left  Matamoros esophagus  Stomach ulcer  Diabetes  Asthma  S/P knee surgery  S/P hysterectomy  S/P cholecystectomy      SOCIAL HISTORY  Smoking History:       REVIEW OF SYSTEMS:    CONSTITUTIONAL:  No distress    HEENT:  Eyes:  No diplopia or blurred vision. ENT:  No earache, sore throat or runny nose.    CARDIOVASCULAR:  No pressure, squeezing, tightness, heaviness or aching about the chest; no palpitations.    RESPIRATORY:  No cough, shortness of breath, PND or orthopnea. Mild SOBOE    GASTROINTESTINAL:  No nausea, vomiting or diarrhea.    GENITOURINARY:  No dysuria, frequency or urgency.    NEUROLOGIC:  No paresthesias, fasciculations, seizures or weakness.    PSYCHIATRIC:  No disorder of thought or mood.    Vital Signs Last 24 Hrs  T(C): 36.6 (25 Apr 2018 07:56), Max: 36.6 (25 Apr 2018 00:06)  T(F): 97.8 (25 Apr 2018 07:56), Max: 97.9 (25 Apr 2018 00:06)  HR: 88 (25 Apr 2018 07:56) (80 - 98)  BP: 156/77 (25 Apr 2018 07:56) (153/75 - 156/77)  BP(mean): --  RR: 19 (25 Apr 2018 07:56) (18 - 19)  SpO2: 94% (25 Apr 2018 03:24) (92% - 94%)    PHYSICAL EXAMINATION:    GENERAL: The patient is awake and alert in no apparent distress.     HEENT: Head is normocephalic and atraumatic. Extraocular muscles are intact. Mucous membranes are moist.    NECK: Supple.    LUNGS: Clear to auscultation without wheezing, rales or rhonchi; respirations unlabored    HEART: Regular rate and rhythm without murmur.    ABDOMEN: Soft, nontender, and nondistended.      EXTREMITIES: Without any cyanosis, clubbing, rash, lesions or edema.    NEUROLOGIC: Grossly intact.    LABS:                        8.9    2.1   )-----------( 69       ( 24 Apr 2018 08:02 )             29.2     04-24    135  |  102  |  32.0<H>  ----------------------------<  286<H>  4.9   |  23.0  |  3.71<H>    Ca    7.5<L>      24 Apr 2018 08:02  Phos  2.8     04-24                          MICROBIOLOGY:    RADIOLOGY & ADDITIONAL STUDIES:  cxrs reviewed PULMONARY PROGRESS NOTE      [id number]    Patient is a 53y old  Female who presents with a chief complaint of Swelling all around the body and foot wound (19 Apr 2018 17:59)  Patient is a 53y old  Female who presents with a chief complaint of Swelling all around the body and foot wound (19 Apr 2018 17:59)   53yo female with hx COPD presents with exacerbation complicated by stage 4 CKD, Matamoros's esophagus, foot ulcer and pancytopenia      INTERVAL HPI/OVERNIGHT EVENTS:  feels sig better  unable to cough up much sputum  dyspnea  improved  no fever, chill, chest pain reported  MEDICATIONS  (STANDING):  ALBUTerol/ipratropium for Nebulization 3 milliLiter(s) Nebulizer every 6 hours  ascorbic acid 500 milliGRAM(s) Oral daily  azithromycin   Tablet 250 milliGRAM(s) Oral daily  benzonatate 100 milliGRAM(s) Oral three times a day  buDESOnide   0.5 milliGRAM(s) Respule 0.5 milliGRAM(s) Inhalation two times a day  buPROPion XL . 300 milliGRAM(s) Oral daily  dextrose 5%. 1000 milliLiter(s) (50 mL/Hr) IV Continuous <Continuous>  dextrose 50% Injectable 12.5 Gram(s) IV Push once  dextrose 50% Injectable 25 Gram(s) IV Push once  dextrose 50% Injectable 25 Gram(s) IV Push once  DULoxetine 60 milliGRAM(s) Oral daily  epoetin krunal Injectable 8000 Unit(s) SubCutaneous <User Schedule>  gabapentin 800 milliGRAM(s) Oral three times a day  heparin  Injectable 5000 Unit(s) SubCutaneous every 12 hours  insulin glargine Injectable (LANTUS) 20 Unit(s) SubCutaneous at bedtime  insulin lispro (HumaLOG) corrective regimen sliding scale   SubCutaneous three times a day before meals  magnesium oxide 400 milliGRAM(s) Oral three times a day with meals  methylPREDNISolone sodium succinate Injectable 40 milliGRAM(s) IV Push every 8 hours  montelukast 10 milliGRAM(s) Oral daily  pantoprazole    Tablet 40 milliGRAM(s) Oral before breakfast  saccharomyces boulardii 250 milliGRAM(s) Oral two times a day  traZODone 200 milliGRAM(s) Oral at bedtime  zinc sulfate 220 milliGRAM(s) Oral daily      MEDICATIONS  (PRN):  dextrose Gel 1 Dose(s) Oral once PRN Blood Glucose LESS THAN 70 milliGRAM(s)/deciliter  glucagon  Injectable 1 milliGRAM(s) IntraMuscular once PRN Glucose LESS THAN 70 milligrams/deciliter  HYDROcodone  5 mG/acetaminophen 325 mG 1 Tablet(s) Oral every 4 hours PRN Moderate Pain (4 - 6)  HYDROcodone/homatropine Syrup 5 milliLiter(s) Oral four times a day PRN Cough  ondansetron Injectable 4 milliGRAM(s) IV Push every 6 hours PRN Nausea and/or Vomiting  oxyCODONE    IR 5 milliGRAM(s) Oral every 6 hours PRN Moderate to Severe Pain (7 - 10)      Allergies    No Known Allergies    Intolerances        PAST MEDICAL & SURGICAL HISTORY:  Fibromyalgia  DM (diabetes mellitus)  Foot ulcer: Left Foot  Osteopenia  Chronic pain  Back ache  Arthritis  Shoulder joint stiffness, left  Matamoros esophagus  Stomach ulcer  Diabetes  Asthma  S/P knee surgery  S/P hysterectomy  S/P cholecystectomy      SOCIAL HISTORY  Smoking History:       REVIEW OF SYSTEMS:    CONSTITUTIONAL:  No distress    HEENT:  Eyes:  No diplopia or blurred vision. ENT:  No earache, sore throat or runny nose.    CARDIOVASCULAR:  No pressure, squeezing, tightness, heaviness or aching about the chest; no palpitations.    RESPIRATORY:  see hpi    GASTROINTESTINAL:  No nausea, vomiting or diarrhea.    GENITOURINARY:  No dysuria, frequency or urgency.    NEUROLOGIC:  No paresthesias, fasciculations, seizures or weakness.    PSYCHIATRIC:  No disorder of thought or mood.    Vital Signs Last 24 Hrs  T(C): 36.6 (25 Apr 2018 07:56), Max: 36.6 (25 Apr 2018 00:06)  T(F): 97.8 (25 Apr 2018 07:56), Max: 97.9 (25 Apr 2018 00:06)  HR: 88 (25 Apr 2018 07:56) (80 - 98)  BP: 156/77 (25 Apr 2018 07:56) (153/75 - 156/77)  BP(mean): --  RR: 19 (25 Apr 2018 07:56) (18 - 19)  SpO2: 94% (25 Apr 2018 03:24) (92% - 94%)    PHYSICAL EXAMINATION:    GENERAL: The patient is awake and alert in no apparent distress.     HEENT: Head is normocephalic and atraumatic. Extraocular muscles are intact. Mucous membranes are moist.    NECK: Supple.    LUNGS: moderate air entry bilat without wheeze; respirations unlabored    HEART: Regular rate and rhythm without murmur.    ABDOMEN: Soft, nontender, and nondistended.      EXTREMITIES: Without any cyanosis, clubbing, rash, lesions or edema.    NEUROLOGIC: Grossly intact.    LABS:                        8.9    2.1   )-----------( 69       ( 24 Apr 2018 08:02 )             29.2     04-24    135  |  102  |  32.0<H>  ----------------------------<  286<H>  4.9   |  23.0  |  3.71<H>    Ca    7.5<L>      24 Apr 2018 08:02  Phos  2.8     04-24                          MICROBIOLOGY:    RADIOLOGY & ADDITIONAL STUDIES:  cxrs reviewed

## 2018-04-25 NOTE — PROGRESS NOTE ADULT - ASSESSMENT
1) CKD IV  2) Diabetic Nephropathy ERIC on CKD  3) Anemia of renal disease  4) ?Liver cirrhosis    CKD IV; No AVF on this admission due to active infection  improving, but still elevated SCr; prerenal; diarrhea/losses; decreased po  IVF D/C'd  urine studies pending  Anemia not at goal; on iron and RHEA  Continue current management

## 2018-04-25 NOTE — PROGRESS NOTE ADULT - ASSESSMENT
COPD exacerbation much improved, now smoke free, CKD  no current bronchospasm on exam  CXR without infiltrates  oxygenating well on room air  no pulmonary contra indications to proposed procedure  increased risk of post operative pulmonary complications  risk/benefit favors  duo neb prior to anesthesia and q 4 -6 hrs post  continue IV medrol till can tolerate po  incentive spirometry  and DVT prophylaxis  discussed with Podiatry

## 2018-04-25 NOTE — PROGRESS NOTE ADULT - SUBJECTIVE AND OBJECTIVE BOX
INTERVAL HISTORY: Denies CP,SOB,palpitation  	  MEDICATIONS:    azithromycin   Tablet 250 milliGRAM(s) Oral daily  ALBUTerol/ipratropium for Nebulization 3 milliLiter(s) Nebulizer every 6 hours  benzonatate 100 milliGRAM(s) Oral three times a day  buDESOnide   0.5 milliGRAM(s) Respule 0.5 milliGRAM(s) Inhalation two times a day  guaiFENesin   Syrup  (Sugar-Free) 200 milliGRAM(s) Oral every 6 hours PRN  montelukast 10 milliGRAM(s) Oral daily  buPROPion XL . 300 milliGRAM(s) Oral daily  DULoxetine 60 milliGRAM(s) Oral daily  gabapentin 800 milliGRAM(s) Oral three times a day  HYDROcodone  5 mG/acetaminophen 325 mG 1 Tablet(s) Oral every 4 hours PRN  ondansetron Injectable 4 milliGRAM(s) IV Push every 6 hours PRN  oxyCODONE    IR 5 milliGRAM(s) Oral every 6 hours PRN  traZODone 100 milliGRAM(s) Oral at bedtime  pantoprazole    Tablet 40 milliGRAM(s) Oral before breakfast  dextrose 50% Injectable 12.5 Gram(s) IV Push once  dextrose 50% Injectable 25 Gram(s) IV Push once  dextrose 50% Injectable 25 Gram(s) IV Push once  dextrose Gel 1 Dose(s) Oral once PRN  glucagon  Injectable 1 milliGRAM(s) IntraMuscular once PRN  insulin lispro (HumaLOG) corrective regimen sliding scale   SubCutaneous three times a day before meals  methylPREDNISolone sodium succinate Injectable 40 milliGRAM(s) IV Push every 8 hours  ascorbic acid 500 milliGRAM(s) Oral daily  dextrose 5%. 1000 milliLiter(s) IV Continuous <Continuous>  epoetin krunal Injectable 8000 Unit(s) SubCutaneous <User Schedule>  heparin  Injectable 5000 Unit(s) SubCutaneous every 12 hours  magnesium oxide 400 milliGRAM(s) Oral three times a day with meals  zinc sulfate 220 milliGRAM(s) Oral daily        PHYSICAL EXAM:  T(C): 36.6 (04-25-18 @ 07:56), Max: 36.6 (04-25-18 @ 00:06)  HR: 88 (04-25-18 @ 07:56) (66 - 98)  BP: 156/77 (04-25-18 @ 07:56) (153/75 - 156/77)  RR: 19 (04-25-18 @ 07:56) (18 - 19)  SpO2: 94% (04-25-18 @ 03:24) (92% - 94%)  Wt(kg): --  I&O's Summary    24 Apr 2018 07:01  -  25 Apr 2018 07:00  --------------------------------------------------------  IN: 325 mL / OUT: 0 mL / NET: 325 mL          Appearance: Normal		  Cardiovascular: Normal S1 S2, No JVD, No murmurs, No edema  Respiratory: Lungs clear to auscultation	  Psychiatry: A & O x 3, Mood & affect appropriate  Gastrointestinal:  Soft, Non-tender, + BS	  Skin: No rashes, No ecchymoses, No cyanosis  Neurologic: Non-focal  Extremities: Normal range of motion, No clubbing, cyanosis or edema  Vascular: Peripheral pulses palpable 2+ bilaterally    	    LABS:	 	                   8.9    2.1   )-----------( 69       ( 24 Apr 2018 08:02 )             29.2     04-24    135  |  102  |  32.0<H>  ----------------------------<  286<H>  4.9   |  23.0  |  3.71<H>    Ca    7.5<L>      24 Apr 2018 08:02  Phos  2.8     04-24      ASSESSMENT/PLAN: SONYA LENNON is a 53y Female with past medical history significant for PAD, HTN, Dyslip, DM, awaiting bone bx possible debridement.    - No active chest pain, evidence of ischemia, decompensated CHF, or unstable arrhythmia.  Echocardiogram pending this AM.

## 2018-04-25 NOTE — BEHAVIORAL HEALTH ASSESSMENT NOTE - NSBHCHARTREVIEWVS_PSY_A_CORE FT
Vital Signs Last 24 Hrs  T(C): 36.6 (25 Apr 2018 07:56), Max: 36.6 (25 Apr 2018 00:06)  T(F): 97.8 (25 Apr 2018 07:56), Max: 97.9 (25 Apr 2018 00:06)  HR: 88 (25 Apr 2018 07:56) (80 - 98)  BP: 156/77 (25 Apr 2018 07:56) (153/75 - 156/77)  BP(mean): --  RR: 19 (25 Apr 2018 07:56) (18 - 19)  SpO2: 94% (25 Apr 2018 03:24) (92% - 94%)

## 2018-04-25 NOTE — PROGRESS NOTE ADULT - SUBJECTIVE AND OBJECTIVE BOX
[facility] DIVISION OF KIDNEY DISEASES AND HYPERTENSION -- FOLLOW UP NOTE  --------------------------------------------------------------------------------  Chief Complaint:   ERIC on CKD    24 hour events/subjective:  Pt seen and examined today  Lying in bed awake, NAD           PAST HISTORY  --------------------------------------------------------------------------------  No significant changes to PMH, PSH, FHx, SHx, unless otherwise noted    ALLERGIES & MEDICATIONS  --------------------------------------------------------------------------------  Allergies    No Known Allergies    Intolerances      Standing Inpatient Medications  ALBUTerol/ipratropium for Nebulization 3 milliLiter(s) Nebulizer every 6 hours  ascorbic acid 500 milliGRAM(s) Oral daily  azithromycin   Tablet 250 milliGRAM(s) Oral daily  benzonatate 100 milliGRAM(s) Oral three times a day  buDESOnide   0.5 milliGRAM(s) Respule 0.5 milliGRAM(s) Inhalation two times a day  buPROPion XL . 300 milliGRAM(s) Oral daily  dextrose 5%. 1000 milliLiter(s) IV Continuous <Continuous>  dextrose 50% Injectable 12.5 Gram(s) IV Push once  dextrose 50% Injectable 25 Gram(s) IV Push once  dextrose 50% Injectable 25 Gram(s) IV Push once  DULoxetine 60 milliGRAM(s) Oral daily  epoetin krunal Injectable 8000 Unit(s) SubCutaneous <User Schedule>  gabapentin 800 milliGRAM(s) Oral three times a day  heparin  Injectable 5000 Unit(s) SubCutaneous every 12 hours  insulin glargine Injectable (LANTUS) 20 Unit(s) SubCutaneous at bedtime  insulin lispro (HumaLOG) corrective regimen sliding scale   SubCutaneous three times a day before meals  magnesium oxide 400 milliGRAM(s) Oral three times a day with meals  methylPREDNISolone sodium succinate Injectable 40 milliGRAM(s) IV Push every 8 hours  montelukast 10 milliGRAM(s) Oral daily  pantoprazole    Tablet 40 milliGRAM(s) Oral before breakfast  saccharomyces boulardii 250 milliGRAM(s) Oral two times a day  traZODone 200 milliGRAM(s) Oral at bedtime  zinc sulfate 220 milliGRAM(s) Oral daily    PRN Inpatient Medications  dextrose Gel 1 Dose(s) Oral once PRN  glucagon  Injectable 1 milliGRAM(s) IntraMuscular once PRN  HYDROcodone  5 mG/acetaminophen 325 mG 1 Tablet(s) Oral every 4 hours PRN  HYDROcodone/homatropine Syrup 5 milliLiter(s) Oral four times a day PRN  ondansetron Injectable 4 milliGRAM(s) IV Push every 6 hours PRN  oxyCODONE    IR 5 milliGRAM(s) Oral every 6 hours PRN      REVIEW OF SYSTEMS  --------------------------------------------------------------------------------  Gen: No weight changes, fatigue, fevers/chills, weakness  Skin: No rashes  Head/Eyes/Ears/Mouth: No headache; Normal hearing; Normal vision w/o blurriness; No sinus pain/discomfort, sore throat  Respiratory: No dyspnea, cough, wheezing, hemoptysis  CV: No chest pain, PND, orthopnea  GI: No abdominal pain, diarrhea, constipation, nausea, vomiting, melena, hematochezia  : No increased frequency, dysuria, hematuria, nocturia  MSK: No joint pain/swelling; no back pain; no edema  Neuro: No dizziness/lightheadedness, weakness, seizures, numbness, tingling  Heme: No easy bruising or bleeding  Endo: No heat/cold intolerance  Psych: No significant nervousness, anxiety, stress, depression    All other systems were reviewed and are negative, except as noted.    VITALS/PHYSICAL EXAM  --------------------------------------------------------------------------------  T(C): 36.6 (04-25-18 @ 07:56), Max: 36.6 (04-25-18 @ 00:06)  HR: 88 (04-25-18 @ 07:56) (66 - 98)  BP: 156/77 (04-25-18 @ 07:56) (153/75 - 156/77)  RR: 19 (04-25-18 @ 07:56) (18 - 19)  SpO2: 94% (04-25-18 @ 03:24) (92% - 94%)  Wt(kg): --        04-24-18 @ 07:01  -  04-25-18 @ 07:00  --------------------------------------------------------  IN: 325 mL / OUT: 0 mL / NET: 325 mL      Physical Exam:  	Gen: NAD, well-appearing  	HEENT: PERRL, supple neck, clear oropharynx  	Pulm: CTA B/L  	CV: RRR, S1S2; no rub  	Back: No spinal or CVA tenderness; no sacral edema  	Abd: +BS, soft, nontender/nondistended  	: No suprapubic tenderness  	UE: Warm, FROM, no clubbing, intact strength; no edema; no asterixis  	LE: Warm, FROM, no clubbing, intact strength; no edema  	Neuro: No focal deficits, intact gait  	Psych: Normal affect and mood  	Skin: Warm, without rashes  	Vascular access:    LABS/STUDIES  --------------------------------------------------------------------------------              8.9    2.1   >-----------<  69       [04-24-18 @ 08:02]              29.2     135  |  102  |  32.0  ----------------------------<  286      [04-24-18 @ 08:02]  4.9   |  23.0  |  3.71        Ca     7.5     [04-24-18 @ 08:02]      Phos  2.8     [04-24-18 @ 08:02]    Creatinine Trend:  SCr 3.71 [04-24 @ 08:02]  SCr 3.44 [04-23 @ 09:07]  SCr 3.69 [04-22 @ 10:08]  SCr 3.18 [04-21 @ 09:28]  SCr 2.92 [04-20 @ 08:37]    Urinalysis - [04-19-18 @ 21:30]      Color Yellow / Appearance Clear / SG 1.010 / pH 6.0      Gluc Negative / Ketone Negative  / Bili Negative / Urobili Negative       Blood Large / Protein 100 / Leuk Est Negative / Nitrite Negative      RBC 11-25 / WBC 0-2 / Hyaline  / Gran  / Sq Epi  / Non Sq Epi Few / Bacteria Occasional    Urine Sodium <30      [04-22-18 @ 22:27]  Urine Osmolality 244      [04-22-18 @ 22:27]    Iron 77, TIBC 253, %sat 30      [04-23-18 @ 09:07]  Ferritin 372      [04-24-18 @ 08:02]  HbA1c 5.6      [04-20-18 @ 08:37]  TSH 4.32      [04-20-18 @ 11:11]

## 2018-04-25 NOTE — BEHAVIORAL HEALTH ASSESSMENT NOTE - HPI (INCLUDE ILLNESS QUALITY, SEVERITY, DURATION, TIMING, CONTEXT, MODIFYING FACTORS, ASSOCIATED SIGNS AND SYMPTOMS)
53 year old woman, chornic hx of depression, multiple medical problems, domiciled with boyfriend, no prior psychiatric hospitalizations or suicide attempts, no hx of substance abuse, admitted found to have volume overload, undergoing evaluation of foot ulcer.  Consults called due to patient appearing weepy.  Patient reports chronic 10/10 depression (10 being the worst) since the 1980s, and states nothing has ever helped her depression states depression became worse over the years after she lost several family members. She reports chronic worthlessness, anhedonia, not leaving house even when encouraged by boyfriend, sleeping issues, recently helped with trazodone. She states if she could kill herself she would however states she is a [employer] and suicide is not an option for her, denies ever wanting to harm herself. She states she knows if she attempts suicide she will not be able to see her family in the afterlife as dictated by [employer]ism, that she continues to pray as a coping mechanism and believes in God, that he will take her life when he thinks the time is right.   She denies hx of manic symptoms AH, VH and paranoia.  Patient starts crying when she talks about her medical problems.   She states he has been on Wellbutrin for years but does not think it helps, also has been on Cymbalta for years, states her dose is 120 mg and her trazodone is supposed to be 200, that she is only getting 100 mg here. She States she finds Cymbalta helpful because it helps control her chronic pain.  She states whenever she is medically hospitalized her depression becomes worse.

## 2018-04-25 NOTE — BEHAVIORAL HEALTH ASSESSMENT NOTE - RISK ASSESSMENT
Chronic risk due to chronic depression, chornic medical problems and pain, status as . Acute risk due to acute medical issues, However patient denies suicidal ideation plan or intent, has [employer] convictions against suicide, displays help seeking behavior, future oriented, not abusing substances. Patient assessed to not be at imminent risk of harm to self or others.

## 2018-04-25 NOTE — PROGRESS NOTE ADULT - SUBJECTIVE AND OBJECTIVE BOX
seen for COPD, CKD5, foot ulcer    complaining of chronic back pain.  diarrhea resolved. sob improving but cough persists.  ROS otherwise negative     MEDICATIONS  (STANDING):  ALBUTerol/ipratropium for Nebulization 3 milliLiter(s) Nebulizer every 6 hours  ascorbic acid 500 milliGRAM(s) Oral daily  azithromycin   Tablet 250 milliGRAM(s) Oral daily  benzonatate 100 milliGRAM(s) Oral three times a day  buDESOnide   0.5 milliGRAM(s) Respule 0.5 milliGRAM(s) Inhalation two times a day  buPROPion XL . 300 milliGRAM(s) Oral daily  dextrose 5%. 1000 milliLiter(s) (50 mL/Hr) IV Continuous <Continuous>  dextrose 50% Injectable 12.5 Gram(s) IV Push once  dextrose 50% Injectable 25 Gram(s) IV Push once  dextrose 50% Injectable 25 Gram(s) IV Push once  DULoxetine 60 milliGRAM(s) Oral daily  epoetin krunal Injectable 8000 Unit(s) SubCutaneous <User Schedule>  gabapentin 800 milliGRAM(s) Oral three times a day  heparin  Injectable 5000 Unit(s) SubCutaneous every 12 hours  insulin glargine Injectable (LANTUS) 20 Unit(s) SubCutaneous at bedtime  insulin lispro (HumaLOG) corrective regimen sliding scale   SubCutaneous three times a day before meals  magnesium oxide 400 milliGRAM(s) Oral three times a day with meals  methylPREDNISolone sodium succinate Injectable 40 milliGRAM(s) IV Push every 8 hours  montelukast 10 milliGRAM(s) Oral daily  pantoprazole    Tablet 40 milliGRAM(s) Oral before breakfast  saccharomyces boulardii 250 milliGRAM(s) Oral two times a day  traZODone 200 milliGRAM(s) Oral at bedtime  zinc sulfate 220 milliGRAM(s) Oral daily    MEDICATIONS  (PRN):  dextrose Gel 1 Dose(s) Oral once PRN Blood Glucose LESS THAN 70 milliGRAM(s)/deciliter  glucagon  Injectable 1 milliGRAM(s) IntraMuscular once PRN Glucose LESS THAN 70 milligrams/deciliter  HYDROcodone  5 mG/acetaminophen 325 mG 1 Tablet(s) Oral every 4 hours PRN Moderate Pain (4 - 6)  HYDROcodone/homatropine Syrup 5 milliLiter(s) Oral four times a day PRN Cough  ondansetron Injectable 4 milliGRAM(s) IV Push every 6 hours PRN Nausea and/or Vomiting  oxyCODONE    IR 5 milliGRAM(s) Oral every 6 hours PRN Moderate to Severe Pain (7 - 10)      Allergies    No Known Allergies    Vital Signs Last 24 Hrs  T(C): 36.6 (25 Apr 2018 07:56), Max: 36.6 (25 Apr 2018 00:06)  T(F): 97.8 (25 Apr 2018 07:56), Max: 97.9 (25 Apr 2018 00:06)  HR: 88 (25 Apr 2018 07:56) (66 - 98)  BP: 156/77 (25 Apr 2018 07:56) (153/75 - 156/77)  BP(mean): --  RR: 19 (25 Apr 2018 07:56) (18 - 19)  SpO2: 94% (25 Apr 2018 03:24) (92% - 94%)    PHYSICAL EXAM:    GENERAL: NAD  CHEST/LUNG: infrequent wheeze  HEART: Regular rate and rhythm; S1 S2  ABDOMEN: Soft, Nontender, Nondistended; Bowel sounds present  EXTREMITIES:  left foot bandaged   NERVOUS SYSTEM:  Alert & Oriented X3 nonfocal ambulatory  PSYCH: normal mood, appropriate response.    LABS:                        8.9    2.1   )-----------( 69       ( 24 Apr 2018 08:02 )             29.2     04-24    135  |  102  |  32.0<H>  ----------------------------<  286<H>  4.9   |  23.0  |  3.71<H>    Ca    7.5<L>      24 Apr 2018 08:02  Phos  2.8     04-24            CAPILLARY BLOOD GLUCOSE      POCT Blood Glucose.: 287 mg/dL (25 Apr 2018 08:10)  POCT Blood Glucose.: 303 mg/dL (24 Apr 2018 21:20)  POCT Blood Glucose.: 296 mg/dL (24 Apr 2018 16:27)  POCT Blood Glucose.: 313 mg/dL (24 Apr 2018 11:53)        RADIOLOGY & ADDITIONAL TESTS:

## 2018-04-25 NOTE — PROGRESS NOTE ADULT - SUBJECTIVE AND OBJECTIVE BOX
52 yo female with Left Foot Plantar Ulcer seen bedside. 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 Instructed patient to keep dressing clean, dry, and intact to the left foot.   Pt needs to be heel weight bearing to the left foot.  d/w with pt risks and benefits of sx  pt agrees with plan at this time  vascular consult reviewed "PVR exhibits aduquet perfusion" thank you  Podiatry  perform bone biopsy and OR debridement, pending on evaluation by vascular for healing potential s/p OR Procedures.  podiatry request medical clearance for sx intervention 4/26   NPO order placed for 2/25 midnight   Podiatry will follow.

## 2018-04-26 ENCOUNTER — RESULT REVIEW (OUTPATIENT)
Age: 53
End: 2018-04-26

## 2018-04-26 DIAGNOSIS — E87.8 OTHER DISORDERS OF ELECTROLYTE AND FLUID BALANCE, NOT ELSEWHERE CLASSIFIED: ICD-10-CM

## 2018-04-26 LAB
ANION GAP SERPL CALC-SCNC: 12 MMOL/L — SIGNIFICANT CHANGE UP (ref 5–17)
ANION GAP SERPL CALC-SCNC: 13 MMOL/L — SIGNIFICANT CHANGE UP (ref 5–17)
APTT BLD: 26.8 SEC — LOW (ref 27.5–37.4)
BUN SERPL-MCNC: 51 MG/DL — HIGH (ref 8–20)
BUN SERPL-MCNC: 51 MG/DL — HIGH (ref 8–20)
CALCIUM SERPL-MCNC: 7.5 MG/DL — LOW (ref 8.6–10.2)
CALCIUM SERPL-MCNC: 8.1 MG/DL — LOW (ref 8.6–10.2)
CHLORIDE SERPL-SCNC: 96 MMOL/L — LOW (ref 98–107)
CHLORIDE SERPL-SCNC: 97 MMOL/L — LOW (ref 98–107)
CO2 SERPL-SCNC: 21 MMOL/L — LOW (ref 22–29)
CO2 SERPL-SCNC: 21 MMOL/L — LOW (ref 22–29)
CREAT SERPL-MCNC: 3.12 MG/DL — HIGH (ref 0.5–1.3)
CREAT SERPL-MCNC: 3.2 MG/DL — HIGH (ref 0.5–1.3)
GLUCOSE BLDC GLUCOMTR-MCNC: 164 MG/DL — HIGH (ref 70–99)
GLUCOSE BLDC GLUCOMTR-MCNC: 187 MG/DL — HIGH (ref 70–99)
GLUCOSE BLDC GLUCOMTR-MCNC: 246 MG/DL — HIGH (ref 70–99)
GLUCOSE BLDC GLUCOMTR-MCNC: 296 MG/DL — HIGH (ref 70–99)
GLUCOSE SERPL-MCNC: 251 MG/DL — HIGH (ref 70–115)
GLUCOSE SERPL-MCNC: 261 MG/DL — HIGH (ref 70–115)
GRAM STN FLD: SIGNIFICANT CHANGE UP
HCT VFR BLD CALC: 27.5 % — LOW (ref 37–47)
HGB BLD-MCNC: 8.7 G/DL — LOW (ref 12–16)
INR BLD: 1.18 RATIO — HIGH (ref 0.88–1.16)
MCHC RBC-ENTMCNC: 29 PG — SIGNIFICANT CHANGE UP (ref 27–31)
MCHC RBC-ENTMCNC: 31.6 G/DL — LOW (ref 32–36)
MCV RBC AUTO: 91.7 FL — SIGNIFICANT CHANGE UP (ref 81–99)
PHOSPHATE SERPL-MCNC: 2.8 MG/DL — SIGNIFICANT CHANGE UP (ref 2.4–4.7)
PLATELET # BLD AUTO: 58 K/UL — LOW (ref 150–400)
POTASSIUM SERPL-MCNC: 5.5 MMOL/L — HIGH (ref 3.5–5.3)
POTASSIUM SERPL-MCNC: 6.2 MMOL/L — CRITICAL HIGH (ref 3.5–5.3)
POTASSIUM SERPL-SCNC: 5.5 MMOL/L — HIGH (ref 3.5–5.3)
POTASSIUM SERPL-SCNC: 6.2 MMOL/L — CRITICAL HIGH (ref 3.5–5.3)
PROTHROM AB SERPL-ACNC: 13 SEC — HIGH (ref 9.8–12.7)
RBC # BLD: 3 M/UL — LOW (ref 4.4–5.2)
RBC # FLD: 14.4 % — SIGNIFICANT CHANGE UP (ref 11–15.6)
SODIUM SERPL-SCNC: 129 MMOL/L — LOW (ref 135–145)
SODIUM SERPL-SCNC: 131 MMOL/L — LOW (ref 135–145)
SPECIMEN SOURCE: SIGNIFICANT CHANGE UP
WBC # BLD: 3.2 K/UL — LOW (ref 4.8–10.8)
WBC # FLD AUTO: 3.2 K/UL — LOW (ref 4.8–10.8)

## 2018-04-26 PROCEDURE — 99233 SBSQ HOSP IP/OBS HIGH 50: CPT

## 2018-04-26 PROCEDURE — 88305 TISSUE EXAM BY PATHOLOGIST: CPT | Mod: 26

## 2018-04-26 PROCEDURE — 88311 DECALCIFY TISSUE: CPT | Mod: 26

## 2018-04-26 PROCEDURE — 73620 X-RAY EXAM OF FOOT: CPT | Mod: 26,LT

## 2018-04-26 RX ORDER — ASCORBIC ACID 60 MG
500 TABLET,CHEWABLE ORAL DAILY
Qty: 0 | Refills: 0 | Status: DISCONTINUED | OUTPATIENT
Start: 2018-04-26 | End: 2018-05-04

## 2018-04-26 RX ORDER — ERYTHROPOIETIN 10000 [IU]/ML
8000 INJECTION, SOLUTION INTRAVENOUS; SUBCUTANEOUS
Qty: 0 | Refills: 0 | Status: DISCONTINUED | OUTPATIENT
Start: 2018-04-26 | End: 2018-05-04

## 2018-04-26 RX ORDER — OXYCODONE HYDROCHLORIDE 5 MG/1
5 TABLET ORAL EVERY 6 HOURS
Qty: 0 | Refills: 0 | Status: DISCONTINUED | OUTPATIENT
Start: 2018-04-26 | End: 2018-05-03

## 2018-04-26 RX ORDER — AZITHROMYCIN 500 MG/1
250 TABLET, FILM COATED ORAL DAILY
Qty: 0 | Refills: 0 | Status: DISCONTINUED | OUTPATIENT
Start: 2018-04-26 | End: 2018-04-27

## 2018-04-26 RX ORDER — GABAPENTIN 400 MG/1
800 CAPSULE ORAL THREE TIMES A DAY
Qty: 0 | Refills: 0 | Status: DISCONTINUED | OUTPATIENT
Start: 2018-04-26 | End: 2018-05-01

## 2018-04-26 RX ORDER — INSULIN GLARGINE 100 [IU]/ML
20 INJECTION, SOLUTION SUBCUTANEOUS AT BEDTIME
Qty: 0 | Refills: 0 | Status: DISCONTINUED | OUTPATIENT
Start: 2018-04-26 | End: 2018-05-01

## 2018-04-26 RX ORDER — GLUCAGON INJECTION, SOLUTION 0.5 MG/.1ML
1 INJECTION, SOLUTION SUBCUTANEOUS ONCE
Qty: 0 | Refills: 0 | Status: DISCONTINUED | OUTPATIENT
Start: 2018-04-26 | End: 2018-05-04

## 2018-04-26 RX ORDER — PANTOPRAZOLE SODIUM 20 MG/1
40 TABLET, DELAYED RELEASE ORAL
Qty: 0 | Refills: 0 | Status: DISCONTINUED | OUTPATIENT
Start: 2018-04-26 | End: 2018-05-04

## 2018-04-26 RX ORDER — ONDANSETRON 8 MG/1
4 TABLET, FILM COATED ORAL ONCE
Qty: 0 | Refills: 0 | Status: DISCONTINUED | OUTPATIENT
Start: 2018-04-26 | End: 2018-04-26

## 2018-04-26 RX ORDER — DEXTROSE 50 % IN WATER 50 %
25 SYRINGE (ML) INTRAVENOUS ONCE
Qty: 0 | Refills: 0 | Status: DISCONTINUED | OUTPATIENT
Start: 2018-04-26 | End: 2018-05-04

## 2018-04-26 RX ORDER — SACCHAROMYCES BOULARDII 250 MG
250 POWDER IN PACKET (EA) ORAL
Qty: 0 | Refills: 0 | Status: DISCONTINUED | OUTPATIENT
Start: 2018-04-26 | End: 2018-05-04

## 2018-04-26 RX ORDER — INSULIN LISPRO 100/ML
VIAL (ML) SUBCUTANEOUS
Qty: 0 | Refills: 0 | Status: DISCONTINUED | OUTPATIENT
Start: 2018-04-26 | End: 2018-05-04

## 2018-04-26 RX ORDER — TRAZODONE HCL 50 MG
200 TABLET ORAL AT BEDTIME
Qty: 0 | Refills: 0 | Status: DISCONTINUED | OUTPATIENT
Start: 2018-04-26 | End: 2018-05-04

## 2018-04-26 RX ORDER — ONDANSETRON 8 MG/1
4 TABLET, FILM COATED ORAL EVERY 6 HOURS
Qty: 0 | Refills: 0 | Status: DISCONTINUED | OUTPATIENT
Start: 2018-04-26 | End: 2018-05-04

## 2018-04-26 RX ORDER — DEXTROSE 50 % IN WATER 50 %
12.5 SYRINGE (ML) INTRAVENOUS ONCE
Qty: 0 | Refills: 0 | Status: DISCONTINUED | OUTPATIENT
Start: 2018-04-26 | End: 2018-05-04

## 2018-04-26 RX ORDER — DEXTROSE 50 % IN WATER 50 %
1 SYRINGE (ML) INTRAVENOUS ONCE
Qty: 0 | Refills: 0 | Status: DISCONTINUED | OUTPATIENT
Start: 2018-04-26 | End: 2018-05-04

## 2018-04-26 RX ORDER — ZINC SULFATE TAB 220 MG (50 MG ZINC EQUIVALENT) 220 (50 ZN) MG
220 TAB ORAL DAILY
Qty: 0 | Refills: 0 | Status: DISCONTINUED | OUTPATIENT
Start: 2018-04-26 | End: 2018-05-04

## 2018-04-26 RX ORDER — HEPARIN SODIUM 5000 [USP'U]/ML
5000 INJECTION INTRAVENOUS; SUBCUTANEOUS EVERY 12 HOURS
Qty: 0 | Refills: 0 | Status: DISCONTINUED | OUTPATIENT
Start: 2018-04-26 | End: 2018-04-29

## 2018-04-26 RX ORDER — DULOXETINE HYDROCHLORIDE 30 MG/1
120 CAPSULE, DELAYED RELEASE ORAL DAILY
Qty: 0 | Refills: 0 | Status: DISCONTINUED | OUTPATIENT
Start: 2018-04-26 | End: 2018-05-04

## 2018-04-26 RX ORDER — FENTANYL CITRATE 50 UG/ML
25 INJECTION INTRAVENOUS
Qty: 0 | Refills: 0 | Status: DISCONTINUED | OUTPATIENT
Start: 2018-04-26 | End: 2018-04-26

## 2018-04-26 RX ORDER — BUPROPION HYDROCHLORIDE 150 MG/1
300 TABLET, EXTENDED RELEASE ORAL DAILY
Qty: 0 | Refills: 0 | Status: DISCONTINUED | OUTPATIENT
Start: 2018-04-26 | End: 2018-05-04

## 2018-04-26 RX ORDER — MONTELUKAST 4 MG/1
10 TABLET, CHEWABLE ORAL DAILY
Qty: 0 | Refills: 0 | Status: DISCONTINUED | OUTPATIENT
Start: 2018-04-26 | End: 2018-05-04

## 2018-04-26 RX ORDER — MAGNESIUM OXIDE 400 MG ORAL TABLET 241.3 MG
400 TABLET ORAL
Qty: 0 | Refills: 0 | Status: DISCONTINUED | OUTPATIENT
Start: 2018-04-26 | End: 2018-05-04

## 2018-04-26 RX ADMIN — Medication 3 MILLILITER(S): at 10:02

## 2018-04-26 RX ADMIN — Medication 100 MILLIGRAM(S): at 22:19

## 2018-04-26 RX ADMIN — HEPARIN SODIUM 5000 UNIT(S): 5000 INJECTION INTRAVENOUS; SUBCUTANEOUS at 22:19

## 2018-04-26 RX ADMIN — Medication 1: at 22:22

## 2018-04-26 RX ADMIN — OXYCODONE HYDROCHLORIDE 5 MILLIGRAM(S): 5 TABLET ORAL at 06:20

## 2018-04-26 RX ADMIN — INSULIN GLARGINE 20 UNIT(S): 100 INJECTION, SOLUTION SUBCUTANEOUS at 22:27

## 2018-04-26 RX ADMIN — MAGNESIUM OXIDE 400 MG ORAL TABLET 400 MILLIGRAM(S): 241.3 TABLET ORAL at 22:19

## 2018-04-26 RX ADMIN — OXYCODONE HYDROCHLORIDE 5 MILLIGRAM(S): 5 TABLET ORAL at 23:45

## 2018-04-26 RX ADMIN — HEPARIN SODIUM 5000 UNIT(S): 5000 INJECTION INTRAVENOUS; SUBCUTANEOUS at 05:39

## 2018-04-26 RX ADMIN — Medication 200 MILLIGRAM(S): at 22:19

## 2018-04-26 RX ADMIN — ERYTHROPOIETIN 8000 UNIT(S): 10000 INJECTION, SOLUTION INTRAVENOUS; SUBCUTANEOUS at 23:20

## 2018-04-26 RX ADMIN — GABAPENTIN 800 MILLIGRAM(S): 400 CAPSULE ORAL at 22:19

## 2018-04-26 RX ADMIN — Medication 250 MILLIGRAM(S): at 22:19

## 2018-04-26 RX ADMIN — Medication 3: at 08:38

## 2018-04-26 RX ADMIN — Medication 3 MILLILITER(S): at 03:14

## 2018-04-26 RX ADMIN — OXYCODONE HYDROCHLORIDE 5 MILLIGRAM(S): 5 TABLET ORAL at 05:42

## 2018-04-26 RX ADMIN — Medication 40 MILLIGRAM(S): at 22:19

## 2018-04-26 RX ADMIN — OXYCODONE HYDROCHLORIDE 5 MILLIGRAM(S): 5 TABLET ORAL at 23:11

## 2018-04-26 RX ADMIN — Medication 40 MILLIGRAM(S): at 05:39

## 2018-04-26 NOTE — BRIEF OPERATIVE NOTE - PROCEDURE
<<-----Click on this checkbox to enter Procedure Amputation  04/26/2018  Resection of 1st metatarsal  Active  [id number]

## 2018-04-26 NOTE — PROGRESS NOTE ADULT - SUBJECTIVE AND OBJECTIVE BOX
INTERVAL HISTORY:  Denies CP,SOB, palpitation  	  MEDICATIONS:  azithromycin   Tablet 250 milliGRAM(s) Oral daily  ALBUTerol/ipratropium for Nebulization 3 milliLiter(s) Nebulizer every 6 hours  benzonatate 100 milliGRAM(s) Oral three times a day  HYDROcodone/homatropine Syrup 5 milliLiter(s) Oral four times a day PRN  montelukast 10 milliGRAM(s) Oral daily  buPROPion XL . 300 milliGRAM(s) Oral daily  DULoxetine 120 milliGRAM(s) Oral daily  gabapentin 800 milliGRAM(s) Oral three times a day  HYDROcodone  5 mG/acetaminophen 325 mG 1 Tablet(s) Oral every 4 hours PRN  ondansetron Injectable 4 milliGRAM(s) IV Push every 6 hours PRN  oxyCODONE    IR 5 milliGRAM(s) Oral every 6 hours PRN  traZODone 200 milliGRAM(s) Oral at bedtime  pantoprazole    Tablet 40 milliGRAM(s) Oral before breakfast  dextrose 50% Injectable 12.5 Gram(s) IV Push once  dextrose 50% Injectable 25 Gram(s) IV Push once  dextrose 50% Injectable 25 Gram(s) IV Push once  dextrose Gel 1 Dose(s) Oral once PRN  glucagon  Injectable 1 milliGRAM(s) IntraMuscular once PRN  insulin glargine Injectable (LANTUS) 20 Unit(s) SubCutaneous at bedtime  insulin lispro (HumaLOG) corrective regimen sliding scale   SubCutaneous three times a day before meals  methylPREDNISolone sodium succinate Injectable 40 milliGRAM(s) IV Push every 8 hours  ascorbic acid 500 milliGRAM(s) Oral daily  dextrose 5%. 1000 milliLiter(s) IV Continuous <Continuous>  epoetin krunal Injectable 8000 Unit(s) SubCutaneous <User Schedule>  heparin  Injectable 5000 Unit(s) SubCutaneous every 12 hours  magnesium oxide 400 milliGRAM(s) Oral three times a day with meals  zinc sulfate 220 milliGRAM(s) Oral daily        PHYSICAL EXAM:  T(C): 37.1 (04-26-18 @ 08:15), Max: 37.1 (04-26-18 @ 08:15)  HR: 80 (04-26-18 @ 08:15) (80 - 95)  BP: 161/91 (04-26-18 @ 08:15) (155/84 - 161/91)  RR: 19 (04-26-18 @ 08:15) (18 - 19)  SpO2: 96% (04-26-18 @ 05:39) (96% - 96%)  Wt(kg): --  I&O's Summary        Appearance: Normal	  HEENT:   Normal oral mucosa, PERRL, EOMI	  Cardiovascular: Normal S1 S2, No JVD, No murmurs, No edema  Respiratory: Lungs clear to auscultation	  Psychiatry: A & O x 3, Mood & affect appropriate  Gastrointestinal:  Soft, Non-tender, + BS	  Skin: No rashes, No ecchymoses, No cyanosis  Neurologic: Non-focal  Extremities: Normal range of motion, No clubbing, cyanosis or edema  Vascular: Peripheral pulses palpable 2+ bilaterally    Echocardiogram: nl EF, no significant VHD  	  LABS:	 	                       8.7    3.2   )-----------( 58       ( 26 Apr 2018 08:15 )             27.5     04-26    129<L>  |  96<L>  |  51.0<H>  ----------------------------<  261<H>  6.2<HH>   |  21.0<L>  |  3.20<H>    Ca    7.5<L>      26 Apr 2018 08:15      ASSESSMENT/PLAN: 	53F HTN, HL,DM in need of foot surgery.  No suggestion of CHF, Arrhythmia, angina.  Unremarkable Echocardiogram.  No clearcut cardiac contraindication for surgery as needed.

## 2018-04-26 NOTE — PROGRESS NOTE ADULT - SUBJECTIVE AND OBJECTIVE BOX
[facility] DIVISION OF KIDNEY DISEASES AND HYPERTENSION -- FOLLOW UP NOTE  --------------------------------------------------------------------------------  Chief Complaint:   ERIC on CKD    24 hour events/subjective:  Pt seen and examined today  Lying in bed awake, NAD         PAST HISTORY  --------------------------------------------------------------------------------  No significant changes to PMH, PSH, FHx, SHx, unless otherwise noted    ALLERGIES & MEDICATIONS  --------------------------------------------------------------------------------  Allergies    No Known Allergies    Intolerances      Standing Inpatient Medications  ALBUTerol/ipratropium for Nebulization 3 milliLiter(s) Nebulizer every 6 hours  ascorbic acid 500 milliGRAM(s) Oral daily  azithromycin   Tablet 250 milliGRAM(s) Oral daily  benzonatate 100 milliGRAM(s) Oral three times a day  buPROPion XL . 300 milliGRAM(s) Oral daily  dextrose 5%. 1000 milliLiter(s) IV Continuous <Continuous>  dextrose 50% Injectable 12.5 Gram(s) IV Push once  dextrose 50% Injectable 25 Gram(s) IV Push once  dextrose 50% Injectable 25 Gram(s) IV Push once  DULoxetine 120 milliGRAM(s) Oral daily  epoetin krunal Injectable 8000 Unit(s) SubCutaneous <User Schedule>  gabapentin 800 milliGRAM(s) Oral three times a day  heparin  Injectable 5000 Unit(s) SubCutaneous every 12 hours  insulin glargine Injectable (LANTUS) 20 Unit(s) SubCutaneous at bedtime  insulin lispro (HumaLOG) corrective regimen sliding scale   SubCutaneous three times a day before meals  magnesium oxide 400 milliGRAM(s) Oral three times a day with meals  methylPREDNISolone sodium succinate Injectable 40 milliGRAM(s) IV Push every 8 hours  montelukast 10 milliGRAM(s) Oral daily  pantoprazole    Tablet 40 milliGRAM(s) Oral before breakfast  saccharomyces boulardii 250 milliGRAM(s) Oral two times a day  traZODone 200 milliGRAM(s) Oral at bedtime  zinc sulfate 220 milliGRAM(s) Oral daily    PRN Inpatient Medications  dextrose Gel 1 Dose(s) Oral once PRN  glucagon  Injectable 1 milliGRAM(s) IntraMuscular once PRN  HYDROcodone  5 mG/acetaminophen 325 mG 1 Tablet(s) Oral every 4 hours PRN  HYDROcodone/homatropine Syrup 5 milliLiter(s) Oral four times a day PRN  ondansetron Injectable 4 milliGRAM(s) IV Push every 6 hours PRN  oxyCODONE    IR 5 milliGRAM(s) Oral every 6 hours PRN      REVIEW OF SYSTEMS  --------------------------------------------------------------------------------  Gen: No weight changes, fatigue, fevers/chills, weakness  Skin: No rashes  Head/Eyes/Ears/Mouth: No headache; Normal hearing; Normal vision w/o blurriness; No sinus pain/discomfort, sore throat  Respiratory: No dyspnea, cough, wheezing, hemoptysis  CV: No chest pain, PND, orthopnea  GI: No abdominal pain, diarrhea, constipation, nausea, vomiting, melena, hematochezia  : No increased frequency, dysuria, hematuria, nocturia  MSK: No joint pain/swelling; no back pain; no edema  Neuro: No dizziness/lightheadedness, weakness, seizures, numbness, tingling  Heme: No easy bruising or bleeding  Endo: No heat/cold intolerance  Psych: No significant nervousness, anxiety, stress, depression    All other systems were reviewed and are negative, except as noted.    VITALS/PHYSICAL EXAM  --------------------------------------------------------------------------------  T(C): 37.1 (04-26-18 @ 08:15), Max: 37.1 (04-26-18 @ 08:15)  HR: 80 (04-26-18 @ 08:15) (80 - 95)  BP: 161/91 (04-26-18 @ 08:15) (155/84 - 161/91)  RR: 19 (04-26-18 @ 08:15) (18 - 19)  SpO2: 96% (04-26-18 @ 05:39) (96% - 96%)  Wt(kg): --        Physical Exam:  	Gen: NAD, well-appearing  	HEENT: PERRL, supple neck, clear oropharynx  	Pulm: CTA B/L  	CV: RRR, S1S2; no rub  	Back: No spinal or CVA tenderness; no sacral edema  	Abd: +BS, soft, nontender/nondistended  	: No suprapubic tenderness  	UE: Warm, FROM, no clubbing, intact strength; no edema; no asterixis  	LE: Warm, FROM, no clubbing, intact strength; no edema  	Neuro: No focal deficits, intact gait  	Psych: Normal affect and mood  	Skin: Warm, without rashes  	Vascular access:    LABS/STUDIES  --------------------------------------------------------------------------------              8.7    3.2   >-----------<  58       [04-26-18 @ 08:15]              27.5     131  |  97  |  51.0  ----------------------------<  251      [04-26-18 @ 10:07]  5.5   |  21.0  |  3.12        Ca     8.1     [04-26-18 @ 10:07]      Phos  2.8     [04-26-18 @ 10:07]      PT/INR: PT 13.0 , INR 1.18       [04-26-18 @ 08:15]  PTT: 26.8       [04-26-18 @ 08:15]      Creatinine Trend:  SCr 3.12 [04-26 @ 10:07]  SCr 3.20 [04-26 @ 08:15]  SCr 3.71 [04-24 @ 08:02]  SCr 3.44 [04-23 @ 09:07]  SCr 3.69 [04-22 @ 10:08]    Urinalysis - [04-19-18 @ 21:30]      Color Yellow / Appearance Clear / SG 1.010 / pH 6.0      Gluc Negative / Ketone Negative  / Bili Negative / Urobili Negative       Blood Large / Protein 100 / Leuk Est Negative / Nitrite Negative      RBC 11-25 / WBC 0-2 / Hyaline  / Gran  / Sq Epi  / Non Sq Epi Few / Bacteria Occasional    Urine Sodium <30      [04-22-18 @ 22:27]  Urine Osmolality 244      [04-22-18 @ 22:27]    Iron 77, TIBC 253, %sat 30      [04-23-18 @ 09:07]  Ferritin 372      [04-24-18 @ 08:02]  HbA1c 5.6      [04-20-18 @ 08:37]  TSH 4.32      [04-20-18 @ 11:11] [facility] DIVISION OF KIDNEY DISEASES AND HYPERTENSION -- FOLLOW UP NOTE  --------------------------------------------------------------------------------  Chief Complaint:   ERIC on CKD    24 hour events/subjective:  Pt seen and examined today, Awaits foot surgery,  Lying in bed awake, NAD     PAST HISTORY  --------------------------------------------------------------------------------  No significant changes to PMH, PSH, FHx, SHx, unless otherwise noted    ALLERGIES & MEDICATIONS  --------------------------------------------------------------------------------  Allergies    No Known Allergies    Intolerances    Standing Inpatient Medications  ALBUTerol/ipratropium for Nebulization 3 milliLiter(s) Nebulizer every 6 hours  ascorbic acid 500 milliGRAM(s) Oral daily  azithromycin   Tablet 250 milliGRAM(s) Oral daily  benzonatate 100 milliGRAM(s) Oral three times a day  buPROPion XL . 300 milliGRAM(s) Oral daily  dextrose 5%. 1000 milliLiter(s) IV Continuous <Continuous>  dextrose 50% Injectable 12.5 Gram(s) IV Push once  dextrose 50% Injectable 25 Gram(s) IV Push once  dextrose 50% Injectable 25 Gram(s) IV Push once  DULoxetine 120 milliGRAM(s) Oral daily  epoetin krunal Injectable 8000 Unit(s) SubCutaneous <User Schedule>  gabapentin 800 milliGRAM(s) Oral three times a day  heparin  Injectable 5000 Unit(s) SubCutaneous every 12 hours  insulin glargine Injectable (LANTUS) 20 Unit(s) SubCutaneous at bedtime  insulin lispro (HumaLOG) corrective regimen sliding scale   SubCutaneous three times a day before meals  magnesium oxide 400 milliGRAM(s) Oral three times a day with meals  methylPREDNISolone sodium succinate Injectable 40 milliGRAM(s) IV Push every 8 hours  montelukast 10 milliGRAM(s) Oral daily  pantoprazole    Tablet 40 milliGRAM(s) Oral before breakfast  saccharomyces boulardii 250 milliGRAM(s) Oral two times a day  traZODone 200 milliGRAM(s) Oral at bedtime  zinc sulfate 220 milliGRAM(s) Oral daily    PRN Inpatient Medications  dextrose Gel 1 Dose(s) Oral once PRN  glucagon  Injectable 1 milliGRAM(s) IntraMuscular once PRN  HYDROcodone  5 mG/acetaminophen 325 mG 1 Tablet(s) Oral every 4 hours PRN  HYDROcodone/homatropine Syrup 5 milliLiter(s) Oral four times a day PRN  ondansetron Injectable 4 milliGRAM(s) IV Push every 6 hours PRN  oxyCODONE    IR 5 milliGRAM(s) Oral every 6 hours PRN      REVIEW OF SYSTEMS  --------------------------------------------------------------------------------  Gen: No weight changes, fatigue, fevers/chills, weakness  Skin: No rashes  Head/Eyes/Ears/Mouth: No headache; Normal hearing; Normal vision w/o blurriness; No sinus pain/discomfort, sore throat  Respiratory: No dyspnea, cough, wheezing, hemoptysis  CV: No chest pain, PND, orthopnea  GI: No abdominal pain, diarrhea, constipation, nausea, vomiting, melena, hematochezia  : No increased frequency, dysuria, hematuria, nocturia  MSK: No joint pain/swelling; no back pain; no edema  Neuro: No dizziness/lightheadedness, weakness, seizures, numbness, tingling  Heme: No easy bruising or bleeding  Endo: No heat/cold intolerance  Psych: No significant nervousness, anxiety, stress, depression    All other systems were reviewed and are negative, except as noted.    VITALS/PHYSICAL EXAM  --------------------------------------------------------------------------------  T(C): 37.1 (04-26-18 @ 08:15), Max: 37.1 (04-26-18 @ 08:15)  HR: 80 (04-26-18 @ 08:15) (80 - 95)  BP: 161/91 (04-26-18 @ 08:15) (155/84 - 161/91)  RR: 19 (04-26-18 @ 08:15) (18 - 19)  SpO2: 96% (04-26-18 @ 05:39) (96% - 96%)  Wt(kg): --    Physical Exam:  	Gen: NAD, ill-appearing, Pale,  	HEENT: PERRL, supple neck,   	Pulm: CTA B/L  	CV: RRR, S1S2; no rub  	Back: No spinal or CVA tenderness; no sacral edema  	Abd: +BS, soft, nontender/nondistended  	: No suprapubic tenderness  	UE: Warm, FROM, no clubbing, intact strength; no edema; no asterixis  	LE: Warm, FROM, no clubbing, intact strength; no edema  	Neuro: No focal deficits,   	Psych: Normal affect and mood  	Skin: Warm, without rashes  	Vascular access: NA,    LABS/STUDIES  --------------------------------------------------------------------------------              8.7    3.2   >-----------<  58       [04-26-18 @ 08:15]              27.5     131  |  97  |  51.0  ----------------------------<  251      [04-26-18 @ 10:07]  5.5   |  21.0  |  3.12        Ca     8.1     [04-26-18 @ 10:07]      Phos  2.8     [04-26-18 @ 10:07]      PT/INR: PT 13.0 , INR 1.18       [04-26-18 @ 08:15]  PTT: 26.8       [04-26-18 @ 08:15]      Creatinine Trend:  SCr 3.12 [04-26 @ 10:07]  SCr 3.20 [04-26 @ 08:15]  SCr 3.71 [04-24 @ 08:02]  SCr 3.44 [04-23 @ 09:07]  SCr 3.69 [04-22 @ 10:08]    Urinalysis - [04-19-18 @ 21:30]      Color Yellow / Appearance Clear / SG 1.010 / pH 6.0      Gluc Negative / Ketone Negative  / Bili Negative / Urobili Negative       Blood Large / Protein 100 / Leuk Est Negative / Nitrite Negative      RBC 11-25 / WBC 0-2 / Hyaline  / Gran  / Sq Epi  / Non Sq Epi Few / Bacteria Occasional    Urine Sodium <30      [04-22-18 @ 22:27]  Urine Osmolality 244      [04-22-18 @ 22:27]    Iron 77, TIBC 253, %sat 30      [04-23-18 @ 09:07]  Ferritin 372      [04-24-18 @ 08:02]  HbA1c 5.6      [04-20-18 @ 08:37]  TSH 4.32      [04-20-18 @ 11:11]

## 2018-04-26 NOTE — BRIEF OPERATIVE NOTE - POST-OP DX
Osteomyelitis of left lower extremity  04/26/2018  Left 1st metatarsal  Active  Chung Duarte  Other chronic osteomyelitis of left foot  04/26/2018  Left 1st metatarsal  Active  Chung Duarte

## 2018-04-26 NOTE — PROGRESS NOTE ADULT - SUBJECTIVE AND OBJECTIVE BOX
seen for CKD5, OM, COPD     improved sob. still chest tightness  ROS otherwise negative    MEDICATIONS  (STANDING):  ALBUTerol/ipratropium for Nebulization 3 milliLiter(s) Nebulizer every 6 hours  ascorbic acid 500 milliGRAM(s) Oral daily  azithromycin   Tablet 250 milliGRAM(s) Oral daily  benzonatate 100 milliGRAM(s) Oral three times a day  buPROPion XL . 300 milliGRAM(s) Oral daily  dextrose 5%. 1000 milliLiter(s) (50 mL/Hr) IV Continuous <Continuous>  dextrose 50% Injectable 12.5 Gram(s) IV Push once  dextrose 50% Injectable 25 Gram(s) IV Push once  dextrose 50% Injectable 25 Gram(s) IV Push once  DULoxetine 120 milliGRAM(s) Oral daily  epoetin krunal Injectable 8000 Unit(s) SubCutaneous <User Schedule>  gabapentin 800 milliGRAM(s) Oral three times a day  heparin  Injectable 5000 Unit(s) SubCutaneous every 12 hours  insulin glargine Injectable (LANTUS) 20 Unit(s) SubCutaneous at bedtime  insulin lispro (HumaLOG) corrective regimen sliding scale   SubCutaneous three times a day before meals  magnesium oxide 400 milliGRAM(s) Oral three times a day with meals  methylPREDNISolone sodium succinate Injectable 40 milliGRAM(s) IV Push every 8 hours  montelukast 10 milliGRAM(s) Oral daily  pantoprazole    Tablet 40 milliGRAM(s) Oral before breakfast  saccharomyces boulardii 250 milliGRAM(s) Oral two times a day  traZODone 200 milliGRAM(s) Oral at bedtime  zinc sulfate 220 milliGRAM(s) Oral daily    MEDICATIONS  (PRN):  dextrose Gel 1 Dose(s) Oral once PRN Blood Glucose LESS THAN 70 milliGRAM(s)/deciliter  glucagon  Injectable 1 milliGRAM(s) IntraMuscular once PRN Glucose LESS THAN 70 milligrams/deciliter  HYDROcodone  5 mG/acetaminophen 325 mG 1 Tablet(s) Oral every 4 hours PRN Moderate Pain (4 - 6)  HYDROcodone/homatropine Syrup 5 milliLiter(s) Oral four times a day PRN Cough  ondansetron Injectable 4 milliGRAM(s) IV Push every 6 hours PRN Nausea and/or Vomiting  oxyCODONE    IR 5 milliGRAM(s) Oral every 6 hours PRN Moderate to Severe Pain (7 - 10)      Allergies    No Known Allergies      Vital Signs Last 24 Hrs  T(C): 37.1 (26 Apr 2018 08:15), Max: 37.1 (26 Apr 2018 08:15)  T(F): 98.8 (26 Apr 2018 08:15), Max: 98.8 (26 Apr 2018 08:15)  HR: 80 (26 Apr 2018 08:15) (80 - 95)  BP: 161/91 (26 Apr 2018 08:15) (155/84 - 161/91)  BP(mean): --  RR: 19 (26 Apr 2018 08:15) (18 - 19)  SpO2: 96% (26 Apr 2018 05:39) (96% - 96%)    PHYSICAL EXAM:    GENERAL: NAD  CHEST/LUNG: ctab  HEART: Regular rate and rhythm; S1 S2  ABDOMEN: Soft, Nontender, Nondistended; Bowel sounds present  EXTREMITIES:  no edema.  NERVOUS SYSTEM:  Alert & Oriented X3, nonfocal  PSYCH: normal mood, appropriate response.    LABS:                        8.7    3.2   )-----------( 58       ( 26 Apr 2018 08:15 )             27.5     04-26    129<L>  |  96<L>  |  51.0<H>  ----------------------------<  261<H>  6.2<HH>   |  21.0<L>  |  3.20<H>    Ca    7.5<L>      26 Apr 2018 08:15      PT/INR - ( 26 Apr 2018 08:15 )   PT: 13.0 sec;   INR: 1.18 ratio         PTT - ( 26 Apr 2018 08:15 )  PTT:26.8 sec      CAPILLARY BLOOD GLUCOSE      POCT Blood Glucose.: 296 mg/dL (26 Apr 2018 08:13)  POCT Blood Glucose.: 386 mg/dL (25 Apr 2018 21:45)  POCT Blood Glucose.: 271 mg/dL (25 Apr 2018 17:00)  POCT Blood Glucose.: 241 mg/dL (25 Apr 2018 12:12)        RADIOLOGY & ADDITIONAL TESTS:

## 2018-04-26 NOTE — PROGRESS NOTE ADULT - ASSESSMENT
1) CKD IV  2) Diabetic Nephropathy ERIC on CKD  3) Anemia of renal disease  4) ?Liver cirrhosis      CKD IV; No AVF on this admission due to active infection  improving  Continue treatment of osteomyelitis as per ID  Stable from renal standpoint - will sign off.  Please re-call if the need arises 1) CKD IV  2) Diabetic Nephropathy   3) Anemia of renal disease  4) Liver cirrhosis ( Non Alcoholic , Fatty Liver )      CKD IV; No AVF on this admission due to active infecti  Continue treatment of osteomyelitis as per ID    Will   Prepare for HD , AVF as MARQUIS SAVAGE ( V.S )

## 2018-04-27 ENCOUNTER — APPOINTMENT (OUTPATIENT)
Dept: NEPHROLOGY | Facility: CLINIC | Age: 53
End: 2018-04-27

## 2018-04-27 DIAGNOSIS — Z51.5 ENCOUNTER FOR PALLIATIVE CARE: ICD-10-CM

## 2018-04-27 DIAGNOSIS — Z71.89 OTHER SPECIFIED COUNSELING: ICD-10-CM

## 2018-04-27 LAB
ANION GAP SERPL CALC-SCNC: 12 MMOL/L — SIGNIFICANT CHANGE UP (ref 5–17)
BUN SERPL-MCNC: 61 MG/DL — HIGH (ref 8–20)
CALCIUM SERPL-MCNC: 7.8 MG/DL — LOW (ref 8.6–10.2)
CHLORIDE SERPL-SCNC: 98 MMOL/L — SIGNIFICANT CHANGE UP (ref 98–107)
CO2 SERPL-SCNC: 21 MMOL/L — LOW (ref 22–29)
CREAT SERPL-MCNC: 2.99 MG/DL — HIGH (ref 0.5–1.3)
GLUCOSE BLDC GLUCOMTR-MCNC: 304 MG/DL — HIGH (ref 70–99)
GLUCOSE BLDC GLUCOMTR-MCNC: 325 MG/DL — HIGH (ref 70–99)
GLUCOSE BLDC GLUCOMTR-MCNC: 383 MG/DL — HIGH (ref 70–99)
GLUCOSE BLDC GLUCOMTR-MCNC: 414 MG/DL — HIGH (ref 70–99)
GLUCOSE SERPL-MCNC: 278 MG/DL — HIGH (ref 70–115)
HCT VFR BLD CALC: 26.9 % — LOW (ref 37–47)
HGB BLD-MCNC: 8.4 G/DL — LOW (ref 12–16)
MCHC RBC-ENTMCNC: 29.2 PG — SIGNIFICANT CHANGE UP (ref 27–31)
MCHC RBC-ENTMCNC: 31.2 G/DL — LOW (ref 32–36)
MCV RBC AUTO: 93.4 FL — SIGNIFICANT CHANGE UP (ref 81–99)
PLATELET # BLD AUTO: 53 K/UL — LOW (ref 150–400)
POTASSIUM SERPL-MCNC: 5.6 MMOL/L — HIGH (ref 3.5–5.3)
POTASSIUM SERPL-SCNC: 5.6 MMOL/L — HIGH (ref 3.5–5.3)
RBC # BLD: 2.88 M/UL — LOW (ref 4.4–5.2)
RBC # FLD: 14.6 % — SIGNIFICANT CHANGE UP (ref 11–15.6)
SODIUM SERPL-SCNC: 131 MMOL/L — LOW (ref 135–145)
WBC # BLD: 4.3 K/UL — LOW (ref 4.8–10.8)
WBC # FLD AUTO: 4.3 K/UL — LOW (ref 4.8–10.8)

## 2018-04-27 PROCEDURE — 99233 SBSQ HOSP IP/OBS HIGH 50: CPT

## 2018-04-27 PROCEDURE — 99232 SBSQ HOSP IP/OBS MODERATE 35: CPT

## 2018-04-27 PROCEDURE — 99223 1ST HOSP IP/OBS HIGH 75: CPT

## 2018-04-27 RX ORDER — CEFTRIAXONE 500 MG/1
INJECTION, POWDER, FOR SOLUTION INTRAMUSCULAR; INTRAVENOUS
Qty: 0 | Refills: 0 | Status: DISCONTINUED | OUTPATIENT
Start: 2018-04-27 | End: 2018-05-01

## 2018-04-27 RX ORDER — ALPRAZOLAM 0.25 MG
0.5 TABLET ORAL THREE TIMES A DAY
Qty: 0 | Refills: 0 | Status: DISCONTINUED | OUTPATIENT
Start: 2018-04-27 | End: 2018-05-04

## 2018-04-27 RX ORDER — FERROUS SULFATE 325(65) MG
325 TABLET ORAL DAILY
Qty: 0 | Refills: 0 | Status: DISCONTINUED | OUTPATIENT
Start: 2018-04-27 | End: 2018-05-01

## 2018-04-27 RX ORDER — CEFTRIAXONE 500 MG/1
INJECTION, POWDER, FOR SOLUTION INTRAMUSCULAR; INTRAVENOUS
Qty: 0 | Refills: 0 | Status: DISCONTINUED | OUTPATIENT
Start: 2018-04-27 | End: 2018-04-27

## 2018-04-27 RX ORDER — IPRATROPIUM/ALBUTEROL SULFATE 18-103MCG
3 AEROSOL WITH ADAPTER (GRAM) INHALATION EVERY 6 HOURS
Qty: 0 | Refills: 0 | Status: DISCONTINUED | OUTPATIENT
Start: 2018-04-27 | End: 2018-05-04

## 2018-04-27 RX ORDER — CEFTRIAXONE 500 MG/1
1000 INJECTION, POWDER, FOR SOLUTION INTRAMUSCULAR; INTRAVENOUS ONCE
Qty: 0 | Refills: 0 | Status: COMPLETED | OUTPATIENT
Start: 2018-04-27 | End: 2018-04-27

## 2018-04-27 RX ORDER — CEFTRIAXONE 500 MG/1
1000 INJECTION, POWDER, FOR SOLUTION INTRAMUSCULAR; INTRAVENOUS EVERY 24 HOURS
Qty: 0 | Refills: 0 | Status: DISCONTINUED | OUTPATIENT
Start: 2018-04-28 | End: 2018-05-01

## 2018-04-27 RX ADMIN — MONTELUKAST 10 MILLIGRAM(S): 4 TABLET, CHEWABLE ORAL at 11:08

## 2018-04-27 RX ADMIN — HEPARIN SODIUM 5000 UNIT(S): 5000 INJECTION INTRAVENOUS; SUBCUTANEOUS at 17:18

## 2018-04-27 RX ADMIN — Medication 0.5 MILLIGRAM(S): at 12:11

## 2018-04-27 RX ADMIN — PANTOPRAZOLE SODIUM 40 MILLIGRAM(S): 20 TABLET, DELAYED RELEASE ORAL at 08:03

## 2018-04-27 RX ADMIN — GABAPENTIN 800 MILLIGRAM(S): 400 CAPSULE ORAL at 21:22

## 2018-04-27 RX ADMIN — Medication 110 MILLIGRAM(S): at 23:50

## 2018-04-27 RX ADMIN — Medication 40 MILLIGRAM(S): at 21:22

## 2018-04-27 RX ADMIN — OXYCODONE HYDROCHLORIDE 5 MILLIGRAM(S): 5 TABLET ORAL at 13:57

## 2018-04-27 RX ADMIN — GABAPENTIN 800 MILLIGRAM(S): 400 CAPSULE ORAL at 13:57

## 2018-04-27 RX ADMIN — HEPARIN SODIUM 5000 UNIT(S): 5000 INJECTION INTRAVENOUS; SUBCUTANEOUS at 05:25

## 2018-04-27 RX ADMIN — AZITHROMYCIN 250 MILLIGRAM(S): 500 TABLET, FILM COATED ORAL at 11:08

## 2018-04-27 RX ADMIN — Medication 200 MILLIGRAM(S): at 21:22

## 2018-04-27 RX ADMIN — Medication 500 MILLIGRAM(S): at 11:10

## 2018-04-27 RX ADMIN — DULOXETINE HYDROCHLORIDE 120 MILLIGRAM(S): 30 CAPSULE, DELAYED RELEASE ORAL at 11:08

## 2018-04-27 RX ADMIN — Medication 325 MILLIGRAM(S): at 12:11

## 2018-04-27 RX ADMIN — MAGNESIUM OXIDE 400 MG ORAL TABLET 400 MILLIGRAM(S): 241.3 TABLET ORAL at 08:02

## 2018-04-27 RX ADMIN — Medication 40 MILLIGRAM(S): at 13:58

## 2018-04-27 RX ADMIN — Medication 110 MILLIGRAM(S): at 12:12

## 2018-04-27 RX ADMIN — GABAPENTIN 800 MILLIGRAM(S): 400 CAPSULE ORAL at 05:24

## 2018-04-27 RX ADMIN — MAGNESIUM OXIDE 400 MG ORAL TABLET 400 MILLIGRAM(S): 241.3 TABLET ORAL at 17:17

## 2018-04-27 RX ADMIN — Medication 0.5 MILLIGRAM(S): at 17:17

## 2018-04-27 RX ADMIN — INSULIN GLARGINE 20 UNIT(S): 100 INJECTION, SOLUTION SUBCUTANEOUS at 21:22

## 2018-04-27 RX ADMIN — Medication 3 MILLILITER(S): at 20:09

## 2018-04-27 RX ADMIN — MAGNESIUM OXIDE 400 MG ORAL TABLET 400 MILLIGRAM(S): 241.3 TABLET ORAL at 12:00

## 2018-04-27 RX ADMIN — Medication 4: at 16:50

## 2018-04-27 RX ADMIN — Medication 3 MILLILITER(S): at 16:47

## 2018-04-27 RX ADMIN — Medication 250 MILLIGRAM(S): at 17:18

## 2018-04-27 RX ADMIN — OXYCODONE HYDROCHLORIDE 5 MILLIGRAM(S): 5 TABLET ORAL at 14:32

## 2018-04-27 RX ADMIN — BUPROPION HYDROCHLORIDE 300 MILLIGRAM(S): 150 TABLET, EXTENDED RELEASE ORAL at 11:06

## 2018-04-27 RX ADMIN — OXYCODONE HYDROCHLORIDE 5 MILLIGRAM(S): 5 TABLET ORAL at 05:24

## 2018-04-27 RX ADMIN — OXYCODONE HYDROCHLORIDE 5 MILLIGRAM(S): 5 TABLET ORAL at 20:10

## 2018-04-27 RX ADMIN — Medication 0.5 MILLIGRAM(S): at 21:21

## 2018-04-27 RX ADMIN — ZINC SULFATE TAB 220 MG (50 MG ZINC EQUIVALENT) 220 MILLIGRAM(S): 220 (50 ZN) TAB at 11:10

## 2018-04-27 RX ADMIN — Medication 40 MILLIGRAM(S): at 05:24

## 2018-04-27 RX ADMIN — Medication 250 MILLIGRAM(S): at 05:24

## 2018-04-27 RX ADMIN — Medication 5: at 08:02

## 2018-04-27 RX ADMIN — CEFTRIAXONE 1000 MILLIGRAM(S): 500 INJECTION, POWDER, FOR SOLUTION INTRAMUSCULAR; INTRAVENOUS at 12:00

## 2018-04-27 RX ADMIN — OXYCODONE HYDROCHLORIDE 5 MILLIGRAM(S): 5 TABLET ORAL at 20:55

## 2018-04-27 RX ADMIN — Medication 6: at 11:59

## 2018-04-27 NOTE — CONSULT NOTE ADULT - PROBLEM SELECTOR RECOMMENDATION 9
No fever  Check ESR and CRP  If concerned for infection check MRI  Will not treat superficial culture  Hold off on antibiotics
agree with d/n, abx/steroids/O2  as per ID,Heme, renal
s/p resection and bone bx of first MT.   Cont abx

## 2018-04-27 NOTE — PROGRESS NOTE ADULT - SUBJECTIVE AND OBJECTIVE BOX
seen OM, CKD5    very emotional/crying:  "my body is breaking down"  "i do not want dialysis".  ros limited due to above.    MEDICATIONS  (STANDING):  ALBUTerol/ipratropium for Nebulization 3 milliLiter(s) Nebulizer every 6 hours  ascorbic acid 500 milliGRAM(s) Oral daily  buPROPion XL . 300 milliGRAM(s) Oral daily  cefTRIAXone Injectable.      cefTRIAXone Injectable. 1000 milliGRAM(s) IV Push once  dextrose 50% Injectable 12.5 Gram(s) IV Push once  dextrose 50% Injectable 25 Gram(s) IV Push once  dextrose 50% Injectable 25 Gram(s) IV Push once  doxycycline IVPB      doxycycline IVPB 100 milliGRAM(s) IV Intermittent once  doxycycline IVPB 100 milliGRAM(s) IV Intermittent every 12 hours  DULoxetine 120 milliGRAM(s) Oral daily  epoetin krunal Injectable 8000 Unit(s) SubCutaneous <User Schedule>  gabapentin 800 milliGRAM(s) Oral three times a day  heparin  Injectable 5000 Unit(s) SubCutaneous every 12 hours  insulin glargine Injectable (LANTUS) 20 Unit(s) SubCutaneous at bedtime  insulin lispro (HumaLOG) corrective regimen sliding scale   SubCutaneous three times a day before meals  magnesium oxide 400 milliGRAM(s) Oral three times a day with meals  methylPREDNISolone sodium succinate Injectable 40 milliGRAM(s) IV Push every 8 hours  montelukast 10 milliGRAM(s) Oral daily  pantoprazole    Tablet 40 milliGRAM(s) Oral before breakfast  saccharomyces boulardii 250 milliGRAM(s) Oral two times a day  traZODone 200 milliGRAM(s) Oral at bedtime  zinc sulfate 220 milliGRAM(s) Oral daily    MEDICATIONS  (PRN):  ALPRAZolam 0.5 milliGRAM(s) Oral three times a day PRN anxiety  dextrose Gel 1 Dose(s) Oral once PRN Blood Glucose LESS THAN 70 milliGRAM(s)/deciliter  glucagon  Injectable 1 milliGRAM(s) IntraMuscular once PRN Glucose LESS THAN 70 milligrams/deciliter  HYDROcodone/homatropine Syrup 5 milliLiter(s) Oral four times a day PRN Cough  ondansetron Injectable 4 milliGRAM(s) IV Push every 6 hours PRN Nausea and/or Vomiting  oxyCODONE    IR 5 milliGRAM(s) Oral every 6 hours PRN Moderate to Severe Pain (7 - 10)      Allergies    No Known Allergies    Vital Signs Last 24 Hrs  T(C): 36.9 (27 Apr 2018 07:36), Max: 37.1 (26 Apr 2018 23:57)  T(F): 98.4 (27 Apr 2018 07:36), Max: 98.7 (26 Apr 2018 23:57)  HR: 84 (27 Apr 2018 08:05) (78 - 88)  BP: 143/74 (27 Apr 2018 07:36) (127/68 - 161/76)  BP(mean): --  RR: 18 (27 Apr 2018 07:36) (12 - 19)  SpO2: 98% (27 Apr 2018 08:05) (95% - 99%)    PHYSICAL EXAM:    GENERAL: crying  CHEST/LUNG: Ctab  HEART: Regular rate and rhythm; S1 S2  ABDOMEN: Soft, Nontender, Nondistended; Bowel sounds present  EXTREMITIES: no edema, left foot bandaged  NERVOUS SYSTEM:  Alert & Oriented X3 nonfocal    LABS:                        8.7    3.2   )-----------( 58       ( 26 Apr 2018 08:15 )             27.5     04-26    131<L>  |  97<L>  |  51.0<H>  ----------------------------<  251<H>  5.5<H>   |  21.0<L>  |  3.12<H>    Ca    8.1<L>      26 Apr 2018 10:07  Phos  2.8     04-26      PT/INR - ( 26 Apr 2018 08:15 )   PT: 13.0 sec;   INR: 1.18 ratio         PTT - ( 26 Apr 2018 08:15 )  PTT:26.8 sec      CAPILLARY BLOOD GLUCOSE      POCT Blood Glucose.: 383 mg/dL (27 Apr 2018 08:01)  POCT Blood Glucose.: 187 mg/dL (26 Apr 2018 22:20)  POCT Blood Glucose.: 164 mg/dL (26 Apr 2018 17:57)  POCT Blood Glucose.: 246 mg/dL (26 Apr 2018 12:51)        RADIOLOGY & ADDITIONAL TESTS:

## 2018-04-27 NOTE — PROGRESS NOTE ADULT - ASSESSMENT
POD #1 s/p IVAS/MAC. A&Ox3/VSS. Denies any HA or nausea. No quest. or complaints r/t yest anesthetic.

## 2018-04-27 NOTE — PROGRESS NOTE ADULT - SUBJECTIVE AND OBJECTIVE BOX
Anesthesia Post Op Note:      INTERVAL HPI/OVERNIGHT EVENTS:    MEDICATIONS  (STANDING):  ALBUTerol/ipratropium for Nebulization 3 milliLiter(s) Nebulizer every 6 hours  ascorbic acid 500 milliGRAM(s) Oral daily  buPROPion XL . 300 milliGRAM(s) Oral daily  cefTRIAXone Injectable.      dextrose 50% Injectable 12.5 Gram(s) IV Push once  dextrose 50% Injectable 25 Gram(s) IV Push once  dextrose 50% Injectable 25 Gram(s) IV Push once  doxycycline IVPB      doxycycline IVPB 100 milliGRAM(s) IV Intermittent every 12 hours  DULoxetine 120 milliGRAM(s) Oral daily  epoetin krunal Injectable 8000 Unit(s) SubCutaneous <User Schedule>  ferrous    sulfate 325 milliGRAM(s) Oral daily  gabapentin 800 milliGRAM(s) Oral three times a day  heparin  Injectable 5000 Unit(s) SubCutaneous every 12 hours  insulin glargine Injectable (LANTUS) 20 Unit(s) SubCutaneous at bedtime  insulin lispro (HumaLOG) corrective regimen sliding scale   SubCutaneous three times a day before meals  magnesium oxide 400 milliGRAM(s) Oral three times a day with meals  methylPREDNISolone sodium succinate Injectable 40 milliGRAM(s) IV Push every 8 hours  montelukast 10 milliGRAM(s) Oral daily  pantoprazole    Tablet 40 milliGRAM(s) Oral before breakfast  saccharomyces boulardii 250 milliGRAM(s) Oral two times a day  traZODone 200 milliGRAM(s) Oral at bedtime  zinc sulfate 220 milliGRAM(s) Oral daily    MEDICATIONS  (PRN):  ALPRAZolam 0.5 milliGRAM(s) Oral three times a day PRN anxiety  dextrose Gel 1 Dose(s) Oral once PRN Blood Glucose LESS THAN 70 milliGRAM(s)/deciliter  glucagon  Injectable 1 milliGRAM(s) IntraMuscular once PRN Glucose LESS THAN 70 milligrams/deciliter  HYDROcodone/homatropine Syrup 5 milliLiter(s) Oral four times a day PRN Cough  ondansetron Injectable 4 milliGRAM(s) IV Push every 6 hours PRN Nausea and/or Vomiting  oxyCODONE    IR 5 milliGRAM(s) Oral every 6 hours PRN Moderate to Severe Pain (7 - 10)      Allergies    No Known Allergies    Intolerances        REVIEW OF SYSTEMS:    CONSTITUTIONAL: No fever, weight loss, or fatigue  EYES: No eye pain, visual disturbances, or discharge  ENMT:  No difficulty hearing, tinnitus, vertigo; No sinus or throat pain  NECK: No pain or stiffness  BREASTS: No pain, masses, or nipple discharge  RESPIRATORY: No cough, wheezing, chills or hemoptysis; No shortness of breath  CARDIOVASCULAR: No chest pain, palpitations, dizziness, or leg swelling  GASTROINTESTINAL: No abdominal or epigastric pain. No nausea, vomiting, or hematemesis; No diarrhea or constipation. No melena or hematochezia.  GENITOURINARY: No dysuria, frequency, hematuria, or incontinence  NEUROLOGICAL: No headaches, memory loss, loss of strength, numbness, or tremors  SKIN: No itching, burning, rashes, or lesions   LYMPH NODES: No enlarged glands  ENDOCRINE: No heat or cold intolerance; No hair loss  MUSCULOSKELETAL: No joint pain or swelling; No muscle, back, or extremity pain  PSYCHIATRIC: No depression, anxiety, mood swings, or difficulty sleeping  HEME/LYMPH: No easy bruising, or bleeding gums  ALLERY AND IMMUNOLOGIC: No hives or eczema    Vital Signs Last 24 Hrs  T(C): 36.9 (27 Apr 2018 07:36), Max: 37.1 (26 Apr 2018 23:57)  T(F): 98.4 (27 Apr 2018 07:36), Max: 98.7 (26 Apr 2018 23:57)  HR: 84 (27 Apr 2018 08:05) (78 - 88)  BP: 143/74 (27 Apr 2018 07:36) (127/68 - 161/76)  BP(mean): --  RR: 18 (27 Apr 2018 07:36) (12 - 19)  SpO2: 98% (27 Apr 2018 08:05) (95% - 99%)        PHYSICAL EXAM:    GENERAL: NAD, well-groomed, well-developed  HEAD:  Atraumatic, Normocephalic  EYES: EOMI, PERRLA, conjunctiva and sclera clear  ENMT: No tonsillar erythema, exudates, or enlargement; Moist mucous membranes, Good dentition, No lesions  NECK: Supple, No JVD, Normal thyroid  NERVOUS SYSTEM:  Alert & Oriented X3, Good concentration; Motor Strength 5/5 B/L upper and lower extremities; DTRs 2+ intact and symmetric  CHEST/LUNG: Clear to percussion bilaterally; No rales, rhonchi, wheezing, or rubs  HEART: Regular rate and rhythm; No murmurs, rubs, or gallops  ABDOMEN: Soft, Nontender, Nondistended; Bowel sounds present  EXTREMITIES:  2+ Peripheral Pulses, No clubbing, cyanosis, or edema  LYMPH: No lymphadenopathy noted  SKIN: No rashes or lesions      LABS:                        8.7    3.2   )-----------( 58       ( 26 Apr 2018 08:15 )             27.5     04-26    131<L>  |  97<L>  |  51.0<H>  ----------------------------<  251<H>  5.5<H>   |  21.0<L>  |  3.12<H>    Ca    8.1<L>      26 Apr 2018 10:07  Phos  2.8     04-26      PT/INR - ( 26 Apr 2018 08:15 )   PT: 13.0 sec;   INR: 1.18 ratio         PTT - ( 26 Apr 2018 08:15 )  PTT:26.8 sec      RADIOLOGY & ADDITIONAL TESTS:

## 2018-04-27 NOTE — PROGRESS NOTE ADULT - SUBJECTIVE AND OBJECTIVE BOX
[facility] DIVISION OF KIDNEY DISEASES AND HYPERTENSION -- FOLLOW UP NOTE  --------------------------------------------------------------------------------  Chief Complaint:  ERIC on CKD    24 hour events/subjective:  Pt seen and examined today,  Lying in bed awake, NAD   Upset about overall condition    PAST HISTORY  --------------------------------------------------------------------------------  No significant changes to PMH, PSH, FHx, SHx, unless otherwise noted    ALLERGIES & MEDICATIONS  --------------------------------------------------------------------------------  Allergies    No Known Allergies    Intolerances      Standing Inpatient Medications  ALBUTerol/ipratropium for Nebulization 3 milliLiter(s) Nebulizer every 6 hours  ascorbic acid 500 milliGRAM(s) Oral daily  buPROPion XL . 300 milliGRAM(s) Oral daily  cefTRIAXone Injectable.      dextrose 50% Injectable 12.5 Gram(s) IV Push once  dextrose 50% Injectable 25 Gram(s) IV Push once  dextrose 50% Injectable 25 Gram(s) IV Push once  doxycycline IVPB      doxycycline IVPB 100 milliGRAM(s) IV Intermittent every 12 hours  DULoxetine 120 milliGRAM(s) Oral daily  epoetin krunal Injectable 8000 Unit(s) SubCutaneous <User Schedule>  ferrous    sulfate 325 milliGRAM(s) Oral daily  gabapentin 800 milliGRAM(s) Oral three times a day  heparin  Injectable 5000 Unit(s) SubCutaneous every 12 hours  insulin glargine Injectable (LANTUS) 20 Unit(s) SubCutaneous at bedtime  insulin lispro (HumaLOG) corrective regimen sliding scale   SubCutaneous three times a day before meals  magnesium oxide 400 milliGRAM(s) Oral three times a day with meals  methylPREDNISolone sodium succinate Injectable 40 milliGRAM(s) IV Push every 8 hours  montelukast 10 milliGRAM(s) Oral daily  pantoprazole    Tablet 40 milliGRAM(s) Oral before breakfast  saccharomyces boulardii 250 milliGRAM(s) Oral two times a day  traZODone 200 milliGRAM(s) Oral at bedtime  zinc sulfate 220 milliGRAM(s) Oral daily    PRN Inpatient Medications  ALPRAZolam 0.5 milliGRAM(s) Oral three times a day PRN  dextrose Gel 1 Dose(s) Oral once PRN  glucagon  Injectable 1 milliGRAM(s) IntraMuscular once PRN  HYDROcodone/homatropine Syrup 5 milliLiter(s) Oral four times a day PRN  ondansetron Injectable 4 milliGRAM(s) IV Push every 6 hours PRN  oxyCODONE    IR 5 milliGRAM(s) Oral every 6 hours PRN      REVIEW OF SYSTEMS  --------------------------------------------------------------------------------  Gen: No weight changes, fatigue, fevers/chills, weakness  Skin: No rashes  Head/Eyes/Ears/Mouth: No headache; Normal hearing; Normal vision w/o blurriness; No sinus pain/discomfort, sore throat  Respiratory: No dyspnea, cough, wheezing, hemoptysis  CV: No chest pain, PND, orthopnea  GI: No abdominal pain, diarrhea, constipation, nausea, vomiting, melena, hematochezia  : No increased frequency, dysuria, hematuria, nocturia  MSK: No joint pain/swelling; no back pain; no edema  Neuro: No dizziness/lightheadedness, weakness, seizures, numbness, tingling  Heme: No easy bruising or bleeding  Endo: No heat/cold intolerance  Psych: No significant nervousness, anxiety, stress, depression    All other systems were reviewed and are negative, except as noted.    VITALS/PHYSICAL EXAM  --------------------------------------------------------------------------------  T(C): 36.9 (04-27-18 @ 07:36), Max: 37.1 (04-26-18 @ 23:57)  HR: 84 (04-27-18 @ 08:05) (78 - 88)  BP: 143/74 (04-27-18 @ 07:36) (127/68 - 161/76)  RR: 18 (04-27-18 @ 07:36) (12 - 19)  SpO2: 98% (04-27-18 @ 08:05) (95% - 99%)  Wt(kg): --        04-27-18 @ 07:01  -  04-27-18 @ 13:17  --------------------------------------------------------  IN: 474 mL / OUT: 500 mL / NET: -26 mL      Physical Exam:  	Gen: NAD, ill-appearing, Pale,  	HEENT: PERRL, supple neck,   	Pulm: CTA B/L  	CV: RRR, S1S2; no rub  	Back: No spinal or CVA tenderness; no sacral edema  	Abd: +BS, soft, nontender/nondistended  	: No suprapubic tenderness  	UE: Warm, FROM, no clubbing, intact strength; no edema; no asterixis  	LE: Warm, FROM, no clubbing, intact strength; no edema  	Neuro: No focal deficits,   	Psych: Normal affect and mood  	Skin: Warm, without rashes  	Vascular access: NA,    LABS/STUDIES  --------------------------------------------------------------------------------              8.7    3.2   >-----------<  58       [04-26-18 @ 08:15]              27.5     131  |  97  |  51.0  ----------------------------<  251      [04-26-18 @ 10:07]  5.5   |  21.0  |  3.12        Ca     8.1     [04-26-18 @ 10:07]      Phos  2.8     [04-26-18 @ 10:07]      PT/INR: PT 13.0 , INR 1.18       [04-26-18 @ 08:15]  PTT: 26.8       [04-26-18 @ 08:15]      Creatinine Trend:  SCr 3.12 [04-26 @ 10:07]  SCr 3.20 [04-26 @ 08:15]  SCr 3.71 [04-24 @ 08:02]  SCr 3.44 [04-23 @ 09:07]  SCr 3.69 [04-22 @ 10:08]    Urinalysis - [04-19-18 @ 21:30]      Color Yellow / Appearance Clear / SG 1.010 / pH 6.0      Gluc Negative / Ketone Negative  / Bili Negative / Urobili Negative       Blood Large / Protein 100 / Leuk Est Negative / Nitrite Negative      RBC 11-25 / WBC 0-2 / Hyaline  / Gran  / Sq Epi  / Non Sq Epi Few / Bacteria Occasional    Urine Sodium <30      [04-22-18 @ 22:27]  Urine Osmolality 244      [04-22-18 @ 22:27]    Iron 77, TIBC 253, %sat 30      [04-23-18 @ 09:07]  Ferritin 372      [04-24-18 @ 08:02]  HbA1c 5.6      [04-20-18 @ 08:37]  TSH 4.32      [04-20-18 @ 11:11]

## 2018-04-27 NOTE — PROGRESS NOTE ADULT - ASSESSMENT
1) CKD IV  2) Diabetic Nephropathy ERIC on CKD  3) Anemia of renal disease  4) ?Liver cirrhosis    CKD IV; No AVF on this admission due to active infection  improving, but still elevated SCr; prerenal; diarrhea/losses; decreased po  IVF D/C'd  urine studies pending  Anemia not at goal; on iron and RHEA epo 8000 TIW  Continue current management

## 2018-04-27 NOTE — PROGRESS NOTE ADULT - ASSESSMENT
COPD exacerbation is improved  Mild wheeze and cough remaining  Obesity  Osteomyelitis    Rec:    Drug nebs  O2 as needed  Prednisone taper after completing brief IV steroids  Complete abx per ID  On GI/DVT prophylaxis  Anxiolytics  Weight loss

## 2018-04-27 NOTE — CONSULT NOTE ADULT - SUBJECTIVE AND OBJECTIVE BOX
Palliative Medicine Initial Consultation Note    HPI:  History from chart  54 y/o F with Hx of Fibromyalgia, DM, Osteopenia, [place] Esophagus seen in Ed for Left Foot Plantar Ulcer, worsening kidney function who is well know to Nephrology service, she went to her Podiatrist (Dr. Sanchez) for her wound check for her left foot wound, Her podiatrist send her to ED because she was looking pale, weak and was having swelling and edema every where.  Patient s/p Left foot resection of 1st metatarsal on 4/26/18.    Past Medical History:  Arthritis    Asthma    Back ache    [place]os esophagus    Chronic pain    Diabetes    DM (diabetes mellitus)    Fibromyalgia    Foot ulcer  Left Foot  Osteopenia    Shoulder joint stiffness, left    Stomach ulcer.     Past Surgical History:  S/P cholecystectomy    S/P hysterectomy    S/P knee surgery.      SOCIAL HISTORY:  EtOH [ ] Yes  [x ] No                                    Drugs [ ] Yes [ x] No                                   [ ] smoker [ x] nonsmoker                                    Admitted from: [ x] home [ ] SNF _________ [ ] [facility] ________    Surrogate/HCP/Guardian: No HCP in chart  FAMILY HISTORY:  No pertinent family history in first degree relatives      Baseline ADLs (prior to admission):  Independent [ ] moderately [ ] fully   Dependent   [ ] moderately [ ]fully    MEDICATIONS  (STANDING):  ALBUTerol/ipratropium for Nebulization 3 milliLiter(s) Nebulizer every 6 hours  ascorbic acid 500 milliGRAM(s) Oral daily  buPROPion XL . 300 milliGRAM(s) Oral daily  cefTRIAXone Injectable.      dextrose 50% Injectable 12.5 Gram(s) IV Push once  dextrose 50% Injectable 25 Gram(s) IV Push once  dextrose 50% Injectable 25 Gram(s) IV Push once  doxycycline IVPB      doxycycline IVPB 100 milliGRAM(s) IV Intermittent every 12 hours  DULoxetine 120 milliGRAM(s) Oral daily  epoetin krunal Injectable 8000 Unit(s) SubCutaneous <User Schedule>  ferrous    sulfate 325 milliGRAM(s) Oral daily  gabapentin 800 milliGRAM(s) Oral three times a day  heparin  Injectable 5000 Unit(s) SubCutaneous every 12 hours  insulin glargine Injectable (LANTUS) 20 Unit(s) SubCutaneous at bedtime  insulin lispro (HumaLOG) corrective regimen sliding scale   SubCutaneous three times a day before meals  magnesium oxide 400 milliGRAM(s) Oral three times a day with meals  methylPREDNISolone sodium succinate Injectable 40 milliGRAM(s) IV Push every 8 hours  montelukast 10 milliGRAM(s) Oral daily  pantoprazole    Tablet 40 milliGRAM(s) Oral before breakfast  saccharomyces boulardii 250 milliGRAM(s) Oral two times a day  traZODone 200 milliGRAM(s) Oral at bedtime  zinc sulfate 220 milliGRAM(s) Oral daily    MEDICATIONS  (PRN):  ALPRAZolam 0.5 milliGRAM(s) Oral three times a day PRN anxiety  dextrose Gel 1 Dose(s) Oral once PRN Blood Glucose LESS THAN 70 milliGRAM(s)/deciliter  glucagon  Injectable 1 milliGRAM(s) IntraMuscular once PRN Glucose LESS THAN 70 milligrams/deciliter  HYDROcodone/homatropine Syrup 5 milliLiter(s) Oral four times a day PRN Cough  ondansetron Injectable 4 milliGRAM(s) IV Push every 6 hours PRN Nausea and/or Vomiting  oxyCODONE    IR 5 milliGRAM(s) Oral every 6 hours PRN Moderate to Severe Pain (7 - 10)      Allergies    No Known Allergies    Intolerances        REVIEW OF SYSTEMS   see HPI    [ ] Unable to obtain due to poor mentation     General: no fevers, no fatigue, no loss of appetite    Skin: no rashes, skin changes  	  Ophthalmologic: no eye pain or blurred vision  	  ENMT:	no sore throat, no ear pain    Respiratory and Thorax: no cough,  no  SOB 	    Cardiovascular:	no chest pain, no leg swelling    Gastrointestinal:	no  n/v/d,c    Genitourinary:	no FUD    Musculoskeletal: no muscle pain	    Neurological: no seizures, no dizziness    Hematology/Lymphatics: no petechia or purpura	    Endocrine: no polyuria, no polydipsia	  SKIN : See HPI    Karnofsky Performance Score/Palliative Performance Status Version 2:   40 %    Vital Signs Last 24 Hrs  T(C): 36.9 (27 Apr 2018 07:36), Max: 37.1 (26 Apr 2018 23:57)  T(F): 98.4 (27 Apr 2018 07:36), Max: 98.7 (26 Apr 2018 23:57)  HR: 84 (27 Apr 2018 08:05) (78 - 88)  BP: 143/74 (27 Apr 2018 07:36) (127/68 - 161/76)  BP(mean): --  RR: 18 (27 Apr 2018 07:36) (12 - 19)  SpO2: 98% (27 Apr 2018 08:05) (95% - 99%)    PHYSICAL EXAM:    General: Obese woman, emotional and crying. AOx3    HEENT: [ x] normal  [ ] dry mouth  [ ] ET tube/trach    Lungs: [ x] comfortable [ ] tachypnea/labored breathing  [ ] excessive secretions    CV: [ x] normal  [ ] tachycardia    GI: [ x] normal  [ ] distended  [ ] tender  [ ] no BS               [ ] PEG/NG/OG tube    : [ ] normal  [ ] incontinent  [x ] oliguria/anuria  [ ] cleary    MSK: [ ] normal  [x ] weakness  [ ] edema             [ ] ambulatory  [ ] bedbound/wheelchair bound    Skin: Left foot dressings clean and dry    LABS:                        8.7    3.2   )-----------( 58       ( 26 Apr 2018 08:15 )             27.5     04-26    131<L>  |  97<L>  |  51.0<H>  ----------------------------<  251<H>  5.5<H>   |  21.0<L>  |  3.12<H>    Ca    8.1<L>      26 Apr 2018 10:07  Phos  2.8     04-26      PT/INR - ( 26 Apr 2018 08:15 )   PT: 13.0 sec;   INR: 1.18 ratio         PTT - ( 26 Apr 2018 08:15 )  PTT:26.8 sec    I&O's Summary    27 Apr 2018 07:01  -  27 Apr 2018 14:42  --------------------------------------------------------  IN: 474 mL / OUT: 500 mL / NET: -26 mL        RADIOLOGY & ADDITIONAL STUDIES:  < from: MR Foot No Cont, Left (04.23.18 @ 13:41) >   EXAM:  MR FOOT LT                          PROCEDURE DATE:  04/23/2018          INTERPRETATION:  EXAMINATION: MRI left foot without contrast    CLINICAL INFORMATION: Left foot ulcer    TECHNIQUE: Multiplanar, multisequential MR imaging was performed.    Comparison is made to a prior foot MRI from 10/2/2015.    FINDINGS: The patient is status post amputation of the first and second   rays at the level of the mid metatarsals. There is a cutaneous ulceration   along the plantar aspect of the stump of the first metatarsal with   adjacent skin thickening and subcutaneous fat infiltration, consistent   with cellulitis. In addition, there is high T2 and mild low T1 signal   within the distal aspect of the stump of the first metatarsal, consistent  with osteomyelitis.    There is chronic flattening/remodeling of the head of the third   metatarsal, consistent with chronic osteonecrosis. There is secondary   arthrosis at the third metatarsophalangeal joint. There are no other   areas of marrow signal alteration to suggest osteoarthritis. There is   diffuse fatty atrophy of the musculature about the foot, consistent with   denervation.    IMPRESSION: Findings consistent with osteomyelitis of the stump of the   first metatarsal, as above.    < end of copied text >    ASSESSMENT and PLAN:    ADVANCE DIRECTIVES:  [ ] YES [ x] NO   DNR [ ] YES [x ] NO  Completed on:                     MOLST  [ ] YES [x ] NO   Completed on:  Living Will  [ ] YES [ x] NO   Completed on:         Thank you for the opportunity to assist with the care of this patient.   [place] [facility] Consult [facility] [phone]. Palliative Medicine Initial Consultation Note    HPI:  History from chart  54 y/o F with Hx of Fibromyalgia, DM, Osteopenia, [place] Esophagus seen in Ed for Left Foot Plantar Ulcer, worsening kidney function who is well know to Nephrology service, she went to her Podiatrist (Dr. Sanchez) for her wound check for her left foot wound, Her podiatrist send her to ED because she was looking pale, weak and was having swelling and edema every where.  Patient s/p Left foot resection of 1st metatarsal on 4/26/18.    Past Medical History:  Arthritis    Asthma    Back ache    [place] esophagus    Chronic pain    Diabetes    DM (diabetes mellitus)    Fibromyalgia    Foot ulcer  Left Foot  Osteopenia    Shoulder joint stiffness, left    Stomach ulcer.     Past Surgical History:  S/P cholecystectomy    S/P hysterectomy    S/P knee surgery.      SOCIAL HISTORY:  EtOH [ ] Yes  [x ] No                                    Drugs [ ] Yes [ x] No                                   [ ] smoker [ x] nonsmoker                                    Admitted from: [ x] home [ ] SNF _________ [ ] [facility] ________    Surrogate/HCP/Guardian: No HCP in chart  Partner Ronald Krishna - ( has lived together x 14 years)  No children    FAMILY HISTORY:  No pertinent family history in first degree relatives      Baseline ADLs (prior to admission):  Independent [ ] moderately [ x] fully   Dependent   [ ] moderately [ ]fully    MEDICATIONS  (STANDING):  ALBUTerol/ipratropium for Nebulization 3 milliLiter(s) Nebulizer every 6 hours  ascorbic acid 500 milliGRAM(s) Oral daily  buPROPion XL . 300 milliGRAM(s) Oral daily  cefTRIAXone Injectable.      dextrose 50% Injectable 12.5 Gram(s) IV Push once  dextrose 50% Injectable 25 Gram(s) IV Push once  dextrose 50% Injectable 25 Gram(s) IV Push once  doxycycline IVPB      doxycycline IVPB 100 milliGRAM(s) IV Intermittent every 12 hours  DULoxetine 120 milliGRAM(s) Oral daily  epoetin krunal Injectable 8000 Unit(s) SubCutaneous <User Schedule>  ferrous    sulfate 325 milliGRAM(s) Oral daily  gabapentin 800 milliGRAM(s) Oral three times a day  heparin  Injectable 5000 Unit(s) SubCutaneous every 12 hours  insulin glargine Injectable (LANTUS) 20 Unit(s) SubCutaneous at bedtime  insulin lispro (HumaLOG) corrective regimen sliding scale   SubCutaneous three times a day before meals  magnesium oxide 400 milliGRAM(s) Oral three times a day with meals  methylPREDNISolone sodium succinate Injectable 40 milliGRAM(s) IV Push every 8 hours  montelukast 10 milliGRAM(s) Oral daily  pantoprazole    Tablet 40 milliGRAM(s) Oral before breakfast  saccharomyces boulardii 250 milliGRAM(s) Oral two times a day  traZODone 200 milliGRAM(s) Oral at bedtime  zinc sulfate 220 milliGRAM(s) Oral daily    MEDICATIONS  (PRN):  ALPRAZolam 0.5 milliGRAM(s) Oral three times a day PRN anxiety  dextrose Gel 1 Dose(s) Oral once PRN Blood Glucose LESS THAN 70 milliGRAM(s)/deciliter  glucagon  Injectable 1 milliGRAM(s) IntraMuscular once PRN Glucose LESS THAN 70 milligrams/deciliter  HYDROcodone/homatropine Syrup 5 milliLiter(s) Oral four times a day PRN Cough  ondansetron Injectable 4 milliGRAM(s) IV Push every 6 hours PRN Nausea and/or Vomiting  oxyCODONE    IR 5 milliGRAM(s) Oral every 6 hours PRN Moderate to Severe Pain (7 - 10)      Allergies    No Known Allergies    Intolerances        REVIEW OF SYSTEMS   see HPI    [ ] Unable to obtain due to poor mentation     General: no fevers, no fatigue, no loss of appetite    Skin: no rashes, skin changes  	  Ophthalmologic: no eye pain or blurred vision  	  ENMT:	no sore throat, no ear pain    Respiratory and Thorax: no cough,  no  SOB 	    Cardiovascular:	no chest pain, no leg swelling    Gastrointestinal:	no  n/v/d,c    Genitourinary:	no FUD    Musculoskeletal: no muscle pain	    Neurological: no seizures, no dizziness    Hematology/Lymphatics: no petechia or purpura	    Endocrine: no polyuria, no polydipsia	  SKIN : See HPI    Karnofsky Performance Score/Palliative Performance Status Version 2:   40 %    Vital Signs Last 24 Hrs  T(C): 36.9 (27 Apr 2018 07:36), Max: 37.1 (26 Apr 2018 23:57)  T(F): 98.4 (27 Apr 2018 07:36), Max: 98.7 (26 Apr 2018 23:57)  HR: 84 (27 Apr 2018 08:05) (78 - 88)  BP: 143/74 (27 Apr 2018 07:36) (127/68 - 161/76)  BP(mean): --  RR: 18 (27 Apr 2018 07:36) (12 - 19)  SpO2: 98% (27 Apr 2018 08:05) (95% - 99%)    PHYSICAL EXAM:    General: Obese woman, emotional and crying. AOx3    HEENT: [ x] normal  [ ] dry mouth  [ ] ET tube/trach    Lungs: [ x] comfortable [ ] tachypnea/labored breathing  [ ] excessive secretions    CV: [ x] normal  [ ] tachycardia    GI: [ x] normal  [ ] distended  [ ] tender  [ ] no BS               [ ] PEG/NG/OG tube    : [ ] normal  [ ] incontinent  [x ] oliguria/anuria  [ ] cleary    MSK: [ ] normal  [x ] weakness  [ ] edema             [ ] ambulatory  [ ] bedbound/wheelchair bound    Skin: Left foot dressings clean and dry    LABS:                        8.7    3.2   )-----------( 58       ( 26 Apr 2018 08:15 )             27.5     04-26    131<L>  |  97<L>  |  51.0<H>  ----------------------------<  251<H>  5.5<H>   |  21.0<L>  |  3.12<H>    Ca    8.1<L>      26 Apr 2018 10:07  Phos  2.8     04-26      PT/INR - ( 26 Apr 2018 08:15 )   PT: 13.0 sec;   INR: 1.18 ratio         PTT - ( 26 Apr 2018 08:15 )  PTT:26.8 sec    I&O's Summary    27 Apr 2018 07:01  -  27 Apr 2018 14:42  --------------------------------------------------------  IN: 474 mL / OUT: 500 mL / NET: -26 mL        RADIOLOGY & ADDITIONAL STUDIES:  < from: MR Foot No Cont, Left (04.23.18 @ 13:41) >   EXAM:  MR FOOT LT                          PROCEDURE DATE:  04/23/2018          INTERPRETATION:  EXAMINATION: MRI left foot without contrast    CLINICAL INFORMATION: Left foot ulcer    TECHNIQUE: Multiplanar, multisequential MR imaging was performed.    Comparison is made to a prior foot MRI from 10/2/2015.    FINDINGS: The patient is status post amputation of the first and second   rays at the level of the mid metatarsals. There is a cutaneous ulceration   along the plantar aspect of the stump of the first metatarsal with   adjacent skin thickening and subcutaneous fat infiltration, consistent   with cellulitis. In addition, there is high T2 and mild low T1 signal   within the distal aspect of the stump of the first metatarsal, consistent  with osteomyelitis.    There is chronic flattening/remodeling of the head of the third   metatarsal, consistent with chronic osteonecrosis. There is secondary   arthrosis at the third metatarsophalangeal joint. There are no other   areas of marrow signal alteration to suggest osteoarthritis. There is   diffuse fatty atrophy of the musculature about the foot, consistent with   denervation.    IMPRESSION: Findings consistent with osteomyelitis of the stump of the   first metatarsal, as above.    < end of copied text >    ASSESSMENT and PLAN:    ADVANCE DIRECTIVES:  [ ] YES [ x] NO   DNR [ ] YES [x ] NO  Completed on:                     MOLST  [ ] YES [x ] NO   Completed on:  Living Will  [ ] YES [ x] NO   Completed on:         Thank you for the opportunity to assist with the care of this patient.   [facility] Consult [facility] [phone].

## 2018-04-27 NOTE — CONSULT NOTE ADULT - PROBLEM SELECTOR RECOMMENDATION 2
Patient stating does not want dialysis, verbalized that she understands that it can lead to death.   Recommend psychiatry reevaluation

## 2018-04-27 NOTE — PROGRESS NOTE ADULT - ASSESSMENT
54 y/o F with long standing left foot ulcer, with underlying Osteomyelitis left first metatarsal on MRI; RECENT MRSA and Enterobacter on superficial wound.

## 2018-04-27 NOTE — PROGRESS NOTE ADULT - SUBJECTIVE AND OBJECTIVE BOX
PULMONARY PROGRESS NOTE      SONYA LENNON  MRN-[id number]    Patient is a 53y old  Female who presents with a chief complaint of Swelling all around the body and foot wound (19 Apr 2018 17:59)      INTERVAL HPI/OVERNIGHT EVENTS:    Patient is awake and alert  Reports improvement with drug nebs    MEDICATIONS  (STANDING):  ALBUTerol/ipratropium for Nebulization 3 milliLiter(s) Nebulizer every 6 hours  ascorbic acid 500 milliGRAM(s) Oral daily  buPROPion XL . 300 milliGRAM(s) Oral daily  cefTRIAXone Injectable.      dextrose 50% Injectable 12.5 Gram(s) IV Push once  dextrose 50% Injectable 25 Gram(s) IV Push once  dextrose 50% Injectable 25 Gram(s) IV Push once  doxycycline IVPB      doxycycline IVPB 100 milliGRAM(s) IV Intermittent every 12 hours  DULoxetine 120 milliGRAM(s) Oral daily  epoetin krunal Injectable 8000 Unit(s) SubCutaneous <User Schedule>  ferrous    sulfate 325 milliGRAM(s) Oral daily  gabapentin 800 milliGRAM(s) Oral three times a day  heparin  Injectable 5000 Unit(s) SubCutaneous every 12 hours  insulin glargine Injectable (LANTUS) 20 Unit(s) SubCutaneous at bedtime  insulin lispro (HumaLOG) corrective regimen sliding scale   SubCutaneous three times a day before meals  magnesium oxide 400 milliGRAM(s) Oral three times a day with meals  methylPREDNISolone sodium succinate Injectable 40 milliGRAM(s) IV Push every 8 hours  montelukast 10 milliGRAM(s) Oral daily  pantoprazole    Tablet 40 milliGRAM(s) Oral before breakfast  saccharomyces boulardii 250 milliGRAM(s) Oral two times a day  traZODone 200 milliGRAM(s) Oral at bedtime  zinc sulfate 220 milliGRAM(s) Oral daily      MEDICATIONS  (PRN):  ALPRAZolam 0.5 milliGRAM(s) Oral three times a day PRN anxiety  dextrose Gel 1 Dose(s) Oral once PRN Blood Glucose LESS THAN 70 milliGRAM(s)/deciliter  glucagon  Injectable 1 milliGRAM(s) IntraMuscular once PRN Glucose LESS THAN 70 milligrams/deciliter  HYDROcodone/homatropine Syrup 5 milliLiter(s) Oral four times a day PRN Cough  ondansetron Injectable 4 milliGRAM(s) IV Push every 6 hours PRN Nausea and/or Vomiting  oxyCODONE    IR 5 milliGRAM(s) Oral every 6 hours PRN Moderate to Severe Pain (7 - 10)      Allergies    No Known Allergies    Intolerances        PAST MEDICAL & SURGICAL HISTORY:  Fibromyalgia  DM (diabetes mellitus)  Foot ulcer: Left Foot  Osteopenia  Chronic pain  Back ache  Arthritis  Shoulder joint stiffness, left  [place] esophagus  Stomach ulcer  Diabetes  Asthma  S/P knee surgery  S/P hysterectomy  S/P cholecystectomy        REVIEW OF SYSTEMS:    CONSTITUTIONAL:  No distress    HEENT:  Eyes:  No diplopia or blurred vision. ENT:  No earache, sore throat or runny nose.    CARDIOVASCULAR:  No pressure, squeezing, tightness, heaviness or aching about the chest; no palpitations.    RESPIRATORY:  Persistent though improved cough, shortness of breath, no PND or orthopnea. Mild SOBOE    GASTROINTESTINAL:  No nausea, vomiting or diarrhea.    GENITOURINARY:  No dysuria, frequency or urgency.    NEUROLOGIC:  No paresthesias, fasciculations, seizures or weakness.    PSYCHIATRIC:  No disorder of thought or mood.    Vital Signs Last 24 Hrs  T(C): 36.9 (27 Apr 2018 07:36), Max: 37.1 (26 Apr 2018 23:57)  T(F): 98.4 (27 Apr 2018 07:36), Max: 98.7 (26 Apr 2018 23:57)  HR: 84 (27 Apr 2018 08:05) (78 - 88)  BP: 143/74 (27 Apr 2018 07:36) (127/68 - 161/76)  BP(mean): --  RR: 18 (27 Apr 2018 07:36) (12 - 19)  SpO2: 98% (27 Apr 2018 08:05) (95% - 99%)    PHYSICAL EXAMINATION:    GENERAL: The patient is awake and alert in no apparent distress. Obese    HEENT: Head is normocephalic and atraumatic. Extraocular muscles are intact. Mucous membranes are moist.    NECK: Supple.    LUNGS: Mild b/l wheezing, no rales or rhonchi; respirations unlabored    HEART: Regular rate and rhythm without murmur.    ABDOMEN: Soft, nontender, and nondistended.      EXTREMITIES: Without any cyanosis, clubbing, rash, lesions with mild edema with L foot wrapped.    NEUROLOGIC: Grossly intact.    LABS:                        8.7    3.2   )-----------( 58       ( 26 Apr 2018 08:15 )             27.5     04-26    131<L>  |  97<L>  |  51.0<H>  ----------------------------<  251<H>  5.5<H>   |  21.0<L>  |  3.12<H>    Ca    8.1<L>      26 Apr 2018 10:07  Phos  2.8     04-26      PT/INR - ( 26 Apr 2018 08:15 )   PT: 13.0 sec;   INR: 1.18 ratio         PTT - ( 26 Apr 2018 08:15 )  PTT:26.8 sec      MICROBIOLOGY:    Culture - Surgical Swab (04.26.18 @ 21:49)    Gram Stain:   No White blood cells  No organisms seen    Specimen Source: .Surgical Swab L 1st metatarsal clear margin/swab        RADIOLOGY & ADDITIONAL STUDIES:     EXAM:  XR CHEST PORTABLE ROUTINE 1V                          PROCEDURE DATE:  04/22/2018          INTERPRETATION:  Chest, frontal portable view    HISTORY: Dyspnea    Comparison: 4/19    Findings:    The mediastinal cardiac silhouette is normal. The lungs are clear.   Osseous structures and soft tissues are unremarkable.    IMPRESSION: No acute findings.        ALBERTO SOLITARIO M.D., ATTENDING RADIOLOGIST  This document has been electronically signed. Apr 22 2018  3:26PM      ECHO:    Summary:   1. Normal biventricular size and systolic function. Left ventricular   ejection fraction, by visual estimation, is 55 to 60%.   2. No significant valvular abnormalities.   3. Mild left atrial enlargement.   4. Trivial pericardial effusion.   5. ** Compared to TTE dated 6/5/17, no significant changes.    [id number] Linda Mendez DO, Electronically signed on 4/25/2018 at   4:04:17 PM

## 2018-04-27 NOTE — PROGRESS NOTE ADULT - SUBJECTIVE AND OBJECTIVE BOX
[facility]                                                    [address] , [address], [place] [address]                                                         PHONE: [phone]    FAX: [phone] [address], [address], [place] [address]                                                 PHONE: [phone]    FAX: [phone]  *******************************************************************************  cc: left foot wound    HPI: 54 y/o F presented to her Podiatrist (Dr. Sanchez) for her wound check for her left foot wound and sent to ER due to weakness, leg swelling and edema. Pt with left foot ulcer, pancytopenia, acute on chronic renal failure due to IV diuresis and diarrhea, hepatic encephalopathy and  found to have cirrhosis. Hx of HTN, HL and diabetes.  Pt with COPD exacerbation during this hospitalization. Pt with Hx of Fibromyalgia, DM, Osteopenia, Matamoros Esophagus.  Pt discontinued smoking on this hospitalization.  Pt denies CP, PND or orthopnea.  Reports exacerbation of COPD.  Pt with osteomyelitis of left foot, now s/p resection of left 1st metatarsal with bone biopsy and debridement.     Overnight events/Subjective Assessment: No new events overnight    INTERPRETATION OF TELEMETRY (personally reviewed):    No Known Allergies    MEDICATIONS  (STANDING):  ascorbic acid 500 milliGRAM(s) Oral daily  azithromycin   Tablet 250 milliGRAM(s) Oral daily  buPROPion XL . 300 milliGRAM(s) Oral daily  dextrose 50% Injectable 12.5 Gram(s) IV Push once  dextrose 50% Injectable 25 Gram(s) IV Push once  dextrose 50% Injectable 25 Gram(s) IV Push once  DULoxetine 120 milliGRAM(s) Oral daily  epoetin krunal Injectable 8000 Unit(s) SubCutaneous <User Schedule>  gabapentin 800 milliGRAM(s) Oral three times a day  heparin  Injectable 5000 Unit(s) SubCutaneous every 12 hours  insulin glargine Injectable (LANTUS) 20 Unit(s) SubCutaneous at bedtime  insulin lispro (HumaLOG) corrective regimen sliding scale   SubCutaneous three times a day before meals  magnesium oxide 400 milliGRAM(s) Oral three times a day with meals  methylPREDNISolone sodium succinate Injectable 40 milliGRAM(s) IV Push every 8 hours  montelukast 10 milliGRAM(s) Oral daily  pantoprazole    Tablet 40 milliGRAM(s) Oral before breakfast  saccharomyces boulardii 250 milliGRAM(s) Oral two times a day  traZODone 200 milliGRAM(s) Oral at bedtime  zinc sulfate 220 milliGRAM(s) Oral daily    MEDICATIONS  (PRN):  dextrose Gel 1 Dose(s) Oral once PRN Blood Glucose LESS THAN 70 milliGRAM(s)/deciliter  glucagon  Injectable 1 milliGRAM(s) IntraMuscular once PRN Glucose LESS THAN 70 milligrams/deciliter  HYDROcodone/homatropine Syrup 5 milliLiter(s) Oral four times a day PRN Cough  ondansetron Injectable 4 milliGRAM(s) IV Push every 6 hours PRN Nausea and/or Vomiting  oxyCODONE    IR 5 milliGRAM(s) Oral every 6 hours PRN Moderate to Severe Pain (7 - 10)      Vital Signs Last 24 Hrs  T(C): 36.9 (2018 07:36), Max: 37.1 (2018 23:57)  T(F): 98.4 (2018 07:36), Max: 98.7 (2018 23:57)  HR: 84 (2018 08:05) (78 - 95)  BP: 143/74 (2018 07:36) (127/68 - 161/76)  BP(mean): --  RR: 18 (2018 07:36) (12 - 19)  SpO2: 98% (2018 08:05) (95% - 99%)    I&O's Detail    I&O's Summary          PHYSICAL EXAM:  General: Appears well developed, well nourished, no acute distress  HEENT: Head: normocephalic, atraumatic  Eyes: Pupils equal and reactive  Neck: Supple, no carotid bruit, no JVD, no HJR  CARDIOVASCULAR: Normal S1 and S2, no murmur, rub, or gallop  LUNGS: Clear to auscultation bilaterally, no rales, rhonchi or wheeze  ABDOMEN: Soft, nontender, non-distended, positive bowel sounds, no mass or bruit  EXTREMITIES: Left foot bandaged  SKIN: Warm and dry with normal turgor  NEURO: Alert & oriented x 3, grossly intact  PSYCH: normal mood and affect        LABS:                        8.7    3.2   )-----------( 58       ( 2018 08:15 )             27.5         131<L>  |  97<L>  |  51.0<H>  ----------------------------<  251<H>  5.5<H>   |  21.0<L>  |  3.12<H>    Ca    8.1<L>      2018 10:07  Phos  2.8               PT/INR - ( 2018 08:15 )   PT: 13.0 sec;   INR: 1.18 ratio         PTT - ( 2018 08:15 )  PTT:26.8 sec  serum  Lipids:   Hemoglobin A1C, Whole Blood: 5.6 % ( @ 08:37)    Thyroid Stimulating Hormone, Serum: 4.32 uIU/mL ( @ 11:11)      RADIOLOGY & ADDITIONAL STUDIES:    EC/19 SR no acute changes    ECHO: < from: TTE Echo Complete w/Doppler (18 @ 10:50) >  Summary:   1. Normal biventricular size and systolic function. Left ventricular   ejection fraction, by visual estimation, is 55 to 60%.   2. No significant valvular abnormalities.   3. Mild left atrial enlargement.   4. Trivial pericardial effusion.   5. ** Compared to TTE dated 17, no significant changes.    < end of copied text >        ASSESSMENT AND PLAN:  In summary, SONYA LENNON is a 53y Female presented to her Podiatrist (Dr. Sanchez) for her wound check for her left foot wound and sent to ER due to weakness, leg swelling and edema. Pt with left foot ulcer, pancytopenia, acute on chronic renal failure due to IV diuresis and diarrhea, hepatic encephalopathy and  found to have cirrhosis. Pt with COPD exacerbation during this hospitalization. Pt with Hx of Fibromyalgia, DM, Osteopenia, Matamoros Esophagus.  Pt discontinued smoking on this hospitalization. Hx of HTN, HL and diabetes.  Pt denies CP, PND or orthopnea.  Reports exacerbation of COPD.  Pt with osteomyelitis of left foot, now s/p resection of left 1st metatarsal with bone biopsy and debridement.    - continued smoking cessation dw pt  - PAD. Post op resection of left 1st metatarsal with bone biopsy and debridement. Tolerated procedure well. ABX per medical. Defer ASA due to low platelets.  - Sinus rhythm. Normal LV function.  Outpt ischemic evaluation will be pursued on discharge due to documented PAD  - Pancytopenia, cirrhosis complicated by hepatic encephalopathy, now resolved. Management per medical  - COPD. Management per medical  - Glycemic control per medical  - Hemodynamics are stable. SR. BP is controlled  - Please reconsult PRN as needed.  Will follow as an outpt on DC    Teresa Interiano MD

## 2018-04-27 NOTE — PROGRESS NOTE ADULT - ASSESSMENT
54 y/o F with Hx of Fibromyalgia, DM2, Osteopenia, Matamoros Esophagus seen in Ed for Left Foot Plantar Ulcer, worsening kidney function sent in by Dr Sanchez, podiatry for paleness, gen weakness and edema.    Patient found to have pancytopenia along with left foot ulcer, confirmed to have OM on MRI and underwent left 1st metatarsal resection.  Patient developed ERIC on CKD 4 likely due to IV lasix drip and diarrhea. Initially treated for hepatic encephalopathy for suspected liver cirrhosis but hepatosplenomegaly on ultrasound with normal ammonia level. Course complicated by COPD exacerbation.  Patient extremely emotional about thought of getting HD and does not want AVF/AVG or dialysis in future. Psychiatry consulted for depression and home medications continued. Palliative care consulted.

## 2018-04-27 NOTE — PROGRESS NOTE ADULT - SUBJECTIVE AND OBJECTIVE BOX
[facility]  INFECTIOUS DISEASES AND INTERNAL MEDICINE at [place]  =======================================================  Dwight Pillai MD [facility]rett[facility]OE Vick MD  Diplomates American Board of Internal Medicine and Infectious Diseases  =======================================================    SONYA LENNON [id number]  follow up on foot ulcer LEFT  patient seen and examined in follow up.  Chart and labs reviewed.     s/p bone bx and wound debridement of left 1st metatarsal on 4/26/18.      =======================================================  Allergies:  No Known Allergies      =======================================================  MEDICATIONS  (STANDING):  ascorbic acid 500 milliGRAM(s) Oral daily  azithromycin   Tablet 250 milliGRAM(s) Oral daily  buPROPion XL . 300 milliGRAM(s) Oral daily  dextrose 50% Injectable 12.5 Gram(s) IV Push once  dextrose 50% Injectable 25 Gram(s) IV Push once  dextrose 50% Injectable 25 Gram(s) IV Push once  DULoxetine 120 milliGRAM(s) Oral daily  epoetin krunal Injectable 8000 Unit(s) SubCutaneous <User Schedule>  gabapentin 800 milliGRAM(s) Oral three times a day  heparin  Injectable 5000 Unit(s) SubCutaneous every 12 hours  insulin glargine Injectable (LANTUS) 20 Unit(s) SubCutaneous at bedtime  insulin lispro (HumaLOG) corrective regimen sliding scale   SubCutaneous three times a day before meals  magnesium oxide 400 milliGRAM(s) Oral three times a day with meals  methylPREDNISolone sodium succinate Injectable 40 milliGRAM(s) IV Push every 8 hours  montelukast 10 milliGRAM(s) Oral daily  pantoprazole    Tablet 40 milliGRAM(s) Oral before breakfast  saccharomyces boulardii 250 milliGRAM(s) Oral two times a day  traZODone 200 milliGRAM(s) Oral at bedtime  zinc sulfate 220 milliGRAM(s) Oral daily     =======================================================  REVIEW OF SYSTEMS:  as above  all other ROS negative    =======================================================  Physical Exam:  ICU Vital Signs Last 24 Hrs  T(C): 36.9 (27 Apr 2018 07:36), Max: 37.1 (26 Apr 2018 23:57)  T(F): 98.4 (27 Apr 2018 07:36), Max: 98.7 (26 Apr 2018 23:57)  HR: 84 (27 Apr 2018 08:05) (78 - 88)  BP: 143/74 (27 Apr 2018 07:36) (127/68 - 161/76)  RR: 18 (27 Apr 2018 07:36) (12 - 19)  SpO2: 98% (27 Apr 2018 08:05) (95% - 99%)    GEN: NAD, pleasant; thin  HEENT: normocephalic and atraumatic. EOMI. AURORA.    NECK: Supple. No carotid bruits.  No lymphadenopathy or thyromegaly.  LUNGS: Clear to auscultation.  HEART: Regular rate and rhythm without murmur.  ABDOMEN: Soft, nontender, and nondistended.  Positive bowel sounds.    : No CVA tenderness  EXTREMITIES: Without any cyanosis, clubbing, rash, lesions or edema.  MSK: no joint swelling  NEUROLOGIC: Cranial nerves II through XII are grossly intact.  SKIN:       =======================================================      Labs:       Labs:  04-26    131<L>  |  97<L>  |  51.0<H>  ----------------------------<  251<H>  5.5<H>   |  21.0<L>  |  3.12<H>    Ca    8.1<L>      26 Apr 2018 10:07  Phos  2.8     04-26                            8.7    3.2   )-----------( 58       ( 26 Apr 2018 08:15 )             27.5       PT/INR - ( 26 Apr 2018 08:15 )   PT: 13.0 sec;   INR: 1.18 ratio         PTT - ( 26 Apr 2018 08:15 )  PTT:26.8 sec          CAPILLARY BLOOD GLUCOSE      POCT Blood Glucose.: 383 mg/dL (27 Apr 2018 08:01)  POCT Blood Glucose.: 187 mg/dL (26 Apr 2018 22:20)  POCT Blood Glucose.: 164 mg/dL (26 Apr 2018 17:57)  POCT Blood Glucose.: 246 mg/dL (26 Apr 2018 12:51)        RECENT CULTURES:  04-26 @ 21:49 .Surgical Swab L 1st metatarsal clear margin/swab       No White blood cells  No organisms seen      04-20 @ 08:37 .Blood Blood-Peripheral     No growth at 5 days.        04-19 @ 14:40 .Other left foot ulcer Enterobacter cloacae  Methicillin resistant Staphylococcus aureus    Numerous Enterobacter cloacae  Numerous Methicillin resistant Staphylococcus aureus  Few Streptococcus agalactiae (Group B)  Culture in progress  .  TYPE: (C=Critical, N=Notification, A=Abnormal) c  TESTS:  _ mrsa  DATE/TIME CALLED: _ 04/21/2018 11:10:41  CALLED TO: Evan segura rn  READ BACK (2 Patient Identifiers)(Y/N): _ y  READ BACK VALUES (Y/N): _ y  CALLED BY: Evan novakge copy to ic pt log      the stump of the first metatarsal, as above.      Culture - Other (04.19.18 @ 14:40)    -  Trimethoprim/Sulfamethoxazole: S <=0.5/9.5    -  Trimethoprim/Sulfamethoxazole: S <=2/38    -  Vancomycin: S 2    -  Piperacillin/Tazobactam: S <=16    -  RIF- Rifampin: S <=1    -  Tetra/Doxy: S <=4    -  Tobramycin: S <=4    -  Amikacin: S <=16    -  Ampicillin: R >16    -  Ampicillin/Sulbactam: R <=8/4    -  Ampicillin/Sulbactam: R <=8/4    -  Aztreonam: S <=4    -  Cefazolin: R >16    -  Cefazolin: R >16    -  Cefepime: S <=4    -  Cefoxitin: R >16    -  Ceftriaxone: S <=1    -  Ciprofloxacin: S <=1    -  Ciprofloxacin: R >2    -  Clindamycin: S <=0.5    -  Daptomycin: S <=0.5    -  Ertapenem: S <=1    -  Erythromycin: R >4    -  Gentamicin: S <=4    -  Gentamicin: S <=4    -  Imipenem: S <=1    -  Levofloxacin: S <=2    -  Levofloxacin: R >4    -  Linezolid: S <=1    -  Meropenem: S <=1    -  Moxifloxacin(Aerobic): R >4    -  Oxacillin: R >2    -  Penicillin: R >8    Specimen Source: .Other left foot ulcer    Culture Results:   Numerous Enterobacter cloacae  Numerous Methicillin resistant Staphylococcus aureus  Few Streptococcus agalactiae (Group B)  Culture in progress  .  TYPE: (C=Critical, N=Notification, A=Abnormal) c  TESTS:  _ mrsa  DATE/TIME CALLED: _ 04/21/2018 11:10:41  CALLED TO: Evan segura rn  READ BACK (2 Patient Identifiers)(Y/N): _ y  READ BACK VALUES (Y/N): _ y  CALLED BY: _ elvia welge copy to ic pt log    Organism Identification: Enterobacter cloacae  Methicillin resistant Staphylococcus aureus    Organism: Methicillin resistant Staphylococcus aureus    Organism: Enterobacter cloacae    Method Type: JEREL    Method Type: JEREL

## 2018-04-27 NOTE — PROGRESS NOTE ADULT - SUBJECTIVE AND OBJECTIVE BOX
S: 53 year old female seen bedside s/p 1st ray amputation on 4/26. Patient denies F/C/N/V/SOB.    DARIEL:  left foot : Surgical site well coapted, no dehiscence no drainage, sutures intact. no purulence noted, no hematoma noted upon probing the surgical site. strikethrough noted on post-op dressing, but no active bleeding noted at this time.      A: s/p 1st ray amputation of the left foot on 4/26      P: Chart reviewed and patient evaluated  Applied xeroform/DSD to surgical site.   Culture and pathology pending.  continue iv abx.   Podiatry will follow while in house. S: 53 year old female seen bedside s/p 1st ray amputation on 4/26. Patient denies F/C/N/V/SOB.    DARIEL:  left foot : Surgical site well coapted, no dehiscence no drainage, sutures intact. no purulence noted, no hematoma noted upon probing the surgical site. strikethrough noted on post-op dressing, but no active bleeding noted at this time.      A: s/p 1st ray amputation of the left foot on 4/26      P: Chart reviewed and patient evaluated  Applied xeroform/DSD to surgical site.   Culture and pathology pending.  continue iv abx.   Patient to be Heel weightbearing with surgical shoe to the LLE.  Podiatry will follow while in house.

## 2018-04-27 NOTE — CONSULT NOTE ADULT - PROBLEM SELECTOR RECOMMENDATION 4
Met with patient very tearful and emotional.  Stating does not want dialysis- does not want the lifestyle of going to HD 3x/week and having to watch diet. Feels it would be too difficult since she is already on a diabetic diet. Tried to  patient- informed her of benefits of dialysis, support groups  Patient with hx of depression. Feel this can be affecting her decision. Recommend psychiatry reeval.  Palliative  consulted- see note.

## 2018-04-28 LAB
ANION GAP SERPL CALC-SCNC: 12 MMOL/L — SIGNIFICANT CHANGE UP (ref 5–17)
ANION GAP SERPL CALC-SCNC: 14 MMOL/L — SIGNIFICANT CHANGE UP (ref 5–17)
BUN SERPL-MCNC: 65 MG/DL — HIGH (ref 8–20)
BUN SERPL-MCNC: 65 MG/DL — HIGH (ref 8–20)
CALCIUM SERPL-MCNC: 7.9 MG/DL — LOW (ref 8.6–10.2)
CALCIUM SERPL-MCNC: 8.3 MG/DL — LOW (ref 8.6–10.2)
CHLORIDE SERPL-SCNC: 101 MMOL/L — SIGNIFICANT CHANGE UP (ref 98–107)
CHLORIDE SERPL-SCNC: 99 MMOL/L — SIGNIFICANT CHANGE UP (ref 98–107)
CO2 SERPL-SCNC: 21 MMOL/L — LOW (ref 22–29)
CO2 SERPL-SCNC: 21 MMOL/L — LOW (ref 22–29)
CREAT SERPL-MCNC: 3.01 MG/DL — HIGH (ref 0.5–1.3)
CREAT SERPL-MCNC: 3.03 MG/DL — HIGH (ref 0.5–1.3)
GLUCOSE BLDC GLUCOMTR-MCNC: 234 MG/DL — HIGH (ref 70–99)
GLUCOSE BLDC GLUCOMTR-MCNC: 305 MG/DL — HIGH (ref 70–99)
GLUCOSE BLDC GLUCOMTR-MCNC: 368 MG/DL — HIGH (ref 70–99)
GLUCOSE BLDC GLUCOMTR-MCNC: 400 MG/DL — HIGH (ref 70–99)
GLUCOSE SERPL-MCNC: 250 MG/DL — HIGH (ref 70–115)
GLUCOSE SERPL-MCNC: 379 MG/DL — HIGH (ref 70–115)
HCT VFR BLD CALC: 26.6 % — LOW (ref 37–47)
HGB BLD-MCNC: 8.3 G/DL — LOW (ref 12–16)
MCHC RBC-ENTMCNC: 29.2 PG — SIGNIFICANT CHANGE UP (ref 27–31)
MCHC RBC-ENTMCNC: 31.2 G/DL — LOW (ref 32–36)
MCV RBC AUTO: 93.7 FL — SIGNIFICANT CHANGE UP (ref 81–99)
PLATELET # BLD AUTO: 50 K/UL — SIGNIFICANT CHANGE UP (ref 150–400)
POTASSIUM SERPL-MCNC: 5.4 MMOL/L — HIGH (ref 3.5–5.3)
POTASSIUM SERPL-MCNC: 6.1 MMOL/L — CRITICAL HIGH (ref 3.5–5.3)
POTASSIUM SERPL-SCNC: 5.4 MMOL/L — HIGH (ref 3.5–5.3)
POTASSIUM SERPL-SCNC: 6.1 MMOL/L — CRITICAL HIGH (ref 3.5–5.3)
RBC # BLD: 2.84 M/UL — LOW (ref 4.4–5.2)
RBC # FLD: 13.9 % — SIGNIFICANT CHANGE UP (ref 11–15.6)
SODIUM SERPL-SCNC: 134 MMOL/L — LOW (ref 135–145)
SODIUM SERPL-SCNC: 134 MMOL/L — LOW (ref 135–145)
WBC # BLD: 3.4 K/UL — LOW (ref 4.8–10.8)
WBC # FLD AUTO: 3.4 K/UL — LOW (ref 4.8–10.8)

## 2018-04-28 PROCEDURE — 99233 SBSQ HOSP IP/OBS HIGH 50: CPT

## 2018-04-28 PROCEDURE — 99232 SBSQ HOSP IP/OBS MODERATE 35: CPT

## 2018-04-28 RX ADMIN — OXYCODONE HYDROCHLORIDE 5 MILLIGRAM(S): 5 TABLET ORAL at 12:04

## 2018-04-28 RX ADMIN — Medication 325 MILLIGRAM(S): at 12:04

## 2018-04-28 RX ADMIN — Medication 3 MILLILITER(S): at 20:41

## 2018-04-28 RX ADMIN — Medication 3 MILLILITER(S): at 03:04

## 2018-04-28 RX ADMIN — Medication 5: at 16:48

## 2018-04-28 RX ADMIN — Medication 40 MILLIGRAM(S): at 05:02

## 2018-04-28 RX ADMIN — OXYCODONE HYDROCHLORIDE 5 MILLIGRAM(S): 5 TABLET ORAL at 18:25

## 2018-04-28 RX ADMIN — MONTELUKAST 10 MILLIGRAM(S): 4 TABLET, CHEWABLE ORAL at 12:04

## 2018-04-28 RX ADMIN — MAGNESIUM OXIDE 400 MG ORAL TABLET 400 MILLIGRAM(S): 241.3 TABLET ORAL at 18:16

## 2018-04-28 RX ADMIN — Medication 5: at 07:51

## 2018-04-28 RX ADMIN — GABAPENTIN 800 MILLIGRAM(S): 400 CAPSULE ORAL at 14:28

## 2018-04-28 RX ADMIN — Medication 110 MILLIGRAM(S): at 23:45

## 2018-04-28 RX ADMIN — Medication 3 MILLILITER(S): at 09:46

## 2018-04-28 RX ADMIN — INSULIN GLARGINE 20 UNIT(S): 100 INJECTION, SOLUTION SUBCUTANEOUS at 21:37

## 2018-04-28 RX ADMIN — Medication 250 MILLIGRAM(S): at 05:00

## 2018-04-28 RX ADMIN — Medication 3 MILLILITER(S): at 16:23

## 2018-04-28 RX ADMIN — MAGNESIUM OXIDE 400 MG ORAL TABLET 400 MILLIGRAM(S): 241.3 TABLET ORAL at 14:28

## 2018-04-28 RX ADMIN — Medication 250 MILLIGRAM(S): at 18:16

## 2018-04-28 RX ADMIN — ZINC SULFATE TAB 220 MG (50 MG ZINC EQUIVALENT) 220 MILLIGRAM(S): 220 (50 ZN) TAB at 12:04

## 2018-04-28 RX ADMIN — Medication 0.5 MILLIGRAM(S): at 16:48

## 2018-04-28 RX ADMIN — GABAPENTIN 800 MILLIGRAM(S): 400 CAPSULE ORAL at 05:00

## 2018-04-28 RX ADMIN — OXYCODONE HYDROCHLORIDE 5 MILLIGRAM(S): 5 TABLET ORAL at 18:58

## 2018-04-28 RX ADMIN — Medication 4: at 11:58

## 2018-04-28 RX ADMIN — OXYCODONE HYDROCHLORIDE 5 MILLIGRAM(S): 5 TABLET ORAL at 12:45

## 2018-04-28 RX ADMIN — ERYTHROPOIETIN 8000 UNIT(S): 10000 INJECTION, SOLUTION INTRAVENOUS; SUBCUTANEOUS at 18:48

## 2018-04-28 RX ADMIN — OXYCODONE HYDROCHLORIDE 5 MILLIGRAM(S): 5 TABLET ORAL at 05:00

## 2018-04-28 RX ADMIN — OXYCODONE HYDROCHLORIDE 5 MILLIGRAM(S): 5 TABLET ORAL at 05:48

## 2018-04-28 RX ADMIN — Medication 110 MILLIGRAM(S): at 14:27

## 2018-04-28 RX ADMIN — GABAPENTIN 800 MILLIGRAM(S): 400 CAPSULE ORAL at 21:37

## 2018-04-28 RX ADMIN — MAGNESIUM OXIDE 400 MG ORAL TABLET 400 MILLIGRAM(S): 241.3 TABLET ORAL at 07:52

## 2018-04-28 RX ADMIN — PANTOPRAZOLE SODIUM 40 MILLIGRAM(S): 20 TABLET, DELAYED RELEASE ORAL at 05:00

## 2018-04-28 RX ADMIN — BUPROPION HYDROCHLORIDE 300 MILLIGRAM(S): 150 TABLET, EXTENDED RELEASE ORAL at 12:03

## 2018-04-28 RX ADMIN — DULOXETINE HYDROCHLORIDE 120 MILLIGRAM(S): 30 CAPSULE, DELAYED RELEASE ORAL at 12:03

## 2018-04-28 RX ADMIN — Medication 500 MILLIGRAM(S): at 12:03

## 2018-04-28 RX ADMIN — HEPARIN SODIUM 5000 UNIT(S): 5000 INJECTION INTRAVENOUS; SUBCUTANEOUS at 18:16

## 2018-04-28 RX ADMIN — Medication 200 MILLIGRAM(S): at 21:37

## 2018-04-28 RX ADMIN — CEFTRIAXONE 1000 MILLIGRAM(S): 500 INJECTION, POWDER, FOR SOLUTION INTRAMUSCULAR; INTRAVENOUS at 12:04

## 2018-04-28 NOTE — PROGRESS NOTE ADULT - ASSESSMENT
COPD exacerbation is improved  Mild wheeze and cough appear resolved  Obesity  Osteomyelitis    Rec:    Drug nebs  O2 as needed; currently off O2  Prednisone taper   Complete abx per ID  On GI/DVT prophylaxis  Resume outpt inhaler therapy on d/c  Anxiolytics  Weight loss  Pulm f/u after d/c

## 2018-04-28 NOTE — PROGRESS NOTE ADULT - SUBJECTIVE AND OBJECTIVE BOX
PULMONARY PROGRESS NOTE      SONYA LENNON  MRN-[id number]    Patient is a 53y old  Female who presents with a chief complaint of Swelling all around the body and foot wound (19 Apr 2018 17:59)      INTERVAL HPI/OVERNIGHT EVENTS:    Patient is awake and alert  Comfortable  Less SOB    MEDICATIONS  (STANDING):  ALBUTerol/ipratropium for Nebulization 3 milliLiter(s) Nebulizer every 6 hours  ascorbic acid 500 milliGRAM(s) Oral daily  buPROPion XL . 300 milliGRAM(s) Oral daily  cefTRIAXone Injectable.      cefTRIAXone Injectable. 1000 milliGRAM(s) IV Push every 24 hours  dextrose 50% Injectable 12.5 Gram(s) IV Push once  dextrose 50% Injectable 25 Gram(s) IV Push once  dextrose 50% Injectable 25 Gram(s) IV Push once  doxycycline IVPB      doxycycline IVPB 100 milliGRAM(s) IV Intermittent every 12 hours  DULoxetine 120 milliGRAM(s) Oral daily  epoetin krunal Injectable 8000 Unit(s) SubCutaneous <User Schedule>  ferrous    sulfate 325 milliGRAM(s) Oral daily  gabapentin 800 milliGRAM(s) Oral three times a day  heparin  Injectable 5000 Unit(s) SubCutaneous every 12 hours  insulin glargine Injectable (LANTUS) 20 Unit(s) SubCutaneous at bedtime  insulin lispro (HumaLOG) corrective regimen sliding scale   SubCutaneous three times a day before meals  magnesium oxide 400 milliGRAM(s) Oral three times a day with meals  montelukast 10 milliGRAM(s) Oral daily  pantoprazole    Tablet 40 milliGRAM(s) Oral before breakfast  predniSONE   Tablet 40 milliGRAM(s) Oral daily  saccharomyces boulardii 250 milliGRAM(s) Oral two times a day  traZODone 200 milliGRAM(s) Oral at bedtime  zinc sulfate 220 milliGRAM(s) Oral daily      MEDICATIONS  (PRN):  ALPRAZolam 0.5 milliGRAM(s) Oral three times a day PRN anxiety  dextrose Gel 1 Dose(s) Oral once PRN Blood Glucose LESS THAN 70 milliGRAM(s)/deciliter  glucagon  Injectable 1 milliGRAM(s) IntraMuscular once PRN Glucose LESS THAN 70 milligrams/deciliter  HYDROcodone/homatropine Syrup 5 milliLiter(s) Oral four times a day PRN Cough  ondansetron Injectable 4 milliGRAM(s) IV Push every 6 hours PRN Nausea and/or Vomiting  oxyCODONE    IR 5 milliGRAM(s) Oral every 6 hours PRN Moderate to Severe Pain (7 - 10)      Allergies    No Known Allergies    Intolerances        PAST MEDICAL & SURGICAL HISTORY:  Fibromyalgia  DM (diabetes mellitus)  Foot ulcer: Left Foot  Osteopenia  Chronic pain  Back ache  Arthritis  Shoulder joint stiffness, left  Matamoros esophagus  Stomach ulcer  Diabetes  Asthma  S/P knee surgery  S/P hysterectomy  S/P cholecystectomy        REVIEW OF SYSTEMS:    CONSTITUTIONAL:  No distress    HEENT:  Eyes:  No diplopia or blurred vision. ENT:  No earache, sore throat or runny nose.    CARDIOVASCULAR:  No pressure, squeezing, tightness, heaviness or aching about the chest; no palpitations.    RESPIRATORY:  Improved cough, shortness of breath, no PND or orthopnea. Mild SOBOE    GASTROINTESTINAL:  No nausea, vomiting or diarrhea.    GENITOURINARY:  No dysuria, frequency or urgency.    NEUROLOGIC:  No paresthesias, fasciculations, seizures or weakness.    PSYCHIATRIC:  No disorder of thought or mood.    Vital Signs Last 24 Hrs  T(C): 36.4 (28 Apr 2018 08:08), Max: 36.9 (27 Apr 2018 16:20)  T(F): 97.6 (28 Apr 2018 08:08), Max: 98.5 (27 Apr 2018 16:20)  HR: 80 (28 Apr 2018 08:08) (79 - 84)  BP: 138/61 (28 Apr 2018 08:08) (138/61 - 155/80)  BP(mean): --  RR: 18 (28 Apr 2018 08:08) (18 - 18)  SpO2: 99% (28 Apr 2018 08:08) (96% - 99%)    PHYSICAL EXAMINATION:    GENERAL: The patient is awake and alert in no apparent distress.     HEENT: Head is normocephalic and atraumatic. Extraocular muscles are intact. Mucous membranes are moist.    NECK: Supple.    LUNGS: Clear to auscultation without wheezing, rales or rhonchi; respirations unlabored    HEART: Regular rate and rhythm without murmur.    ABDOMEN: Soft, nontender, and nondistended.      EXTREMITIES: Without any cyanosis, clubbing, rash, lesions or edema.    NEUROLOGIC: Grossly intact.    LABS:                        8.3    3.4   )-----------( 50       ( 28 Apr 2018 07:24 )             26.6     04-28    134<L>  |  99  |  65.0<H>  ----------------------------<  250<H>  5.4<H>   |  21.0<L>  |  3.01<H>    Ca    8.3<L>      28 Apr 2018 10:14        MICROBIOLOGY:    Culture - Surgical Swab (04.26.18 @ 21:49)    Gram Stain:   No White blood cells  No organisms seen    Specimen Source: .Surgical Swab L 1st metatarsal clear margin/swab    Culture Results:   Culture in progress    RADIOLOGY & ADDITIONAL STUDIES:     EXAM:  XR CHEST PORTABLE ROUTINE 1V                          PROCEDURE DATE:  04/22/2018          INTERPRETATION:  Chest, frontal portable view    HISTORY: Dyspnea    Comparison: 4/19    Findings:    The mediastinal cardiac silhouette is normal. The lungs are clear.   Osseous structures and soft tissues are unremarkable.    IMPRESSION: No acute findings.        ALBERTO SOLITARIO M.D., ATTENDING RADIOLOGIST  This document has been electronically signed. Apr 22 2018  3:26PM          ECHO:      Summary:   1. Normal biventricular size and systolic function. Left ventricular   ejection fraction, by visual estimation, is 55 to 60%.   2. No significant valvular abnormalities.   3. Mild left atrial enlargement.   4. Trivial pericardial effusion.   5. ** Compared to TTE dated 6/5/17, no significant changes.    [id number] Linda Mendez DO, Electronically signed on 4/25/2018 at   4:04:17 PM

## 2018-04-28 NOTE — PROGRESS NOTE ADULT - SUBJECTIVE AND OBJECTIVE BOX
[facility] DIVISION OF KIDNEY DISEASES AND HYPERTENSION -- FOLLOW UP NOTE  --------------------------------------------------------------------------------  Chief Complaint:  ERIC on CKD    24 hour events/subjective:  Pt seen and examined today,  Lying in bed awake, NAD   Crying about overall health condition;     PAST HISTORY  --------------------------------------------------------------------------------  No significant changes to PMH, PSH, FHx, SHx, unless otherwise noted    ALLERGIES & MEDICATIONS  --------------------------------------------------------------------------------  Allergies    No Known Allergies    Intolerances      Standing Inpatient Medications  ALBUTerol/ipratropium for Nebulization 3 milliLiter(s) Nebulizer every 6 hours  ascorbic acid 500 milliGRAM(s) Oral daily  buPROPion XL . 300 milliGRAM(s) Oral daily  cefTRIAXone Injectable.      cefTRIAXone Injectable. 1000 milliGRAM(s) IV Push every 24 hours  dextrose 50% Injectable 12.5 Gram(s) IV Push once  dextrose 50% Injectable 25 Gram(s) IV Push once  dextrose 50% Injectable 25 Gram(s) IV Push once  doxycycline IVPB      doxycycline IVPB 100 milliGRAM(s) IV Intermittent every 12 hours  DULoxetine 120 milliGRAM(s) Oral daily  epoetin krunal Injectable 8000 Unit(s) SubCutaneous <User Schedule>  ferrous    sulfate 325 milliGRAM(s) Oral daily  gabapentin 800 milliGRAM(s) Oral three times a day  heparin  Injectable 5000 Unit(s) SubCutaneous every 12 hours  insulin glargine Injectable (LANTUS) 20 Unit(s) SubCutaneous at bedtime  insulin lispro (HumaLOG) corrective regimen sliding scale   SubCutaneous three times a day before meals  magnesium oxide 400 milliGRAM(s) Oral three times a day with meals  montelukast 10 milliGRAM(s) Oral daily  pantoprazole    Tablet 40 milliGRAM(s) Oral before breakfast  predniSONE   Tablet 40 milliGRAM(s) Oral daily  saccharomyces boulardii 250 milliGRAM(s) Oral two times a day  traZODone 200 milliGRAM(s) Oral at bedtime  zinc sulfate 220 milliGRAM(s) Oral daily    PRN Inpatient Medications  ALPRAZolam 0.5 milliGRAM(s) Oral three times a day PRN  dextrose Gel 1 Dose(s) Oral once PRN  glucagon  Injectable 1 milliGRAM(s) IntraMuscular once PRN  HYDROcodone/homatropine Syrup 5 milliLiter(s) Oral four times a day PRN  ondansetron Injectable 4 milliGRAM(s) IV Push every 6 hours PRN  oxyCODONE    IR 5 milliGRAM(s) Oral every 6 hours PRN      REVIEW OF SYSTEMS  --------------------------------------------------------------------------------  Gen: No weight changes, fatigue, fevers/chills, weakness  Skin: No rashes  Head/Eyes/Ears/Mouth: No headache; Normal hearing; Normal vision w/o blurriness; No sinus pain/discomfort, sore throat  Respiratory: No dyspnea, cough, wheezing, hemoptysis  CV: No chest pain, PND, orthopnea  GI: No abdominal pain, diarrhea, constipation, nausea, vomiting, melena, hematochezia  : No increased frequency, dysuria, hematuria, nocturia  MSK: No joint pain/swelling; no back pain; no edema  Neuro: No dizziness/lightheadedness, weakness, seizures, numbness, tingling  Heme: No easy bruising or bleeding  Endo: No heat/cold intolerance  Psych: + significant nervousness, anxiety, stress, depression    All other systems were reviewed and are negative, except as noted.    VITALS/PHYSICAL EXAM  --------------------------------------------------------------------------------  T(C): 36.4 (04-28-18 @ 08:08), Max: 36.9 (04-27-18 @ 16:20)  HR: 80 (04-28-18 @ 08:08) (79 - 84)  BP: 138/61 (04-28-18 @ 08:08) (138/61 - 155/80)  RR: 18 (04-28-18 @ 08:08) (18 - 18)  SpO2: 99% (04-28-18 @ 08:08) (96% - 99%)  Wt(kg): --        04-27-18 @ 07:01  -  04-28-18 @ 07:00  --------------------------------------------------------  IN: 711 mL / OUT: 700 mL / NET: 11 mL      Physical Exam:  	Gen: NAD, ill-appearing, Pale,  	HEENT: PERRL, supple neck,   	Pulm: CTA B/L  	CV: RRR, S1S2; no rub  	Back: No spinal or CVA tenderness; no sacral edema  	Abd: +BS, soft, nontender/nondistended  	: No suprapubic tenderness  	UE: Warm, FROM, no clubbing, intact strength; no edema; no asterixis  	LE: Warm, FROM, no clubbing, intact strength; no edema  	Neuro: No focal deficits,   	Psych: Normal affect and mood  	Skin: Warm, without rashes  	Vascular access: NA,    LABS/STUDIES  --------------------------------------------------------------------------------              8.3    3.4   >-----------<  50       [04-28-18 @ 07:24]              26.6     134  |  99  |  65.0  ----------------------------<  250      [04-28-18 @ 10:14]  5.4   |  21.0  |  3.01        Ca     8.3     [04-28-18 @ 10:14]            Creatinine Trend:  SCr 3.01 [04-28 @ 10:14]  SCr 3.03 [04-28 @ 07:24]  SCr 2.99 [04-27 @ 15:02]  SCr 3.12 [04-26 @ 10:07]  SCr 3.20 [04-26 @ 08:15]    Urinalysis - [04-19-18 @ 21:30]      Color Yellow / Appearance Clear / SG 1.010 / pH 6.0      Gluc Negative / Ketone Negative  / Bili Negative / Urobili Negative       Blood Large / Protein 100 / Leuk Est Negative / Nitrite Negative      RBC 11-25 / WBC 0-2 / Hyaline  / Gran  / Sq Epi  / Non Sq Epi Few / Bacteria Occasional    Urine Sodium <30      [04-22-18 @ 22:27]  Urine Osmolality 244      [04-22-18 @ 22:27]    Iron 77, TIBC 253, %sat 30      [04-23-18 @ 09:07]  Ferritin 372      [04-24-18 @ 08:02]  HbA1c 5.6      [04-20-18 @ 08:37]  TSH 4.32      [04-20-18 @ 11:11]

## 2018-04-28 NOTE — PROGRESS NOTE ADULT - ASSESSMENT
1) CKD IV  2) Diabetic Nephropathy ERIC on CKD  3) Anemia of renal disease  4) ?Liver cirrhosis    CKD IV; No AVF on this admission due to active infection  improving, but still elevated SCr; prerenal; diarrhea has stopped; eating;   IVF D/C'd  urine studies pending  Anemia not at goal; on iron and RHEA epo 8000 TIW  Continue current management   Needs psych consult; pt has psychiatrist outpt;

## 2018-04-28 NOTE — PROVIDER CONTACT NOTE (CRITICAL VALUE NOTIFICATION) - ACTION/TREATMENT ORDERED:
called and notified immediately; as per md stat repeat lab for K+// will continue to follow
Notified MD in person, no new orders at this time

## 2018-04-29 LAB
ANION GAP SERPL CALC-SCNC: 11 MMOL/L — SIGNIFICANT CHANGE UP (ref 5–17)
BUN SERPL-MCNC: 66 MG/DL — HIGH (ref 8–20)
CALCIUM SERPL-MCNC: 8 MG/DL — LOW (ref 8.6–10.2)
CHLORIDE SERPL-SCNC: 101 MMOL/L — SIGNIFICANT CHANGE UP (ref 98–107)
CO2 SERPL-SCNC: 23 MMOL/L — SIGNIFICANT CHANGE UP (ref 22–29)
CREAT SERPL-MCNC: 2.9 MG/DL — HIGH (ref 0.5–1.3)
EOSINOPHIL # BLD AUTO: 0 K/UL — SIGNIFICANT CHANGE UP (ref 0–0.5)
EOSINOPHIL NFR BLD AUTO: 0.5 % — SIGNIFICANT CHANGE UP (ref 0–6)
GLUCOSE BLDC GLUCOMTR-MCNC: 179 MG/DL — HIGH (ref 70–99)
GLUCOSE BLDC GLUCOMTR-MCNC: 229 MG/DL — HIGH (ref 70–99)
GLUCOSE BLDC GLUCOMTR-MCNC: 311 MG/DL — HIGH (ref 70–99)
GLUCOSE BLDC GLUCOMTR-MCNC: 474 MG/DL — CRITICAL HIGH (ref 70–99)
GLUCOSE SERPL-MCNC: 156 MG/DL — HIGH (ref 70–115)
HCT VFR BLD CALC: 26.5 % — LOW (ref 37–47)
HGB BLD-MCNC: 8.1 G/DL — LOW (ref 12–16)
LYMPHOCYTES # BLD AUTO: 0.8 K/UL — LOW (ref 1–4.8)
LYMPHOCYTES # BLD AUTO: 21.9 % — SIGNIFICANT CHANGE UP (ref 20–55)
MAGNESIUM SERPL-MCNC: 2.4 MG/DL — SIGNIFICANT CHANGE UP (ref 1.6–2.6)
MCHC RBC-ENTMCNC: 28.7 PG — SIGNIFICANT CHANGE UP (ref 27–31)
MCHC RBC-ENTMCNC: 30.6 G/DL — LOW (ref 32–36)
MCV RBC AUTO: 94 FL — SIGNIFICANT CHANGE UP (ref 81–99)
MONOCYTES # BLD AUTO: 0.2 K/UL — SIGNIFICANT CHANGE UP (ref 0–0.8)
MONOCYTES NFR BLD AUTO: 6.5 % — SIGNIFICANT CHANGE UP (ref 3–10)
NEUTROPHILS # BLD AUTO: 2.6 K/UL — SIGNIFICANT CHANGE UP (ref 1.8–8)
NEUTROPHILS NFR BLD AUTO: 69.5 % — SIGNIFICANT CHANGE UP (ref 37–73)
PHOSPHATE SERPL-MCNC: 2.5 MG/DL — SIGNIFICANT CHANGE UP (ref 2.4–4.7)
PLATELET # BLD AUTO: 32 K/UL — LOW (ref 150–400)
POTASSIUM SERPL-MCNC: 5 MMOL/L — SIGNIFICANT CHANGE UP (ref 3.5–5.3)
POTASSIUM SERPL-SCNC: 5 MMOL/L — SIGNIFICANT CHANGE UP (ref 3.5–5.3)
RBC # BLD: 2.82 M/UL — LOW (ref 4.4–5.2)
RBC # FLD: 14.7 % — SIGNIFICANT CHANGE UP (ref 11–15.6)
SODIUM SERPL-SCNC: 135 MMOL/L — SIGNIFICANT CHANGE UP (ref 135–145)
WBC # BLD: 3.7 K/UL — LOW (ref 4.8–10.8)
WBC # FLD AUTO: 3.7 K/UL — LOW (ref 4.8–10.8)

## 2018-04-29 PROCEDURE — 99233 SBSQ HOSP IP/OBS HIGH 50: CPT

## 2018-04-29 PROCEDURE — 99497 ADVNCD CARE PLAN 30 MIN: CPT

## 2018-04-29 RX ADMIN — Medication 4: at 11:35

## 2018-04-29 RX ADMIN — Medication 6: at 17:51

## 2018-04-29 RX ADMIN — MAGNESIUM OXIDE 400 MG ORAL TABLET 400 MILLIGRAM(S): 241.3 TABLET ORAL at 17:51

## 2018-04-29 RX ADMIN — MONTELUKAST 10 MILLIGRAM(S): 4 TABLET, CHEWABLE ORAL at 11:34

## 2018-04-29 RX ADMIN — GABAPENTIN 800 MILLIGRAM(S): 400 CAPSULE ORAL at 06:20

## 2018-04-29 RX ADMIN — Medication 325 MILLIGRAM(S): at 11:34

## 2018-04-29 RX ADMIN — Medication 3 MILLILITER(S): at 03:22

## 2018-04-29 RX ADMIN — CEFTRIAXONE 1000 MILLIGRAM(S): 500 INJECTION, POWDER, FOR SOLUTION INTRAMUSCULAR; INTRAVENOUS at 11:33

## 2018-04-29 RX ADMIN — OXYCODONE HYDROCHLORIDE 5 MILLIGRAM(S): 5 TABLET ORAL at 22:28

## 2018-04-29 RX ADMIN — BUPROPION HYDROCHLORIDE 300 MILLIGRAM(S): 150 TABLET, EXTENDED RELEASE ORAL at 11:34

## 2018-04-29 RX ADMIN — MAGNESIUM OXIDE 400 MG ORAL TABLET 400 MILLIGRAM(S): 241.3 TABLET ORAL at 11:34

## 2018-04-29 RX ADMIN — DULOXETINE HYDROCHLORIDE 120 MILLIGRAM(S): 30 CAPSULE, DELAYED RELEASE ORAL at 11:34

## 2018-04-29 RX ADMIN — Medication 200 MILLIGRAM(S): at 22:28

## 2018-04-29 RX ADMIN — Medication 110 MILLIGRAM(S): at 22:29

## 2018-04-29 RX ADMIN — HEPARIN SODIUM 5000 UNIT(S): 5000 INJECTION INTRAVENOUS; SUBCUTANEOUS at 06:19

## 2018-04-29 RX ADMIN — INSULIN GLARGINE 20 UNIT(S): 100 INJECTION, SOLUTION SUBCUTANEOUS at 22:29

## 2018-04-29 RX ADMIN — Medication 0.5 MILLIGRAM(S): at 01:14

## 2018-04-29 RX ADMIN — GABAPENTIN 800 MILLIGRAM(S): 400 CAPSULE ORAL at 22:28

## 2018-04-29 RX ADMIN — Medication 3 MILLILITER(S): at 15:22

## 2018-04-29 RX ADMIN — ZINC SULFATE TAB 220 MG (50 MG ZINC EQUIVALENT) 220 MILLIGRAM(S): 220 (50 ZN) TAB at 11:34

## 2018-04-29 RX ADMIN — Medication 40 MILLIGRAM(S): at 06:20

## 2018-04-29 RX ADMIN — PANTOPRAZOLE SODIUM 40 MILLIGRAM(S): 20 TABLET, DELAYED RELEASE ORAL at 06:20

## 2018-04-29 RX ADMIN — Medication 110 MILLIGRAM(S): at 15:28

## 2018-04-29 RX ADMIN — Medication 1: at 08:06

## 2018-04-29 RX ADMIN — MAGNESIUM OXIDE 400 MG ORAL TABLET 400 MILLIGRAM(S): 241.3 TABLET ORAL at 08:06

## 2018-04-29 RX ADMIN — Medication 250 MILLIGRAM(S): at 17:50

## 2018-04-29 RX ADMIN — Medication 250 MILLIGRAM(S): at 06:20

## 2018-04-29 RX ADMIN — OXYCODONE HYDROCHLORIDE 5 MILLIGRAM(S): 5 TABLET ORAL at 22:58

## 2018-04-29 RX ADMIN — Medication 3 MILLILITER(S): at 22:02

## 2018-04-29 RX ADMIN — OXYCODONE HYDROCHLORIDE 5 MILLIGRAM(S): 5 TABLET ORAL at 06:28

## 2018-04-29 RX ADMIN — Medication 3 MILLILITER(S): at 09:35

## 2018-04-29 RX ADMIN — Medication 0.5 MILLIGRAM(S): at 08:09

## 2018-04-29 RX ADMIN — Medication 0.5 MILLIGRAM(S): at 15:44

## 2018-04-29 RX ADMIN — Medication 500 MILLIGRAM(S): at 11:34

## 2018-04-29 RX ADMIN — GABAPENTIN 800 MILLIGRAM(S): 400 CAPSULE ORAL at 15:28

## 2018-04-29 NOTE — PROGRESS NOTE ADULT - SUBJECTIVE AND OBJECTIVE BOX
PMD: unknown  Podiatry Dr Sanchez  Nephro Dr Bedoya    CHIEF COMPLAINT: foot wound + generalized edema    Patient seen and examined at the bedside. No acute overnight events. No events yesterday. Complaining of sob, sore throat, generalized discomfort this AM. Denies fever/chills, headache, lightheadedness, dizziness, chest pain, palpitations, abd pain, nausea/vomiting/diarrhea, muscle pain.      =========================================================================================  T(C): 36.6 (04-28-18 @ 21:31), Max: 36.9 (04-28-18 @ 16:16)  HR: 82 (04-29-18 @ 03:24) (80 - 88)  BP: 153/80 (04-28-18 @ 21:31) (138/61 - 169/82)  RR: 18 (04-28-18 @ 21:31) (16 - 18)  SpO2: 97% (04-28-18 @ 21:31) (97% - 99%)    PHYSICAL EXAM.    GEN - appears age appropriate. well nourished. pleasant. no distress.   HEENT - NCAT, EOMI, PAULINA  RESP - CTA BL, no wheeze/stridor/rhonchi/crackles. not on supplemental O2. able to speak in full sentences without distress.   CARDIO - NS1S2, RRR. No murmurs/rubs/gallops.  ABD - Soft/Non tender/Non distended. Normal BS x4 quadrants. no guarding/rebound tenderness.  Ext - No JOSE.  MSK - BL 5/5 strength on upper and lower extremities.   Neuro - AAOx3. cn 2-12 grossly intact  Psych - anxious, appears to have mild depression, overall poor outlook on med situation  Skin - c/d/i. no rashes/lesions      I&O's Summary    27 Apr 2018 07:01  -  28 Apr 2018 07:00  --------------------------------------------------------  IN: 711 mL / OUT: 700 mL / NET: 11 mL      Daily     Daily     =========================================================================================  LABS.    04-28    134<L>  |  99  |  65.0<H>  ----------------------------<  250<H>  5.4<H>   |  21.0<L>  |  3.01<H>    Ca    8.3<L>      28 Apr 2018 10:14                            8.3    3.4   )-----------( 50       ( 28 Apr 2018 07:24 )             26.6                   =========================================================================================  IMAGING.     =========================================================================================    MEDICATIONS  (STANDING):  ALBUTerol/ipratropium for Nebulization 3 milliLiter(s) Nebulizer every 6 hours  ascorbic acid 500 milliGRAM(s) Oral daily  buPROPion XL . 300 milliGRAM(s) Oral daily  cefTRIAXone Injectable.      cefTRIAXone Injectable. 1000 milliGRAM(s) IV Push every 24 hours  dextrose 50% Injectable 12.5 Gram(s) IV Push once  dextrose 50% Injectable 25 Gram(s) IV Push once  dextrose 50% Injectable 25 Gram(s) IV Push once  doxycycline IVPB      doxycycline IVPB 100 milliGRAM(s) IV Intermittent every 12 hours  DULoxetine 120 milliGRAM(s) Oral daily  epoetin krunal Injectable 8000 Unit(s) SubCutaneous <User Schedule>  ferrous    sulfate 325 milliGRAM(s) Oral daily  gabapentin 800 milliGRAM(s) Oral three times a day  heparin  Injectable 5000 Unit(s) SubCutaneous every 12 hours  insulin glargine Injectable (LANTUS) 20 Unit(s) SubCutaneous at bedtime  insulin lispro (HumaLOG) corrective regimen sliding scale   SubCutaneous three times a day before meals  magnesium oxide 400 milliGRAM(s) Oral three times a day with meals  montelukast 10 milliGRAM(s) Oral daily  pantoprazole    Tablet 40 milliGRAM(s) Oral before breakfast  predniSONE   Tablet 40 milliGRAM(s) Oral daily  saccharomyces boulardii 250 milliGRAM(s) Oral two times a day  traZODone 200 milliGRAM(s) Oral at bedtime  zinc sulfate 220 milliGRAM(s) Oral daily    MEDICATIONS  (PRN):  ALPRAZolam 0.5 milliGRAM(s) Oral three times a day PRN anxiety  dextrose Gel 1 Dose(s) Oral once PRN Blood Glucose LESS THAN 70 milliGRAM(s)/deciliter  glucagon  Injectable 1 milliGRAM(s) IntraMuscular once PRN Glucose LESS THAN 70 milligrams/deciliter  HYDROcodone/homatropine Syrup 5 milliLiter(s) Oral four times a day PRN Cough  ondansetron Injectable 4 milliGRAM(s) IV Push every 6 hours PRN Nausea and/or Vomiting  oxyCODONE    IR 5 milliGRAM(s) Oral every 6 hours PRN Moderate to Severe Pain (7 - 10)

## 2018-04-29 NOTE — PROGRESS NOTE ADULT - PROBLEM SELECTOR PROBLEM 7
Diarrhea, unspecified type
Fatty liver disease, nonalcoholic
Diarrhea, unspecified type

## 2018-04-29 NOTE — PROGRESS NOTE ADULT - PROBLEM SELECTOR PLAN 8
resolved
resolved  iatrogenic due to lactulose
states she has hx COPD  CXR pending  nebs/budesonide.  declines IV/PO steroids.
resolved

## 2018-04-29 NOTE — PROGRESS NOTE ADULT - ASSESSMENT
54 y/o F with Hx of Fibromyalgia, DM2, Osteopenia, [place] Esophagus seen in Ed for Left Foot Plantar Ulcer, worsening kidney function sent in by Dr Sanchez, podiatry for paleness, gen weakness and edema.    Patient found to have pancytopenia along with left foot ulcer, confirmed to have OM on MRI and underwent left 1st metatarsal resection.  Patient developed ERIC on CKD 4 likely due to IV lasix drip and diarrhea. Initially treated for hepatic encephalopathy for suspected liver cirrhosis but hepatosplenomegaly on ultrasound with normal ammonia level. Course complicated by COPD exacerbation.  Patient extremely emotional about thought of getting HD and does not want AVF/AVG or dialysis in future. Psychiatry consulted for depression and home medications continued. Palliative care consulted.        Thrombocytopenia- WORSENED. asymptomatic. in setting of total pancytopenia. hold heparin. monitor closely for signs of acute bleed. repeat in AM. resume dvt ppx when PLT > 50. heme follow up post dc

## 2018-04-29 NOTE — PROGRESS NOTE ADULT - PROBLEM SELECTOR PROBLEM 8
Metabolic encephalopathy
Metabolic encephalopathy
Diarrhea, unspecified type
Metabolic encephalopathy
Metabolic encephalopathy
COPD exacerbation
Metabolic encephalopathy
Metabolic encephalopathy

## 2018-04-29 NOTE — PROGRESS NOTE ADULT - ASSESSMENT
52 y/o F with Hx of Fibromyalgia, DM2, Osteopenia, [place] Esophagus seen in Ed for Left Foot Plantar Ulcer, worsening kidney function sent in by Dr Sanchez, podiatry for paleness, gen weakness and edema.    Patient found to have pancytopenia along with left foot ulcer, confirmed to have OM on MRI and underwent left 1st metatarsal resection.  Patient developed ERIC on CKD 4 likely due to IV lasix drip and diarrhea. Initially treated for hepatic encephalopathy for suspected liver cirrhosis but hepatosplenomegaly on ultrasound with normal ammonia level. Course complicated by COPD exacerbation.  Patient extremely emotional about thought of getting HD and does not want AVF/AVG or dialysis in future. Psychiatry consulted for depression and home medications continued. Palliative care consulted.

## 2018-04-29 NOTE — PROGRESS NOTE ADULT - PROBLEM SELECTOR PLAN 9
continue home meds  psychiatry consulted UNCONTROLLED  bedside conversation with counseling and positive reinforcement provided.  continue home meds  psychiatry reconsulted

## 2018-04-29 NOTE — PROGRESS NOTE ADULT - PROBLEM SELECTOR PROBLEM 10
Goals of care, counseling/discussion
Depression
Goals of care, counseling/discussion
Goals of care, counseling/discussion

## 2018-04-29 NOTE — PROGRESS NOTE ADULT - ASSESSMENT
1) CKD IV  2) Diabetic Nephropathy ERIC on CKD  3) Anemia of renal disease  4) ?Liver cirrhosis    CKD IV; No AVF on this admission due to active infection  improving, but still elevated SCr; prerenal; diarrhea has stopped; eating;   IVF D/C'd  urine studies pending  Anemia not at goal; on iron and RHEA epo 8000 TIW  Continue current management   Needs psych consult; pt has psychiatrist outpt;     jayson Wagner

## 2018-04-29 NOTE — PROGRESS NOTE ADULT - SUBJECTIVE AND OBJECTIVE BOX
[facility] DIVISION OF KIDNEY DISEASES AND HYPERTENSION -- FOLLOW UP NOTE  --------------------------------------------------------------------------------  Chief Complaint:  ERIC on CKD    24 hour events/subjective:  Pt seen and examined today,  Lying in bed awake, NAD   Renal fx stable    PAST HISTORY  --------------------------------------------------------------------------------  No significant changes to PMH, PSH, FHx, SHx, unless otherwise noted    ALLERGIES & MEDICATIONS  --------------------------------------------------------------------------------  Allergies    No Known Allergies    Intolerances      Standing Inpatient Medications  ALBUTerol/ipratropium for Nebulization 3 milliLiter(s) Nebulizer every 6 hours  ascorbic acid 500 milliGRAM(s) Oral daily  buPROPion XL . 300 milliGRAM(s) Oral daily  cefTRIAXone Injectable.      cefTRIAXone Injectable. 1000 milliGRAM(s) IV Push every 24 hours  dextrose 50% Injectable 12.5 Gram(s) IV Push once  dextrose 50% Injectable 25 Gram(s) IV Push once  dextrose 50% Injectable 25 Gram(s) IV Push once  doxycycline IVPB      doxycycline IVPB 100 milliGRAM(s) IV Intermittent every 12 hours  DULoxetine 120 milliGRAM(s) Oral daily  epoetin krunal Injectable 8000 Unit(s) SubCutaneous <User Schedule>  ferrous    sulfate 325 milliGRAM(s) Oral daily  gabapentin 800 milliGRAM(s) Oral three times a day  insulin glargine Injectable (LANTUS) 20 Unit(s) SubCutaneous at bedtime  insulin lispro (HumaLOG) corrective regimen sliding scale   SubCutaneous three times a day before meals  magnesium oxide 400 milliGRAM(s) Oral three times a day with meals  montelukast 10 milliGRAM(s) Oral daily  pantoprazole    Tablet 40 milliGRAM(s) Oral before breakfast  saccharomyces boulardii 250 milliGRAM(s) Oral two times a day  traZODone 200 milliGRAM(s) Oral at bedtime  zinc sulfate 220 milliGRAM(s) Oral daily    PRN Inpatient Medications  ALPRAZolam 0.5 milliGRAM(s) Oral three times a day PRN  dextrose Gel 1 Dose(s) Oral once PRN  glucagon  Injectable 1 milliGRAM(s) IntraMuscular once PRN  HYDROcodone/homatropine Syrup 5 milliLiter(s) Oral four times a day PRN  ondansetron Injectable 4 milliGRAM(s) IV Push every 6 hours PRN  oxyCODONE    IR 5 milliGRAM(s) Oral every 6 hours PRN      REVIEW OF SYSTEMS  --------------------------------------------------------------------------------  Gen: No weight changes, fatigue, fevers/chills, weakness  Skin: No rashes  Head/Eyes/Ears/Mouth: No headache; Normal hearing; Normal vision w/o blurriness; No sinus pain/discomfort, sore throat  Respiratory: No dyspnea, cough, wheezing, hemoptysis  CV: No chest pain, PND, orthopnea  GI: No abdominal pain, diarrhea, constipation, nausea, vomiting, melena, hematochezia  : No increased frequency, dysuria, hematuria, nocturia  MSK: No joint pain/swelling; no back pain; no edema  Neuro: No dizziness/lightheadedness, weakness, seizures, numbness, tingling  Heme: No easy bruising or bleeding  Endo: No heat/cold intolerance  Psych: No significant nervousness, anxiety, stress, depression    All other systems were reviewed and are negative, except as noted.    VITALS/PHYSICAL EXAM  --------------------------------------------------------------------------------  T(C): 36.6 (04-29-18 @ 08:25), Max: 36.9 (04-28-18 @ 16:16)  HR: 93 (04-29-18 @ 08:25) (82 - 93)  BP: 133/72 (04-29-18 @ 08:25) (133/72 - 169/82)  RR: 18 (04-29-18 @ 08:25) (16 - 18)  SpO2: 95% (04-29-18 @ 08:25) (95% - 98%)  Wt(kg): --        Physical Exam:  	Gen: NAD, ill-appearing, Pale,  	HEENT: PERRL, supple neck,   	Pulm: CTA B/L  	CV: RRR, S1S2; no rub  	Back: No spinal or CVA tenderness; no sacral edema  	Abd: +BS, soft, nontender/nondistended  	: No suprapubic tenderness  	UE: Warm, FROM, no clubbing, intact strength; no edema; no asterixis  	LE: Warm, FROM, no clubbing, intact strength; no edema  	Neuro: No focal deficits,   	Psych: Normal affect and mood  	Skin: Warm, without rashes  	Vascular access: NA,    LABS/STUDIES  --------------------------------------------------------------------------------              8.1    3.7   >-----------<  32       [04-29-18 @ 07:44]              26.5     135  |  101  |  66.0  ----------------------------<  156      [04-29-18 @ 07:44]  5.0   |  23.0  |  2.90        Ca     8.0     [04-29-18 @ 07:44]      Mg     2.4     [04-29-18 @ 07:44]      Phos  2.5     [04-29-18 @ 07:44]            Creatinine Trend:  SCr 2.90 [04-29 @ 07:44]  SCr 3.01 [04-28 @ 10:14]  SCr 3.03 [04-28 @ 07:24]  SCr 2.99 [04-27 @ 15:02]  SCr 3.12 [04-26 @ 10:07]    Urinalysis - [04-19-18 @ 21:30]      Color Yellow / Appearance Clear / SG 1.010 / pH 6.0      Gluc Negative / Ketone Negative  / Bili Negative / Urobili Negative       Blood Large / Protein 100 / Leuk Est Negative / Nitrite Negative      RBC 11-25 / WBC 0-2 / Hyaline  / Gran  / Sq Epi  / Non Sq Epi Few / Bacteria Occasional    Urine Sodium <30      [04-22-18 @ 22:27]  Urine Osmolality 244      [04-22-18 @ 22:27]    Iron 77, TIBC 253, %sat 30      [04-23-18 @ 09:07]  Ferritin 372      [04-24-18 @ 08:02]  HbA1c 5.6      [04-20-18 @ 08:37]  TSH 4.32      [04-20-18 @ 11:11]

## 2018-04-29 NOTE — PROGRESS NOTE ADULT - SUBJECTIVE AND OBJECTIVE BOX
PMD: unknown  Podiatry Dr Sanchez  Nephro Dr Bedoya    CHIEF COMPLAINT: foot wound + generalized edema    Patient seen and examined at the bedside. No acute overnight events. No events yesterday. Complaining of sob, sore throat, generalized discomfort this AM. Denies fever/chills, headache, lightheadedness, dizziness, chest pain, palpitations, abd pain, nausea/vomiting/diarrhea, muscle pain.      =========================================================================================    PHYSICAL EXAM.    GEN - appears age appropriate. well nourished. pleasant. no distress.   HEENT - NCAT, EOMI, PAULINA  RESP - CTA BL, no wheeze/stridor/rhonchi/crackles. not on supplemental O2. able to speak in full sentences without distress.   CARDIO - NS1S2, RRR. No murmurs/rubs/gallops.  ABD - Soft/Non tender/Non distended. Normal BS x4 quadrants. no guarding/rebound tenderness.  Ext - No JOSE.  MSK - BL 5/5 strength on upper and lower extremities.   Neuro - AAOx3. cn 2-12 grossly intact  Psych - anxious, appears to have mild depression, overall poor outlook on med situation  Skin - c/d/i. no rashes/lesions      VITAL SIGNS.    Vital Signs Last 24 Hrs  T(C): 36.6 (29 Apr 2018 08:25), Max: 36.9 (28 Apr 2018 16:16)  T(F): 97.9 (29 Apr 2018 08:25), Max: 98.4 (28 Apr 2018 16:16)  HR: 93 (29 Apr 2018 08:25) (82 - 93)  BP: 133/72 (29 Apr 2018 08:25) (133/72 - 169/82)  BP(mean): --  RR: 18 (29 Apr 2018 08:25) (16 - 18)  SpO2: 95% (29 Apr 2018 08:25) (95% - 98%)        =================================================    LABS.                          8.1    3.7   )-----------( 32       ( 29 Apr 2018 07:44 )             26.5     04-29    135  |  101  |  66.0<H>  ----------------------------<  156<H>  5.0   |  23.0  |  2.90<H>    Ca    8.0<L>      29 Apr 2018 07:44  Phos  2.5     04-29  Mg     2.4     04-29          I&O's Summary      Culture - Surgical Swab (collected 26 Apr 2018 21:49)  Source: .Surgical Swab L 1st metatarsal clear margin/swab  Gram Stain (26 Apr 2018 22:47):    No White blood cells    No organisms seen  Preliminary Report (28 Apr 2018 17:48):    Few Streptococcus species Identification and susceptibility to follow.    Culture in progress        ================================================    IMAGING.      ================================================    MEDICATIONS  (STANDING):  ALBUTerol/ipratropium for Nebulization 3 milliLiter(s) Nebulizer every 6 hours  ascorbic acid 500 milliGRAM(s) Oral daily  buPROPion XL . 300 milliGRAM(s) Oral daily  cefTRIAXone Injectable.      cefTRIAXone Injectable. 1000 milliGRAM(s) IV Push every 24 hours  dextrose 50% Injectable 12.5 Gram(s) IV Push once  dextrose 50% Injectable 25 Gram(s) IV Push once  dextrose 50% Injectable 25 Gram(s) IV Push once  doxycycline IVPB      doxycycline IVPB 100 milliGRAM(s) IV Intermittent every 12 hours  DULoxetine 120 milliGRAM(s) Oral daily  epoetin krunal Injectable 8000 Unit(s) SubCutaneous <User Schedule>  ferrous    sulfate 325 milliGRAM(s) Oral daily  gabapentin 800 milliGRAM(s) Oral three times a day  heparin  Injectable 5000 Unit(s) SubCutaneous every 12 hours  insulin glargine Injectable (LANTUS) 20 Unit(s) SubCutaneous at bedtime  insulin lispro (HumaLOG) corrective regimen sliding scale   SubCutaneous three times a day before meals  magnesium oxide 400 milliGRAM(s) Oral three times a day with meals  montelukast 10 milliGRAM(s) Oral daily  pantoprazole    Tablet 40 milliGRAM(s) Oral before breakfast  saccharomyces boulardii 250 milliGRAM(s) Oral two times a day  traZODone 200 milliGRAM(s) Oral at bedtime  zinc sulfate 220 milliGRAM(s) Oral daily    MEDICATIONS  (PRN):  ALPRAZolam 0.5 milliGRAM(s) Oral three times a day PRN anxiety  dextrose Gel 1 Dose(s) Oral once PRN Blood Glucose LESS THAN 70 milliGRAM(s)/deciliter  glucagon  Injectable 1 milliGRAM(s) IntraMuscular once PRN Glucose LESS THAN 70 milligrams/deciliter  HYDROcodone/homatropine Syrup 5 milliLiter(s) Oral four times a day PRN Cough  ondansetron Injectable 4 milliGRAM(s) IV Push every 6 hours PRN Nausea and/or Vomiting  oxyCODONE    IR 5 milliGRAM(s) Oral every 6 hours PRN Moderate to Severe Pain (7 - 10) PMD: unknown  Podiatry Dr Sanchez  Nephro Dr Bedoya    CHIEF COMPLAINT: foot wound + generalized edema    Patient seen and examined at the bedside. No acute overnight events. No events yesterday. Complaining of depression this AM. Denies fever/chills, headache, lightheadedness, dizziness, chest pain, palpitations, abd pain, nausea/vomiting/diarrhea, muscle pain.      =========================================================================================    PHYSICAL EXAM.    GEN - appears age appropriate. well nourished. pleasant.  HEENT - NCAT, EOMI, PAULINA  RESP - CTA BL, no wheeze/stridor/rhonchi/crackles. not on supplemental O2. able to speak in full sentences without distress.   CARDIO - NS1S2, RRR. No murmurs/rubs/gallops.  ABD - Soft/Non tender/Non distended. Normal BS x4 quadrants. no guarding/rebound tenderness.  Ext - No JOSE.  MSK - BL 5/5 strength on upper and lower extremities.   Neuro - AAOx3. cn 2-12 grossly intact  Psych - tearful, depressed. visibly worsened mood from yesterday. overcome with emotions regarding medical condition.   Skin - c/d/i. no rashes/lesions      VITAL SIGNS.    Vital Signs Last 24 Hrs  T(C): 36.6 (29 Apr 2018 08:25), Max: 36.9 (28 Apr 2018 16:16)  T(F): 97.9 (29 Apr 2018 08:25), Max: 98.4 (28 Apr 2018 16:16)  HR: 93 (29 Apr 2018 08:25) (82 - 93)  BP: 133/72 (29 Apr 2018 08:25) (133/72 - 169/82)  BP(mean): --  RR: 18 (29 Apr 2018 08:25) (16 - 18)  SpO2: 95% (29 Apr 2018 08:25) (95% - 98%)        =================================================    LABS.                          8.1    3.7   )-----------( 32       ( 29 Apr 2018 07:44 )             26.5     04-29    135  |  101  |  66.0<H>  ----------------------------<  156<H>  5.0   |  23.0  |  2.90<H>    Ca    8.0<L>      29 Apr 2018 07:44  Phos  2.5     04-29  Mg     2.4     04-29          I&O's Summary      Culture - Surgical Swab (collected 26 Apr 2018 21:49)  Source: .Surgical Swab L 1st metatarsal clear margin/swab  Gram Stain (26 Apr 2018 22:47):    No White blood cells    No organisms seen  Preliminary Report (28 Apr 2018 17:48):    Few Streptococcus species Identification and susceptibility to follow.    Culture in progress        ================================================    IMAGING.      ================================================    MEDICATIONS  (STANDING):  ALBUTerol/ipratropium for Nebulization 3 milliLiter(s) Nebulizer every 6 hours  ascorbic acid 500 milliGRAM(s) Oral daily  buPROPion XL . 300 milliGRAM(s) Oral daily  cefTRIAXone Injectable.      cefTRIAXone Injectable. 1000 milliGRAM(s) IV Push every 24 hours  dextrose 50% Injectable 12.5 Gram(s) IV Push once  dextrose 50% Injectable 25 Gram(s) IV Push once  dextrose 50% Injectable 25 Gram(s) IV Push once  doxycycline IVPB      doxycycline IVPB 100 milliGRAM(s) IV Intermittent every 12 hours  DULoxetine 120 milliGRAM(s) Oral daily  epoetin krunal Injectable 8000 Unit(s) SubCutaneous <User Schedule>  ferrous    sulfate 325 milliGRAM(s) Oral daily  gabapentin 800 milliGRAM(s) Oral three times a day  heparin  Injectable 5000 Unit(s) SubCutaneous every 12 hours  insulin glargine Injectable (LANTUS) 20 Unit(s) SubCutaneous at bedtime  insulin lispro (HumaLOG) corrective regimen sliding scale   SubCutaneous three times a day before meals  magnesium oxide 400 milliGRAM(s) Oral three times a day with meals  montelukast 10 milliGRAM(s) Oral daily  pantoprazole    Tablet 40 milliGRAM(s) Oral before breakfast  saccharomyces boulardii 250 milliGRAM(s) Oral two times a day  traZODone 200 milliGRAM(s) Oral at bedtime  zinc sulfate 220 milliGRAM(s) Oral daily    MEDICATIONS  (PRN):  ALPRAZolam 0.5 milliGRAM(s) Oral three times a day PRN anxiety  dextrose Gel 1 Dose(s) Oral once PRN Blood Glucose LESS THAN 70 milliGRAM(s)/deciliter  glucagon  Injectable 1 milliGRAM(s) IntraMuscular once PRN Glucose LESS THAN 70 milligrams/deciliter  HYDROcodone/homatropine Syrup 5 milliLiter(s) Oral four times a day PRN Cough  ondansetron Injectable 4 milliGRAM(s) IV Push every 6 hours PRN Nausea and/or Vomiting  oxyCODONE    IR 5 milliGRAM(s) Oral every 6 hours PRN Moderate to Severe Pain (7 - 10)

## 2018-04-29 NOTE — PROGRESS NOTE ADULT - PROBLEM SELECTOR PLAN 10
palliative consulted  explained to patient in detail what AVF surgery entails and how HD is done.  advised that she can take the weekend to think about whether or not she wants AVF on this admission or wait to go home and f/u with nephrology and decide afterwards or whether she doesn't want it at all.....she will think about it.
palliative consulted  explained to patient in detail what AVF surgery entails and how HD is done.  advised that she can take the weekend to think about whether or not she wants AVF on this admission or wait to go home and f/u with nephrology and decide afterwards or whether she doesn't want it at all.....she will think about it.
continue home meds  psychiatry consulted
palliative consulted  explained to patient in detail what AVF surgery entails and how HD is done.  advised that she can take the weekend to think about whether or not she wants AVF on this admission or wait to go home and f/u with nephrology and decide afterwards or whether she doesn't want it at all.....she will think about it.

## 2018-04-30 DIAGNOSIS — F33.1 MAJOR DEPRESSIVE DISORDER, RECURRENT, MODERATE: ICD-10-CM

## 2018-04-30 DIAGNOSIS — D69.6 THROMBOCYTOPENIA, UNSPECIFIED: ICD-10-CM

## 2018-04-30 LAB
-  CLINDAMYCIN: SIGNIFICANT CHANGE UP
-  ERYTHROMYCIN: SIGNIFICANT CHANGE UP
-  LEVOFLOXACIN: SIGNIFICANT CHANGE UP
-  PENICILLIN: SIGNIFICANT CHANGE UP
-  VANCOMYCIN: SIGNIFICANT CHANGE UP
ANION GAP SERPL CALC-SCNC: 11 MMOL/L — SIGNIFICANT CHANGE UP (ref 5–17)
APTT BLD: 25.9 SEC — LOW (ref 27.5–37.4)
APTT BLD: 46.5 SEC — HIGH (ref 27.5–37.4)
BUN SERPL-MCNC: 64 MG/DL — HIGH (ref 8–20)
CALCIUM SERPL-MCNC: 7.8 MG/DL — LOW (ref 8.6–10.2)
CHLORIDE SERPL-SCNC: 101 MMOL/L — SIGNIFICANT CHANGE UP (ref 98–107)
CO2 SERPL-SCNC: 21 MMOL/L — LOW (ref 22–29)
CREAT SERPL-MCNC: 2.67 MG/DL — HIGH (ref 0.5–1.3)
GLUCOSE BLDC GLUCOMTR-MCNC: 175 MG/DL — HIGH (ref 70–99)
GLUCOSE BLDC GLUCOMTR-MCNC: 297 MG/DL — HIGH (ref 70–99)
GLUCOSE BLDC GLUCOMTR-MCNC: 354 MG/DL — HIGH (ref 70–99)
GLUCOSE BLDC GLUCOMTR-MCNC: 405 MG/DL — HIGH (ref 70–99)
GLUCOSE SERPL-MCNC: 159 MG/DL — HIGH (ref 70–115)
HCT VFR BLD CALC: 26.4 % — LOW (ref 37–47)
HCT VFR BLD CALC: 30.1 % — LOW (ref 37–47)
HGB BLD-MCNC: 8.1 G/DL — LOW (ref 12–16)
HGB BLD-MCNC: 9.4 G/DL — LOW (ref 12–16)
MCHC RBC-ENTMCNC: 29.1 PG — SIGNIFICANT CHANGE UP (ref 27–31)
MCHC RBC-ENTMCNC: 29.3 PG — SIGNIFICANT CHANGE UP (ref 27–31)
MCHC RBC-ENTMCNC: 30.7 G/DL — LOW (ref 32–36)
MCHC RBC-ENTMCNC: 31.2 G/DL — LOW (ref 32–36)
MCV RBC AUTO: 93.8 FL — SIGNIFICANT CHANGE UP (ref 81–99)
MCV RBC AUTO: 95 FL — SIGNIFICANT CHANGE UP (ref 81–99)
METHOD TYPE: SIGNIFICANT CHANGE UP
PLATELET # BLD AUTO: 17 K/UL — CRITICAL LOW (ref 150–400)
PLATELET # BLD AUTO: SIGNIFICANT CHANGE UP K/UL (ref 150–400)
POTASSIUM SERPL-MCNC: 4.6 MMOL/L — SIGNIFICANT CHANGE UP (ref 3.5–5.3)
POTASSIUM SERPL-SCNC: 4.6 MMOL/L — SIGNIFICANT CHANGE UP (ref 3.5–5.3)
RBC # BLD: 2.78 M/UL — LOW (ref 4.4–5.2)
RBC # BLD: 3.21 M/UL — LOW (ref 4.4–5.2)
RBC # FLD: 14.5 % — SIGNIFICANT CHANGE UP (ref 11–15.6)
RBC # FLD: 14.7 % — SIGNIFICANT CHANGE UP (ref 11–15.6)
SODIUM SERPL-SCNC: 133 MMOL/L — LOW (ref 135–145)
WBC # BLD: 3.5 K/UL — LOW (ref 4.8–10.8)
WBC # BLD: 4 K/UL — LOW (ref 4.8–10.8)
WBC # FLD AUTO: 3.5 K/UL — LOW (ref 4.8–10.8)
WBC # FLD AUTO: 4 K/UL — LOW (ref 4.8–10.8)

## 2018-04-30 PROCEDURE — 99233 SBSQ HOSP IP/OBS HIGH 50: CPT

## 2018-04-30 PROCEDURE — 76937 US GUIDE VASCULAR ACCESS: CPT | Mod: 26

## 2018-04-30 PROCEDURE — 99232 SBSQ HOSP IP/OBS MODERATE 35: CPT

## 2018-04-30 PROCEDURE — 36000 PLACE NEEDLE IN VEIN: CPT

## 2018-04-30 RX ORDER — ARGATROBAN 50 MG/50ML
2 INJECTION, SOLUTION INTRAVENOUS
Qty: 250 | Refills: 0 | Status: DISCONTINUED | OUTPATIENT
Start: 2018-04-30 | End: 2018-05-02

## 2018-04-30 RX ORDER — BENZOCAINE AND MENTHOL 5; 1 G/100ML; G/100ML
1 LIQUID ORAL
Qty: 0 | Refills: 0 | Status: DISCONTINUED | OUTPATIENT
Start: 2018-04-30 | End: 2018-05-04

## 2018-04-30 RX ADMIN — Medication 5: at 11:42

## 2018-04-30 RX ADMIN — Medication 30 MILLIGRAM(S): at 06:00

## 2018-04-30 RX ADMIN — PANTOPRAZOLE SODIUM 40 MILLIGRAM(S): 20 TABLET, DELAYED RELEASE ORAL at 06:00

## 2018-04-30 RX ADMIN — Medication 6: at 17:08

## 2018-04-30 RX ADMIN — Medication 0.5 MILLIGRAM(S): at 12:12

## 2018-04-30 RX ADMIN — Medication 3 MILLILITER(S): at 15:40

## 2018-04-30 RX ADMIN — Medication 3 MILLILITER(S): at 20:33

## 2018-04-30 RX ADMIN — ARGATROBAN 9.25 MICROGRAM(S)/KG/MIN: 50 INJECTION, SOLUTION INTRAVENOUS at 15:38

## 2018-04-30 RX ADMIN — Medication 110 MILLIGRAM(S): at 14:21

## 2018-04-30 RX ADMIN — Medication 200 MILLIGRAM(S): at 22:44

## 2018-04-30 RX ADMIN — OXYCODONE HYDROCHLORIDE 5 MILLIGRAM(S): 5 TABLET ORAL at 06:29

## 2018-04-30 RX ADMIN — BUPROPION HYDROCHLORIDE 300 MILLIGRAM(S): 150 TABLET, EXTENDED RELEASE ORAL at 11:43

## 2018-04-30 RX ADMIN — ZINC SULFATE TAB 220 MG (50 MG ZINC EQUIVALENT) 220 MILLIGRAM(S): 220 (50 ZN) TAB at 11:43

## 2018-04-30 RX ADMIN — MAGNESIUM OXIDE 400 MG ORAL TABLET 400 MILLIGRAM(S): 241.3 TABLET ORAL at 17:09

## 2018-04-30 RX ADMIN — Medication 3 MILLILITER(S): at 08:53

## 2018-04-30 RX ADMIN — Medication 110 MILLIGRAM(S): at 22:45

## 2018-04-30 RX ADMIN — Medication 250 MILLIGRAM(S): at 17:09

## 2018-04-30 RX ADMIN — MAGNESIUM OXIDE 400 MG ORAL TABLET 400 MILLIGRAM(S): 241.3 TABLET ORAL at 06:00

## 2018-04-30 RX ADMIN — OXYCODONE HYDROCHLORIDE 5 MILLIGRAM(S): 5 TABLET ORAL at 21:29

## 2018-04-30 RX ADMIN — CEFTRIAXONE 1000 MILLIGRAM(S): 500 INJECTION, POWDER, FOR SOLUTION INTRAMUSCULAR; INTRAVENOUS at 11:42

## 2018-04-30 RX ADMIN — OXYCODONE HYDROCHLORIDE 5 MILLIGRAM(S): 5 TABLET ORAL at 05:59

## 2018-04-30 RX ADMIN — OXYCODONE HYDROCHLORIDE 5 MILLIGRAM(S): 5 TABLET ORAL at 11:44

## 2018-04-30 RX ADMIN — MAGNESIUM OXIDE 400 MG ORAL TABLET 400 MILLIGRAM(S): 241.3 TABLET ORAL at 11:43

## 2018-04-30 RX ADMIN — INSULIN GLARGINE 20 UNIT(S): 100 INJECTION, SOLUTION SUBCUTANEOUS at 21:35

## 2018-04-30 RX ADMIN — MONTELUKAST 10 MILLIGRAM(S): 4 TABLET, CHEWABLE ORAL at 11:43

## 2018-04-30 RX ADMIN — Medication 3 MILLILITER(S): at 02:59

## 2018-04-30 RX ADMIN — OXYCODONE HYDROCHLORIDE 5 MILLIGRAM(S): 5 TABLET ORAL at 21:59

## 2018-04-30 RX ADMIN — GABAPENTIN 800 MILLIGRAM(S): 400 CAPSULE ORAL at 14:21

## 2018-04-30 RX ADMIN — Medication 1: at 07:06

## 2018-04-30 RX ADMIN — GABAPENTIN 800 MILLIGRAM(S): 400 CAPSULE ORAL at 21:31

## 2018-04-30 RX ADMIN — Medication 0.5 MILLIGRAM(S): at 03:37

## 2018-04-30 RX ADMIN — Medication 250 MILLIGRAM(S): at 06:00

## 2018-04-30 RX ADMIN — Medication 500 MILLIGRAM(S): at 11:43

## 2018-04-30 RX ADMIN — Medication 0.5 MILLIGRAM(S): at 21:29

## 2018-04-30 RX ADMIN — OXYCODONE HYDROCHLORIDE 5 MILLIGRAM(S): 5 TABLET ORAL at 12:30

## 2018-04-30 RX ADMIN — ARGATROBAN 9.25 MICROGRAM(S)/KG/MIN: 50 INJECTION, SOLUTION INTRAVENOUS at 19:22

## 2018-04-30 RX ADMIN — DULOXETINE HYDROCHLORIDE 120 MILLIGRAM(S): 30 CAPSULE, DELAYED RELEASE ORAL at 11:43

## 2018-04-30 RX ADMIN — Medication 325 MILLIGRAM(S): at 11:43

## 2018-04-30 RX ADMIN — GABAPENTIN 800 MILLIGRAM(S): 400 CAPSULE ORAL at 06:00

## 2018-04-30 NOTE — CONSULT NOTE ADULT - ASSESSMENT
54 yo female with PMH of Fibromyalgia, DM, Osteopenia, Matamoros's  Esophagus seen in Ed for Left Foot Plantar Ulcer & Edema w. Weight gain.  Pt states earlier today she went  for wound  Care  on the left foot. Her podiatrist send her to ED because she was looking pale and weak. Pt states her left foot is fine and she does follow up with Dr. Sanchez ( Podiatry    Pt denies any discharge or pus noted from the wound.   Pt states she is diabetic.   PT denies N/V/C/F/SOB     Allergies: NKDA  PMHX: Fibromyalgia, DM, Osteopenia, Matamoros's  Esophagus    Per Podiatry :    Vascular: DP/PT: 1/4 b/l; CFT< 3 sec x 8; TG: wnl; no edema noted    Derm: granular base ulceration noted with hyperkeratotic rim noted at the left plantar foot at submetatarsal  site; no pus noted; no drainage noted; no clinical sign of infection noted; no edema noted; no pain upon palpation  Neuro: Protective sensation is slightly diminished   Ortho: Partial 1st ray amputation noted bilaterally     A: Left Foot Stable Plantar Ulcer    DMN , CKD - 4 , Renal Anemia, Nephrotic,   Patient evaluated and chart reviewed  xray ordered; results pending  cx obtained; results pending  elsa ordered; results pending    DSD applied to the left foot. Instructed patient to keep dressing clean, dry, and intact to the left foot.    IV Lasix , RHEA,    AVF - during  this hospitalization,
54 y/o F with left foot ulcer
ASSESSMENT: Patient is a 53y old f with CKD stage 4, Fibromyalgia, DM, pancytopenia, Osteopenia, Matamoros Esophagus seen in Ed for Left Foot Plantar Ulcer. Vascular surgery consulted for worsening CKD.       PLAN:   - discussed in length with patient about AVF vs. AVG creation. States she is unsure if she wants hemodialysis. She refused vessel mapping last night, however she thought she "was getting a tube stuck in her arm."    - re-ordered vein mapping for today  - Plan to be discussed with Dr. Ward for possible creation of AVF/ AVG
Imp:  Patient is seen for pancytopenia; known HSM, possible underlying cirrhosis  Labs reviewed-iron studies non-diagnostic, B12 normal, folate low; elevated Cr-  Multifactoria anemia, secondary to chronic disease/CKD,HSM  Thrombocytopenia-secondary to HSM, ? cirrhosis  Verbal report of HIT-agree with Agaroban; monitor coags, platelet count closely  Rec:  po Folate          Procrit          Iron, either po or IV
Unfortunate 51yo female with hx COPD presents with exacerbation complicated by stage 4 CKD, Matamoros's esophagus, foot ulcer and pancytopenia
53yr woman, diabetic, CKD stage 5, COPD hx of depression currently under therapy, fibromyalgia admitted with OM of left metatarsal, s/p resection. Patient depressed, emotional state possibly affecting treatment decisions.

## 2018-04-30 NOTE — PROGRESS NOTE BEHAVIORAL HEALTH - RISK ASSESSMENT
Chronic risk due to chronic depression, chornic medical problems and pain, status as . Acute risk due to acute medical issues, However patient denies suicidal ideation plan or intent, has [employer] convictions against suicide, displays help seeking behavior, future oriented, not abusing substances. Patient assessed to not be at imminent risk of harm to self or others.

## 2018-04-30 NOTE — PROGRESS NOTE ADULT - SUBJECTIVE AND OBJECTIVE BOX
PULMONARY PROGRESS NOTE      [id number]    Patient is a 53y old  Female who presents with a chief complaint of Swelling all around the body and foot wound (19 Apr 2018 17:59)      INTERVAL HPI/OVERNIGHT EVENTS: She is feeling better. Less sob; walking w/o difficulty. Main complaint is cough with difficulty getting out sputum. Was smoking up until admission.     MEDICATIONS  (STANDING):  ALBUTerol/ipratropium for Nebulization 3 milliLiter(s) Nebulizer every 6 hours  ascorbic acid 500 milliGRAM(s) Oral daily  buPROPion XL . 300 milliGRAM(s) Oral daily  cefTRIAXone Injectable.      cefTRIAXone Injectable. 1000 milliGRAM(s) IV Push every 24 hours  dextrose 50% Injectable 12.5 Gram(s) IV Push once  dextrose 50% Injectable 25 Gram(s) IV Push once  dextrose 50% Injectable 25 Gram(s) IV Push once  doxycycline IVPB      doxycycline IVPB 100 milliGRAM(s) IV Intermittent every 12 hours  DULoxetine 120 milliGRAM(s) Oral daily  epoetin krunal Injectable 8000 Unit(s) SubCutaneous <User Schedule>  ferrous    sulfate 325 milliGRAM(s) Oral daily  gabapentin 800 milliGRAM(s) Oral three times a day  insulin glargine Injectable (LANTUS) 20 Unit(s) SubCutaneous at bedtime  insulin lispro (HumaLOG) corrective regimen sliding scale   SubCutaneous three times a day before meals  magnesium oxide 400 milliGRAM(s) Oral three times a day with meals  montelukast 10 milliGRAM(s) Oral daily  pantoprazole    Tablet 40 milliGRAM(s) Oral before breakfast  predniSONE   Tablet 30 milliGRAM(s) Oral daily  saccharomyces boulardii 250 milliGRAM(s) Oral two times a day  traZODone 200 milliGRAM(s) Oral at bedtime  zinc sulfate 220 milliGRAM(s) Oral daily      MEDICATIONS  (PRN):  ALPRAZolam 0.5 milliGRAM(s) Oral three times a day PRN anxiety  benzocaine 15 mG/menthol 3.6 mG Lozenge 1 Lozenge Oral four times a day PRN Sore Throat  dextrose Gel 1 Dose(s) Oral once PRN Blood Glucose LESS THAN 70 milliGRAM(s)/deciliter  glucagon  Injectable 1 milliGRAM(s) IntraMuscular once PRN Glucose LESS THAN 70 milligrams/deciliter  HYDROcodone/homatropine Syrup 5 milliLiter(s) Oral four times a day PRN Cough  ondansetron Injectable 4 milliGRAM(s) IV Push every 6 hours PRN Nausea and/or Vomiting  oxyCODONE    IR 5 milliGRAM(s) Oral every 6 hours PRN Moderate to Severe Pain (7 - 10)      Allergies    No Known Allergies    Intolerances        PAST MEDICAL & SURGICAL HISTORY:  Fibromyalgia  DM (diabetes mellitus)  Foot ulcer: Left Foot  Osteopenia  Chronic pain  Back ache  Arthritis  Shoulder joint stiffness, left  Matamoros esophagus  Stomach ulcer  Diabetes  Asthma  S/P knee surgery  S/P hysterectomy  S/P cholecystectomy      SOCIAL HISTORY  Smoking History: current      REVIEW OF SYSTEMS:    CONSTITUTIONAL:  No distress    HEENT:  Eyes:  No diplopia or blurred vision. ENT:  No earache, sore throat or runny nose.    CARDIOVASCULAR:  No pressure, squeezing, tightness, heaviness or aching about the chest; no palpitations.    RESPIRATORY:  per HPI     GASTROINTESTINAL:  No nausea, vomiting or diarrhea.    GENITOURINARY:  No dysuria, frequency or urgency.    NEUROLOGIC:  No paresthesias, fasciculations, seizures or weakness.    PSYCHIATRIC:  No disorder of thought or mood.    Vital Signs Last 24 Hrs  T(C): 36.6 (30 Apr 2018 08:30), Max: 36.6 (29 Apr 2018 16:49)  T(F): 97.8 (30 Apr 2018 08:30), Max: 97.9 (29 Apr 2018 16:49)  HR: 88 (30 Apr 2018 08:54) (79 - 90)  BP: 134/79 (30 Apr 2018 08:30) (134/79 - 157/83)  BP(mean): --  RR: 18 (30 Apr 2018 08:30) (18 - 20)  SpO2: 96% (30 Apr 2018 08:30) (94% - 96%)    PHYSICAL EXAMINATION:    GENERAL: The patient is awake and alert in no apparent distress.     HEENT: Head is normocephalic and atraumatic. Extraocular muscles are intact. Mucous membranes are moist.    NECK: Supple.    LUNGS: scattered rhonchi, respirations unlabored    HEART: Regular rate and rhythm      ABDOMEN: Soft, nontender, and nondistended.      EXTREMITIES: Without any cyanosis, clubbing, rash, lesions or edema.    NEUROLOGIC: Grossly intact.    LABS:                        8.1    3.5   )-----------( 17       ( 30 Apr 2018 07:49 )             26.4     04-30    133<L>  |  101  |  64.0<H>  ----------------------------<  159<H>  4.6   |  21.0<L>  |  2.67<H>    Ca    7.8<L>      30 Apr 2018 07:48  Phos  2.5     04-29  Mg     2.4     04-29         RADIOLOGY & ADDITIONAL STUDIES:  < from: Xray Chest 1 View- PORTABLE-Routine (04.22.18 @ 12:22) >  EXAM:  XR CHEST PORTABLE ROUTINE 1V                          PROCEDURE DATE:  04/22/2018          INTERPRETATION:  Chest, frontal portable view    HISTORY: Dyspnea    Comparison: 4/19    Findings:    The mediastinal cardiac silhouette is normal. The lungs are clear.   Osseous structures and soft tissues are unremarkable.    IMPRESSION: No acute findings.                  < end of copied text >

## 2018-04-30 NOTE — PROGRESS NOTE BEHAVIORAL HEALTH - SUMMARY
53 year old woman, chornic hx of depression, multiple medical problems, domiciled with boyfriend, no prior psychiatric hospitalizations or suicide attempts, no hx of substance abuse, admitted found to have volume overload, undergoing evaluation of foot ulcer.  Patient has been chronically depressed for decades, reports response to cymbalta, trazodone added recently has helped with sleep reports responding well to medications being updated to correct dose, improved mood in response to social supports  would continue medications at current doses , would not increase wellbutrin due to renal insufficiency

## 2018-04-30 NOTE — PROGRESS NOTE BEHAVIORAL HEALTH - NSBHFUPINTERVALHXFT_PSY_A_CORE
Patient reports intensity of depression reduced to 5/10 from 10/10 last week, attributes this to MDs sitting and explaining to her exactly what her health problems are and what to expect, that she had a lot of misinformation prior this which was causing her great anxiety. She states appetite is improved significantly, visit from pastoral services was therapeutic. She reports responding well to being put on the correct doses of her medications  and is sleeping better. She continues to find cymbalta helpful but does not think wellbutrin has helped much. She is hopeful of being discharged in the next few days, reports family and boyfriend have been supportive, plans to see the therapist offered by her PCP

## 2018-04-30 NOTE — CONSULT NOTE ADULT - CONSULT REASON
CKD - 4 , Edema, Anemia,    DMN
Foot ulcer
AECOPD
AVF creation
Goals of Care- Patient does not want dialysis
Left Foot Stable Plantar Ulcer
Pancytopenia
Preop eval
pancytopenia  Liver cirrhosis by history  ESRD  Non healing left foot ulcer

## 2018-04-30 NOTE — PROGRESS NOTE ADULT - SUBJECTIVE AND OBJECTIVE BOX
[facility] DIVISION OF KIDNEY DISEASES AND HYPERTENSION -- FOLLOW UP NOTE  --------------------------------------------------------------------------------  Chief Complaint:  ERIC on CKD    24 hour events/subjective:  Pt seen and examined  Lying in bed awake, NAD   Renal fx improved    PAST HISTORY  --------------------------------------------------------------------------------  No significant changes to PMH, PSH, FHx, SHx, unless otherwise noted    ALLERGIES & MEDICATIONS  --------------------------------------------------------------------------------  Allergies    No Known Allergies    Intolerances      Standing Inpatient Medications  ALBUTerol/ipratropium for Nebulization 3 milliLiter(s) Nebulizer every 6 hours  argatroban Infusion 2 MICROgram(s)/kG/Min IV Continuous <Continuous>  ascorbic acid 500 milliGRAM(s) Oral daily  buPROPion XL . 300 milliGRAM(s) Oral daily  cefTRIAXone Injectable.      cefTRIAXone Injectable. 1000 milliGRAM(s) IV Push every 24 hours  dextrose 50% Injectable 12.5 Gram(s) IV Push once  dextrose 50% Injectable 25 Gram(s) IV Push once  dextrose 50% Injectable 25 Gram(s) IV Push once  doxycycline IVPB      doxycycline IVPB 100 milliGRAM(s) IV Intermittent every 12 hours  DULoxetine 120 milliGRAM(s) Oral daily  epoetin krunal Injectable 8000 Unit(s) SubCutaneous <User Schedule>  ferrous    sulfate 325 milliGRAM(s) Oral daily  gabapentin 800 milliGRAM(s) Oral three times a day  insulin glargine Injectable (LANTUS) 20 Unit(s) SubCutaneous at bedtime  insulin lispro (HumaLOG) corrective regimen sliding scale   SubCutaneous three times a day before meals  magnesium oxide 400 milliGRAM(s) Oral three times a day with meals  montelukast 10 milliGRAM(s) Oral daily  pantoprazole    Tablet 40 milliGRAM(s) Oral before breakfast  predniSONE   Tablet 30 milliGRAM(s) Oral daily  saccharomyces boulardii 250 milliGRAM(s) Oral two times a day  traZODone 200 milliGRAM(s) Oral at bedtime  zinc sulfate 220 milliGRAM(s) Oral daily    PRN Inpatient Medications  ALPRAZolam 0.5 milliGRAM(s) Oral three times a day PRN  benzocaine 15 mG/menthol 3.6 mG Lozenge 1 Lozenge Oral four times a day PRN  dextrose Gel 1 Dose(s) Oral once PRN  glucagon  Injectable 1 milliGRAM(s) IntraMuscular once PRN  HYDROcodone/homatropine Syrup 5 milliLiter(s) Oral four times a day PRN  ondansetron Injectable 4 milliGRAM(s) IV Push every 6 hours PRN  oxyCODONE    IR 5 milliGRAM(s) Oral every 6 hours PRN      REVIEW OF SYSTEMS  --------------------------------------------------------------------------------  Gen: No weight changes, fatigue, fevers/chills, weakness  Skin: No rashes  Head/Eyes/Ears/Mouth: No headache; Normal hearing; Normal vision w/o blurriness; No sinus pain/discomfort, sore throat  Respiratory: No dyspnea, cough, wheezing, hemoptysis  CV: No chest pain, PND, orthopnea  GI: No abdominal pain, diarrhea, constipation, nausea, vomiting, melena, hematochezia  : No increased frequency, dysuria, hematuria, nocturia  MSK: No joint pain/swelling; no back pain; no edema  Neuro: No dizziness/lightheadedness, weakness, seizures, numbness, tingling  Heme: No easy bruising or bleeding  Endo: No heat/cold intolerance  Psych: No significant nervousness, anxiety, stress, depression    All other systems were reviewed and are negative, except as noted.    VITALS/PHYSICAL EXAM  --------------------------------------------------------------------------------  T(C): 36.6 (04-30-18 @ 08:30), Max: 36.6 (04-29-18 @ 16:49)  HR: 86 (04-30-18 @ 09:59) (79 - 90)  BP: 134/79 (04-30-18 @ 08:30) (134/79 - 157/83)  RR: 18 (04-30-18 @ 08:30) (18 - 20)  SpO2: 96% (04-30-18 @ 09:59) (94% - 96%)  Wt(kg): --        Physical Exam:  	Gen: NAD  	HEENT: PERRL, supple neck, clear oropharynx  	Pulm: CTA B/L  	CV: RRR, S1S2; no rub  	Back: No spinal or CVA tenderness; no sacral edema  	Abd: +BS, soft, nontender/nondistended  	: No suprapubic tenderness  	UE: Warm, FROM, no clubbing, intact strength; no edema; no asterixis  	LE: foot wrapped  	Neuro: No focal deficits, intact gait  	Psych: Normal affect and mood  	Skin: Warm, without rashes  	Vascular access:    LABS/STUDIES  --------------------------------------------------------------------------------              8.1    3.5   >-----------<  17       [04-30-18 @ 07:49]              26.4     133  |  101  |  64.0  ----------------------------<  159      [04-30-18 @ 07:48]  4.6   |  21.0  |  2.67        Ca     7.8     [04-30-18 @ 07:48]      Mg     2.4     [04-29-18 @ 07:44]      Phos  2.5     [04-29-18 @ 07:44]            Creatinine Trend:  SCr 2.67 [04-30 @ 07:48]  SCr 2.90 [04-29 @ 07:44]  SCr 3.01 [04-28 @ 10:14]  SCr 3.03 [04-28 @ 07:24]  SCr 2.99 [04-27 @ 15:02]    Urinalysis - [04-19-18 @ 21:30]      Color Yellow / Appearance Clear / SG 1.010 / pH 6.0      Gluc Negative / Ketone Negative  / Bili Negative / Urobili Negative       Blood Large / Protein 100 / Leuk Est Negative / Nitrite Negative      RBC 11-25 / WBC 0-2 / Hyaline  / Gran  / Sq Epi  / Non Sq Epi Few / Bacteria Occasional      Iron 77, TIBC 253, %sat 30      [04-23-18 @ 09:07]  Ferritin 372      [04-24-18 @ 08:02]  HbA1c 5.6      [04-20-18 @ 08:37]  TSH 4.32      [04-20-18 @ 11:11]

## 2018-04-30 NOTE — PHYSICAL THERAPY INITIAL EVALUATION ADULT - PERTINENT HX OF CURRENT PROBLEM, REHAB EVAL
52 y/o F with Hx of Fibromyalgia, DM, Osteopenia, [place] Esophagus seen in Ed for Left Foot Plantar Ulcer, worsening kidney function who is well know to Nephrology service, she went to her Podiatrist (Dr. Sancehz) for her wound check for her left foot wound, Her podiatrist send her to ED because she was looking pale, weak and was having swelling and edema everywhere now s/p left 1st ray amputation/resection for osteomyelitis.

## 2018-04-30 NOTE — CONSULT NOTE ADULT - SUBJECTIVE AND OBJECTIVE BOX
HPI: Patient is a 53y Female seen on consultation for the evaluation and management of pancytopenia.  Also, report of HIT antibodies.  Patient has H/O of DM, foot ulcers, renal insufficency, HSM, possible cirrhosis, depression, on long term antidepressants.  Patient was admitted with wound infection of feet, treated with antibiotics.  Found to be pancytopenic; no history of bleeding.  She denies fevers or chills.  Complains of chronic left-sided abdominal discomfort with nausea.  Consult requested to address pancytopenia.      PAST MEDICAL & SURGICAL HISTORY:  Fibromyalgia  DM (diabetes mellitus)  Foot ulcer: Left Foot  Osteopenia  Chronic pain  Back ache  Arthritis  Shoulder joint stiffness, left  [place] esophagus  Stomach ulcer  Diabetes  Asthma  S/P knee surgery  S/P hysterectomy  S/P cholecystectomy      REVIEW OF SYSTEMS      	    Skin/Breast:Echymoses at SQ injection site  	  Ophthalmologic:denies blurry vision  	  ENMT:	c/o dry mouth    Respiratory and Thorax:denies SOB or chest pain  	  Cardiovascular:	no chest pain or palpitations    Gastrointestinal:	chronic abdominal pain; denies rectal bleeding or melena    Genitourinary:	denies hematuria    Musculoskeletal:	chronic bony pain    	    Psychiatric:chronic depression	      	    Allergic/Immunologic:	  MEDICATIONS  (STANDING):  ALBUTerol/ipratropium for Nebulization 3 milliLiter(s) Nebulizer every 6 hours  argatroban Infusion 2 MICROgram(s)/kG/Min (9.252 mL/Hr) IV Continuous <Continuous>  ascorbic acid 500 milliGRAM(s) Oral daily  buPROPion XL . 300 milliGRAM(s) Oral daily  cefTRIAXone Injectable.      cefTRIAXone Injectable. 1000 milliGRAM(s) IV Push every 24 hours  dextrose 50% Injectable 12.5 Gram(s) IV Push once  dextrose 50% Injectable 25 Gram(s) IV Push once  dextrose 50% Injectable 25 Gram(s) IV Push once  doxycycline IVPB      doxycycline IVPB 100 milliGRAM(s) IV Intermittent every 12 hours  DULoxetine 120 milliGRAM(s) Oral daily  epoetin krunal Injectable 8000 Unit(s) SubCutaneous <User Schedule>  ferrous    sulfate 325 milliGRAM(s) Oral daily  gabapentin 800 milliGRAM(s) Oral three times a day  insulin glargine Injectable (LANTUS) 20 Unit(s) SubCutaneous at bedtime  insulin lispro (HumaLOG) corrective regimen sliding scale   SubCutaneous three times a day before meals  magnesium oxide 400 milliGRAM(s) Oral three times a day with meals  montelukast 10 milliGRAM(s) Oral daily  pantoprazole    Tablet 40 milliGRAM(s) Oral before breakfast  predniSONE   Tablet 30 milliGRAM(s) Oral daily  saccharomyces boulardii 250 milliGRAM(s) Oral two times a day  traZODone 200 milliGRAM(s) Oral at bedtime  zinc sulfate 220 milliGRAM(s) Oral daily    MEDICATIONS  (PRN):  ALPRAZolam 0.5 milliGRAM(s) Oral three times a day PRN anxiety  benzocaine 15 mG/menthol 3.6 mG Lozenge 1 Lozenge Oral four times a day PRN Sore Throat  dextrose Gel 1 Dose(s) Oral once PRN Blood Glucose LESS THAN 70 milliGRAM(s)/deciliter  glucagon  Injectable 1 milliGRAM(s) IntraMuscular once PRN Glucose LESS THAN 70 milligrams/deciliter  HYDROcodone/homatropine Syrup 5 milliLiter(s) Oral four times a day PRN Cough  ondansetron Injectable 4 milliGRAM(s) IV Push every 6 hours PRN Nausea and/or Vomiting  oxyCODONE    IR 5 milliGRAM(s) Oral every 6 hours PRN Moderate to Severe Pain (7 - 10)      Allergies    No Known Allergies    SOCIAL HISTORY:    Smoking Status:Former smoker  Alcohol:Non drinker  Marital Status:Has long term partner  Occupation:Disability    FAMILY HISTORY:  No pertinent family history in first degree relatives        Vital Signs Last 24 Hrs  T(C): 36.6 (30 Apr 2018 08:30), Max: 36.6 (29 Apr 2018 16:49)  T(F): 97.8 (30 Apr 2018 08:30), Max: 97.9 (29 Apr 2018 16:49)  HR: 86 (30 Apr 2018 09:59) (79 - 90)  BP: 134/79 (30 Apr 2018 08:30) (134/79 - 157/83)  BP(mean): --  RR: 18 (30 Apr 2018 08:30) (18 - 20)  SpO2: 96% (30 Apr 2018 09:59) (94% - 96%)    PHYSICAL EXAM:      Constitutional:Chroncially ill-appearing, overweight, awake, alert, oriented    Eyes:pale, anicteric    ENMT:no adenopathy; drymm with poor dentition    Neck:Supple    Respiratory:Clear    Cardiovascular:RRR    Gastrointestinal:Soft, palpable spleen tip    Extremities:bandaged        LABS:                        8.1    3.5   )-----------( 17       ( 30 Apr 2018 07:49 )             26.4     04-30    133<L>  |  101  |  64.0<H>  ----------------------------<  159<H>  4.6   |  21.0<L>  |  2.67<H>    Ca    7.8<L>      30 Apr 2018 07:48  Phos  2.5     04-29  Mg     2.4     04-29      PTT - ( 30 Apr 2018 13:16 )  PTT:25.9 sec      RADIOLOGY & ADDITIONAL STUDIES:

## 2018-04-30 NOTE — PHYSICAL THERAPY INITIAL EVALUATION ADULT - PREDICTED DURATION OF THERAPY (DAYS/WKS), PT EVAL
pt was independent with RW and surgical shoe for mobility on level surfaces, declined stair assessment. no skilled needs at this time. will not follow.

## 2018-04-30 NOTE — PHYSICAL THERAPY INITIAL EVALUATION ADULT - ADDITIONAL COMMENTS
pt states she lives with her significant other in a 1st floor apartment with 1 step to enter. states she has a handle and that he assists her. has RW and rollator she uses as needed. significant other on disability and able to assist as needed.

## 2018-04-30 NOTE — PROGRESS NOTE ADULT - ASSESSMENT
-COPD exacerbation is improved  -Cough may improve as she gets further from smoking  -Obesity  -Osteomyelitis  -Smoker    Rec:    Drug nebs  O2 as needed; currently off O2; check again prior to d/c  Prednisone taper   Complete abx per ID  On GI/DVT prophylaxis  Resume outpt inhaler therapy on d/c  Anxiolytics  Weight loss  Pulm f/u after d/c  Will see in the office  Call if needed further in hospital.

## 2018-04-30 NOTE — PROGRESS NOTE BEHAVIORAL HEALTH - NSBHCONSULTFOLLOWAFTERCARE_PSY_A_CORE FT
return to [facility]; would inquire if therapist there can be provided to patient in addition to medication management

## 2018-04-30 NOTE — PROGRESS NOTE ADULT - SUBJECTIVE AND OBJECTIVE BOX
S: 53 year old female seen bedside s/p 1st ray amputation on 4/26. Patient denies F/C/N/V/SOB.    DARIEL:   left foot  Vasc: DP/PT 1/4; TG: WNL; CFT < 5 sec on 3-5 digits   Derm: Surgical site well coapted, no dehiscence no drainage, sutures intact. no purulence noted, no hematoma noted upon probing the surgical site. strikethrough noted on post-op dressing, but no active bleeding noted at this time; ecchymosis about the entire rearfoot  Neuro: diminished protective sensation    A: s/p 1st ray amputation of the left foot on 4/26    P: Chart reviewed and patient evaluated  Applied xeroform/DSD to surgical site.   Culture and pathology pending: few strep noted  continue iv abx.   Patient to be Heel weightbearing with surgical shoe to the LLE.  Podiatry will follow while in house.

## 2018-04-30 NOTE — PHYSICAL THERAPY INITIAL EVALUATION ADULT - NEUROVASCULAR ASSESSMENT LLE
pt states she has no sensation in her left toes./exam limited to exposed toes only, which had some ecchymosis. rest of foot covered with post-surgical bulky dressing and unable to be examined.

## 2018-04-30 NOTE — PROGRESS NOTE ADULT - SUBJECTIVE AND OBJECTIVE BOX
[facility]  INFECTIOUS DISEASES AND INTERNAL MEDICINE at [place]  =======================================================  Dwight Pillai MD FACZOE Vick MD  Diplomates American Board of Internal Medicine and Infectious Diseases  =======================================================    SONYA LENNON [id number]  follow up on Lt foot ulcer     s/p bone bx and wound debridement of left 1st metatarsal on 4/26/18.    No complaints  Afebrile      Allergies:  No Known Allergies      Antibiotics:  cefTRIAXone Injectable. 1000 milliGRAM(s) IV Push every 24 hours  doxycycline IVPB 100 milliGRAM(s) IV Intermittent every 12 hours      REVIEW OF SYSTEMS:  as above  all other ROS negative      Physical Exam:  Vital Signs Last 24 Hrs  T(C): 36.6 (30 Apr 2018 08:30), Max: 36.6 (29 Apr 2018 16:49)  T(F): 97.8 (30 Apr 2018 08:30), Max: 97.9 (29 Apr 2018 16:49)  HR: 86 (30 Apr 2018 09:59) (79 - 90)  BP: 134/79 (30 Apr 2018 08:30) (134/79 - 157/83)  RR: 18 (30 Apr 2018 08:30) (18 - 20)  SpO2: 96% (30 Apr 2018 09:59) (94% - 96%)      GEN: NAD, pleasant; thin  HEENT: normocephalic and atraumatic. EOMI. PERRL.    NECK: Supple.   LUNGS: Clear to auscultation.  HEART: Regular rate and rhythm   ABDOMEN: Soft, nontender, and nondistended.  Positive bowel sounds.    : No CVA tenderness  EXTREMITIES: Left foot in dressing  MSK: no joint swelling  NEUROLOGIC: No focal deficits  SKIN:   Left foot in dressing      Labs:   04-30    133<L>  |  101  |  64.0<H>  ----------------------------<  159<H>  4.6   |  21.0<L>  |  2.67<H>    Ca    7.8<L>      30 Apr 2018 07:48  Phos  2.5     04-29  Mg     2.4     04-29                 8.1    3.5   )-----------( 17       ( 30 Apr 2018 07:49 )             26.4         RECENT CULTURES:  04-26 @ 21:49 .Surgical Swab L 1st metatarsal clear margin/swab     Few Streptococcus species Identification and susceptibility to follow.  Culture in progress  No White blood cells  No organisms seen      04-20 @ 08:37 .Blood Blood-Peripheral     No growth at 5 days.      04-19 @ 14:40 .Other left foot ulcer Enterobacter cloacae  Methicillin resistant Staphylococcus aureus    Numerous Enterobacter cloacae  Numerous Methicillin resistant Staphylococcus aureus  Few Streptococcus agalactiae (Group B)  Culture in progress

## 2018-04-30 NOTE — PHYSICAL THERAPY INITIAL EVALUATION ADULT - MANUAL MUSCLE TESTING RESULTS, REHAB EVAL
no strength deficits were identified/except left hip flex 4-/5, knee ext 4-/5, ankle df 3/5(overpressure not applied to test >3/5 due to surgery and bulky dressing)

## 2018-04-30 NOTE — CONSULT NOTE ADULT - CONSULT REQUESTED DATE/TIME
21-Apr-2018 14:04
19-Apr-2018 14:19
21-Apr-2018 08:41
23-Apr-2018 15:39
24-Apr-2018 15:16
27-Apr-2018 14:41
30-Apr-2018 14:19
20-Apr-2018 13:44
19-Apr-2018 16:21

## 2018-04-30 NOTE — PROGRESS NOTE ADULT - SUBJECTIVE AND OBJECTIVE BOX
PMD: unknown  Podiatry Dr Sanchez  Nephro Dr Bedoya    CHIEF COMPLAINT: foot wound + generalized edema    Patient seen and examined at the bedside. No acute overnight events. Significant depression noted @ bediside yesterday., lengthy conversation + counseling provided. Complaining of nothing this AM, in better spirits. Denies fever/chills, headache, lightheadedness, dizziness, chest pain, palpitations, abd pain, nausea/vomiting/diarrhea, muscle pain.      =========================================================================================    PHYSICAL EXAM.    GEN - appears age appropriate. well nourished. pleasant.  HEENT - NCAT, EOMI, PAULIAN  RESP - CTA BL, no wheeze/stridor/rhonchi/crackles. not on supplemental O2. able to speak in full sentences without distress.   CARDIO - NS1S2, RRR. No murmurs/rubs/gallops.  ABD - Soft/Non tender/Non distended. Normal BS x4 quadrants. no guarding/rebound tenderness.  Ext - No JOSE. LLE wrapped in ace bandage  MSK - BL 5/5 strength on upper and lower extremities.   Neuro - AAOx3. cn 2-12 grossly intact  Psych - normal affect, more cheerful and positive compared to prior days  Skin - c/d/i. no rashes/lesions      VITAL SIGNS.    Vital Signs Last 24 Hrs  T(C): 36.6 (29 Apr 2018 08:25), Max: 36.9 (28 Apr 2018 16:16)  T(F): 97.9 (29 Apr 2018 08:25), Max: 98.4 (28 Apr 2018 16:16)  HR: 93 (29 Apr 2018 08:25) (82 - 93)  BP: 133/72 (29 Apr 2018 08:25) (133/72 - 169/82)  BP(mean): --  RR: 18 (29 Apr 2018 08:25) (16 - 18)  SpO2: 95% (29 Apr 2018 08:25) (95% - 98%)        =================================================    LABS.                          8.1    3.7   )-----------( 32       ( 29 Apr 2018 07:44 )             26.5     04-29    135  |  101  |  66.0<H>  ----------------------------<  156<H>  5.0   |  23.0  |  2.90<H>    Ca    8.0<L>      29 Apr 2018 07:44  Phos  2.5     04-29  Mg     2.4     04-29          I&O's Summary      Culture - Surgical Swab (collected 26 Apr 2018 21:49)  Source: .Surgical Swab L 1st metatarsal clear margin/swab  Gram Stain (26 Apr 2018 22:47):    No White blood cells    No organisms seen  Preliminary Report (28 Apr 2018 17:48):    Few Streptococcus species Identification and susceptibility to follow.    Culture in progress    Culture - Surgical Swab (04.26.18 @ 21:49)    -  Vancomycin: S 0.5    -  Clindamycin: R [id number]    -  Erythromycin: R [id number]    -  Levofloxacin: S 0.5    -  Penicillin: S 0.06    Gram Stain:   No White blood cells  No organisms seen    Specimen Source: .Surgical Swab L 1st metatarsal clear margin/swab    Culture Results:   Few Streptococcus agalactiae (Group B)  Culture in progress    Organism Identification: Streptococcus agalactiae (Group B)    Organism: Streptococcus agalactiae (Group B)    Method Type: JEREL            ================================================    IMAGING.      ================================================    MEDICATIONS  (STANDING):  ALBUTerol/ipratropium for Nebulization 3 milliLiter(s) Nebulizer every 6 hours  ascorbic acid 500 milliGRAM(s) Oral daily  buPROPion XL . 300 milliGRAM(s) Oral daily  cefTRIAXone Injectable.      cefTRIAXone Injectable. 1000 milliGRAM(s) IV Push every 24 hours  dextrose 50% Injectable 12.5 Gram(s) IV Push once  dextrose 50% Injectable 25 Gram(s) IV Push once  dextrose 50% Injectable 25 Gram(s) IV Push once  doxycycline IVPB      doxycycline IVPB 100 milliGRAM(s) IV Intermittent every 12 hours  DULoxetine 120 milliGRAM(s) Oral daily  epoetin krunal Injectable 8000 Unit(s) SubCutaneous <User Schedule>  ferrous    sulfate 325 milliGRAM(s) Oral daily  gabapentin 800 milliGRAM(s) Oral three times a day  heparin  Injectable 5000 Unit(s) SubCutaneous every 12 hours  insulin glargine Injectable (LANTUS) 20 Unit(s) SubCutaneous at bedtime  insulin lispro (HumaLOG) corrective regimen sliding scale   SubCutaneous three times a day before meals  magnesium oxide 400 milliGRAM(s) Oral three times a day with meals  montelukast 10 milliGRAM(s) Oral daily  pantoprazole    Tablet 40 milliGRAM(s) Oral before breakfast  saccharomyces boulardii 250 milliGRAM(s) Oral two times a day  traZODone 200 milliGRAM(s) Oral at bedtime  zinc sulfate 220 milliGRAM(s) Oral daily    MEDICATIONS  (PRN):  ALPRAZolam 0.5 milliGRAM(s) Oral three times a day PRN anxiety  dextrose Gel 1 Dose(s) Oral once PRN Blood Glucose LESS THAN 70 milliGRAM(s)/deciliter  glucagon  Injectable 1 milliGRAM(s) IntraMuscular once PRN Glucose LESS THAN 70 milligrams/deciliter  HYDROcodone/homatropine Syrup 5 milliLiter(s) Oral four times a day PRN Cough  ondansetron Injectable 4 milliGRAM(s) IV Push every 6 hours PRN Nausea and/or Vomiting  oxyCODONE    IR 5 milliGRAM(s) Oral every 6 hours PRN Moderate to Severe Pain (7 - 10)

## 2018-04-30 NOTE — PROGRESS NOTE ADULT - ASSESSMENT
52 y/o F with Hx of Fibromyalgia, DM2, Osteopenia, [place] Esophagus seen in Ed for Left Foot Plantar Ulcer, worsening kidney function sent in by Dr Sanchez, podiatry for paleness, gen weakness and edema.    Patient found to have pancytopenia along with left foot ulcer, confirmed to have OM on MRI and underwent left 1st metatarsal resection.  Patient developed ERIC on CKD 4 likely due to IV lasix drip and diarrhea. Initially treated for hepatic encephalopathy for suspected liver cirrhosis but hepatosplenomegaly on ultrasound with normal ammonia level. Course complicated by COPD exacerbation.  Patient extremely emotional about thought of getting HD and does not want AVF/AVG or dialysis in future. Psychiatry consulted for depression and home medications continued. Palliative care consulted.    Course complicated by significant thrombocytopenia.    Thrombocytopenia, ? HIT - WORSENED despite holding heparin. asymptomatic. in setting of total pancytopenia. intermediate 4T score. Plan: cont to hold heparin. monitor closely for signs of acute bleed/thrombosis. serial repeat cbc. heme consult. empiric tx for HIT, lab work up sent, start argatroban.      Diabetic Foot Ulcer complicated by Lt 1st digit OM- IMRPROVING. OM confirmed on MRI. s/p resection of 1st great toe on Lt foot by podiatry, uncomplicated well tolerated procedure. on rocephin + doxy per ID. bone culture resulted as strep agalactiae sensistve to vanco, levofloxacin, PCN. Plan: will discuss with ID stopping doxy as no MRSA coverage needed + duration of abx needed. cont local wound care per podiatry. PT eval still pending.     Mild COPD Exac- IMPROVING. Plan: c/w prednisone taper. duonebs. resp inhalers. no need for supplemental o2. pulmonary following    CKD 5- IMPROVING. complicated by asymptomatic mild hyperkalemia, resolved. nephro following. with improved mood pt now more amenable to idea of HD, sp vein mapping by [facility] during this admission. no inpatient emergent need for HD. Plan: cont to monitor renal function. follow up outpt with vasc to continue conversations regarding fistula placement, deferring until acute infection with OM fully treated    Depression- IMPROVING. acute mild worsening noted during this admission sec to concerns regarding medical condition and long term prognosis. bedside conversation with counseling and positive reinforcement provided. Psych reeval appreciated, no changes to current regimen advised. Plan: continue home meds.  on dc will need referral to Therapist for continued care, she benefits from having long sessions to talk out her frustrations, responds very well to active listening and positive reinforcement    Pancytopenia- asymptomatic. anemia likely of CKD. no clear explanation for leukopenia. now also with superimposed acute on  chronic thrombocytopenia. heme onc + renal following. Plan: serial cbc. heme follow up in office. epo + fe supplementation    DM2 complicated by ckd + foot ulcer on long term insulin therapy- also complicated by asymptomatic hyperglycemia. complicated by asymptomatic hyperglycemia. hga1c 5.6, @ goal <7, note though may be falsely low given anemia. cont lantus ,c/w raiss    NAFLD- remote hx of CT with signs of cirrhosis, not available for review @ this time, cannot confirm. imaging this admission only with signs of hepatosplenomegaly + fatty infiltration (US). d/c lactulose/rifaximin as no encephalopathy during this admission. ammonia normal Plan: outpatient GI evaluation. weight loss + diet advised, control of comorbid conditions

## 2018-04-30 NOTE — CONSULT NOTE ADULT - PROVIDER SPECIALTY LIST ADULT
Cardiology
Heme/Onc
Palliative Care
Podiatry
Pulmonology
Vascular Surgery
Heme/Onc
Nephrology
Infectious Disease

## 2018-04-30 NOTE — PROCEDURE NOTE - PROCEDURE
<<-----Click on this checkbox to enter Procedure Peripheral intravenous catheter assessment  04/30/2018  #20G  1.75"   IV ns flush good heme back right cephalic vein  Active  JSTEELE  Vascular access with ultrasound guidance  04/30/2018  Patent right cephalic vein  Active  JSTEELE

## 2018-04-30 NOTE — PROGRESS NOTE BEHAVIORAL HEALTH - NSBHCHARTREVIEWVS_PSY_A_CORE FT
Vital Signs Last 24 Hrs  T(C): 36.6 (30 Apr 2018 08:30), Max: 36.6 (29 Apr 2018 16:49)  T(F): 97.8 (30 Apr 2018 08:30), Max: 97.9 (29 Apr 2018 16:49)  HR: 86 (30 Apr 2018 09:59) (79 - 90)  BP: 134/79 (30 Apr 2018 08:30) (134/79 - 157/83)  BP(mean): --  RR: 18 (30 Apr 2018 08:30) (18 - 20)  SpO2: 96% (30 Apr 2018 09:59) (94% - 96%)

## 2018-04-30 NOTE — PROGRESS NOTE ADULT - ASSESSMENT
1) CKD IV  2) Diabetic Nephropathy ERIC on CKD  3) Anemia of renal disease  4) ?Liver cirrhosis    CKD IV; No AVF on this admission due to active infection  improving,   IVF D/C'd  urine studies pending  Anemia not at goal; on iron and RHEA epo 8000 TIW  Continue current management   Needs psych consult; pt has psychiatrist outpt;     jayson Wagner

## 2018-05-01 LAB
ANION GAP SERPL CALC-SCNC: 13 MMOL/L — SIGNIFICANT CHANGE UP (ref 5–17)
APTT BLD: 51.5 SEC — HIGH (ref 27.5–37.4)
APTT BLD: 54.6 SEC — HIGH (ref 27.5–37.4)
BUN SERPL-MCNC: 68 MG/DL — HIGH (ref 8–20)
CALCIUM SERPL-MCNC: 7.9 MG/DL — LOW (ref 8.6–10.2)
CHLORIDE SERPL-SCNC: 100 MMOL/L — SIGNIFICANT CHANGE UP (ref 98–107)
CLOSURE TME COLL+EPINEP BLD: 35 K/UL — LOW (ref 150–400)
CO2 SERPL-SCNC: 20 MMOL/L — LOW (ref 22–29)
CREAT SERPL-MCNC: 2.8 MG/DL — HIGH (ref 0.5–1.3)
CULTURE RESULTS: SIGNIFICANT CHANGE UP
GLUCOSE BLDC GLUCOMTR-MCNC: 231 MG/DL — HIGH (ref 70–99)
GLUCOSE BLDC GLUCOMTR-MCNC: 278 MG/DL — HIGH (ref 70–99)
GLUCOSE BLDC GLUCOMTR-MCNC: 395 MG/DL — HIGH (ref 70–99)
GLUCOSE BLDC GLUCOMTR-MCNC: 398 MG/DL — HIGH (ref 70–99)
GLUCOSE SERPL-MCNC: 191 MG/DL — HIGH (ref 70–115)
HCT VFR BLD CALC: 25.7 % — LOW (ref 37–47)
HGB BLD-MCNC: 8 G/DL — LOW (ref 12–16)
INR BLD: 1.88 RATIO — HIGH (ref 0.88–1.16)
MCHC RBC-ENTMCNC: 29.3 PG — SIGNIFICANT CHANGE UP (ref 27–31)
MCHC RBC-ENTMCNC: 31.1 G/DL — LOW (ref 32–36)
MCV RBC AUTO: 94.1 FL — SIGNIFICANT CHANGE UP (ref 81–99)
ORGANISM # SPEC MICROSCOPIC CNT: SIGNIFICANT CHANGE UP
ORGANISM # SPEC MICROSCOPIC CNT: SIGNIFICANT CHANGE UP
PF4 HEPARIN CMPLX AB SER-ACNC: NEGATIVE — SIGNIFICANT CHANGE UP
PF4 HEPARIN CMPLX AB SERPL QL IA: 0.14 ABS — SIGNIFICANT CHANGE UP
PLATELET # BLD AUTO: SIGNIFICANT CHANGE UP K/UL (ref 150–400)
POTASSIUM SERPL-MCNC: 5.2 MMOL/L — SIGNIFICANT CHANGE UP (ref 3.5–5.3)
POTASSIUM SERPL-SCNC: 5.2 MMOL/L — SIGNIFICANT CHANGE UP (ref 3.5–5.3)
PROTHROM AB SERPL-ACNC: 21 SEC — HIGH (ref 9.8–12.7)
RBC # BLD: 2.73 M/UL — LOW (ref 4.4–5.2)
RBC # FLD: 15.2 % — SIGNIFICANT CHANGE UP (ref 11–15.6)
SODIUM SERPL-SCNC: 133 MMOL/L — LOW (ref 135–145)
SPECIMEN SOURCE: SIGNIFICANT CHANGE UP
WBC # BLD: 4.4 K/UL — LOW (ref 4.8–10.8)
WBC # FLD AUTO: 4.4 K/UL — LOW (ref 4.8–10.8)

## 2018-05-01 PROCEDURE — 99233 SBSQ HOSP IP/OBS HIGH 50: CPT

## 2018-05-01 PROCEDURE — 99232 SBSQ HOSP IP/OBS MODERATE 35: CPT

## 2018-05-01 RX ORDER — CEFTRIAXONE 500 MG/1
2000 INJECTION, POWDER, FOR SOLUTION INTRAMUSCULAR; INTRAVENOUS EVERY 24 HOURS
Qty: 0 | Refills: 0 | Status: DISCONTINUED | OUTPATIENT
Start: 2018-05-01 | End: 2018-05-04

## 2018-05-01 RX ORDER — FERROUS SULFATE 325(65) MG
325 TABLET ORAL THREE TIMES A DAY
Qty: 0 | Refills: 0 | Status: DISCONTINUED | OUTPATIENT
Start: 2018-05-01 | End: 2018-05-02

## 2018-05-01 RX ORDER — GABAPENTIN 400 MG/1
600 CAPSULE ORAL AT BEDTIME
Qty: 0 | Refills: 0 | Status: DISCONTINUED | OUTPATIENT
Start: 2018-05-01 | End: 2018-05-04

## 2018-05-01 RX ORDER — INSULIN GLARGINE 100 [IU]/ML
23 INJECTION, SOLUTION SUBCUTANEOUS AT BEDTIME
Qty: 0 | Refills: 0 | Status: DISCONTINUED | OUTPATIENT
Start: 2018-05-01 | End: 2018-05-02

## 2018-05-01 RX ORDER — FOLIC ACID 0.8 MG
1 TABLET ORAL DAILY
Qty: 0 | Refills: 0 | Status: DISCONTINUED | OUTPATIENT
Start: 2018-05-01 | End: 2018-05-04

## 2018-05-01 RX ADMIN — MONTELUKAST 10 MILLIGRAM(S): 4 TABLET, CHEWABLE ORAL at 11:27

## 2018-05-01 RX ADMIN — Medication 200 MILLIGRAM(S): at 21:44

## 2018-05-01 RX ADMIN — Medication 3 MILLILITER(S): at 03:18

## 2018-05-01 RX ADMIN — Medication 250 MILLIGRAM(S): at 17:28

## 2018-05-01 RX ADMIN — Medication 1 MILLIGRAM(S): at 17:28

## 2018-05-01 RX ADMIN — Medication 325 MILLIGRAM(S): at 21:44

## 2018-05-01 RX ADMIN — Medication 3 MILLILITER(S): at 09:44

## 2018-05-01 RX ADMIN — MAGNESIUM OXIDE 400 MG ORAL TABLET 400 MILLIGRAM(S): 241.3 TABLET ORAL at 11:28

## 2018-05-01 RX ADMIN — OXYCODONE HYDROCHLORIDE 5 MILLIGRAM(S): 5 TABLET ORAL at 04:48

## 2018-05-01 RX ADMIN — Medication 110 MILLIGRAM(S): at 11:35

## 2018-05-01 RX ADMIN — Medication 2: at 07:59

## 2018-05-01 RX ADMIN — PANTOPRAZOLE SODIUM 40 MILLIGRAM(S): 20 TABLET, DELAYED RELEASE ORAL at 04:48

## 2018-05-01 RX ADMIN — GABAPENTIN 600 MILLIGRAM(S): 400 CAPSULE ORAL at 21:44

## 2018-05-01 RX ADMIN — Medication 3 MILLILITER(S): at 20:07

## 2018-05-01 RX ADMIN — Medication 5: at 11:20

## 2018-05-01 RX ADMIN — OXYCODONE HYDROCHLORIDE 5 MILLIGRAM(S): 5 TABLET ORAL at 05:18

## 2018-05-01 RX ADMIN — Medication 500 MILLIGRAM(S): at 11:22

## 2018-05-01 RX ADMIN — OXYCODONE HYDROCHLORIDE 5 MILLIGRAM(S): 5 TABLET ORAL at 11:22

## 2018-05-01 RX ADMIN — OXYCODONE HYDROCHLORIDE 5 MILLIGRAM(S): 5 TABLET ORAL at 18:40

## 2018-05-01 RX ADMIN — Medication 250 MILLIGRAM(S): at 04:47

## 2018-05-01 RX ADMIN — Medication 0.5 MILLIGRAM(S): at 04:48

## 2018-05-01 RX ADMIN — OXYCODONE HYDROCHLORIDE 5 MILLIGRAM(S): 5 TABLET ORAL at 11:39

## 2018-05-01 RX ADMIN — OXYCODONE HYDROCHLORIDE 5 MILLIGRAM(S): 5 TABLET ORAL at 23:49

## 2018-05-01 RX ADMIN — DULOXETINE HYDROCHLORIDE 120 MILLIGRAM(S): 30 CAPSULE, DELAYED RELEASE ORAL at 11:22

## 2018-05-01 RX ADMIN — Medication 0.5 MILLIGRAM(S): at 21:55

## 2018-05-01 RX ADMIN — GABAPENTIN 800 MILLIGRAM(S): 400 CAPSULE ORAL at 04:49

## 2018-05-01 RX ADMIN — Medication 30 MILLIGRAM(S): at 04:48

## 2018-05-01 RX ADMIN — Medication 3 MILLILITER(S): at 15:33

## 2018-05-01 RX ADMIN — ERYTHROPOIETIN 8000 UNIT(S): 10000 INJECTION, SOLUTION INTRAVENOUS; SUBCUTANEOUS at 18:41

## 2018-05-01 RX ADMIN — MAGNESIUM OXIDE 400 MG ORAL TABLET 400 MILLIGRAM(S): 241.3 TABLET ORAL at 17:23

## 2018-05-01 RX ADMIN — Medication 110 MILLIGRAM(S): at 21:44

## 2018-05-01 RX ADMIN — Medication 325 MILLIGRAM(S): at 11:22

## 2018-05-01 RX ADMIN — MAGNESIUM OXIDE 400 MG ORAL TABLET 400 MILLIGRAM(S): 241.3 TABLET ORAL at 04:48

## 2018-05-01 RX ADMIN — Medication 5: at 17:22

## 2018-05-01 RX ADMIN — CEFTRIAXONE 2000 MILLIGRAM(S): 500 INJECTION, POWDER, FOR SOLUTION INTRAMUSCULAR; INTRAVENOUS at 13:45

## 2018-05-01 RX ADMIN — BUPROPION HYDROCHLORIDE 300 MILLIGRAM(S): 150 TABLET, EXTENDED RELEASE ORAL at 11:30

## 2018-05-01 RX ADMIN — ZINC SULFATE TAB 220 MG (50 MG ZINC EQUIVALENT) 220 MILLIGRAM(S): 220 (50 ZN) TAB at 11:21

## 2018-05-01 RX ADMIN — OXYCODONE HYDROCHLORIDE 5 MILLIGRAM(S): 5 TABLET ORAL at 17:28

## 2018-05-01 RX ADMIN — INSULIN GLARGINE 23 UNIT(S): 100 INJECTION, SOLUTION SUBCUTANEOUS at 21:44

## 2018-05-01 NOTE — PROGRESS NOTE ADULT - SUBJECTIVE AND OBJECTIVE BOX
PMD: unknown  Podiatry Dr Sanchez  Nephro Dr Bedoya    CHIEF COMPLAINT: foot wound + generalized edema    Patient seen and examined at the bedside. No acute overnight events. No events  yesterday. Complaining of recurrent lowering of mood this AM, redirected with counseling @ bedside. Denies fever/chills, headache, lightheadedness, dizziness, chest pain, palpitations, abd pain, nausea/vomiting/diarrhea, muscle pain.      =========================================================================================    PHYSICAL EXAM.    GEN - appears age appropriate. well nourished. pleasant.  HEENT - NCAT, EOMI, PAULINA  RESP - CTA BL, mild anterior wheeze/stridor/rhonchi/crackles. not on supplemental O2. able to speak in full sentences without distress.   CARDIO - NS1S2, RRR. No murmurs/rubs/gallops.  ABD - Soft/Non tender/Non distended. Normal BS x4 quadrants. no guarding/rebound tenderness.  Ext - No JOSE. LLE wrapped in ace bandage  MSK - BL 5/5 strength on upper and lower extremities.   Neuro - AAOx3. cn 2-12 grossly intact  Psych - normal affect, more cheerful and positive compared to prior days  Skin - c/d/i. no rashes/lesions      VITAL SIGNS.    Vital Signs Last 24 Hrs  T(C): 36.6 (01 May 2018 08:22), Max: 36.7 (01 May 2018 00:34)  T(F): 97.8 (01 May 2018 08:22), Max: 98.1 (01 May 2018 00:34)  HR: 83 (01 May 2018 09:47) (77 - 84)  BP: 141/82 (01 May 2018 08:22) (139/75 - 141/82)  BP(mean): --  RR: 17 (01 May 2018 08:22) (17 - 18)  SpO2: 98% (01 May 2018 09:47) (93% - 100%)        =================================================    LABS.                          8.0    4.4   )-----------( clumped    ( 01 May 2018 08:06 )             25.7     05-01    133<L>  |  100  |  68.0<H>  ----------------------------<  191<H>  5.2   |  20.0<L>  |  2.80<H>    Ca    7.9<L>      01 May 2018 08:06        PT/INR - ( 01 May 2018 08:06 )   PT: 21.0 sec;   INR: 1.88 ratio         PTT - ( 01 May 2018 08:06 )  PTT:51.5 sec  I&O's Summary        ================================================    IMAGING.      ================================================    MEDICATIONS  (STANDING):  ALBUTerol/ipratropium for Nebulization 3 milliLiter(s) Nebulizer every 6 hours  argatroban Infusion 2 MICROgram(s)/kG/Min (9.252 mL/Hr) IV Continuous <Continuous>  ascorbic acid 500 milliGRAM(s) Oral daily  buPROPion XL . 300 milliGRAM(s) Oral daily  cefTRIAXone Injectable. 2000 milliGRAM(s) IV Push every 24 hours  dextrose 50% Injectable 12.5 Gram(s) IV Push once  dextrose 50% Injectable 25 Gram(s) IV Push once  dextrose 50% Injectable 25 Gram(s) IV Push once  doxycycline IVPB      doxycycline IVPB 100 milliGRAM(s) IV Intermittent every 12 hours  DULoxetine 120 milliGRAM(s) Oral daily  epoetin krunal Injectable 8000 Unit(s) SubCutaneous <User Schedule>  ferrous    sulfate 325 milliGRAM(s) Oral three times a day  folic acid 1 milliGRAM(s) Oral daily  gabapentin 600 milliGRAM(s) Oral at bedtime  insulin glargine Injectable (LANTUS) 20 Unit(s) SubCutaneous at bedtime  insulin lispro (HumaLOG) corrective regimen sliding scale   SubCutaneous three times a day before meals  magnesium oxide 400 milliGRAM(s) Oral three times a day with meals  montelukast 10 milliGRAM(s) Oral daily  pantoprazole    Tablet 40 milliGRAM(s) Oral before breakfast  predniSONE   Tablet   Oral   saccharomyces boulardii 250 milliGRAM(s) Oral two times a day  traZODone 200 milliGRAM(s) Oral at bedtime  zinc sulfate 220 milliGRAM(s) Oral daily    MEDICATIONS  (PRN):  ALPRAZolam 0.5 milliGRAM(s) Oral three times a day PRN anxiety  benzocaine 15 mG/menthol 3.6 mG Lozenge 1 Lozenge Oral four times a day PRN Sore Throat  dextrose Gel 1 Dose(s) Oral once PRN Blood Glucose LESS THAN 70 milliGRAM(s)/deciliter  glucagon  Injectable 1 milliGRAM(s) IntraMuscular once PRN Glucose LESS THAN 70 milligrams/deciliter  HYDROcodone/homatropine Syrup 5 milliLiter(s) Oral four times a day PRN Cough  ondansetron Injectable 4 milliGRAM(s) IV Push every 6 hours PRN Nausea and/or Vomiting  oxyCODONE    IR 5 milliGRAM(s) Oral every 6 hours PRN Moderate to Severe Pain (7 - 10)

## 2018-05-01 NOTE — PROGRESS NOTE ADULT - SUBJECTIVE AND OBJECTIVE BOX
[facility] DIVISION OF KIDNEY DISEASES AND HYPERTENSION -- FOLLOW UP NOTE  --------------------------------------------------------------------------------  Chief Complaint:    DMN , CKD - 4 , Renal Anemia,    24 hour events/subjective:  Pt seen and examined  Lying in bed awake, NAD     PAST HISTORY  --------------------------------------------------------------------------------  No significant changes to PMH, PSH, FHx, SHx, unless otherwise noted    ALLERGIES & MEDICATIONS  --------------------------------------------------------------------------------  Allergies    No Known Allergies    Standing Inpatient Medications  ALBUTerol/ipratropium for Nebulization 3 milliLiter(s) Nebulizer every 6 hours  argatroban Infusion 2 MICROgram(s)/kG/Min IV Continuous <Continuous>  ascorbic acid 500 milliGRAM(s) Oral daily  buPROPion XL . 300 milliGRAM(s) Oral daily  cefTRIAXone Injectable.      cefTRIAXone Injectable. 1000 milliGRAM(s) IV Push every 24 hours  dextrose 50% Injectable 12.5 Gram(s) IV Push once  dextrose 50% Injectable 25 Gram(s) IV Push once  dextrose 50% Injectable 25 Gram(s) IV Push once  doxycycline IVPB      doxycycline IVPB 100 milliGRAM(s) IV Intermittent every 12 hours  DULoxetine 120 milliGRAM(s) Oral daily  epoetin krunal Injectable 8000 Unit(s) SubCutaneous <User Schedule>  ferrous    sulfate 325 milliGRAM(s) Oral daily  gabapentin 800 milliGRAM(s) Oral three times a day  insulin glargine Injectable (LANTUS) 20 Unit(s) SubCutaneous at bedtime  insulin lispro (HumaLOG) corrective regimen sliding scale   SubCutaneous three times a day before meals  magnesium oxide 400 milliGRAM(s) Oral three times a day with meals  montelukast 10 milliGRAM(s) Oral daily  pantoprazole    Tablet 40 milliGRAM(s) Oral before breakfast  predniSONE   Tablet 30 milliGRAM(s) Oral daily  saccharomyces boulardii 250 milliGRAM(s) Oral two times a day  traZODone 200 milliGRAM(s) Oral at bedtime  zinc sulfate 220 milliGRAM(s) Oral daily    PRN Inpatient Medications  ALPRAZolam 0.5 milliGRAM(s) Oral three times a day PRN  benzocaine 15 mG/menthol 3.6 mG Lozenge 1 Lozenge Oral four times a day PRN  dextrose Gel 1 Dose(s) Oral once PRN  glucagon  Injectable 1 milliGRAM(s) IntraMuscular once PRN  HYDROcodone/homatropine Syrup 5 milliLiter(s) Oral four times a day PRN  ondansetron Injectable 4 milliGRAM(s) IV Push every 6 hours PRN  oxyCODONE    IR 5 milliGRAM(s) Oral every 6 hours PRN      REVIEW OF SYSTEMS  --------------------------------------------------------------------------------  Gen: No weight changes, +  fatigue,  No fevers/chills,   Skin: No rashes  Head/Eyes/Ears/Mouth: No headache; Normal hearing; Normal vision w/o blurriness;   Respiratory: No dyspnea, cough, wheezing, hemoptysis  CV: No chest pain, PND, orthopnea  GI: No abdominal pain, diarrhea, constipation, nausea, vomiting, melena, hematochezia  : No increased frequency, dysuria, hematuria, nocturia  MSK: +  joint pain/swelling; no back pain; no edema  Neuro: No dizziness/lightheadedness, weakness, seizures, numbness, tingling  Heme:  + easy bruising ,  bleeding  Endo: No heat/cold intolerance  Psych: No significant nervousness, anxiety, stress,  + depression    All other systems were reviewed and are negative, except as noted.    VITALS/PHYSICAL EXAM  --------------------------------------------------------------------------------    Vital Signs Last 48 Hrs  T(C): 36.6 (01 May 2018 08:22), Max: 36.7 (01 May 2018 00:34)  T(F): 97.8 (01 May 2018 08:22), Max: 98.1 (01 May 2018 00:34)  HR: 77 (01 May 2018 08:22) (77 - 88)  BP: 141/82 (01 May 2018 08:22) (139/75 - 154/79)  BP(mean): --  RR: 17 (01 May 2018 08:22) (17 - 18)  SpO2: 93% (01 May 2018 08:22) (93% - 100%)    T(C): 36.6 (18 @ 08:30), Max: 36.6 (18 @ 16:49)  HR: 86 (18 @ 09:59) (79 - 90)  BP: 134/79 (18 @ 08:30) (134/79 - 157/83)  RR: 18 (18 @ 08:30) (18 - 20)  SpO2: 96% (18 @ 09:59) (94% - 96%)    Physical Exam:  	Gen: NAD  	HEENT: PERRL, supple neck, clear oropharynx  	Pulm: CTA B/L  	CV: RRR, S1S2; no rub  	Back: No spinal or CVA tenderness; no sacral edema  	Abd: +BS, soft, nontender/nondistended  	: No suprapubic tenderness  	UE: Warm, FROM, no clubbing, intact strength; no edema; no asterixis  	LE: foot wrapped  	Neuro: No focal deficits, intact gait  	Psych: Normal affect and mood  	Skin: Warm, without rashes  	Vascular access:    LABS/STUDIES  --------------------------------------------------------------------------------    133<L>  |  101    |  64.0<H>  ----------------------------<  159<H>  Ca:7.8<L> (2018 07:48)  4.6     |  21.0<L>  |  2.67<H>                         9.4<L>  4.0<L> )-----------( clumped    ( 2018 19:19 )             30.1<L>    Phos:2.5 mg/dL M.4 mg/dL PTH:-- Uric acid:-- Serum Osm:--  Ferritin:-- Iron:-- TIBC:-- Tsat:--  B12:-- TSH:-- ( 07:44)      UProt:-- UCr:-- P/C Ratio:-- 24 hour Prot:-- UVol:-- CrCl:--  Amira:<30 mmol/L UOsm:244 mosm/kg<L> UVol:-- UCl:-- UK:-- ( 22:27)                 8.1    3.5   >-----------<  17       [18 07:49]              26.4     133  |  101  |  64.0  ----------------------------<  159      [18 07:48]  4.6   |  21.0  |  2.67        Ca     7.8     [18 07:48]      Mg     2.4     [18 07:44]      Phos  2.5     [18 07:44]    Creatinine Trend:  SCr 2.67 [:48]  SCr 2.90 [04-29 @ 07:44]  SCr 3.01 [ @ 10:14]  SCr 3.03 [ @ 07:24]  SCr 2.99 [ @ 15:02]    Urinalysis - [18 @ 21:30]      Color Yellow / Appearance Clear / SG 1.010 / pH 6.0      Gluc Negative / Ketone Negative  / Bili Negative / Urobili Negative       Blood Large / Protein 100 / Leuk Est Negative / Nitrite Negative      RBC 11-25 / WBC 0-2 / Hyaline  / Gran  / Sq Epi  / Non Sq Epi Few / Bacteria Occasional      Iron 77, TIBC 253, %sat 30      [18 @ 09:07]  Ferritin 372      [18 @ 08:02]  HbA1c 5.6      [18 @ 08:37]  TSH 4.32      [18 @ 11:11]    1) CKD IV  2) Diabetic Nephropathy ERIC / CKD  3) Anemia of renal disease  4) Liver cirrhosis    CKD  IV; No AVF on this admission due to active infection,    Anemia not at goal; on iron and RHEA 8000 TIW  Continue current management   Neurontin Dose modified for eGFR < 30 ml.,    OP F.U when discharged,    Thank you,

## 2018-05-01 NOTE — PROGRESS NOTE ADULT - SUBJECTIVE AND OBJECTIVE BOX
HPI: Patient is a 53y Female seen on consultation for the evaluation and management of pancytopenia.  Also, report of HIT antibodies.  Patient has H/O of DM, foot ulcers, renal insufficency, HSM, possible cirrhosis, depression, on long term antidepressants.  Patient was admitted with wound infection of feet, treated with antibiotics.  Found to be pancytopenic; no history of bleeding.  She denies fevers or chills.  Complains of chronic left-sided abdominal discomfort with nausea.      INTERVAL HISTORY    patient is tearful and upset her diagnosis of osteomyelitis. She denies any bleeding. no fevers.     PAST MEDICAL & SURGICAL HISTORY:  Fibromyalgia  DM (diabetes mellitus)  Foot ulcer: Left Foot  Osteopenia  Chronic pain  Back ache  Arthritis  Shoulder joint stiffness, left  [place] esophagus  Stomach ulcer  Diabetes  Asthma  S/P knee surgery  S/P hysterectomy  S/P cholecystectomy      REVIEW OF SYSTEMS      	      	  ENMT:	c/o dry mouth    Respiratory and Thorax:denies SOB or chest pain  	  Cardiovascular:	no chest pain or palpitations      Genitourinary:	denies hematuria    Musculoskeletal:	chronic bony pain    	    Psychiatric:chronic depression	      MEDICATIONS  (STANDING):  ALBUTerol/ipratropium for Nebulization 3 milliLiter(s) Nebulizer every 6 hours  argatroban Infusion 2 MICROgram(s)/kG/Min (9.252 mL/Hr) IV Continuous <Continuous>  ascorbic acid 500 milliGRAM(s) Oral daily  buPROPion XL . 300 milliGRAM(s) Oral daily  cefTRIAXone Injectable. 2000 milliGRAM(s) IV Push every 24 hours  dextrose 50% Injectable 12.5 Gram(s) IV Push once  dextrose 50% Injectable 25 Gram(s) IV Push once  dextrose 50% Injectable 25 Gram(s) IV Push once  doxycycline IVPB      doxycycline IVPB 100 milliGRAM(s) IV Intermittent every 12 hours  DULoxetine 120 milliGRAM(s) Oral daily  epoetin krunal Injectable 8000 Unit(s) SubCutaneous <User Schedule>  ferrous    sulfate 325 milliGRAM(s) Oral daily  gabapentin 600 milliGRAM(s) Oral at bedtime  insulin glargine Injectable (LANTUS) 20 Unit(s) SubCutaneous at bedtime  insulin lispro (HumaLOG) corrective regimen sliding scale   SubCutaneous three times a day before meals  magnesium oxide 400 milliGRAM(s) Oral three times a day with meals  montelukast 10 milliGRAM(s) Oral daily  pantoprazole    Tablet 40 milliGRAM(s) Oral before breakfast  predniSONE   Tablet   Oral   saccharomyces boulardii 250 milliGRAM(s) Oral two times a day  traZODone 200 milliGRAM(s) Oral at bedtime  zinc sulfate 220 milliGRAM(s) Oral daily      ICU Vital Signs Last 24 Hrs  T(C): 36.6 (01 May 2018 08:22), Max: 36.7 (01 May 2018 00:34)  T(F): 97.8 (01 May 2018 08:22), Max: 98.1 (01 May 2018 00:34)  HR: 83 (01 May 2018 09:47) (77 - 84)  BP: 141/82 (01 May 2018 08:22) (139/75 - 154/79)  BP(mean): --  ABP: --  ABP(mean): --  RR: 17 (01 May 2018 08:22) (17 - 18)  SpO2: 98% (01 May 2018 09:47) (93% - 100%)      PHYSICAL EXAM:      Constitutional:Chroncially ill-appearing, overweight, awake, alert, oriented    Eyes:pale, anicteric    ENMT:no adenopathy; drymm with poor dentition    Neck:Supple    Respiratory:Clear    Cardiovascular:RRR    Gastrointestinal:Soft, palpable spleen tip    Extremities:bandaged                  8.0                  133  | 20.0 | 68.0         4.4   >-----------< clumped   ------------------------< 191                   25.7                 5.2  | 100  | 2.80                                         Ca 7.9   Mg x     Ph x      PT/INR - ( 01 May 2018 08:06 )   PT: 21.0 sec;   INR: 1.88 ratio         PTT - ( 01 May 2018 08:06 )  PTT:51.5 sec

## 2018-05-01 NOTE — PROGRESS NOTE ADULT - SUBJECTIVE AND OBJECTIVE BOX
[facility]  INFECTIOUS DISEASES AND INTERNAL MEDICINE at [place]  =======================================================  Dwight Pillai MD FACZOE Vick MD  Diplomates American Board of Internal Medicine and Infectious Diseases  =======================================================    SONYA LENNON [id number]  follow up on Lt foot ulcer     s/p bone bx and wound debridement of left 1st metatarsal on 4/26/18.    No complaints  Afebrile      Allergies:  No Known Allergies      Antibiotics:  cefTRIAXone Injectable. 1000 milliGRAM(s) IV Push every 24 hours  doxycycline IVPB 100 milliGRAM(s) IV Intermittent every 12 hours      REVIEW OF SYSTEMS:  as above  all other ROS negative      Physical Exam:  Vital Signs Last 24 Hrs  T(C): 36.6 (01 May 2018 08:22), Max: 36.7 (01 May 2018 00:34)  T(F): 97.8 (01 May 2018 08:22), Max: 98.1 (01 May 2018 00:34)  HR: 83 (01 May 2018 09:47) (77 - 84)  BP: 141/82 (01 May 2018 08:22) (139/75 - 154/79)  RR: 17 (01 May 2018 08:22) (17 - 18)  SpO2: 98% (01 May 2018 09:47) (93% - 100%)      GEN: NAD, pleasant; thin  HEENT: normocephalic and atraumatic. EOMI. PERRL.    NECK: Supple.   LUNGS: Clear to auscultation.  HEART: Regular rate and rhythm   ABDOMEN: Soft, nontender, and nondistended.  Positive bowel sounds.    : No CVA tenderness  EXTREMITIES: Left foot in dressing  MSK: no joint swelling  NEUROLOGIC: No focal deficits  SKIN:   Left foot in dressing      Labs:   05-01    133<L>  |  100  |  68.0<H>  ----------------------------<  191<H>  5.2   |  20.0<L>  |  2.80<H>    Ca    7.9<L>      01 May 2018 08:06               8.0    4.4   )-----------( clumped    ( 01 May 2018 08:06 )             25.7       PT/INR - ( 01 May 2018 08:06 )   PT: 21.0 sec;   INR: 1.88 ratio         PTT - ( 01 May 2018 08:06 )  PTT:51.5 sec      RECENT CULTURES:  04-26 @ 21:49 .Surgical Swab L 1st metatarsal clear margin/swab Streptococcus agalactiae (Group B)    Few Streptococcus agalactiae (Group B)  Culture in progress  No White blood cells  No organisms seen      04-20 @ 08:37 .Blood Blood-Peripheral     No growth at 5 days.      04-19 @ 14:40 .Other left foot ulcer Enterobacter cloacae  Methicillin resistant Staphylococcus aureus    Numerous Enterobacter cloacae  Numerous Methicillin resistant Staphylococcus aureus  Few Streptococcus agalactiae (Group B)  Culture in progress

## 2018-05-01 NOTE — PROGRESS NOTE ADULT - ASSESSMENT
Imp:  Patient is seen for pancytopenia; known HSM, possible underlying cirrhosis  Labs reviewed-iron studies non-diagnostic, B12 normal, folate low; elevated Cr-  Multifactoria anemia, secondary to chronic disease/CKD,HSM  Thrombocytopenia-secondary to HSM, ? cirrhosis, infection and antibiotics. 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 will repeat HIT antibodies. If Plt count continue to decrease, would monitor off anticoagulation.     Rec:  po Folate          Procrit          Iron, either po or IV

## 2018-05-01 NOTE — PROGRESS NOTE ADULT - ASSESSMENT
54 y/o F with Hx of DM2, Osteopenia seen in Ed for Left Foot Plantar Ulcer, worsening kidney function , gen weakness and edema.    Patient found to have pancytopenia along with left foot ulcer, confirmed to have OM on MRI and underwent left 1st metatarsal resection.  Patient developed ERIC on CKD 4 likely due to IV lasix drip and diarrhea.   Hepatosplenomegaly on ultrasound with normal ammonia level.     Psychiatry consulted for depression and home medications continued. Palliative care consulted.    Course complicated by  Worsening  COPD & thrombocytopenia.    Thrombocytopenia- ? HIT -Off Heparin,  On Empiric treatment  for HIT ( On argatroban )     Diabetic Foot Ulcer complicated by Lt 1st digit OM- S/P Resection of 1st great toe on Lt foot - on Rocephin + doxycycline,    CKD 5- sp vein mapping, AVF as OP,    Pancytopenia- Anemia likely of CKD, Superimposed acute on  chronic thrombocytopenia.     DM2 complicated by CKD + foot ulcer on long term insulin therapy-

## 2018-05-01 NOTE — PROGRESS NOTE ADULT - ASSESSMENT
52 y/o F with Hx of Fibromyalgia, DM2, Osteopenia, Matamoros Esophagus admitted for diabetic foot ulcer concerning for OM.     Presentation complicated by worsening kidney function.     likely due to IV lasix drip and diarrhea. Initially treated for hepatic encephalopathy for suspected liver cirrhosis but hepatosplenomegaly on ultrasound with normal ammonia level. Course complicated by COPD exacerbation.  Patient extremely emotional about thought of getting HD and does not want AVF/AVG or dialysis in future. Psychiatry consulted for depression and home medications continued. Palliative care consulted.    Course complicated by significant thrombocytopenia.    Thrombocytopenia, ? HIT - STABLE. asymptomatic. in setting of total pancytopenia. intermediate 4T score. Plan: cont to hold heparin. monitor closely for signs of acute bleed/thrombosis. serial repeat cbc, BLUE TOP TUBE only to avoid PLT clotting. heme consult appreciated. empiric tx for HIT with argatroban to continue, lab work up sent + pending      Diabetic Foot Ulcer complicated by Lt 1st digit OM- IMPROVING. OM confirmed on MRI. s/p resection of 1st great toe on Lt foot by podiatry, uncomplicated well tolerated procedure. on rocephin + doxy per ID. bone culture resulted as strep agalactiae sensistve to vanco, levofloxacin, PCN. Plan: per ID pt to cont both abx, pending FU regarding duration of abx needed. cont local wound care per podiatry. PT eval appreciated, pt amb with RW independently, pending full write of formal consult    Mild COPD Exac- IMPROVING. Plan: c/w prednisone taper. duonebs. resp inhalers. no need for supplemental o2. pulmonary following    ERIC on CKD 4, progressed to now CKD5- STABLE. nephro following. pt now more amenable to idea of HD, sp vein mapping by [facility] during this admission. no inpatient emergent need for HD but will likely need access placed and HD to start right after discharge. Plan: cont to monitor renal function. follow up outpt with [facility] to continue conversations regarding fistula placement, deferring until acute infection with OM fully treated    Depression- STABLE. acute mild worsening noted during this admission sec to concerns regarding medical condition and long term prognosis. bedside conversation with counseling and positive reinforcement provided. Psych reeval appreciated, no changes to current regimen advised. Plan: continue home meds.  on dc will need referral to Therapist for continued care, she benefits from having long sessions to talk out her frustrations, responds very well to active listening and positive reinforcement    Pancytopenia- asymptomatic. anemia likely of CKD. no clear explanation for leukopenia. now also with superimposed acute on  chronic thrombocytopenia. heme onc + renal following. Plan: serial cbc. heme follow up in office. epo + fe supplementation    DM2 complicated by ckd + foot ulcer on long term insulin therapy- also complicated by asymptomatic hyperglycemia. complicated by asymptomatic hyperglycemia. hga1c 5.6, @ goal <7, note though may be falsely low given anemia. inc lantus from 20 to 23 ,c/w iss    NAFLD- remote hx of CT with signs of cirrhosis, not available for review @ this time, cannot confirm. imaging this admission only with signs of hepatosplenomegaly + fatty infiltration (US). d/c lactulose/rifaximin as no encephalopathy during this admission. ammonia normal Plan: outpatient GI evaluation. weight loss + diet advised, control of comorbid conditions    Dispo: pending finalization of PT eval. also pending resolution of HIT

## 2018-05-02 LAB
ANION GAP SERPL CALC-SCNC: 16 MMOL/L — SIGNIFICANT CHANGE UP (ref 5–17)
BUN SERPL-MCNC: 63 MG/DL — HIGH (ref 8–20)
CALCIUM SERPL-MCNC: 7.8 MG/DL — LOW (ref 8.6–10.2)
CHLORIDE SERPL-SCNC: 100 MMOL/L — SIGNIFICANT CHANGE UP (ref 98–107)
CO2 SERPL-SCNC: 20 MMOL/L — LOW (ref 22–29)
CREAT SERPL-MCNC: 3.05 MG/DL — HIGH (ref 0.5–1.3)
GLUCOSE BLDC GLUCOMTR-MCNC: 186 MG/DL — HIGH (ref 70–99)
GLUCOSE BLDC GLUCOMTR-MCNC: 270 MG/DL — HIGH (ref 70–99)
GLUCOSE BLDC GLUCOMTR-MCNC: 304 MG/DL — HIGH (ref 70–99)
GLUCOSE BLDC GLUCOMTR-MCNC: 339 MG/DL — HIGH (ref 70–99)
GLUCOSE SERPL-MCNC: 167 MG/DL — HIGH (ref 70–115)
HCT VFR BLD CALC: 25.4 % — LOW (ref 37–47)
HGB BLD-MCNC: 7.7 G/DL — LOW (ref 12–16)
MCHC RBC-ENTMCNC: 29.3 PG — SIGNIFICANT CHANGE UP (ref 27–31)
MCHC RBC-ENTMCNC: 30.3 G/DL — LOW (ref 32–36)
MCV RBC AUTO: 96.6 FL — SIGNIFICANT CHANGE UP (ref 81–99)
PLATELET # BLD AUTO: 33 K/UL — LOW (ref 150–400)
POTASSIUM SERPL-MCNC: 5 MMOL/L — SIGNIFICANT CHANGE UP (ref 3.5–5.3)
POTASSIUM SERPL-SCNC: 5 MMOL/L — SIGNIFICANT CHANGE UP (ref 3.5–5.3)
RBC # BLD: 2.63 M/UL — LOW (ref 4.4–5.2)
RBC # FLD: 14.8 % — SIGNIFICANT CHANGE UP (ref 11–15.6)
SODIUM SERPL-SCNC: 136 MMOL/L — SIGNIFICANT CHANGE UP (ref 135–145)
SRA INTERP SER-IMP: SIGNIFICANT CHANGE UP
UFH PPP CHRO-ACNC: 0 U/ML — LOW (ref 0.3–0.7)
WBC # BLD: 4.3 K/UL — LOW (ref 4.8–10.8)
WBC # FLD AUTO: 4.3 K/UL — LOW (ref 4.8–10.8)

## 2018-05-02 PROCEDURE — 99232 SBSQ HOSP IP/OBS MODERATE 35: CPT

## 2018-05-02 PROCEDURE — 99233 SBSQ HOSP IP/OBS HIGH 50: CPT

## 2018-05-02 RX ORDER — IRON SUCROSE 20 MG/ML
200 INJECTION, SOLUTION INTRAVENOUS DAILY
Qty: 0 | Refills: 0 | Status: DISCONTINUED | OUTPATIENT
Start: 2018-05-02 | End: 2018-05-04

## 2018-05-02 RX ORDER — INSULIN GLARGINE 100 [IU]/ML
25 INJECTION, SOLUTION SUBCUTANEOUS AT BEDTIME
Qty: 0 | Refills: 0 | Status: DISCONTINUED | OUTPATIENT
Start: 2018-05-02 | End: 2018-05-04

## 2018-05-02 RX ADMIN — MONTELUKAST 10 MILLIGRAM(S): 4 TABLET, CHEWABLE ORAL at 12:04

## 2018-05-02 RX ADMIN — Medication 3 MILLILITER(S): at 20:15

## 2018-05-02 RX ADMIN — MAGNESIUM OXIDE 400 MG ORAL TABLET 400 MILLIGRAM(S): 241.3 TABLET ORAL at 17:19

## 2018-05-02 RX ADMIN — OXYCODONE HYDROCHLORIDE 5 MILLIGRAM(S): 5 TABLET ORAL at 06:01

## 2018-05-02 RX ADMIN — Medication 500 MILLIGRAM(S): at 12:04

## 2018-05-02 RX ADMIN — Medication 3 MILLILITER(S): at 02:50

## 2018-05-02 RX ADMIN — GABAPENTIN 600 MILLIGRAM(S): 400 CAPSULE ORAL at 22:54

## 2018-05-02 RX ADMIN — OXYCODONE HYDROCHLORIDE 5 MILLIGRAM(S): 5 TABLET ORAL at 13:20

## 2018-05-02 RX ADMIN — MAGNESIUM OXIDE 400 MG ORAL TABLET 400 MILLIGRAM(S): 241.3 TABLET ORAL at 08:14

## 2018-05-02 RX ADMIN — Medication 4: at 17:18

## 2018-05-02 RX ADMIN — Medication 110 MILLIGRAM(S): at 12:09

## 2018-05-02 RX ADMIN — Medication 3: at 12:08

## 2018-05-02 RX ADMIN — Medication 325 MILLIGRAM(S): at 06:00

## 2018-05-02 RX ADMIN — Medication 200 MILLIGRAM(S): at 22:54

## 2018-05-02 RX ADMIN — CEFTRIAXONE 2000 MILLIGRAM(S): 500 INJECTION, POWDER, FOR SOLUTION INTRAMUSCULAR; INTRAVENOUS at 13:17

## 2018-05-02 RX ADMIN — Medication 3 MILLILITER(S): at 15:36

## 2018-05-02 RX ADMIN — Medication 1 MILLIGRAM(S): at 13:20

## 2018-05-02 RX ADMIN — OXYCODONE HYDROCHLORIDE 5 MILLIGRAM(S): 5 TABLET ORAL at 23:57

## 2018-05-02 RX ADMIN — Medication 20 MILLIGRAM(S): at 06:00

## 2018-05-02 RX ADMIN — INSULIN GLARGINE 25 UNIT(S): 100 INJECTION, SOLUTION SUBCUTANEOUS at 22:57

## 2018-05-02 RX ADMIN — OXYCODONE HYDROCHLORIDE 5 MILLIGRAM(S): 5 TABLET ORAL at 06:27

## 2018-05-02 RX ADMIN — Medication 250 MILLIGRAM(S): at 17:19

## 2018-05-02 RX ADMIN — IRON SUCROSE 110 MILLIGRAM(S): 20 INJECTION, SOLUTION INTRAVENOUS at 18:15

## 2018-05-02 RX ADMIN — ZINC SULFATE TAB 220 MG (50 MG ZINC EQUIVALENT) 220 MILLIGRAM(S): 220 (50 ZN) TAB at 12:04

## 2018-05-02 RX ADMIN — BUPROPION HYDROCHLORIDE 300 MILLIGRAM(S): 150 TABLET, EXTENDED RELEASE ORAL at 17:20

## 2018-05-02 RX ADMIN — BENZOCAINE AND MENTHOL 1 LOZENGE: 5; 1 LIQUID ORAL at 12:10

## 2018-05-02 RX ADMIN — Medication 3 MILLILITER(S): at 10:00

## 2018-05-02 RX ADMIN — Medication 110 MILLIGRAM(S): at 22:54

## 2018-05-02 RX ADMIN — OXYCODONE HYDROCHLORIDE 5 MILLIGRAM(S): 5 TABLET ORAL at 13:46

## 2018-05-02 RX ADMIN — Medication 250 MILLIGRAM(S): at 06:00

## 2018-05-02 RX ADMIN — Medication 0.5 MILLIGRAM(S): at 19:42

## 2018-05-02 RX ADMIN — OXYCODONE HYDROCHLORIDE 5 MILLIGRAM(S): 5 TABLET ORAL at 22:54

## 2018-05-02 RX ADMIN — PANTOPRAZOLE SODIUM 40 MILLIGRAM(S): 20 TABLET, DELAYED RELEASE ORAL at 06:01

## 2018-05-02 RX ADMIN — DULOXETINE HYDROCHLORIDE 120 MILLIGRAM(S): 30 CAPSULE, DELAYED RELEASE ORAL at 12:04

## 2018-05-02 RX ADMIN — Medication 1: at 08:14

## 2018-05-02 RX ADMIN — MAGNESIUM OXIDE 400 MG ORAL TABLET 400 MILLIGRAM(S): 241.3 TABLET ORAL at 12:03

## 2018-05-02 RX ADMIN — OXYCODONE HYDROCHLORIDE 5 MILLIGRAM(S): 5 TABLET ORAL at 00:30

## 2018-05-02 NOTE — CHART NOTE - NSCHARTNOTEFT_GEN_A_CORE
Source: Patient [x]  Family [ ]   other [ ]    Current Diet: Consistent carbohydrate renal with evening snack     PO intake:  < 50% [ ]   50-75%  [x]   %  [ ]  other :    Source for PO intake [x] Patient [ ] family [ ] chart [ ] staff [ ] other    Current Weight:     4/19 - 170#     % Weight Change     Pertinent Medications: MEDICATIONS  (STANDING):  ALBUTerol/ipratropium for Nebulization 3 milliLiter(s) Nebulizer every 6 hours  argatroban Infusion 2 MICROgram(s)/kG/Min (9.252 mL/Hr) IV Continuous <Continuous>  ascorbic acid 500 milliGRAM(s) Oral daily  buPROPion XL . 300 milliGRAM(s) Oral daily  cefTRIAXone Injectable. 2000 milliGRAM(s) IV Push every 24 hours  dextrose 50% Injectable 12.5 Gram(s) IV Push once  dextrose 50% Injectable 25 Gram(s) IV Push once  dextrose 50% Injectable 25 Gram(s) IV Push once  doxycycline IVPB      doxycycline IVPB 100 milliGRAM(s) IV Intermittent every 12 hours  DULoxetine 120 milliGRAM(s) Oral daily  epoetin krunal Injectable 8000 Unit(s) SubCutaneous <User Schedule>  ferrous    sulfate 325 milliGRAM(s) Oral three times a day  folic acid 1 milliGRAM(s) Oral daily  gabapentin 600 milliGRAM(s) Oral at bedtime  insulin glargine Injectable (LANTUS) 23 Unit(s) SubCutaneous at bedtime  insulin lispro (HumaLOG) corrective regimen sliding scale   SubCutaneous three times a day before meals  magnesium oxide 400 milliGRAM(s) Oral three times a day with meals  montelukast 10 milliGRAM(s) Oral daily  pantoprazole    Tablet 40 milliGRAM(s) Oral before breakfast  predniSONE   Tablet 20 milliGRAM(s) Oral daily  predniSONE   Tablet   Oral   saccharomyces boulardii 250 milliGRAM(s) Oral two times a day  traZODone 200 milliGRAM(s) Oral at bedtime  zinc sulfate 220 milliGRAM(s) Oral daily    MEDICATIONS  (PRN):  ALPRAZolam 0.5 milliGRAM(s) Oral three times a day PRN anxiety  benzocaine 15 mG/menthol 3.6 mG Lozenge 1 Lozenge Oral four times a day PRN Sore Throat  dextrose Gel 1 Dose(s) Oral once PRN Blood Glucose LESS THAN 70 milliGRAM(s)/deciliter  glucagon  Injectable 1 milliGRAM(s) IntraMuscular once PRN Glucose LESS THAN 70 milligrams/deciliter  HYDROcodone/homatropine Syrup 5 milliLiter(s) Oral four times a day PRN Cough  ondansetron Injectable 4 milliGRAM(s) IV Push every 6 hours PRN Nausea and/or Vomiting  oxyCODONE    IR 5 milliGRAM(s) Oral every 6 hours PRN Moderate to Severe Pain (7 - 10)    Pertinent Labs: CBC Full  -  ( 02 May 2018 08:09 )  WBC Count : 4.3 K/uL  Hemoglobin : 7.7 g/dL  Hematocrit : 25.4 %    05-02 Na136 mmol/L Glu 167 mg/dL<H> K+ 5.0 mmol/L Cr  3.05 mg/dL<H> BUN 63.0 mg/dL<H> Phos n/a   Alb n/a   PAB n/a       Nutrition focused physical exam NOT conducted - found signs of malnutrition [ ]absent [ ]present    Subcutaneous fat loss: [ ] Orbital fat pads region, [ ]Buccal fat region, [ ]Triceps region,  [ ]Ribs region    Muscle wasting: [ ]Temples region, [ ]Clavicle region, [ ]Shoulder region, [ ]Scapula region, [ ]Interosseous region,  [ ]thigh region, [ ]Calf region    Estimated Needs:   [x] no change since previous assessment  [ ] recalculated:     Current Nutrition Diagnosis:  Continued altered nutrition - related laboratory values related to impaired renal function and DM as evidenced by increased BUN/ Creat/ glu.     Provided verbal and written DM and CKD diet education. Also provided education on how to relieve symptoms of GERD/Amtamoros's Esophagus.  Discussed menu options and meal preferences to encourage better PO intake. Pt is agreeable to go to dialysis after discharge from [facility]. Pt reports unintentional weight gain now as a result of fluid retention despite poor PO intake PTA.     Recommendations:     1. Monitor weights   2. Monitor PO intake and adherence to diet order     Monitoring and Evaluation:   [x] PO intake [x] Tolerance to diet prescription [X] Weights  [X] Follow up per protocol [X] Labs: Source: Patient [x]  Family [ ]   other [ ]    Current Diet: Consistent carbohydrate renal with evening snack     PO intake:  < 50% [ ]   50-75%  [x]   %  [ ]  other :    Source for PO intake [x] Patient [ ] family [ ] chart [ ] staff [ ] other    Current Weight:     4/19 - 170#   PTA, pt states weight has been stable for 5 years at 155#     % Weight Change     Pertinent Medications: MEDICATIONS  (STANDING):  ALBUTerol/ipratropium for Nebulization 3 milliLiter(s) Nebulizer every 6 hours  argatroban Infusion 2 MICROgram(s)/kG/Min (9.252 mL/Hr) IV Continuous <Continuous>  ascorbic acid 500 milliGRAM(s) Oral daily  buPROPion XL . 300 milliGRAM(s) Oral daily  cefTRIAXone Injectable. 2000 milliGRAM(s) IV Push every 24 hours  dextrose 50% Injectable 12.5 Gram(s) IV Push once  dextrose 50% Injectable 25 Gram(s) IV Push once  dextrose 50% Injectable 25 Gram(s) IV Push once  doxycycline IVPB      doxycycline IVPB 100 milliGRAM(s) IV Intermittent every 12 hours  DULoxetine 120 milliGRAM(s) Oral daily  epoetin krunal Injectable 8000 Unit(s) SubCutaneous <User Schedule>  ferrous    sulfate 325 milliGRAM(s) Oral three times a day  folic acid 1 milliGRAM(s) Oral daily  gabapentin 600 milliGRAM(s) Oral at bedtime  insulin glargine Injectable (LANTUS) 23 Unit(s) SubCutaneous at bedtime  insulin lispro (HumaLOG) corrective regimen sliding scale   SubCutaneous three times a day before meals  magnesium oxide 400 milliGRAM(s) Oral three times a day with meals  montelukast 10 milliGRAM(s) Oral daily  pantoprazole    Tablet 40 milliGRAM(s) Oral before breakfast  predniSONE   Tablet 20 milliGRAM(s) Oral daily  predniSONE   Tablet   Oral   saccharomyces boulardii 250 milliGRAM(s) Oral two times a day  traZODone 200 milliGRAM(s) Oral at bedtime  zinc sulfate 220 milliGRAM(s) Oral daily    MEDICATIONS  (PRN):  ALPRAZolam 0.5 milliGRAM(s) Oral three times a day PRN anxiety  benzocaine 15 mG/menthol 3.6 mG Lozenge 1 Lozenge Oral four times a day PRN Sore Throat  dextrose Gel 1 Dose(s) Oral once PRN Blood Glucose LESS THAN 70 milliGRAM(s)/deciliter  glucagon  Injectable 1 milliGRAM(s) IntraMuscular once PRN Glucose LESS THAN 70 milligrams/deciliter  HYDROcodone/homatropine Syrup 5 milliLiter(s) Oral four times a day PRN Cough  ondansetron Injectable 4 milliGRAM(s) IV Push every 6 hours PRN Nausea and/or Vomiting  oxyCODONE    IR 5 milliGRAM(s) Oral every 6 hours PRN Moderate to Severe Pain (7 - 10)    Pertinent Labs: CBC Full  -  ( 02 May 2018 08:09 )  WBC Count : 4.3 K/uL  Hemoglobin : 7.7 g/dL  Hematocrit : 25.4 %    05-02 Na136 mmol/L Glu 167 mg/dL<H> K+ 5.0 mmol/L Cr  3.05 mg/dL<H> BUN 63.0 mg/dL<H> Phos n/a   Alb n/a   PAB n/a       Nutrition focused physical exam NOT conducted - found signs of malnutrition [ ]absent [ ]present    Subcutaneous fat loss: [ ] Orbital fat pads region, [ ]Buccal fat region, [ ]Triceps region,  [ ]Ribs region    Muscle wasting: [ ]Temples region, [ ]Clavicle region, [ ]Shoulder region, [ ]Scapula region, [ ]Interosseous region,  [ ]thigh region, [ ]Calf region    Estimated Needs:   [x] no change since previous assessment  [ ] recalculated:     Current Nutrition Diagnosis:  Continued altered nutrition - related laboratory values related to impaired renal function and DM as evidenced by increased BUN/ Creat/ glu.     Provided verbal and written DM and CKD diet education. Also provided education on how to relieve symptoms of GERD/Matamoros's Esophagus.  Discussed menu options and meal preferences to encourage better PO intake. Pt is agreeable to go to dialysis after discharge from [facility]. Pt reports unintentional weight gain now as a result of fluid retention despite poor PO intake PTA.     Recommendations:     1. Monitor weights   2. Monitor PO intake and adherence to diet order     Monitoring and Evaluation:   [x] PO intake [x] Tolerance to diet prescription [X] Weights  [X] Follow up per protocol [X] Labs:

## 2018-05-02 NOTE — PROGRESS NOTE ADULT - SUBJECTIVE AND OBJECTIVE BOX
PMD: unknown  Podiatry Dr Sanchez  Nephro Dr Bedoya    CHIEF COMPLAINT: foot wound + generalized edema    Patient seen and examined at the bedside. No acute overnight events. No events  yesterday. Complaining of recurrent lowering of mood this AM, redirected with counseling @ bedside. Denies fever/chills, headache, lightheadedness, dizziness, chest pain, palpitations, abd pain, nausea/vomiting/diarrhea, muscle pain.      =========================================================================================    PHYSICAL EXAM.    GEN - appears age appropriate. well nourished. pleasant.  HEENT - NCAT, EOMI, PAULINA  RESP - CTA BL, mild anterior wheeze/stridor/rhonchi/crackles. not on supplemental O2. able to speak in full sentences without distress.   CARDIO - NS1S2, RRR. No murmurs/rubs/gallops.  ABD - Soft/Non tender/Non distended. Normal BS x4 quadrants. no guarding/rebound tenderness.  Ext - No JOSE. LLE wrapped in ace bandage  MSK - BL 5/5 strength on upper and lower extremities.   Neuro - AAOx3. cn 2-12 grossly intact  Psych - normal affect, more cheerful and positive compared to prior days  Skin - c/d/i. no rashes/lesions      VITAL SIGNS.    Vital Signs Last 24 Hrs  T(C): 36.8 (02 May 2018 07:39), Max: 36.8 (02 May 2018 07:39)  T(F): 98.2 (02 May 2018 07:39), Max: 98.2 (02 May 2018 07:39)  HR: 89 (02 May 2018 10:01) (78 - 89)  BP: 154/82 (02 May 2018 07:39) (144/73 - 154/82)  BP(mean): --  RR: 18 (02 May 2018 07:39) (18 - 18)  SpO2: 96% (02 May 2018 07:39) (95% - 99%)        =================================================    LABS.                          7.7    4.3   )-----------( x        ( 02 May 2018 08:09 )             25.4     05-02    136  |  100  |  63.0<H>  ----------------------------<  167<H>  5.0   |  20.0<L>  |  3.05<H>    Ca    7.8<L>      02 May 2018 08:09        PT/INR - ( 01 May 2018 08:06 )   PT: 21.0 sec;   INR: 1.88 ratio         PTT - ( 01 May 2018 08:06 )  PTT:51.5 sec  I&O's Summary    01 May 2018 07:01  -  02 May 2018 07:00  --------------------------------------------------------  IN: 238 mL / OUT: 0 mL / NET: 238 mL    02 May 2018 07:01  -  02 May 2018 11:32  --------------------------------------------------------  IN: 11.5 mL / OUT: 0 mL / NET: 11.5 mL          ================================================    IMAGING.      ================================================    MEDICATIONS  (STANDING):  ALBUTerol/ipratropium for Nebulization 3 milliLiter(s) Nebulizer every 6 hours  argatroban Infusion 2 MICROgram(s)/kG/Min (9.252 mL/Hr) IV Continuous <Continuous>  ascorbic acid 500 milliGRAM(s) Oral daily  buPROPion XL . 300 milliGRAM(s) Oral daily  cefTRIAXone Injectable. 2000 milliGRAM(s) IV Push every 24 hours  dextrose 50% Injectable 12.5 Gram(s) IV Push once  dextrose 50% Injectable 25 Gram(s) IV Push once  dextrose 50% Injectable 25 Gram(s) IV Push once  doxycycline IVPB      doxycycline IVPB 100 milliGRAM(s) IV Intermittent every 12 hours  DULoxetine 120 milliGRAM(s) Oral daily  epoetin krunal Injectable 8000 Unit(s) SubCutaneous <User Schedule>  ferrous    sulfate 325 milliGRAM(s) Oral three times a day  folic acid 1 milliGRAM(s) Oral daily  gabapentin 600 milliGRAM(s) Oral at bedtime  insulin glargine Injectable (LANTUS) 23 Unit(s) SubCutaneous at bedtime  insulin lispro (HumaLOG) corrective regimen sliding scale   SubCutaneous three times a day before meals  magnesium oxide 400 milliGRAM(s) Oral three times a day with meals  montelukast 10 milliGRAM(s) Oral daily  pantoprazole    Tablet 40 milliGRAM(s) Oral before breakfast  predniSONE   Tablet 20 milliGRAM(s) Oral daily  predniSONE   Tablet   Oral   saccharomyces boulardii 250 milliGRAM(s) Oral two times a day  traZODone 200 milliGRAM(s) Oral at bedtime  zinc sulfate 220 milliGRAM(s) Oral daily    MEDICATIONS  (PRN):  ALPRAZolam 0.5 milliGRAM(s) Oral three times a day PRN anxiety  benzocaine 15 mG/menthol 3.6 mG Lozenge 1 Lozenge Oral four times a day PRN Sore Throat  dextrose Gel 1 Dose(s) Oral once PRN Blood Glucose LESS THAN 70 milliGRAM(s)/deciliter  glucagon  Injectable 1 milliGRAM(s) IntraMuscular once PRN Glucose LESS THAN 70 milligrams/deciliter  HYDROcodone/homatropine Syrup 5 milliLiter(s) Oral four times a day PRN Cough  ondansetron Injectable 4 milliGRAM(s) IV Push every 6 hours PRN Nausea and/or Vomiting  oxyCODONE    IR 5 milliGRAM(s) Oral every 6 hours PRN Moderate to Severe Pain (7 - 10) PMD: unknown  Podiatry Dr Sanchez  Nephsteph Bedoya    CHIEF COMPLAINT: foot wound + generalized edema    Patient seen and examined at the bedside. No acute overnight events. No events yesterday. Complaining of sore throat this AM. Denies fever/chills, headache, lightheadedness, dizziness, chest pain, palpitations, abd pain, nausea/vomiting/diarrhea, muscle pain.      =========================================================================================    PHYSICAL EXAM.    GEN - appears age appropriate. well nourished. pleasant.  HEENT - NCAT, EOMI, PAULINA  RESP - CTA BL, no wheeze/stridor/rhonchi/crackles. not on supplemental O2. able to speak in full sentences without distress.   CARDIO - NS1S2, RRR. No murmurs/rubs/gallops.  ABD - Soft/Non tender/Non distended. Normal BS x4 quadrants. no guarding/rebound tenderness.  Ext - No JOSE. LLE wrapped in ace bandage  MSK - BL 5/5 strength on upper and lower extremities.   Neuro - AAOx3. cn 2-12 grossly intact  Psych - normal affect  Skin - c/d/i. no rashes/lesions      VITAL SIGNS.    Vital Signs Last 24 Hrs  T(C): 36.8 (02 May 2018 07:39), Max: 36.8 (02 May 2018 07:39)  T(F): 98.2 (02 May 2018 07:39), Max: 98.2 (02 May 2018 07:39)  HR: 89 (02 May 2018 10:01) (78 - 89)  BP: 154/82 (02 May 2018 07:39) (144/73 - 154/82)  BP(mean): --  RR: 18 (02 May 2018 07:39) (18 - 18)  SpO2: 96% (02 May 2018 07:39) (95% - 99%)        =================================================    LABS.                          7.7    4.3   )-----------( x        ( 02 May 2018 08:09 )             25.4     05-02    136  |  100  |  63.0<H>  ----------------------------<  167<H>  5.0   |  20.0<L>  |  3.05<H>    Ca    7.8<L>      02 May 2018 08:09        PT/INR - ( 01 May 2018 08:06 )   PT: 21.0 sec;   INR: 1.88 ratio         PTT - ( 01 May 2018 08:06 )  PTT:51.5 sec  I&O's Summary    01 May 2018 07:01  -  02 May 2018 07:00  --------------------------------------------------------  IN: 238 mL / OUT: 0 mL / NET: 238 mL    02 May 2018 07:01  -  02 May 2018 11:32  --------------------------------------------------------  IN: 11.5 mL / OUT: 0 mL / NET: 11.5 mL          ================================================    IMAGING.      ================================================    MEDICATIONS  (STANDING):  ALBUTerol/ipratropium for Nebulization 3 milliLiter(s) Nebulizer every 6 hours  argatroban Infusion 2 MICROgram(s)/kG/Min (9.252 mL/Hr) IV Continuous <Continuous>  ascorbic acid 500 milliGRAM(s) Oral daily  buPROPion XL . 300 milliGRAM(s) Oral daily  cefTRIAXone Injectable. 2000 milliGRAM(s) IV Push every 24 hours  dextrose 50% Injectable 12.5 Gram(s) IV Push once  dextrose 50% Injectable 25 Gram(s) IV Push once  dextrose 50% Injectable 25 Gram(s) IV Push once  doxycycline IVPB      doxycycline IVPB 100 milliGRAM(s) IV Intermittent every 12 hours  DULoxetine 120 milliGRAM(s) Oral daily  epoetin krunal Injectable 8000 Unit(s) SubCutaneous <User Schedule>  ferrous    sulfate 325 milliGRAM(s) Oral three times a day  folic acid 1 milliGRAM(s) Oral daily  gabapentin 600 milliGRAM(s) Oral at bedtime  insulin glargine Injectable (LANTUS) 23 Unit(s) SubCutaneous at bedtime  insulin lispro (HumaLOG) corrective regimen sliding scale   SubCutaneous three times a day before meals  magnesium oxide 400 milliGRAM(s) Oral three times a day with meals  montelukast 10 milliGRAM(s) Oral daily  pantoprazole    Tablet 40 milliGRAM(s) Oral before breakfast  predniSONE   Tablet 20 milliGRAM(s) Oral daily  predniSONE   Tablet   Oral   saccharomyces boulardii 250 milliGRAM(s) Oral two times a day  traZODone 200 milliGRAM(s) Oral at bedtime  zinc sulfate 220 milliGRAM(s) Oral daily    MEDICATIONS  (PRN):  ALPRAZolam 0.5 milliGRAM(s) Oral three times a day PRN anxiety  benzocaine 15 mG/menthol 3.6 mG Lozenge 1 Lozenge Oral four times a day PRN Sore Throat  dextrose Gel 1 Dose(s) Oral once PRN Blood Glucose LESS THAN 70 milliGRAM(s)/deciliter  glucagon  Injectable 1 milliGRAM(s) IntraMuscular once PRN Glucose LESS THAN 70 milligrams/deciliter  HYDROcodone/homatropine Syrup 5 milliLiter(s) Oral four times a day PRN Cough  ondansetron Injectable 4 milliGRAM(s) IV Push every 6 hours PRN Nausea and/or Vomiting  oxyCODONE    IR 5 milliGRAM(s) Oral every 6 hours PRN Moderate to Severe Pain (7 - 10)

## 2018-05-02 NOTE — PROGRESS NOTE ADULT - SUBJECTIVE AND OBJECTIVE BOX
[facility]  INFECTIOUS DISEASES AND INTERNAL MEDICINE at [place]  =======================================================  Dwight Pillai MD FACP   Jabier Vick MD  Diplomates American Board of Internal Medicine and Infectious Diseases  =======================================================    SONYA LENNON [id number]  follow up on Lt foot infection.   s/p resection of head of left 1st metatarsal on 4/26/18.  still with foot pain in left side  no fevers        Allergies:  No Known Allergies      MEDICATIONS  (STANDING):  ALBUTerol/ipratropium for Nebulization 3 milliLiter(s) Nebulizer every 6 hours  ascorbic acid 500 milliGRAM(s) Oral daily  buPROPion XL . 300 milliGRAM(s) Oral daily  cefTRIAXone Injectable. 2000 milliGRAM(s) IV Push every 24 hours  dextrose 50% Injectable 12.5 Gram(s) IV Push once  dextrose 50% Injectable 25 Gram(s) IV Push once  dextrose 50% Injectable 25 Gram(s) IV Push once  doxycycline IVPB      doxycycline IVPB 100 milliGRAM(s) IV Intermittent every 12 hours  DULoxetine 120 milliGRAM(s) Oral daily  epoetin krunal Injectable 8000 Unit(s) SubCutaneous <User Schedule>  folic acid 1 milliGRAM(s) Oral daily  gabapentin 600 milliGRAM(s) Oral at bedtime  insulin glargine Injectable (LANTUS) 25 Unit(s) SubCutaneous at bedtime  insulin lispro (HumaLOG) corrective regimen sliding scale   SubCutaneous three times a day before meals  iron sucrose IVPB 200 milliGRAM(s) IV Intermittent daily  magnesium oxide 400 milliGRAM(s) Oral three times a day with meals  montelukast 10 milliGRAM(s) Oral daily  pantoprazole    Tablet 40 milliGRAM(s) Oral before breakfast  predniSONE   Tablet 20 milliGRAM(s) Oral daily  predniSONE   Tablet   Oral   saccharomyces boulardii 250 milliGRAM(s) Oral two times a day  traZODone 200 milliGRAM(s) Oral at bedtime  zinc sulfate 220 milliGRAM(s) Oral daily      REVIEW OF SYSTEMS:  as above  all other ROS negative      Physical Exam:  Vital Signs Last 24 Hrs  T(C): 36.8 (02 May 2018 07:39), Max: 36.8 (02 May 2018 07:39)  T(F): 98.2 (02 May 2018 07:39), Max: 98.2 (02 May 2018 07:39)  HR: 84 (02 May 2018 15:37) (78 - 89)  BP: 154/82 (02 May 2018 07:39) (144/73 - 154/82)  RR: 18 (02 May 2018 07:39) (18 - 18)  SpO2: 96% (02 May 2018 07:39) (95% - 99%)    GEN: NAD, pleasant; thin  HEENT: normocephalic and atraumatic. EOMI. PERRL.    NECK: Supple.   LUNGS: coarse breath sounds bilaterally.   HEART: Regular rate and rhythm   ABDOMEN: Soft, nontender, and nondistended.  Positive bowel sounds.    : No CVA tenderness  EXTREMITIES: Left foot in dressing; with BLEEDING through bandage  MSK: no joint swelling  NEUROLOGIC: No focal deficits  SKIN:   Left foot in dressing    Labs:       Labs:  05-02    136  |  100  |  63.0<H>  ----------------------------<  167<H>  5.0   |  20.0<L>  |  3.05<H>    Ca    7.8<L>      02 May 2018 08:09                            7.7    4.3   )-----------( 33       ( 02 May 2018 08:09 )             25.4       PT/INR - ( 01 May 2018 08:06 )   PT: 21.0 sec;   INR: 1.88 ratio         PTT - ( 01 May 2018 08:06 )  PTT:51.5 sec         RECENT CULTURES:  04-26 @ 21:49 .Surgical Swab L 1st metatarsal clear margin/swab Streptococcus agalactiae (Group B)    Few Streptococcus agalactiae (Group B)    No White blood cells  No organisms seen      04-20 @ 08:37 .Blood Blood-Peripheral     No growth at 5 days.        04-19 @ 14:40 .Other left foot ulcer Enterobacter cloacae  Methicillin resistant Staphylococcus aureus    Numerous Enterobacter cloacae  Numerous Methicillin resistant Staphylococcus aureus  Few Streptococcus agalactiae (Group B)  Culture in progress  .  TYPE: (C=Critical, N=Notification, A=Abnormal) c  TESTS:  _ mrsa  DATE/TIME CALLED: _ 04/21/2018 11:10:41  CALLED TO: Evan segura rn  READ BACK (2 Patient Identifiers)(Y/N): _ y  READ BACK VALUES (Y/N): _ y  CALLED BY: _ elvia welge copy to ic pt log

## 2018-05-02 NOTE — PROGRESS NOTE ADULT - ASSESSMENT
54 y/o F with Hx of Fibromyalgia, DM2, Osteopenia, Matamoros Esophagus admitted for diabetic foot ulcer concerning for OM.     Presentation complicated by worsening kidney function.     likely due to IV lasix drip and diarrhea. Initially treated for hepatic encephalopathy for suspected liver cirrhosis but hepatosplenomegaly on ultrasound with normal ammonia level. Course complicated by COPD exacerbation.  Patient extremely emotional about thought of getting HD and does not want AVF/AVG or dialysis in future. Psychiatry consulted for depression and home medications continued. Palliative care consulted.    Course complicated by significant thrombocytopenia.    Thrombocytopenia, ? HIT - STABLE. improved from low of 17 to 30s. asymptomatic. in setting of total pancytopenia. intermediate 4T score. Plan: cont to hold heparin. monitor closely for signs of acute bleed/thrombosis. serial repeat cbc, BLUE TOP TUBE only to avoid PLT clotting. heme consult appreciated. empiric tx for HIT with argatroban to continue, lab work up sent + pending, prelim findings based on resulted Hep PF NEG.      Diabetic Foot Ulcer complicated by Lt 1st digit OM- IMPROVING. OM confirmed on MRI. s/p resection of 1st great toe on Lt foot by podiatry, uncomplicated well tolerated procedure. on rocephin + doxy per ID. bone culture resulted as strep agalactiae sensistve to vanco, levofloxacin, PCN. Plan: cont both abx, given cx results will likely be able to dc doxy as no MRSA coverage needed, will discuss. also pending FU regarding duration of abx needed, anticipate 6wk total from the time of operation. cont local wound care per podiatry. PT eval appreciated, pt amb with RW independently, pending full write of formal consult, but will be able to go home on eventual dc.    Mild COPD Exac- IMPROVING. Plan: c/w prednisone taper. duonebs. resp inhalers. no need for supplemental o2. pulmonary following    ERIC on CKD 4, progressed to now CKD5- WORSENING, unclear as to why. nephro following. pt now more amenable to idea of HD, sp vein mapping by [facility] during this admission. no inpatient emergent need for HD but will likely need access placed and HD to start right after discharge. Plan: cont to monitor renal function. follow up outpt with [facility] to continue conversations regarding fistula placement, deferring until acute infection with OM fully treated if possible    Depression- STABLE. acute mild worsening noted during this admission sec to concerns regarding medical condition and long term prognosis. bedside conversation with counseling and positive reinforcement provided. Psych reeval appreciated, no changes to current regimen advised. Plan: continue home meds.  on dc will need referral to Therapist for continued care, she benefits from having long sessions to talk out her frustrations, responds very well to active listening and positive reinforcement    Pancytopenia- asymptomatic. anemia likely of CKD, WORSENING, asymptomatic. no clear explanation for leukopenia, ? abx related. now also with superimposed acute on  chronic thrombocytopenia. heme onc + renal following. Plan: serial cbc. heme follow up in office. epo. vit c. ferrous sulfate tid. cont empiric tx HIT as above.     DM2 complicated by ckd + foot ulcer on long term insulin therapy- IMPROVING. also complicated by asymptomatic hyperglycemia. hga1c 5.6, @ goal <7, note though may be falsely low given anemia. inc lantus from 23 to 25 ,c/w iss    NAFLD- remote hx of CT with signs of cirrhosis, not available for review @ this time, cannot confirm. imaging this admission only with signs of hepatosplenomegaly + fatty infiltration (US). d/c lactulose/rifaximin as no encephalopathy during this admission. ammonia normal Plan: outpatient GI evaluation. weight loss + diet advised, control of comorbid conditions    Dispo: pending finalization of PT eval. also pending resolution of HIT 52 y/o F with Hx of Fibromyalgia, DM2, Osteopenia, Matamoros Esophagus admitted for diabetic foot ulcer concerning for OM.     Presentation complicated by worsening kidney function.    Course complicated by significant thrombocytopenia. Also with anemia.    Course also complicated by labile depression.     Thrombocytopenia - IMPROVING. asymptomatic. in setting of total pancytopenia. intermediate 4T score. sp argatroban. Hep PF4 assay negative, HIT ruled out. Plan: cont to hold heparin + DVT ppx as PLT still low. monitor closely for signs of acute thrombosis. serial repeat cbc, BLUE TOP TUBE only to avoid PLT clotting. heme consult appreciated.     Pancytopenia- asymptomatic. anemia likely of CKD, WORSENING, mildly symptomatic. no clear explanation for leukopenia, ? abx related. now also with superimposed acute on  chronic thrombocytopenia. heme onc + renal following. Plan: serial cbc. heme follow up in office. epo. vit c. change po Fe to brief course iv venofer, start 5/2. check type + screen in AM in case prbc transfusion needed. if she continues to be symptomatic or worsens symptoms, empiric 1u PRBC tomorrow irrespective of blood count, otherwise transfuse for hgb < 7.      DM2 complicated by ckd + foot ulcer on long term insulin therapy- IMPROVING. also complicated by asymptomatic hyperglycemia. hga1c 5.6, @ goal <7, note though may be falsely low given anemia. inc lantus from 23 to 25 ,c/w iss    Diabetic Foot Ulcer complicated by Lt 1st digit OM- IMPROVING. OM confirmed on MRI. s/p resection of 1st great toe on Lt foot by podiatry, uncomplicated well tolerated procedure. on rocephin + doxy per ID. bone culture resulted as strep agalactiae sensistve to vanco, levofloxacin, PCN. Plan: cont both abx, given cx results will likely be able to dc doxy as no MRSA coverage needed, will discuss. also pending FU regarding duration of abx needed, anticipate 6wk total from the time of operation. cont local wound care per podiatry. PT eval appreciated, pt amb with RW independently, pending full write of formal consult, but will be able to go home on eventual dc.    Mild COPD Exac- IMPROVING. Plan: c/w prednisone taper. duonebs. resp inhalers. no need for supplemental o2. pulmonary following    ERIC on CKD 4, progressed to now CKD5- WORSENING, unclear as to why. nephro following. pt now more amenable to idea of HD, sp vein mapping by [facility] during this admission. no inpatient emergent need for HD but will likely need access placed and HD to start right after discharge. Plan: cont to monitor renal function. follow up outpt with [facility] to continue conversations regarding fistula placement, deferring until acute infection with OM fully treated if possible    Depression- STABLE. acute mild worsening noted during this admission sec to concerns regarding medical condition and long term prognosis. bedside conversation with counseling and positive reinforcement provided. Psych reeval appreciated, no changes to current regimen advised. Plan: continue home meds.  on dc will need referral to Therapist for continued care, she benefits from having long sessions to talk out her frustrations, responds very well to active listening and positive reinforcement    NAFLD- remote hx of CT with signs of cirrhosis, not available for review @ this time, cannot confirm. imaging this admission only with signs of hepatosplenomegaly + fatty infiltration (US). d/c lactulose/rifaximin as no encephalopathy during this admission. ammonia normal Plan: outpatient GI evaluation. weight loss + diet advised, control of comorbid conditions    Dispo: pending finalization of PT eval. also pending resolution of HIT

## 2018-05-03 LAB
ANION GAP SERPL CALC-SCNC: 14 MMOL/L — SIGNIFICANT CHANGE UP (ref 5–17)
BLD GP AB SCN SERPL QL: SIGNIFICANT CHANGE UP
BUN SERPL-MCNC: 69 MG/DL — HIGH (ref 8–20)
CALCIUM SERPL-MCNC: 7.4 MG/DL — LOW (ref 8.6–10.2)
CHLORIDE SERPL-SCNC: 98 MMOL/L — SIGNIFICANT CHANGE UP (ref 98–107)
CO2 SERPL-SCNC: 19 MMOL/L — LOW (ref 22–29)
CREAT SERPL-MCNC: 2.67 MG/DL — HIGH (ref 0.5–1.3)
GLUCOSE BLDC GLUCOMTR-MCNC: 146 MG/DL — HIGH (ref 70–99)
GLUCOSE BLDC GLUCOMTR-MCNC: 165 MG/DL — HIGH (ref 70–99)
GLUCOSE BLDC GLUCOMTR-MCNC: 223 MG/DL — HIGH (ref 70–99)
GLUCOSE BLDC GLUCOMTR-MCNC: 309 MG/DL — HIGH (ref 70–99)
GLUCOSE SERPL-MCNC: 139 MG/DL — HIGH (ref 70–115)
HCT VFR BLD CALC: 24.8 % — LOW (ref 37–47)
HGB BLD-MCNC: 7.4 G/DL — LOW (ref 12–16)
MCHC RBC-ENTMCNC: 28.8 PG — SIGNIFICANT CHANGE UP (ref 27–31)
MCHC RBC-ENTMCNC: 29.8 G/DL — LOW (ref 32–36)
MCV RBC AUTO: 96.5 FL — SIGNIFICANT CHANGE UP (ref 81–99)
PF4 HEPARIN CMPLX AB SER-ACNC: NEGATIVE — SIGNIFICANT CHANGE UP
PF4 HEPARIN CMPLX AB SERPL QL IA: 0.2 ABS — SIGNIFICANT CHANGE UP
PLATELET # BLD AUTO: 40 K/UL — SIGNIFICANT CHANGE UP (ref 150–400)
POTASSIUM SERPL-MCNC: 5 MMOL/L — SIGNIFICANT CHANGE UP (ref 3.5–5.3)
POTASSIUM SERPL-SCNC: 5 MMOL/L — SIGNIFICANT CHANGE UP (ref 3.5–5.3)
RBC # BLD: 2.57 M/UL — LOW (ref 4.4–5.2)
RBC # FLD: 14.4 % — SIGNIFICANT CHANGE UP (ref 11–15.6)
SODIUM SERPL-SCNC: 131 MMOL/L — LOW (ref 135–145)
TYPE + AB SCN PNL BLD: SIGNIFICANT CHANGE UP
WBC # BLD: 3.4 K/UL — LOW (ref 4.8–10.8)
WBC # FLD AUTO: 3.4 K/UL — LOW (ref 4.8–10.8)

## 2018-05-03 PROCEDURE — 36569 INSJ PICC 5 YR+ W/O IMAGING: CPT

## 2018-05-03 PROCEDURE — 76937 US GUIDE VASCULAR ACCESS: CPT | Mod: 26

## 2018-05-03 PROCEDURE — 99233 SBSQ HOSP IP/OBS HIGH 50: CPT

## 2018-05-03 PROCEDURE — 99232 SBSQ HOSP IP/OBS MODERATE 35: CPT

## 2018-05-03 RX ORDER — FOLIC ACID 0.8 MG
1 TABLET ORAL
Qty: 0 | Refills: 0 | COMMUNITY
Start: 2018-05-03

## 2018-05-03 RX ORDER — ZINC SULFATE TAB 220 MG (50 MG ZINC EQUIVALENT) 220 (50 ZN) MG
1 TAB ORAL
Qty: 0 | Refills: 0 | COMMUNITY
Start: 2018-05-03

## 2018-05-03 RX ORDER — BENZOCAINE AND MENTHOL 5; 1 G/100ML; G/100ML
1 LIQUID ORAL
Qty: 120 | Refills: 0 | OUTPATIENT
Start: 2018-05-03 | End: 2018-06-01

## 2018-05-03 RX ORDER — TRAZODONE HCL 50 MG
2 TABLET ORAL
Qty: 0 | Refills: 0 | COMMUNITY
Start: 2018-05-03

## 2018-05-03 RX ORDER — ASCORBIC ACID 60 MG
1 TABLET,CHEWABLE ORAL
Qty: 0 | Refills: 0 | COMMUNITY
Start: 2018-05-03

## 2018-05-03 RX ORDER — CEFTRIAXONE 500 MG/1
2 INJECTION, POWDER, FOR SOLUTION INTRAMUSCULAR; INTRAVENOUS
Qty: 40 | Refills: 0 | OUTPATIENT
Start: 2018-05-03 | End: 2018-05-22

## 2018-05-03 RX ORDER — DULOXETINE HYDROCHLORIDE 30 MG/1
2 CAPSULE, DELAYED RELEASE ORAL
Qty: 0 | Refills: 0 | COMMUNITY
Start: 2018-05-03

## 2018-05-03 RX ORDER — SACCHAROMYCES BOULARDII 250 MG
1 POWDER IN PACKET (EA) ORAL
Qty: 0 | Refills: 0 | COMMUNITY
Start: 2018-05-03

## 2018-05-03 RX ORDER — BUPROPION HYDROCHLORIDE 150 MG/1
1 TABLET, EXTENDED RELEASE ORAL
Qty: 0 | Refills: 0 | COMMUNITY
Start: 2018-05-03

## 2018-05-03 RX ORDER — ALPRAZOLAM 0.25 MG
1 TABLET ORAL
Qty: 0 | Refills: 0 | COMMUNITY
Start: 2018-05-03

## 2018-05-03 RX ORDER — PANTOPRAZOLE SODIUM 20 MG/1
1 TABLET, DELAYED RELEASE ORAL
Qty: 0 | Refills: 0 | COMMUNITY
Start: 2018-05-03

## 2018-05-03 RX ORDER — FERROUS SULFATE 325(65) MG
1 TABLET ORAL
Qty: 90 | Refills: 0 | OUTPATIENT
Start: 2018-05-03 | End: 2018-06-01

## 2018-05-03 RX ORDER — MAGNESIUM OXIDE 400 MG ORAL TABLET 241.3 MG
1 TABLET ORAL
Qty: 0 | Refills: 0 | COMMUNITY
Start: 2018-05-03

## 2018-05-03 RX ADMIN — OXYCODONE HYDROCHLORIDE 5 MILLIGRAM(S): 5 TABLET ORAL at 19:09

## 2018-05-03 RX ADMIN — Medication 250 MILLIGRAM(S): at 06:21

## 2018-05-03 RX ADMIN — OXYCODONE HYDROCHLORIDE 5 MILLIGRAM(S): 5 TABLET ORAL at 13:17

## 2018-05-03 RX ADMIN — Medication 0.5 MILLIGRAM(S): at 13:28

## 2018-05-03 RX ADMIN — GABAPENTIN 600 MILLIGRAM(S): 400 CAPSULE ORAL at 22:10

## 2018-05-03 RX ADMIN — ERYTHROPOIETIN 8000 UNIT(S): 10000 INJECTION, SOLUTION INTRAVENOUS; SUBCUTANEOUS at 22:10

## 2018-05-03 RX ADMIN — Medication 1 MILLIGRAM(S): at 11:16

## 2018-05-03 RX ADMIN — ZINC SULFATE TAB 220 MG (50 MG ZINC EQUIVALENT) 220 MILLIGRAM(S): 220 (50 ZN) TAB at 11:16

## 2018-05-03 RX ADMIN — OXYCODONE HYDROCHLORIDE 5 MILLIGRAM(S): 5 TABLET ORAL at 11:19

## 2018-05-03 RX ADMIN — Medication 2: at 18:15

## 2018-05-03 RX ADMIN — Medication 20 MILLIGRAM(S): at 06:21

## 2018-05-03 RX ADMIN — Medication 250 MILLIGRAM(S): at 18:12

## 2018-05-03 RX ADMIN — MAGNESIUM OXIDE 400 MG ORAL TABLET 400 MILLIGRAM(S): 241.3 TABLET ORAL at 07:32

## 2018-05-03 RX ADMIN — MAGNESIUM OXIDE 400 MG ORAL TABLET 400 MILLIGRAM(S): 241.3 TABLET ORAL at 13:15

## 2018-05-03 RX ADMIN — Medication 3 MILLILITER(S): at 15:44

## 2018-05-03 RX ADMIN — Medication 100 MILLIGRAM(S): at 18:12

## 2018-05-03 RX ADMIN — Medication 3 MILLILITER(S): at 09:46

## 2018-05-03 RX ADMIN — INSULIN GLARGINE 25 UNIT(S): 100 INJECTION, SOLUTION SUBCUTANEOUS at 22:12

## 2018-05-03 RX ADMIN — Medication 4: at 11:19

## 2018-05-03 RX ADMIN — MONTELUKAST 10 MILLIGRAM(S): 4 TABLET, CHEWABLE ORAL at 11:16

## 2018-05-03 RX ADMIN — Medication 0.5 MILLIGRAM(S): at 01:55

## 2018-05-03 RX ADMIN — IRON SUCROSE 110 MILLIGRAM(S): 20 INJECTION, SOLUTION INTRAVENOUS at 14:32

## 2018-05-03 RX ADMIN — Medication 0.5 MILLIGRAM(S): at 22:11

## 2018-05-03 RX ADMIN — Medication 3 MILLILITER(S): at 21:41

## 2018-05-03 RX ADMIN — OXYCODONE HYDROCHLORIDE 5 MILLIGRAM(S): 5 TABLET ORAL at 18:12

## 2018-05-03 RX ADMIN — CEFTRIAXONE 2000 MILLIGRAM(S): 500 INJECTION, POWDER, FOR SOLUTION INTRAMUSCULAR; INTRAVENOUS at 13:26

## 2018-05-03 RX ADMIN — Medication 200 MILLIGRAM(S): at 22:10

## 2018-05-03 RX ADMIN — Medication 500 MILLIGRAM(S): at 11:16

## 2018-05-03 RX ADMIN — BUPROPION HYDROCHLORIDE 300 MILLIGRAM(S): 150 TABLET, EXTENDED RELEASE ORAL at 11:16

## 2018-05-03 RX ADMIN — PANTOPRAZOLE SODIUM 40 MILLIGRAM(S): 20 TABLET, DELAYED RELEASE ORAL at 06:21

## 2018-05-03 RX ADMIN — DULOXETINE HYDROCHLORIDE 120 MILLIGRAM(S): 30 CAPSULE, DELAYED RELEASE ORAL at 11:16

## 2018-05-03 RX ADMIN — MAGNESIUM OXIDE 400 MG ORAL TABLET 400 MILLIGRAM(S): 241.3 TABLET ORAL at 18:12

## 2018-05-03 NOTE — PROGRESS NOTE ADULT - SUBJECTIVE AND OBJECTIVE BOX
CC: follow up symptom mgmt    INTERVAL HPI/OVERNIGHT EVENTS:  no overnight events  s/p PICC line   PRESENT SYMPTOMS: SOURCE:  Patient/Family/Team    PAIN SCALE:  0 = none  1 = mild   2 = moderate  3 = severe    Pain: denies    Dyspnea:  [ ] YES [ x] NO  Anxiety:  [ ] YES [ x] NO  Fatigue: [ ] YES [x ] NO  Nausea: [ ] YES [x ] NO  Loss of Appetite: [ ] YES [x] NO  Other symptoms: __________    MEDICATIONS  (STANDING):  ALBUTerol/ipratropium for Nebulization 3 milliLiter(s) Nebulizer every 6 hours  ascorbic acid 500 milliGRAM(s) Oral daily  buPROPion XL . 300 milliGRAM(s) Oral daily  cefTRIAXone Injectable. 2000 milliGRAM(s) IV Push every 24 hours  dextrose 50% Injectable 12.5 Gram(s) IV Push once  dextrose 50% Injectable 25 Gram(s) IV Push once  dextrose 50% Injectable 25 Gram(s) IV Push once  doxycycline hyclate Capsule 100 milliGRAM(s) Oral every 12 hours  DULoxetine 120 milliGRAM(s) Oral daily  epoetin krunal Injectable 8000 Unit(s) SubCutaneous <User Schedule>  folic acid 1 milliGRAM(s) Oral daily  gabapentin 600 milliGRAM(s) Oral at bedtime  insulin glargine Injectable (LANTUS) 25 Unit(s) SubCutaneous at bedtime  insulin lispro (HumaLOG) corrective regimen sliding scale   SubCutaneous three times a day before meals  iron sucrose IVPB 200 milliGRAM(s) IV Intermittent daily  magnesium oxide 400 milliGRAM(s) Oral three times a day with meals  montelukast 10 milliGRAM(s) Oral daily  pantoprazole    Tablet 40 milliGRAM(s) Oral before breakfast  predniSONE   Tablet   Oral   saccharomyces boulardii 250 milliGRAM(s) Oral two times a day  traZODone 200 milliGRAM(s) Oral at bedtime  zinc sulfate 220 milliGRAM(s) Oral daily    MEDICATIONS  (PRN):  ALPRAZolam 0.5 milliGRAM(s) Oral three times a day PRN anxiety  benzocaine 15 mG/menthol 3.6 mG Lozenge 1 Lozenge Oral four times a day PRN Sore Throat  dextrose Gel 1 Dose(s) Oral once PRN Blood Glucose LESS THAN 70 milliGRAM(s)/deciliter  glucagon  Injectable 1 milliGRAM(s) IntraMuscular once PRN Glucose LESS THAN 70 milligrams/deciliter  HYDROcodone/homatropine Syrup 5 milliLiter(s) Oral four times a day PRN Cough  ondansetron Injectable 4 milliGRAM(s) IV Push every 6 hours PRN Nausea and/or Vomiting  oxyCODONE    IR 5 milliGRAM(s) Oral every 6 hours PRN Moderate to Severe Pain (7 - 10)      Allergies    No Known Allergies    Intolerances    Karnofsky Performance Score/Palliative Performance Status Version 2:   50      %    Vital Signs Last 24 Hrs  T(C): 36.4 (03 May 2018 08:00), Max: 37.1 (02 May 2018 16:41)  T(F): 97.5 (03 May 2018 08:00), Max: 98.7 (02 May 2018 16:41)  HR: 80 (03 May 2018 08:00) (80 - 88)  BP: 152/83 (03 May 2018 08:00) (139/72 - 159/81)  BP(mean): --  RR: 18 (03 May 2018 08:00) (18 - 18)  SpO2: 98% (03 May 2018 08:00) (98% - 98%)    PHYSICAL EXAM:    General: WD woman AOx3 good spirits- smiling    HEENT: [ x] normal  [ ] dry mouth  [ ] ET tube/trach    Lungs: [x ] comfortable [ ] tachypnea/labored breathing  [ ] excessive secretions    CV: [ x] normal  [ ] tachycardia    GI: [x ] normal  [ ] distended  [ ] tender  [ ] no BS               [ ] PEG/NG/OG tube    : [x ] normal  [ ] incontinent  [ ] oliguria/anuria  [ ] cleary    MSK: [x ] normal  [ ] weakness  [ ] edema             [ ] ambulatory  [ ] bedbound/wheelchair bound    Skin: no rash left foot bandage    LABS:                        7.4    3.4   )-----------( 40       ( 03 May 2018 07:52 )             24.8     05-03    131<L>  |  98  |  69.0<H>  ----------------------------<  139<H>  5.0   |  19.0<L>  |  2.67<H>    Ca    7.4<L>      03 May 2018 07:52          I&O's Summary    02 May 2018 07:01  -  03 May 2018 07:00  --------------------------------------------------------  IN: 11.5 mL / OUT: 0 mL / NET: 11.5 mL      Thank you for the opportunity to assist with the care of this patient.   [facility] [phone].

## 2018-05-03 NOTE — PROGRESS NOTE ADULT - SUBJECTIVE AND OBJECTIVE BOX
HPI: Patient is a 53y Female seen on consultation for the evaluation and management of pancytopenia.  Also, report of HIT antibodies.  Patient has H/O of DM, foot ulcers, renal insufficency, HSM, possible cirrhosis, depression, on long term antidepressants.  Patient was admitted with wound infection of feet, treated with antibiotics.  Found to be pancytopenic; no history of bleeding.  She denies fevers or chills.  Complains of chronic left-sided abdominal discomfort with nausea.      INTERVAL HISTORY    patient is tearful and upset her diagnosis of osteomyelitis. She denies any bleeding. no fevers.   Currently receiving blood transfusion without incident    PAST MEDICAL & SURGICAL HISTORY:  Fibromyalgia  DM (diabetes mellitus)  Foot ulcer: Left Foot  Osteopenia  Chronic pain  Back ache  Arthritis  Shoulder joint stiffness, left  Matamoros esophagus  Stomach ulcer  Diabetes  Asthma  S/P knee surgery  S/P hysterectomy  S/P cholecystectomy      REVIEW OF SYSTEMS      	      	  ENMT:	c/o dry mouth    Respiratory and Thorax:denies SOB or chest pain  	  Cardiovascular:	no chest pain or palpitations      Genitourinary:	denies hematuria    Musculoskeletal:	chronic bony pain    	    Psychiatric:chronic depression	      MEDICATIONS  (STANDING):  ALBUTerol/ipratropium for Nebulization 3 milliLiter(s) Nebulizer every 6 hours  argatroban Infusion 2 MICROgram(s)/kG/Min (9.252 mL/Hr) IV Continuous <Continuous>  ascorbic acid 500 milliGRAM(s) Oral daily  buPROPion XL . 300 milliGRAM(s) Oral daily  cefTRIAXone Injectable. 2000 milliGRAM(s) IV Push every 24 hours  dextrose 50% Injectable 12.5 Gram(s) IV Push once  dextrose 50% Injectable 25 Gram(s) IV Push once  dextrose 50% Injectable 25 Gram(s) IV Push once  doxycycline IVPB      doxycycline IVPB 100 milliGRAM(s) IV Intermittent every 12 hours  DULoxetine 120 milliGRAM(s) Oral daily  epoetin krunal Injectable 8000 Unit(s) SubCutaneous <User Schedule>  ferrous    sulfate 325 milliGRAM(s) Oral daily  gabapentin 600 milliGRAM(s) Oral at bedtime  insulin glargine Injectable (LANTUS) 20 Unit(s) SubCutaneous at bedtime  insulin lispro (HumaLOG) corrective regimen sliding scale   SubCutaneous three times a day before meals  magnesium oxide 400 milliGRAM(s) Oral three times a day with meals  montelukast 10 milliGRAM(s) Oral daily  pantoprazole    Tablet 40 milliGRAM(s) Oral before breakfast  predniSONE   Tablet   Oral   saccharomyces boulardii 250 milliGRAM(s) Oral two times a day  traZODone 200 milliGRAM(s) Oral at bedtime  zinc sulfate 220 milliGRAM(s) Oral daily      ICU Vital Signs Last 24 Hrs  T(C): 36.6 (01 May 2018 08:22), Max: 36.7 (01 May 2018 00:34)  T(F): 97.8 (01 May 2018 08:22), Max: 98.1 (01 May 2018 00:34)  HR: 83 (01 May 2018 09:47) (77 - 84)  BP: 141/82 (01 May 2018 08:22) (139/75 - 154/79)  BP(mean): --  ABP: --  ABP(mean): --  RR: 17 (01 May 2018 08:22) (17 - 18)  SpO2: 98% (01 May 2018 09:47) (93% - 100%)      PHYSICAL EXAM:      Constitutional:Chroncially ill-appearing, overweight, awake, alert, oriented    Eyes:pale, anicteric    ENMT:no adenopathy; drymm with poor dentition    Neck:Supple    Respiratory:Clear    Cardiovascular:RRR    Gastrointestinal:Soft, palpable spleen tip    Extremities:bandaged      Lab 5/3/2018:  WBC:  3.4  H/H 7.4/24.8, plt ct 40,000                  8.0                  133  | 20.0 | 68.0         4.4   >-----------< clumped   ------------------------< 191                   25.7                 5.2  | 100  | 2.80                                         Ca 7.9   Mg x     Ph x      PT/INR - ( 01 May 2018 08:06 )   PT: 21.0 sec;   INR: 1.88 ratio         PTT - ( 01 May 2018 08:06 )  PTT:51.5 sec

## 2018-05-03 NOTE — PROGRESS NOTE ADULT - SUBJECTIVE AND OBJECTIVE BOX
PMD: unknown  Podiatry Dr Sanchez  Nephsteph Bedoya    CHIEF COMPLAINT: foot wound + generalized edema    Patient seen and examined at the bedside. No acute overnight events. No events yesterday. Complaining of sore throat this AM. Denies fever/chills, headache, lightheadedness, dizziness, chest pain, palpitations, abd pain, nausea/vomiting/diarrhea, muscle pain.      =========================================================================================    PHYSICAL EXAM.    GEN - appears age appropriate. well nourished. pleasant.  HEENT - NCAT, EOMI, PAULINA  RESP - CTA BL, no wheeze/stridor/rhonchi/crackles. not on supplemental O2. able to speak in full sentences without distress.   CARDIO - NS1S2, RRR. No murmurs/rubs/gallops.  ABD - Soft/Non tender/Non distended. Normal BS x4 quadrants. no guarding/rebound tenderness.  Ext - No JOSE. LLE wrapped in ace bandage  MSK - BL 5/5 strength on upper and lower extremities.   Neuro - AAOx3. cn 2-12 grossly intact  Psych - normal affect  Skin - c/d/i. no rashes/lesions      VITAL SIGNS.    Vital Signs Last 24 Hrs  T(C): 36.4 (03 May 2018 08:00), Max: 37.1 (02 May 2018 16:41)  T(F): 97.5 (03 May 2018 08:00), Max: 98.7 (02 May 2018 16:41)  HR: 80 (03 May 2018 08:00) (80 - 88)  BP: 152/83 (03 May 2018 08:00) (139/72 - 159/81)  BP(mean): --  RR: 18 (03 May 2018 08:00) (18 - 18)  SpO2: 98% (03 May 2018 08:00) (98% - 98%)        =================================================    LABS.                          7.4    3.4   )-----------( 40       ( 03 May 2018 07:52 )             24.8     05-03    131<L>  |  98  |  69.0<H>  ----------------------------<  139<H>  5.0   |  19.0<L>  |  2.67<H>    Ca    7.4<L>      03 May 2018 07:52          I&O's Summary    02 May 2018 07:01  -  03 May 2018 07:00  --------------------------------------------------------  IN: 11.5 mL / OUT: 0 mL / NET: 11.5 mL          ================================================    IMAGING.      ================================================    MEDICATIONS  (STANDING):  ALBUTerol/ipratropium for Nebulization 3 milliLiter(s) Nebulizer every 6 hours  ascorbic acid 500 milliGRAM(s) Oral daily  buPROPion XL . 300 milliGRAM(s) Oral daily  cefTRIAXone Injectable. 2000 milliGRAM(s) IV Push every 24 hours  dextrose 50% Injectable 12.5 Gram(s) IV Push once  dextrose 50% Injectable 25 Gram(s) IV Push once  dextrose 50% Injectable 25 Gram(s) IV Push once  doxycycline hyclate Capsule 100 milliGRAM(s) Oral every 12 hours  DULoxetine 120 milliGRAM(s) Oral daily  epoetin krunal Injectable 8000 Unit(s) SubCutaneous <User Schedule>  folic acid 1 milliGRAM(s) Oral daily  gabapentin 600 milliGRAM(s) Oral at bedtime  insulin glargine Injectable (LANTUS) 25 Unit(s) SubCutaneous at bedtime  insulin lispro (HumaLOG) corrective regimen sliding scale   SubCutaneous three times a day before meals  iron sucrose IVPB 200 milliGRAM(s) IV Intermittent daily  magnesium oxide 400 milliGRAM(s) Oral three times a day with meals  montelukast 10 milliGRAM(s) Oral daily  pantoprazole    Tablet 40 milliGRAM(s) Oral before breakfast  predniSONE   Tablet   Oral   saccharomyces boulardii 250 milliGRAM(s) Oral two times a day  traZODone 200 milliGRAM(s) Oral at bedtime  zinc sulfate 220 milliGRAM(s) Oral daily    MEDICATIONS  (PRN):  ALPRAZolam 0.5 milliGRAM(s) Oral three times a day PRN anxiety  benzocaine 15 mG/menthol 3.6 mG Lozenge 1 Lozenge Oral four times a day PRN Sore Throat  dextrose Gel 1 Dose(s) Oral once PRN Blood Glucose LESS THAN 70 milliGRAM(s)/deciliter  glucagon  Injectable 1 milliGRAM(s) IntraMuscular once PRN Glucose LESS THAN 70 milligrams/deciliter  HYDROcodone/homatropine Syrup 5 milliLiter(s) Oral four times a day PRN Cough  ondansetron Injectable 4 milliGRAM(s) IV Push every 6 hours PRN Nausea and/or Vomiting  oxyCODONE    IR 5 milliGRAM(s) Oral every 6 hours PRN Moderate to Severe Pain (7 - 10) PMD: unknown  Podiatry Dr Sanchez  Nephsteph [facility]oya    CHIEF COMPLAINT: foot wound + generalized edema    Patient seen and examined at the bedside. No acute overnight events. No events yesterday. Complaining of fatigue this AM. Denies fever/chills, headache, lightheadedness, dizziness, chest pain, palpitations, abd pain, nausea/vomiting/diarrhea, muscle pain.      =========================================================================================    PHYSICAL EXAM.    GEN - appears age appropriate. well nourished. pleasant.  HEENT - NCAT, EOMI, PAULINA  RESP - CTA BL, no wheeze/stridor/rhonchi/crackles. not on supplemental O2. able to speak in full sentences without distress.   CARDIO - NS1S2, RRR. No murmurs/rubs/gallops.  ABD - Soft/Non tender/Non distended. Normal BS x4 quadrants. no guarding/rebound tenderness.  Ext - No JOSE. LLE wrapped in ace bandage  MSK - BL 5/5 strength on upper and lower extremities.   Neuro - AAOx3. cn 2-12 grossly intact  Psych - normal affect  Skin - c/d/i. no rashes/lesions      VITAL SIGNS.    Vital Signs Last 24 Hrs  T(C): 36.4 (03 May 2018 08:00), Max: 37.1 (02 May 2018 16:41)  T(F): 97.5 (03 May 2018 08:00), Max: 98.7 (02 May 2018 16:41)  HR: 80 (03 May 2018 08:00) (80 - 88)  BP: 152/83 (03 May 2018 08:00) (139/72 - 159/81)  BP(mean): --  RR: 18 (03 May 2018 08:00) (18 - 18)  SpO2: 98% (03 May 2018 08:00) (98% - 98%)        =================================================    LABS.                          7.4    3.4   )-----------( 40       ( 03 May 2018 07:52 )             24.8     05-03    131<L>  |  98  |  69.0<H>  ----------------------------<  139<H>  5.0   |  19.0<L>  |  2.67<H>    Ca    7.4<L>      03 May 2018 07:52          I&O's Summary    02 May 2018 07:01  -  03 May 2018 07:00  --------------------------------------------------------  IN: 11.5 mL / OUT: 0 mL / NET: 11.5 mL          ================================================    IMAGING.      ================================================    MEDICATIONS  (STANDING):  ALBUTerol/ipratropium for Nebulization 3 milliLiter(s) Nebulizer every 6 hours  ascorbic acid 500 milliGRAM(s) Oral daily  buPROPion XL . 300 milliGRAM(s) Oral daily  cefTRIAXone Injectable. 2000 milliGRAM(s) IV Push every 24 hours  dextrose 50% Injectable 12.5 Gram(s) IV Push once  dextrose 50% Injectable 25 Gram(s) IV Push once  dextrose 50% Injectable 25 Gram(s) IV Push once  doxycycline hyclate Capsule 100 milliGRAM(s) Oral every 12 hours  DULoxetine 120 milliGRAM(s) Oral daily  epoetin krunal Injectable 8000 Unit(s) SubCutaneous <User Schedule>  folic acid 1 milliGRAM(s) Oral daily  gabapentin 600 milliGRAM(s) Oral at bedtime  insulin glargine Injectable (LANTUS) 25 Unit(s) SubCutaneous at bedtime  insulin lispro (HumaLOG) corrective regimen sliding scale   SubCutaneous three times a day before meals  iron sucrose IVPB 200 milliGRAM(s) IV Intermittent daily  magnesium oxide 400 milliGRAM(s) Oral three times a day with meals  montelukast 10 milliGRAM(s) Oral daily  pantoprazole    Tablet 40 milliGRAM(s) Oral before breakfast  predniSONE   Tablet   Oral   saccharomyces boulardii 250 milliGRAM(s) Oral two times a day  traZODone 200 milliGRAM(s) Oral at bedtime  zinc sulfate 220 milliGRAM(s) Oral daily    MEDICATIONS  (PRN):  ALPRAZolam 0.5 milliGRAM(s) Oral three times a day PRN anxiety  benzocaine 15 mG/menthol 3.6 mG Lozenge 1 Lozenge Oral four times a day PRN Sore Throat  dextrose Gel 1 Dose(s) Oral once PRN Blood Glucose LESS THAN 70 milliGRAM(s)/deciliter  glucagon  Injectable 1 milliGRAM(s) IntraMuscular once PRN Glucose LESS THAN 70 milligrams/deciliter  HYDROcodone/homatropine Syrup 5 milliLiter(s) Oral four times a day PRN Cough  ondansetron Injectable 4 milliGRAM(s) IV Push every 6 hours PRN Nausea and/or Vomiting  oxyCODONE    IR 5 milliGRAM(s) Oral every 6 hours PRN Moderate to Severe Pain (7 - 10)

## 2018-05-03 NOTE — PROGRESS NOTE ADULT - ASSESSMENT
52 y/o F with long standing left foot ulcer, with underlying Osteomyelitis left first metatarsal on MRI; RECENT MRSA and Enterobacter on superficial wound.

## 2018-05-03 NOTE — PROCEDURE NOTE - NSPOSTCAREGUIDE_GEN_A_CORE
Keep the cast/splint/dressing clean and dry/Instructed patient/caregiver to follow-up with primary care physician/Care for catheter as per unit/ICU protocols/Verbal/written post procedure instructions were given to patient/caregiver/Instructed patient/caregiver regarding signs and symptoms of infection
Instructed patient/caregiver regarding signs and symptoms of infection/Instructed patient/caregiver to follow-up with primary care physician/Keep the cast/splint/dressing clean and dry/Care for catheter as per unit/ICU protocols

## 2018-05-03 NOTE — PROGRESS NOTE ADULT - ASSESSMENT
Imp:  Patient is seen for pancytopenia; known HSM, possible underlying cirrhosis  Labs reviewed-iron studies non-diagnostic, B12 normal, folate low; elevated Cr-  Multifactoria anemia, secondary to chronic disease/CKD,HSM  Thrombocytopenia-secondary to HSM, ? cirrhosis, infection and antibiotics. 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 will repeat HIT antibodies. If Plt count continue to decrease, would monitor off anticoagulation.     Rec:  po Folate          Procrit          Iron, either po or IV          F/U CBC post transfusion

## 2018-05-03 NOTE — PROCEDURE NOTE - PROCEDURE
<<-----Click on this checkbox to enter Procedure Midline catheter insertion  05/03/2018  BARD 10cm MIDLINE  ARM CIRCUMFERENCE 34cm  Active  BGERLACH

## 2018-05-03 NOTE — PROGRESS NOTE ADULT - SUBJECTIVE AND OBJECTIVE BOX
S: 53 year old female seen bedside s/p 1st ray amputation on 4/26. Patient denies F/C/N/V/SOB.    DARIEL:   left foot  Vasc: DP/PT 1/4; TG: WNL; CFT < 5 sec on 3-5 digits   Derm: Surgical site well coapted, no dehiscence no drainage, sutures intact. no purulence noted, no hematoma noted upon probing the surgical site. strikethrough noted on post-op dressing (from walking on surgical site), but no active bleeding noted at this time; ecchymosis about the entire rearfoot  Neuro: diminished protective sensation    A: s/p 1st ray amputation of the left foot on 4/26    P: Chart reviewed and patient evaluated  Applied xeroform/DSD to surgical site.   Culture and pathology pending: few strep noted  continue iv abx.   Patient to be Heel weightbearing with surgical shoe to the LLE.  Patient may be podiatrically discharged once medically stable and f/u with Dr. Sanchez as outpatient  Podiatry will follow while in house.

## 2018-05-03 NOTE — PROGRESS NOTE ADULT - ASSESSMENT
52 y/o F with Hx of Fibromyalgia, DM2, Osteopenia, [place] Esophagus admitted for diabetic foot ulcer concerning for OM.     Presentation complicated by worsening kidney function.    Course complicated by significant thrombocytopenia. Also with anemia.    Course also complicated by labile depression.     Thrombocytopenia - IMPROVING. asymptomatic. in setting of total pancytopenia. intermediate 4T score. sp argatroban. Hep PF4 assay + NUNO negative, HIT ruled out. Plan: cont to hold heparin + DVT ppx as PLT still low. monitor closely for signs of acute thrombosis. serial repeat cbc, BLUE TOP TUBE only to avoid PLT clotting. heme consult appreciated.     Pancytopenia- asymptomatic. anemia likely of CKD, WORSENING, mildly symptomatic. no clear explanation for leukopenia, ? abx related. now also with superimposed acute on  chronic thrombocytopenia. heme onc + renal following. Plan: heme follow up in office. epo. vit c. will give 1u prbc transfusion for relief of symptoms. repeat cbc in AM    DM2 complicated by ckd + foot ulcer on long term insulin therapy- IMPROVING. also complicated by asymptomatic hyperglycemia. hga1c 5.6, @ goal <7, note though may be falsely low given anemia. cont lantus 25 ,c/w iss    Diabetic Foot Ulcer complicated by Lt 1st digit OM- STABLE. OM confirmed on MRI. s/p resection of 1st great toe on Lt foot by podiatry, uncomplicated well tolerated procedure. on rocephin + doxy per ID. bone culture resulted as strep agalactiae sensistve to vanco, levofloxacin, PCN. Plan: cont both abx per ID, last date 5/22, PICC placement 5/3. cont local wound care per podiatry. PT eval appreciated, pt amb with RW independently, will be able to go home. anticipate dc 5/4    Mild COPD Exac- IMPROVING. Plan: c/w prednisone taper, 10mg daily x2 days started 5/4. duonebs. resp inhalers. no need for supplemental o2. pulmonary following    ERIC on CKD 4, progressed to now CKD5- IMPROVING, recent worsening may have been related to argatroban, better since it was discontinued. nephro following. sp vein mapping by [facility] during this admission. no inpatient emergent need for HD but will likely need access placed and HD to start right after discharge. Plan: cont to monitor renal function. follow up outpt with [facility] to continue plans regarding fistula placement. outpt follow up with renal    Depression- STABLE. acute mild worsening noted during this admission sec to concerns regarding medical condition and long term prognosis. bedside conversation with counseling and positive reinforcement provided. Psych reeval appreciated, no changes to current regimen advised. Plan: continue home meds.  on dc will need referral to Therapist for continued care, she benefits from having long sessions to talk out her frustrations, responds very well to active listening and positive reinforcement    NAFLD- remote hx of CT with signs of cirrhosis, not available for review @ this time, cannot confirm. imaging this admission only with signs of hepatosplenomegaly + fatty infiltration (US). d/c lactulose/rifaximin as no encephalopathy during this admission. ammonia normal Plan: outpatient GI evaluation. weight loss + diet advised, control of comorbid conditions    Dispo: dc home with PICC 5/4 54 y/o F with Hx of Fibromyalgia, DM2, Osteopenia, [place] Esophagus admitted for diabetic foot ulcer concerning for OM.     Presentation complicated by worsening kidney function.    Course complicated by significant thrombocytopenia. Also with anemia.    Course also complicated by labile depression.     Thrombocytopenia - IMPROVING. asymptomatic. in setting of total pancytopenia. intermediate 4T score. sp argatroban. Hep PF4 assay + NUNO negative, HIT ruled out. Plan: cont to hold heparin + DVT ppx as PLT still low. monitor closely for signs of acute thrombosis. serial repeat cbc, BLUE TOP TUBE only to avoid PLT clotting. heme consult appreciated.     Pancytopenia- asymptomatic. anemia likely of CKD, WORSENING, mildly symptomatic. no clear explanation for leukopenia, ? abx related. now also with superimposed acute on  chronic thrombocytopenia. heme onc + renal following. Plan: heme follow up in office. epo. vit c. will give 1u prbc transfusion for relief of symptoms (progressively worsening fatigue). repeat cbc in AM    DM2 complicated by ckd + foot ulcer on long term insulin therapy- IMPROVING. also complicated by asymptomatic hyperglycemia. hga1c 5.6, @ goal <7, note though may be falsely low given anemia. cont lantus 25 ,c/w iss    Diabetic Foot Ulcer complicated by Lt 1st digit OM- STABLE. OM confirmed on MRI. s/p resection of 1st great toe on Lt foot by podiatry, uncomplicated well tolerated procedure. on rocephin + doxy per ID. bone culture resulted as strep agalactiae sensistve to vanco, levofloxacin, PCN. Plan: cont both abx per ID, last date 5/22, PICC placement 5/3. cont local wound care per podiatry. PT eval appreciated, pt amb with RW independently, will be able to go home. anticipate dc 5/4    Mild COPD Exac- IMPROVING. Plan: c/w prednisone taper, 10mg daily x2 days started 5/4. duonebs. resp inhalers. no need for supplemental o2. pulmonary following    ERIC on CKD 4, progressed to now CKD5- IMPROVING, recent worsening may have been related to argatroban, better since it was discontinued. nephro following. sp vein mapping by [facility] during this admission. no inpatient emergent need for HD but will likely need access placed and HD to start right after discharge. Plan: cont to monitor renal function. follow up outpt with vasc to continue plans regarding fistula placement. outpt follow up with renal    Depression- STABLE. acute mild worsening noted during this admission sec to concerns regarding medical condition and long term prognosis. bedside conversation with counseling and positive reinforcement provided. Psych reeval appreciated, no changes to current regimen advised. Plan: continue home meds.  on dc will need referral to Therapist for continued care, she benefits from having long sessions to talk out her frustrations, responds very well to active listening and positive reinforcement    NAFLD- remote hx of CT with signs of cirrhosis, not available for review @ this time, cannot confirm. imaging this admission only with signs of hepatosplenomegaly + fatty infiltration (US). d/c lactulose/rifaximin as no encephalopathy during this admission. ammonia normal Plan: outpatient GI evaluation. weight loss + diet advised, control of comorbid conditions    Dispo: dc home with PICC 5/4

## 2018-05-03 NOTE — PROCEDURE NOTE - NSPROCDETAILS_GEN_ALL_CORE
sterile dressing applied/supine position/ultrasound guidance/sterile technique, catheter placed/ultrasound assessment/location identified, draped/prepped, sterile technique used
dressing applied/secured in place/sterile technique, catheter placed/ultrasound utilization/location identified, draped/prepped, sterile technique used/blood seen on insertion/flushes easily

## 2018-05-03 NOTE — PROGRESS NOTE ADULT - SUBJECTIVE AND OBJECTIVE BOX
[facility]  INFECTIOUS DISEASES AND INTERNAL MEDICINE at [place]  =======================================================  Dwight Pillai MD [facility] Hospital[facility]OE Vick MD  Diplomates American Board of Internal Medicine and Infectious Diseases  =======================================================    SONYA LENNON [id number]  follow up on Lt foot infection.   s/p resection of head of left 1st metatarsal on 4/26/18.    no fevers  PICC line ordered, pending placement.    ======================================    Allergies:  No Known Allergies    MEDICATIONS  (STANDING):  ALBUTerol/ipratropium for Nebulization 3 milliLiter(s) Nebulizer every 6 hours  ascorbic acid 500 milliGRAM(s) Oral daily  buPROPion XL . 300 milliGRAM(s) Oral daily  cefTRIAXone Injectable. 2000 milliGRAM(s) IV Push every 24 hours  dextrose 50% Injectable 12.5 Gram(s) IV Push once  dextrose 50% Injectable 25 Gram(s) IV Push once  dextrose 50% Injectable 25 Gram(s) IV Push once  doxycycline hyclate Capsule 100 milliGRAM(s) Oral every 12 hours  DULoxetine 120 milliGRAM(s) Oral daily  epoetin krunal Injectable 8000 Unit(s) SubCutaneous <User Schedule>  folic acid 1 milliGRAM(s) Oral daily  gabapentin 600 milliGRAM(s) Oral at bedtime  insulin glargine Injectable (LANTUS) 25 Unit(s) SubCutaneous at bedtime  insulin lispro (HumaLOG) corrective regimen sliding scale   SubCutaneous three times a day before meals  iron sucrose IVPB 200 milliGRAM(s) IV Intermittent daily  magnesium oxide 400 milliGRAM(s) Oral three times a day with meals  montelukast 10 milliGRAM(s) Oral daily  pantoprazole    Tablet 40 milliGRAM(s) Oral before breakfast  predniSONE   Tablet   Oral   saccharomyces boulardii 250 milliGRAM(s) Oral two times a day  traZODone 200 milliGRAM(s) Oral at bedtime  zinc sulfate 220 milliGRAM(s) Oral daily    ======================================  REVIEW OF SYSTEMS:  as above  all other ROS negative    ======================================  Physical Exam:  Vital Signs Last 24 Hrs  T(C): 36.4 (03 May 2018 08:00), Max: 37.1 (02 May 2018 16:41)  T(F): 97.5 (03 May 2018 08:00), Max: 98.7 (02 May 2018 16:41)  HR: 80 (03 May 2018 08:00) (80 - 89)  BP: 152/83 (03 May 2018 08:00) (139/72 - 159/81)  RR: 18 (03 May 2018 08:00) (18 - 18)  SpO2: 98% (03 May 2018 08:00) (98% - 98%)    GEN: NAD, pleasant; thin  HEENT: normocephalic and atraumatic. EOMI. PERRL.    NECK: Supple.   LUNGS: coarse breath sounds bilaterally.   HEART: Regular rate and rhythm   ABDOMEN: Soft, nontender, and nondistended.  Positive bowel sounds.    : No CVA tenderness  EXTREMITIES: Left foot in dressing   MSK: no joint swelling  NEUROLOGIC: No focal deficits  SKIN:   Left foot in dressing    ======================================      Labs:  05-03    131<L>  |  98  |  69.0<H>  ----------------------------<  139<H>  5.0   |  19.0<L>  |  2.67<H>    Ca    7.4<L>      03 May 2018 07:52                            7.4    3.4   )-----------( 40       ( 03 May 2018 07:52 )             24.8         CAPILLARY BLOOD GLUCOSE      POCT Blood Glucose.: 146 mg/dL (03 May 2018 07:31)  POCT Blood Glucose.: 339 mg/dL (02 May 2018 22:56)  POCT Blood Glucose.: 304 mg/dL (02 May 2018 17:17)  POCT Blood Glucose.: 270 mg/dL (02 May 2018 12:07)        RECENT CULTURES:  04-26 @ 21:49 .Surgical Swab L 1st metatarsal clear margin/swab Streptococcus agalactiae (Group B)    Few Streptococcus agalactiae (Group B)    No White blood cells  No organisms seen      04-20 @ 08:37 .Blood Blood-Peripheral     No growth at 5 days.        04-19 @ 14:40 .Other left foot ulcer Enterobacter cloacae  Methicillin resistant Staphylococcus aureus    Numerous Enterobacter cloacae  Numerous Methicillin resistant Staphylococcus aureus  Few Streptococcus agalactiae (Group B)  Culture in progress  .  TYPE: (C=Critical, N=Notification, A=Abnormal) c  TESTS:  _ mrsa  DATE/TIME CALLED: _ 04/21/2018 11:10:41  CALLED TO: Evan segura rn  READ BACK (2 Patient Identifiers)(Y/N): _ y  READ BACK VALUES (Y/N): _ y  CALLED BY: Evan gan welge copy to  pt log

## 2018-05-03 NOTE — PROGRESS NOTE ADULT - ASSESSMENT
53yr woman, diabetic, CKD stage 5, COPD hx of depression currently under therapy, fibromyalgia admitted with OM of left metatarsal, s/p resection.

## 2018-05-04 ENCOUNTER — TRANSCRIPTION ENCOUNTER (OUTPATIENT)
Age: 53
End: 2018-05-04

## 2018-05-04 VITALS
SYSTOLIC BLOOD PRESSURE: 140 MMHG | DIASTOLIC BLOOD PRESSURE: 75 MMHG | HEART RATE: 89 BPM | OXYGEN SATURATION: 99 % | RESPIRATION RATE: 17 BRPM

## 2018-05-04 DIAGNOSIS — R19.7 DIARRHEA, UNSPECIFIED: ICD-10-CM

## 2018-05-04 LAB
ANION GAP SERPL CALC-SCNC: 14 MMOL/L — SIGNIFICANT CHANGE UP (ref 5–17)
BUN SERPL-MCNC: 70 MG/DL — HIGH (ref 8–20)
CALCIUM SERPL-MCNC: 7.8 MG/DL — LOW (ref 8.6–10.2)
CHLORIDE SERPL-SCNC: 100 MMOL/L — SIGNIFICANT CHANGE UP (ref 98–107)
CO2 SERPL-SCNC: 20 MMOL/L — LOW (ref 22–29)
CREAT SERPL-MCNC: 2.92 MG/DL — HIGH (ref 0.5–1.3)
GLUCOSE BLDC GLUCOMTR-MCNC: 138 MG/DL — HIGH (ref 70–99)
GLUCOSE BLDC GLUCOMTR-MCNC: 211 MG/DL — HIGH (ref 70–99)
GLUCOSE SERPL-MCNC: 125 MG/DL — HIGH (ref 70–115)
HCT VFR BLD CALC: 28.7 % — LOW (ref 37–47)
HGB BLD-MCNC: 8.7 G/DL — LOW (ref 12–16)
MCHC RBC-ENTMCNC: 29 PG — SIGNIFICANT CHANGE UP (ref 27–31)
MCHC RBC-ENTMCNC: 30.3 G/DL — LOW (ref 32–36)
MCV RBC AUTO: 95.7 FL — SIGNIFICANT CHANGE UP (ref 81–99)
PLATELET # BLD AUTO: 34 K/UL — LOW (ref 150–400)
POTASSIUM SERPL-MCNC: 4.9 MMOL/L — SIGNIFICANT CHANGE UP (ref 3.5–5.3)
POTASSIUM SERPL-SCNC: 4.9 MMOL/L — SIGNIFICANT CHANGE UP (ref 3.5–5.3)
RBC # BLD: 3 M/UL — LOW (ref 4.4–5.2)
RBC # FLD: 15.1 % — SIGNIFICANT CHANGE UP (ref 11–15.6)
SODIUM SERPL-SCNC: 134 MMOL/L — LOW (ref 135–145)
WBC # BLD: 4.9 K/UL — SIGNIFICANT CHANGE UP (ref 4.8–10.8)
WBC # FLD AUTO: 4.9 K/UL — SIGNIFICANT CHANGE UP (ref 4.8–10.8)

## 2018-05-04 PROCEDURE — 99232 SBSQ HOSP IP/OBS MODERATE 35: CPT

## 2018-05-04 PROCEDURE — 99239 HOSP IP/OBS DSCHRG MGMT >30: CPT

## 2018-05-04 RX ORDER — OXYCODONE HYDROCHLORIDE 5 MG/1
2 TABLET ORAL
Qty: 40 | Refills: 0 | OUTPATIENT
Start: 2018-05-04 | End: 2018-05-08

## 2018-05-04 RX ORDER — TRAZODONE HCL 50 MG
2 TABLET ORAL
Qty: 14 | Refills: 0 | OUTPATIENT
Start: 2018-05-04 | End: 2018-05-10

## 2018-05-04 RX ORDER — ZINC SULFATE TAB 220 MG (50 MG ZINC EQUIVALENT) 220 (50 ZN) MG
1 TAB ORAL
Qty: 30 | Refills: 0 | OUTPATIENT
Start: 2018-05-04 | End: 2018-06-02

## 2018-05-04 RX ORDER — ALPRAZOLAM 0.25 MG
1 TABLET ORAL
Qty: 21 | Refills: 0 | OUTPATIENT
Start: 2018-05-04 | End: 2018-05-10

## 2018-05-04 RX ORDER — MAGNESIUM OXIDE 400 MG ORAL TABLET 241.3 MG
1 TABLET ORAL
Qty: 90 | Refills: 0 | OUTPATIENT
Start: 2018-05-04 | End: 2018-06-02

## 2018-05-04 RX ORDER — OXYCODONE HYDROCHLORIDE 5 MG/1
5 TABLET ORAL EVERY 6 HOURS
Qty: 0 | Refills: 0 | Status: DISCONTINUED | OUTPATIENT
Start: 2018-05-04 | End: 2018-05-04

## 2018-05-04 RX ORDER — BUPROPION HYDROCHLORIDE 150 MG/1
1 TABLET, EXTENDED RELEASE ORAL
Qty: 30 | Refills: 0 | OUTPATIENT
Start: 2018-05-04 | End: 2018-06-02

## 2018-05-04 RX ORDER — GABAPENTIN 400 MG/1
1 CAPSULE ORAL
Qty: 90 | Refills: 0 | OUTPATIENT
Start: 2018-05-04 | End: 2018-06-02

## 2018-05-04 RX ORDER — OXYCODONE HYDROCHLORIDE 5 MG/1
1 TABLET ORAL
Qty: 20 | Refills: 0 | OUTPATIENT
Start: 2018-05-04 | End: 2018-05-08

## 2018-05-04 RX ORDER — FOLIC ACID 0.8 MG
1 TABLET ORAL
Qty: 30 | Refills: 0 | OUTPATIENT
Start: 2018-05-04 | End: 2018-06-02

## 2018-05-04 RX ORDER — ALPRAZOLAM 0.25 MG
0.5 TABLET ORAL THREE TIMES A DAY
Qty: 0 | Refills: 0 | Status: DISCONTINUED | OUTPATIENT
Start: 2018-05-04 | End: 2018-05-04

## 2018-05-04 RX ORDER — DULOXETINE HYDROCHLORIDE 30 MG/1
2 CAPSULE, DELAYED RELEASE ORAL
Qty: 60 | Refills: 0 | OUTPATIENT
Start: 2018-05-04 | End: 2018-06-02

## 2018-05-04 RX ORDER — SACCHAROMYCES BOULARDII 250 MG
1 POWDER IN PACKET (EA) ORAL
Qty: 60 | Refills: 0 | OUTPATIENT
Start: 2018-05-04 | End: 2018-06-02

## 2018-05-04 RX ADMIN — ZINC SULFATE TAB 220 MG (50 MG ZINC EQUIVALENT) 220 MILLIGRAM(S): 220 (50 ZN) TAB at 11:27

## 2018-05-04 RX ADMIN — BUPROPION HYDROCHLORIDE 300 MILLIGRAM(S): 150 TABLET, EXTENDED RELEASE ORAL at 11:27

## 2018-05-04 RX ADMIN — OXYCODONE HYDROCHLORIDE 5 MILLIGRAM(S): 5 TABLET ORAL at 16:49

## 2018-05-04 RX ADMIN — OXYCODONE HYDROCHLORIDE 5 MILLIGRAM(S): 5 TABLET ORAL at 03:00

## 2018-05-04 RX ADMIN — Medication 0.5 MILLIGRAM(S): at 05:57

## 2018-05-04 RX ADMIN — MAGNESIUM OXIDE 400 MG ORAL TABLET 400 MILLIGRAM(S): 241.3 TABLET ORAL at 08:40

## 2018-05-04 RX ADMIN — Medication 10 MILLIGRAM(S): at 05:55

## 2018-05-04 RX ADMIN — Medication 3 MILLILITER(S): at 03:04

## 2018-05-04 RX ADMIN — OXYCODONE HYDROCHLORIDE 5 MILLIGRAM(S): 5 TABLET ORAL at 02:22

## 2018-05-04 RX ADMIN — IRON SUCROSE 110 MILLIGRAM(S): 20 INJECTION, SOLUTION INTRAVENOUS at 14:19

## 2018-05-04 RX ADMIN — DULOXETINE HYDROCHLORIDE 120 MILLIGRAM(S): 30 CAPSULE, DELAYED RELEASE ORAL at 11:27

## 2018-05-04 RX ADMIN — OXYCODONE HYDROCHLORIDE 5 MILLIGRAM(S): 5 TABLET ORAL at 15:45

## 2018-05-04 RX ADMIN — PANTOPRAZOLE SODIUM 40 MILLIGRAM(S): 20 TABLET, DELAYED RELEASE ORAL at 05:55

## 2018-05-04 RX ADMIN — Medication 500 MILLIGRAM(S): at 11:27

## 2018-05-04 RX ADMIN — MAGNESIUM OXIDE 400 MG ORAL TABLET 400 MILLIGRAM(S): 241.3 TABLET ORAL at 11:28

## 2018-05-04 RX ADMIN — Medication 250 MILLIGRAM(S): at 05:55

## 2018-05-04 RX ADMIN — OXYCODONE HYDROCHLORIDE 5 MILLIGRAM(S): 5 TABLET ORAL at 11:26

## 2018-05-04 RX ADMIN — Medication 100 MILLIGRAM(S): at 05:55

## 2018-05-04 RX ADMIN — OXYCODONE HYDROCHLORIDE 5 MILLIGRAM(S): 5 TABLET ORAL at 09:55

## 2018-05-04 RX ADMIN — Medication 0.5 MILLIGRAM(S): at 14:22

## 2018-05-04 RX ADMIN — Medication 2: at 11:26

## 2018-05-04 RX ADMIN — MONTELUKAST 10 MILLIGRAM(S): 4 TABLET, CHEWABLE ORAL at 11:27

## 2018-05-04 RX ADMIN — Medication 1 MILLIGRAM(S): at 11:27

## 2018-05-04 RX ADMIN — CEFTRIAXONE 2000 MILLIGRAM(S): 500 INJECTION, POWDER, FOR SOLUTION INTRAMUSCULAR; INTRAVENOUS at 14:19

## 2018-05-04 NOTE — DISCHARGE NOTE ADULT - CARE PLAN
Principal Discharge DX:	Diabetic foot ulcer  Goal:	resolution  Assessment and plan of treatment:	Continue all antibiotics as prescribed. You may benefit from taking a probiotic such a florastor which can be bought over the counter for the duration of time that you are on antibiotics to help prevent an infectious diarrhea called Clostridium difficile. If you notice you are having more than 4-5 bowel movements that are very liquidy or diarrheal, be sure to see your primary care doctor or come to the ER to have your stool tested. If your original infection seems to get worse, if you are having lots of high fevers over 100.4F, chills, extreme shakiness, very bad difficulty breathing, cannot hold what you are eating down, or develop a bad rash, come to the ER immediately.  Secondary Diagnosis:	CKD (chronic kidney disease) stage 5, GFR less than 15 ml/min  Goal:	follow up  Assessment and plan of treatment:	Your kidney function is not optimal. This can be due to a variety of reasons. If you were not aware of this, you should discuss with your Primary Care Doctor about seeing a Nephrologist. If you already have a nephrologist, be sure to continue following up as scheduled.    Be sure to follow up with the vascular surgery team to discuss fistula placement, this can be coordinated with your nephrologist  Secondary Diagnosis:	Thrombocytopenia  Goal:	follow up  Assessment and plan of treatment:	You were noticed to have blood work with some abnormalities. At this time, you are safe to leave the hospital, we do not suspect that anything immediately will come of these findings, but it is something that should be followed to see if it is getting better, staying the same, or getting worse. Be sure to discuss this at your follow up visit with your Primary Care Doctor to have these tests repeated and to see what work up, if any, is necessary to continue managing it. Be sure to follow up with hematology.  Secondary Diagnosis:	Anemia in stage 5 chronic kidney disease, not on chronic dialysis  Goal:	follow up  Assessment and plan of treatment:	follow up with your Primary Care Doctor to review your blood work regularly. Be sure to alert a medical professional immediately if you have symptoms of acute or worsening anemia including but not limited to the following: lightheadedness, dizziness, paleness, palpitations, shortness of breath, abnormal bleeding.  Secondary Diagnosis:	Fatty liver disease, nonalcoholic  Goal:	follow up  Assessment and plan of treatment:	You were noted to have an abnormality on imaging done while you were in the hospital. You should be sure to follow up, either with your Primary Care Doctor, or a specialist (gastroenterologist), in order to make a decision of if you would like to continue surveillance to follow this abnormality up. No intervention was required, or recommended to be done, while you were in the hospital, this should be done on an outpatient basis.  Secondary Diagnosis:	COPD exacerbation  Goal:	follow up  Assessment and plan of treatment:	Be sure to take all medications as prescribed, even if you do not feel short of breath. The only medication to be taken on an as needed basis is a rescue inhaler. If you have been prescribed a taper of steroids, be sure to take it as prescribed. If you experience worsening shortness of breath unimproved by nebulizers or inhalers, wheezing, breathlessness, be sure to come to the ER for evaluation.  Secondary Diagnosis:	Depression, unspecified depression type  Goal:	follow up  Assessment and plan of treatment:	Continue all medications as prescribed. Be sure to follow up with a psychiatrist or therapist.

## 2018-05-04 NOTE — DISCHARGE NOTE ADULT - PATIENT PORTAL LINK FT
You can access the [facility] Patient Portal, offered by [facility], by registering with the following website: [email or web address]

## 2018-05-04 NOTE — DISCHARGE NOTE ADULT - CARE PROVIDER_API CALL
Alf Sanchez (DPDENEEN), Podiatric Medicine and Surgery  [address] [place] [address]  Phone: [phone]  Fax: [phone]    Ronn Sandra), Internal Medicine; Nephrology  [address] [place] [address]  Phone: [phone]  Fax: [phone]    Jean Pierre Nava), Medicine  [address] [place] [address]  Phone: [phone]  Fax: [phone]

## 2018-05-04 NOTE — PROGRESS NOTE ADULT - PROBLEM SELECTOR PROBLEM 6
DM2 (diabetes mellitus, type 2)
Fatty liver disease, nonalcoholic
Fatty liver disease, nonalcoholic
DM2 (diabetes mellitus, type 2)
Diarrhea of presumed infectious origin
Fatty liver disease, nonalcoholic

## 2018-05-04 NOTE — DISCHARGE NOTE ADULT - ADDITIONAL INSTRUCTIONS
-Follow up with Primary Care Doctor within 1 week  -Follow up with Hematology Oncology (blood and cancer specialists) in 1 week Dr Nava  -Follow up with Podiatry (Foot specialists) within 1 week  -Follow up with Pulmonology (Lung doctor) in 3-4 weeks  -Follow up with Nephrology (Kidney specialists) in 1 week  -Follow up with Psychiatry within 3-4 weeks  -Follow up with Gastroenterology within 4-6 weeks

## 2018-05-04 NOTE — PROGRESS NOTE ADULT - SUBJECTIVE AND OBJECTIVE BOX
[facility]  INFECTIOUS DISEASES AND INTERNAL MEDICINE at [place]  =======================================================  Dwight Vick MD  Diplomates American Board of Internal Medicine and Infectious Diseases  =======================================================    SONYA LENNON [id number]  follow up on Lt foot infection.   s/p resection of head of left 1st metatarsal on 4/26/18.  no fevers  s/p placement of midline in right arm  patient complaining of pain in the left foot  also complaining of loose BM x 5 overnight    ======================================    Allergies:  No Known Allergies    MEDICATIONS  (STANDING):  ALBUTerol/ipratropium for Nebulization 3 milliLiter(s) Nebulizer every 6 hours  ascorbic acid 500 milliGRAM(s) Oral daily  buPROPion XL . 300 milliGRAM(s) Oral daily  cefTRIAXone Injectable. 2000 milliGRAM(s) IV Push every 24 hours  dextrose 50% Injectable 12.5 Gram(s) IV Push once  dextrose 50% Injectable 25 Gram(s) IV Push once  dextrose 50% Injectable 25 Gram(s) IV Push once  doxycycline hyclate Capsule 100 milliGRAM(s) Oral every 12 hours  DULoxetine 120 milliGRAM(s) Oral daily  epoetin krunal Injectable 8000 Unit(s) SubCutaneous <User Schedule>  folic acid 1 milliGRAM(s) Oral daily  gabapentin 600 milliGRAM(s) Oral at bedtime  insulin glargine Injectable (LANTUS) 25 Unit(s) SubCutaneous at bedtime  insulin lispro (HumaLOG) corrective regimen sliding scale   SubCutaneous three times a day before meals  iron sucrose IVPB 200 milliGRAM(s) IV Intermittent daily  magnesium oxide 400 milliGRAM(s) Oral three times a day with meals  montelukast 10 milliGRAM(s) Oral daily  pantoprazole    Tablet 40 milliGRAM(s) Oral before breakfast  predniSONE   Tablet 10 milliGRAM(s) Oral daily  predniSONE   Tablet   Oral   saccharomyces boulardii 250 milliGRAM(s) Oral two times a day  traZODone 200 milliGRAM(s) Oral at bedtime  zinc sulfate 220 milliGRAM(s) Oral daily    ======================================  REVIEW OF SYSTEMS:  as above  all other ROS negative    ======================================  Physical Exam:  Vital Signs Last 24 Hrs  T(C): 36.7 (04 May 2018 08:35), Max: 37.2 (03 May 2018 22:00)  T(F): 98.1 (04 May 2018 08:35), Max: 99 (03 May 2018 22:00)  HR: 80 (04 May 2018 08:35) (78 - 88)  BP: 157/85 (04 May 2018 08:35) (140/65 - 157/91)  RR: 18 (04 May 2018 08:35) (16 - 18)  SpO2: 98% (04 May 2018 08:35) (98% - 99%)    GEN: NAD, pleasant; thin  HEENT: normocephalic and atraumatic. EOMI. PERRL.    NECK: Supple.   LUNGS: coarse breath sounds bilaterally.   HEART: Regular rate and rhythm   ABDOMEN: Soft, nontender, and nondistended.  Positive bowel sounds.    : No CVA tenderness  EXTREMITIES: Left foot in dressing   RIGHT UE with mid line  MSK: no joint swelling  NEUROLOGIC: No focal deficits  SKIN:   Left foot in dressing    ======================================      Labs:  05-04    134<L>  |  100  |  70.0<H>  ----------------------------<  125<H>  4.9   |  20.0<L>  |  2.92<H>    Ca    7.8<L>      04 May 2018 08:20                            8.7    4.9   )-----------( 34       ( 04 May 2018 08:20 )             28.7     RECENT CULTURES:  04-26 @ 21:49 .Surgical Swab L 1st metatarsal clear margin/swab Streptococcus agalactiae (Group B)    Few Streptococcus agalactiae (Group B)    No White blood cells  No organisms seen

## 2018-05-04 NOTE — DISCHARGE NOTE ADULT - SECONDARY DIAGNOSIS.
Depression, unspecified depression type CKD (chronic kidney disease) stage 5, GFR less than 15 ml/min Thrombocytopenia Anemia in stage 5 chronic kidney disease, not on chronic dialysis Fatty liver disease, nonalcoholic COPD exacerbation

## 2018-05-04 NOTE — PROGRESS NOTE ADULT - SUBJECTIVE AND OBJECTIVE BOX
PMD: unknown  Podiatry Dr Sanchez  Nephro Dr Bedoya    CHIEF COMPLAINT: foot wound + generalized edema    Patient seen and examined at the bedside. No acute overnight events. sp 1u PRBC for symptomatic anemia yesterday. Complaining of fatigue this AM. Denies fever/chills, headache, lightheadedness, dizziness, chest pain, palpitations, abd pain, nausea/vomiting/diarrhea, muscle pain.      =========================================================================================    PHYSICAL EXAM.    GEN - appears age appropriate. well nourished. pleasant.  HEENT - NCAT, EOMI, PAULINA  RESP - CTA BL, no wheeze/stridor/rhonchi/crackles. not on supplemental O2. able to speak in full sentences without distress.   CARDIO - NS1S2, RRR. No murmurs/rubs/gallops.  ABD - Soft/Non tender/Non distended. Normal BS x4 quadrants. no guarding/rebound tenderness.  Ext - No JOSE. LLE wrapped in ace bandage  MSK - BL 5/5 strength on upper and lower extremities.   Neuro - AAOx3. cn 2-12 grossly intact  Psych - normal affect  Skin - c/d/i. no rashes/lesions      VITAL SIGNS.    Vital Signs Last 24 Hrs  T(C): 36.7 (04 May 2018 08:35), Max: 37.2 (03 May 2018 22:00)  T(F): 98.1 (04 May 2018 08:35), Max: 99 (03 May 2018 22:00)  HR: 80 (04 May 2018 08:35) (78 - 88)  BP: 157/85 (04 May 2018 08:35) (140/65 - 157/91)  BP(mean): --  RR: 18 (04 May 2018 08:35) (16 - 18)  SpO2: 98% (04 May 2018 08:35) (98% - 99%)        =================================================    LABS.                          8.7    4.9   )-----------( 34       ( 04 May 2018 08:20 )             28.7     05-04    134<L>  |  100  |  70.0<H>  ----------------------------<  125<H>  4.9   |  20.0<L>  |  2.92<H>    Ca    7.8<L>      04 May 2018 08:20          I&O's Summary        ================================================    IMAGING.      ================================================    MEDICATIONS  (STANDING):  ALBUTerol/ipratropium for Nebulization 3 milliLiter(s) Nebulizer every 6 hours  ascorbic acid 500 milliGRAM(s) Oral daily  buPROPion XL . 300 milliGRAM(s) Oral daily  cefTRIAXone Injectable. 2000 milliGRAM(s) IV Push every 24 hours  dextrose 50% Injectable 12.5 Gram(s) IV Push once  dextrose 50% Injectable 25 Gram(s) IV Push once  dextrose 50% Injectable 25 Gram(s) IV Push once  doxycycline hyclate Capsule 100 milliGRAM(s) Oral every 12 hours  DULoxetine 120 milliGRAM(s) Oral daily  epoetin krunal Injectable 8000 Unit(s) SubCutaneous <User Schedule>  folic acid 1 milliGRAM(s) Oral daily  gabapentin 600 milliGRAM(s) Oral at bedtime  insulin glargine Injectable (LANTUS) 25 Unit(s) SubCutaneous at bedtime  insulin lispro (HumaLOG) corrective regimen sliding scale   SubCutaneous three times a day before meals  iron sucrose IVPB 200 milliGRAM(s) IV Intermittent daily  magnesium oxide 400 milliGRAM(s) Oral three times a day with meals  montelukast 10 milliGRAM(s) Oral daily  pantoprazole    Tablet 40 milliGRAM(s) Oral before breakfast  predniSONE   Tablet 10 milliGRAM(s) Oral daily  predniSONE   Tablet   Oral   saccharomyces boulardii 250 milliGRAM(s) Oral two times a day  traZODone 200 milliGRAM(s) Oral at bedtime  zinc sulfate 220 milliGRAM(s) Oral daily    MEDICATIONS  (PRN):  ALPRAZolam 0.5 milliGRAM(s) Oral three times a day PRN anxiety  benzocaine 15 mG/menthol 3.6 mG Lozenge 1 Lozenge Oral four times a day PRN Sore Throat  dextrose Gel 1 Dose(s) Oral once PRN Blood Glucose LESS THAN 70 milliGRAM(s)/deciliter  glucagon  Injectable 1 milliGRAM(s) IntraMuscular once PRN Glucose LESS THAN 70 milligrams/deciliter  ondansetron Injectable 4 milliGRAM(s) IV Push every 6 hours PRN Nausea and/or Vomiting  oxyCODONE    IR 5 milliGRAM(s) Oral every 6 hours PRN Moderate to Severe Pain (7 - 10) PMD: unknown  Podiatry Dr Sanchez  Nephro Dr Bedoya    CHIEF COMPLAINT: foot wound + generalized edema    Patient seen and examined at the bedside. No acute overnight events. sp 1u PRBC for symptomatic anemia yesterday. Complaining of nothying this AM. Denies fever/chills, headache, lightheadedness, dizziness, chest pain, palpitations, abd pain, nausea/vomiting/diarrhea, muscle pain.      =========================================================================================    PHYSICAL EXAM.    GEN - appears age appropriate. well nourished. pleasant.  HEENT - NCAT, EOMI, PAULINA  RESP - CTA BL, no wheeze/stridor/rhonchi/crackles. not on supplemental O2. able to speak in full sentences without distress.   CARDIO - NS1S2, RRR. No murmurs/rubs/gallops.  ABD - Soft/Non tender/Non distended. Normal BS x4 quadrants. no guarding/rebound tenderness.  Ext - No JOSE. LLE wrapped in ace bandage  MSK - BL 5/5 strength on upper and lower extremities.   Neuro - AAOx3. cn 2-12 grossly intact  Psych - normal affect  Skin - c/d/i. no rashes/lesions      VITAL SIGNS.    Vital Signs Last 24 Hrs  T(C): 36.7 (04 May 2018 08:35), Max: 37.2 (03 May 2018 22:00)  T(F): 98.1 (04 May 2018 08:35), Max: 99 (03 May 2018 22:00)  HR: 80 (04 May 2018 08:35) (78 - 88)  BP: 157/85 (04 May 2018 08:35) (140/65 - 157/91)  BP(mean): --  RR: 18 (04 May 2018 08:35) (16 - 18)  SpO2: 98% (04 May 2018 08:35) (98% - 99%)        =================================================    LABS.                          8.7    4.9   )-----------( 34       ( 04 May 2018 08:20 )             28.7     05-04    134<L>  |  100  |  70.0<H>  ----------------------------<  125<H>  4.9   |  20.0<L>  |  2.92<H>    Ca    7.8<L>      04 May 2018 08:20          I&O's Summary        ================================================    IMAGING.      ================================================    MEDICATIONS  (STANDING):  ALBUTerol/ipratropium for Nebulization 3 milliLiter(s) Nebulizer every 6 hours  ascorbic acid 500 milliGRAM(s) Oral daily  buPROPion XL . 300 milliGRAM(s) Oral daily  cefTRIAXone Injectable. 2000 milliGRAM(s) IV Push every 24 hours  dextrose 50% Injectable 12.5 Gram(s) IV Push once  dextrose 50% Injectable 25 Gram(s) IV Push once  dextrose 50% Injectable 25 Gram(s) IV Push once  doxycycline hyclate Capsule 100 milliGRAM(s) Oral every 12 hours  DULoxetine 120 milliGRAM(s) Oral daily  epoetin krunal Injectable 8000 Unit(s) SubCutaneous <User Schedule>  folic acid 1 milliGRAM(s) Oral daily  gabapentin 600 milliGRAM(s) Oral at bedtime  insulin glargine Injectable (LANTUS) 25 Unit(s) SubCutaneous at bedtime  insulin lispro (HumaLOG) corrective regimen sliding scale   SubCutaneous three times a day before meals  iron sucrose IVPB 200 milliGRAM(s) IV Intermittent daily  magnesium oxide 400 milliGRAM(s) Oral three times a day with meals  montelukast 10 milliGRAM(s) Oral daily  pantoprazole    Tablet 40 milliGRAM(s) Oral before breakfast  predniSONE   Tablet 10 milliGRAM(s) Oral daily  predniSONE   Tablet   Oral   saccharomyces boulardii 250 milliGRAM(s) Oral two times a day  traZODone 200 milliGRAM(s) Oral at bedtime  zinc sulfate 220 milliGRAM(s) Oral daily    MEDICATIONS  (PRN):  ALPRAZolam 0.5 milliGRAM(s) Oral three times a day PRN anxiety  benzocaine 15 mG/menthol 3.6 mG Lozenge 1 Lozenge Oral four times a day PRN Sore Throat  dextrose Gel 1 Dose(s) Oral once PRN Blood Glucose LESS THAN 70 milliGRAM(s)/deciliter  glucagon  Injectable 1 milliGRAM(s) IntraMuscular once PRN Glucose LESS THAN 70 milligrams/deciliter  ondansetron Injectable 4 milliGRAM(s) IV Push every 6 hours PRN Nausea and/or Vomiting  oxyCODONE    IR 5 milliGRAM(s) Oral every 6 hours PRN Moderate to Severe Pain (7 - 10)

## 2018-05-04 NOTE — PROGRESS NOTE ADULT - PROBLEM SELECTOR PLAN 3
complicated by asymptomatic mild hyperkalemia, resolved  cont to monitor and correct electrolytes PRN  neprho following  pt deferring AVF at this time, does not know if she wants HD  AVF once patient agreeable + when acute infection is done with tx.  can be done as outpatient  cont managing comorbid conditions
continue medical management
Patient accepting future dialysis.
complicated by asymptomatic mild hyperkalemia  cont to monitor and correct electrolytes PRN  neprho following  pt deferring AVF at this time, does not know if she wants HD  AVF once patient agreeable.  can be done as outpatient
continue medical management
s/p IV lasix  IVF due to ERIC on CKD  renal following.  AVF deferred by vascular given OM
seem at baseline  renal following  c/w IV lasix x 24hrs
with OM on MRI  hold off on abx per ID  d/w podiatry: planned for bone biopsy THURS
continue medical management
s/p IV lasix  consider IVF   renal following.  AVF on this admission---educated patient on procedure.
worsening renal function.  possible need for HD
AVF once patient agreeable.  can be done as outpatient
stable  AVF once cleared by ID/podiatry  likely outpatient   d/w renal
continue medical management
dr colby consulted  venofer ordered  hold off on abx
stable  AVF once cleared by ID/podiatry  likely outpatient   d/w renal

## 2018-05-04 NOTE — PROGRESS NOTE ADULT - PROBLEM SELECTOR PROBLEM 3
DM2 (diabetes mellitus, type 2)
CKD (chronic kidney disease) stage 5, GFR less than 15 ml/min
DM2 (diabetes mellitus, type 2)
Foot ulcer due to secondary DM
CKD (chronic kidney disease) stage 5, GFR less than 15 ml/min
DM2 (diabetes mellitus, type 2)
CKD (chronic kidney disease) stage 5, GFR less than 15 ml/min
CKD (chronic kidney disease) stage 5, GFR less than 15 ml/min
DM2 (diabetes mellitus, type 2)
Pancytopenia
CKD (chronic kidney disease) stage 5, GFR less than 15 ml/min
CKD (chronic kidney disease) stage 5, GFR less than 15 ml/min

## 2018-05-04 NOTE — PROGRESS NOTE ADULT - NSHPATTENDINGPLANDISCUSS_GEN_ALL_CORE
Dr Wagner
ID/patient at bedside.
the patient.  All imaging and results of lab/other studies reviewed by me. All questions answered to the satisfaction of the patient. At this time they agree with the current plan of therapy.
patient at bedside.
the patient.  All imaging and results of lab/other studies reviewed by me. All questions answered to the satisfaction of the patient. At this time they agree with the current plan of therapy.
Dr Wagner
pt and podiatry
patient at bedside.
renal;  patient/ at bedside
patient and ID at bedside.
Dr Wagner, Dr Sanchez, Dr Sanon
Medical Team
Medical Team
Dr Wagner
ID/patient at bedside.
Patient
the patient.  All imaging and results of lab/other studies reviewed by me. All questions answered to the satisfaction of the patient. At this time they agree with the current plan of therapy.
patient at bedside.
patient at bedside.
the patient.  All imaging and results of lab/other studies reviewed by me. All questions answered to the satisfaction of the patient. At this time they agree with the current plan of therapy.

## 2018-05-04 NOTE — PROGRESS NOTE ADULT - PROBLEM SELECTOR PROBLEM 5
DM2 (diabetes mellitus, type 2)
Foot ulcer due to secondary DM
Thrombocytopenia
DM2 (diabetes mellitus, type 2)
Thrombocytopenia
DM2 (diabetes mellitus, type 2)
Pancytopenia
Thrombocytopenia
Thrombocytopenia
DM2 (diabetes mellitus, type 2)
Thrombocytopenia
DM2 (diabetes mellitus, type 2)
DM2 (diabetes mellitus, type 2)

## 2018-05-04 NOTE — PROGRESS NOTE ADULT - ASSESSMENT
54 y/o F with long standing left foot ulcer, with underlying Osteomyelitis left first metatarsal on MRI; RECENT MRSA and Enterobacter on superficial wound.   new onset of loose BM.  no fevers.

## 2018-05-04 NOTE — PROGRESS NOTE ADULT - PROBLEM SELECTOR PLAN 5
hga1c 5.6  hold lantus ,c/w yair
wound care per podiatry
Need to monitor  Hematology following
hga1c 5.6, @ goal <7  add lantus ,c/w raiss  steroid induced hyperglycemia
PLT is 17  Need to monitor
Need to monitor  Hematology following
Need to monitor  Hematology following
hga1c 5.6  hold lantus ,c/w yair
hga1c 5.6  hold lantus ,c/w yair
hga1c 5.6, @ goal <7  add lantus ,c/w raiss  steroid induced hyperglycemia
venofer/epogen  stable  heme following
Need to monitor  ? Hematology consult
hga1c 5.6  hold lantus ,c/w yair
hga1c 5.6  add lantus ,c/w raiss  steroid induced hyperglycemia
hga1c 5.6  add lantus ,c/w raiss  steroid induced hyperglycemia

## 2018-05-04 NOTE — PROGRESS NOTE ADULT - PROBLEM SELECTOR PROBLEM 2
Foot ulcer due to secondary DM
DM2 (diabetes mellitus, type 2)
COPD exacerbation
COPD exacerbation
Depression
Foot ulcer due to secondary DM
Metabolic encephalopathy
COPD exacerbation
Foot ulcer due to secondary DM
Foot ulcer due to secondary DM
CKD (chronic kidney disease) stage 5, GFR less than 15 ml/min
Foot ulcer due to secondary DM
COPD exacerbation

## 2018-05-04 NOTE — DISCHARGE NOTE ADULT - INSTRUCTIONS
Renal restrictive diet: low potassium, low phosphorus, low salt, without protein restrictions. low sugar low carb. - remove dressing and cleanse with saline   - place xeroform over incision site and cover with gauze and ABD  - wrap with kerlix and ACE

## 2018-05-04 NOTE — PROGRESS NOTE ADULT - SUBJECTIVE AND OBJECTIVE BOX
HPI: Patient is a 53y Female seen on consultation for the evaluation and management of pancytopenia.  Also, report of HIT antibodies.  Patient has H/O of DM, foot ulcers, renal insufficency, HSM, possible cirrhosis, depression, on long term antidepressants.  Patient was admitted with wound infection of feet, treated with antibiotics.  Found to be pancytopenic; no history of bleeding.  She denies fevers or chills.  Complains of chronic left-sided abdominal discomfort with nausea.      INTERVAL HISTORY    denies bleeding. HIT antibodies negative and argatroban discontinued. platelet count stable.     PAST MEDICAL & SURGICAL HISTORY:  Fibromyalgia  DM (diabetes mellitus)  Foot ulcer: Left Foot  Osteopenia  Chronic pain  Back ache  Arthritis  Shoulder joint stiffness, left  Matamoros esophagus  Stomach ulcer  Diabetes  Asthma  S/P knee surgery  S/P hysterectomy  S/P cholecystectomy      REVIEW OF SYSTEMS      	      	  ENMT:	c/o dry mouth    Respiratory and Thorax:denies SOB or chest pain  	  Cardiovascular:	no chest pain or palpitations      Genitourinary:	denies hematuria    Musculoskeletal:	chronic bony pain    	    Psychiatric:chronic depression	      MEDICATIONS  (STANDING):  ALBUTerol/ipratropium for Nebulization 3 milliLiter(s) Nebulizer every 6 hours  ascorbic acid 500 milliGRAM(s) Oral daily  buPROPion XL . 300 milliGRAM(s) Oral daily  cefTRIAXone Injectable. 2000 milliGRAM(s) IV Push every 24 hours  dextrose 50% Injectable 12.5 Gram(s) IV Push once  dextrose 50% Injectable 25 Gram(s) IV Push once  dextrose 50% Injectable 25 Gram(s) IV Push once  doxycycline hyclate Capsule 100 milliGRAM(s) Oral every 12 hours  DULoxetine 120 milliGRAM(s) Oral daily  epoetin krunal Injectable 8000 Unit(s) SubCutaneous <User Schedule>  folic acid 1 milliGRAM(s) Oral daily  gabapentin 600 milliGRAM(s) Oral at bedtime  insulin glargine Injectable (LANTUS) 25 Unit(s) SubCutaneous at bedtime  insulin lispro (HumaLOG) corrective regimen sliding scale   SubCutaneous three times a day before meals  iron sucrose IVPB 200 milliGRAM(s) IV Intermittent daily  magnesium oxide 400 milliGRAM(s) Oral three times a day with meals  montelukast 10 milliGRAM(s) Oral daily  pantoprazole    Tablet 40 milliGRAM(s) Oral before breakfast  predniSONE   Tablet 10 milliGRAM(s) Oral daily  predniSONE   Tablet   Oral   saccharomyces boulardii 250 milliGRAM(s) Oral two times a day  traZODone 200 milliGRAM(s) Oral at bedtime  zinc sulfate 220 milliGRAM(s) Oral daily        ICU Vital Signs Last 24 Hrs  T(C): 36.6 (01 May 2018 08:22), Max: 36.7 (01 May 2018 00:34)  T(F): 97.8 (01 May 2018 08:22), Max: 98.1 (01 May 2018 00:34)  HR: 83 (01 May 2018 09:47) (77 - 84)  BP: 141/82 (01 May 2018 08:22) (139/75 - 154/79)  BP(mean): --  ABP: --  ABP(mean): --  RR: 17 (01 May 2018 08:22) (17 - 18)  SpO2: 98% (01 May 2018 09:47) (93% - 100%)      PHYSICAL EXAM:      Constitutional:Chroncially ill-appearing, overweight, awake, alert, oriented    Eyes:pale, anicteric    ENMT:no adenopathy; drymm with poor dentition    Neck:Supple    Respiratory:Clear    Cardiovascular:RRR    Gastrointestinal:Soft, palpable spleen tip    Extremities:bandaged                  8.7                  134  | 20.0 | 70.0         4.9   >-----------< 34      ------------------------< 125                   28.7                 4.9  | 100  | 2.92                                         Ca 7.8   Mg x     Ph x

## 2018-05-04 NOTE — PROGRESS NOTE ADULT - ASSESSMENT
52 y/o F with Hx of Fibromyalgia, DM2, Osteopenia, Matam[place]os Esophagus admitted for diabetic foot ulcer concerning for OM.     Presentation complicated by worsening kidney function.    Course complicated by significant thrombocytopenia. Also with anemia.    Course also complicated by labile depression.     Thrombocytopenia - STABLE. asymptomatic. in setting of total pancytopenia. intermediate 4T score. sp argatroban. Hep PF4 assay + NUNO negative, HIT ruled out. Plan: cont to hold heparin + DVT ppx as PLT still low. monitor closely for signs of acute thrombosis. serial repeat cbc, BLUE TOP TUBE only to avoid PLT clotting. heme consult appreciated.     Anemia of Chronic Kidney Disease- IMPROVIED. sp 1u prbc on 5/4, well tolerated, asymptomatic, hgb was NOT less than 7 but pt was symptomatic. PLAN: heme follow up in office. epo. vit c.     Pancytopenia- asymptomatic. anemia likely of CKD. no clear explanation for leukopenia, ? abx related,RESOLVED. also with superimposed acute on  chronic thrombocytopenia as above. heme onc + renal following. Plan: anemia management as above. outpt follow up for thrombocytopenia.     DM2 complicated by ckd + foot ulcer on long term insulin therapy- IMPROVING. also complicated by asymptomatic hyperglycemia. hga1c 5.6, @ goal <7, note though may be falsely low given anemia. cont lantus 25 ,c/w iss    Diabetic Foot Ulcer complicated by Lt 1st digit OM- STABLE. OM confirmed on MRI. s/p resection of 1st great toe on Lt foot by podiatry, uncomplicated well tolerated procedure. on rocephin + doxy per ID. bone culture resulted as strep agalactiae sensistve to vanco, levofloxacin, PCN. Plan: cont both abx per ID, last date 5/22, PICC placement 5/3. cont local wound care per podiatry. PT eval appreciated, pt amb with RW independently, will be able to go home. anticipate dc 5/4    Mild COPD Exac- IMPROVING. Plan: c/w prednisone taper, 10mg daily x2 days started 5/4. duonebs. resp inhalers. no need for supplemental o2. pulmonary following    ERIC on CKD 4, progressed to now CKD5- STABLE. nephro following. sp vein mapping by [facility] during this admission. no inpatient emergent need for HD but will likely need access placed and HD to start right after discharge. Plan: cont to monitor renal function. follow up outpt with vasc to continue plans regarding fistula placement. outpt follow up with renal    Depression- STABLE. acute mild worsening noted during this admission sec to concerns regarding medical condition and long term prognosis. bedside conversation with counseling and positive reinforcement provided. Psych reeval appreciated, no changes to current regimen advised. Plan: continue home meds.  on dc will need referral to Therapist for continued care, she benefits from having long sessions to talk out her frustrations, responds very well to active listening and positive reinforcement    NAFLD- remote hx of CT with signs of cirrhosis, not available for review @ this time, cannot confirm. imaging this admission only with signs of hepatosplenomegaly + fatty infiltration (US). d/c lactulose/rifaximin as no encephalopathy during this admission. ammonia normal Plan: outpatient GI evaluation. weight loss + diet advised, control of comorbid conditions    Dispo: dc home with PICC 5/4

## 2018-05-04 NOTE — PROGRESS NOTE ADULT - PROVIDER SPECIALTY LIST ADULT
Anticoag Management
Cardiology
Cardiology
Heme/Onc
Hospitalist
Infectious Disease
Nephrology
Palliative Care
Podiatry
Pulmonology
Vascular Surgery
Vascular Surgery
Cardiology
Heme/Onc
Heme/Onc
Podiatry
Nephrology
Pulmonology
Hospitalist
Infectious Disease

## 2018-05-04 NOTE — DISCHARGE NOTE ADULT - HOSPITAL COURSE
54 y/o F with Hx of Fibromyalgia, DM2, Osteopenia, [place] Esophagus admitted for diabetic foot ulcer concerning for OM. Diabetic Foot Ulcer complicated by Lt 1st digit OM- STABLE. OM confirmed on MRI. s/p resection of 1st great toe on Lt foot by podiatry, uncomplicated well tolerated procedure. on rocephin + doxy per ID. bone culture resulted as strep agalactiae sensistve to vanco, levofloxacin, PCN. Plan: cont both abx per ID, last date 5/22, PICC placement 5/3. cont local wound care per podiatry. PT eval appreciated, pt amb with RW independently, will be able to go home.     Presentation complicated by worsening kidney function. ERIC on CKD 4, progressed to now CKD5- STABLE. nephro following. sp vein mapping by [facility] during this admission. no inpatient emergent need for HD but will likely need access placed and HD to start right after discharge. Plan: cont to monitor renal function. follow up outpt with [facility] to continue plans regarding fistula placement. outpt follow up with renal    Course complicated by significant thrombocytopenia. Also with anemia. Thrombocytopenia - STABLE. asymptomatic. in setting of total pancytopenia. intermediate 4T score. sp argatroban. Hep PF4 assay + NUNO negative, HIT ruled out. Plan: cont to hold heparin + DVT ppx as PLT still low. monitor closely for signs of acute thrombosis. serial repeat cbc, BLUE TOP TUBE only to avoid PLT clotting. heme consult appreciated. Anemia of Chronic Kidney Disease- IMPROVIED. sp 1u prbc on 5/4, well tolerated, asymptomatic, hgb was NOT less than 7 but pt was symptomatic. PLAN: heme follow up in office. epo. vit c. Pancytopenia- asymptomatic. no clear explanation for leukopenia, ? abx related, RESOLVED.     Course also complicated by labile depression. Depression- STABLE. acute mild worsening noted during this admission sec to concerns regarding medical condition and long term prognosis. bedside conversation with counseling and positive reinforcement provided. Psych reeval appreciated, no changes to current regimen advised. Plan: continue home meds.  on dc will need referral to Therapist for continued care, she benefits from having long sessions to talk out her frustrations, responds very well to active listening and positive reinforcement    Course complicated by Mild COPD Exac- IMPROVING. Plan: c/w prednisone taper, 10mg daily x2 days started 5/4. duonebs. resp inhalers. no need for supplemental o2. pulmonary following    Regarding DM2 complicated by ckd + foot ulcer on long term insulin therapy- IMPROVING. also complicated by asymptomatic hyperglycemia. hga1c 5.6, @ goal <7, note though may be falsely low given anemia. cont lantus 25 ,c/w iss    Regarding NAFLD- remote hx of CT with signs of cirrhosis, not available for review @ this time, cannot confirm. imaging this admission only with signs of hepatosplenomegaly + fatty infiltration (US). d/c lactulose/rifaximin as no encephalopathy during this admission. ammonia normal Plan: outpatient GI evaluation. weight loss + diet advised, control of comorbid conditions    All electrolyte abnormalities were monitored carefully and repleted as necessary during this hospitalization. At the time of discharge patient was hemodynamically stable and amenable to all terms of discharge. The patient has received verbal instructions from myself regarding discharge plans.     Length of Discharge: 45MIN

## 2018-05-04 NOTE — DISCHARGE NOTE ADULT - OTHER SIGNIFICANT FINDINGS
05-04    134<L>  |  100  |  70.0<H>  ----------------------------<  125<H>  4.9   |  20.0<L>  |  2.92<H>    Ca    7.8<L>      04 May 2018 08:20                            8.7    4.9   )-----------( 34       ( 04 May 2018 08:20 )             28.7     Culture - Surgical Swab (04.26.18 @ 21:49)    -  Vancomycin: S 0.5    -  Levofloxacin: S 0.5    -  Penicillin: S 0.06    Gram Stain:   No White blood cells  No organisms seen    -  Erythromycin: R [id number]    -  Clindamycin: R [id number]    Specimen Source: .Surgical Swab L 1st metatarsal clear margin/swab    Culture Results:   Few Streptococcus agalactiae (Group B)    Organism Identification: Streptococcus agalactiae (Group B)    Organism: Streptococcus agalactiae (Group B)    Method Type: JEREL    < from: MR Foot No Cont, Left (04.23.18 @ 13:41) >  FINDINGS: The patient is status post amputation of the first and second   rays at the level of the mid metatarsals. There is a cutaneous ulceration   along the plantar aspect of the stump of the first metatarsal with   adjacent skin thickening and subcutaneous fat infiltration, consistent   with cellulitis. In addition, there is high T2 and mild low T1 signal   within the distal aspect of the stump of the first metatarsal, consistent  with osteomyelitis.    There is chronic flattening/remodeling of the head of the third   metatarsal, consistent with chronic osteonecrosis. There is secondary   arthrosis at the third metatarsophalangeal joint. There are no other   areas of marrow signal alteration to suggest osteoarthritis. There is   diffuse fatty atrophy of the musculature about the foot, consistent with   denervation.    IMPRESSION: Findings consistent with osteomyelitis of the stump of the   first metatarsal, as above.    < end of copied text >    < from: US Abdomen Complete (04.21.18 @ 10:57) >  IMPRESSION:     Hepatosplenomegaly. Heterogeneous fatty infiltration of the liver. Status   post cholecystectomy. 2.4 cm left renal cyst.    < end of copied text > Culture - Surgical Swab (04.26.18 @ 21:49)    -  Vancomycin: S 0.5    -  Levofloxacin: S 0.5    -  Penicillin: S 0.06    Specimen Source: .Surgical Swab L 1st metatarsal clear margin/swab    Culture Results:   Few Streptococcus agalactiae (Group B)    Organism Identification: Streptococcus agalactiae (Group B)    Organism: Streptococcus agalactiae (Group B)    < from: MR Foot No Cont, Left (04.23.18 @ 13:41) >  FINDINGS: The patient is status post amputation of the first and second   rays at the level of the mid metatarsals. There is a cutaneous ulceration   along the plantar aspect of the stump of the first metatarsal with   adjacent skin thickening and subcutaneous fat infiltration, consistent   with cellulitis. In addition, there is high T2 and mild low T1 signal   within the distal aspect of the stump of the first metatarsal, consistent  with osteomyelitis.    There is chronic flattening/remodeling of the head of the third   metatarsal, consistent with chronic osteonecrosis. There is secondary   arthrosis at the third metatarsophalangeal joint. There are no other   areas of marrow signal alteration to suggest osteoarthritis. There is   diffuse fatty atrophy of the musculature about the foot, consistent with   denervation.    IMPRESSION: Findings consistent with osteomyelitis of the stump of the   first metatarsal, as above.    < end of copied text >    < from: US Abdomen Complete (04.21.18 @ 10:57) >  IMPRESSION:     Hepatosplenomegaly. Heterogeneous fatty infiltration of the liver. Status   post cholecystectomy. 2.4 cm left renal cyst.    < end of copied text >    < from: US Vessel Mapping Hemodialysis (04.21.18 @ 10:57) >  FINDINGS:   Right arm measurements are as follows:   Right cephalic vein:   Subclavian junction 0.35 cm  Proximal upper arm 0.32 cm  Mid upper arm 0.20 cm  Distal upper arm  0.22 cm  Antecubital fossa  0.26 cm  Proximal forearm  0.17 cm  Mid forearm 0.18 cm  Distal forearm  0.21 cm  Wrist  0.23 cm    Right basilic vein:  Subclavian junction  0.56 cm  Proximal upper arm  0.47 cm  Mid upper arm  0.43 cm  Distal upper arm  0.28 cm  Antecubital fossa 0.20  Proximal forearm  0.11 cm  Mid forearm 0.12 cm  Distal forearm  0.09 cm  Wrist  0.10 cm    Left arm measurements are as follows:   Left cephalic vein:   Subclavian junction  0.20 cm  Proximal upper arm  0.23 cm  Mid upper arm  0.26 cm  Distal upper arm  0.26 cm  Antecubital fossa  0.16 cm  Proximal forearm  0.35 cm  Mid forearm  0.24 cm  Distal forearm  0.24 cm  Wrist  0.24 cm    Left basilic vein:  Subclavian junction 0.48 cm  Proximal upper arm  0.38 cm  Mid upper arm  0.36 cm  Distal upper arm  0.37 cm  Antecubital fossa 0.28  Proximal forearm 0.19  Mid forearm  0.18 cm  Distal forearm  0.14 cm  Wrist  0.14 cm    The internal jugular and subclavian veins are patent bilaterally. The   basilic and cephalic veins are widely patent throughout both arms.    IMPRESSION:    Cephalic and basilic vein measurements as above.    < end of copied text >

## 2018-05-04 NOTE — DISCHARGE NOTE ADULT - MEDICATION SUMMARY - MEDICATIONS TO STOP TAKING
I will STOP taking the medications listed below when I get home from the hospital:    traZODone 100 mg oral tablet  -- 1 tab(s) by mouth once a day (at bedtime)    diazePAM 5 mg oral tablet  -- 1 tab(s) by mouth every 6 hours    DULoxetine 60 mg oral delayed release capsule  -- 2 cap(s) by mouth once a day    Duricef  -- 500 milligram(s) by mouth 2 times a day

## 2018-05-04 NOTE — DISCHARGE NOTE ADULT - NSFTFADEVICE1RD_GEN_ALL_CORE
Grzegorz Castellon MD  Otolaryngology  [facility]     walker

## 2018-05-04 NOTE — DISCHARGE NOTE ADULT - MEDICATION SUMMARY - MEDICATIONS TO TAKE
I will START or STAY ON the medications listed below when I get home from the hospital:    3:1 Commode  -- 1 unit device    Dx: R27.0  M62.81  -- Indication: For Toileting    predniSONE 10 mg oral tablet  -- 1 tab(s) by mouth once a day   -- It is very important that you take or use this exactly as directed.  Do not skip doses or discontinue unless directed by your doctor.  Obtain medical advice before taking any non-prescription drugs as some may affect the action of this medication.  Take with food or milk.    -- Indication: For COPD exacerbation    oxyCODONE 10 mg oral tablet  -- 1 tab(s) by mouth every 6 hours, As needed, Severe Pain (7 - 10) MDD:4tabs  -- Indication: For Pain    gabapentin 800 mg oral tablet  -- 1 tab(s) by mouth 3 times a day, As Needed -neuropathic pain  -- Indication: For Pain    DULoxetine 60 mg oral delayed release capsule  -- 2 cap(s) by mouth once a day  -- Indication: For Mood    traZODone 100 mg oral tablet  -- 2 tab(s) by mouth once a day (at bedtime) MDD:1 tabs  -- Indication: For insomnia/mood    Tresiba FlexTouch 100 units/mL subcutaneous solution  -- 70 unit(s) subcutaneous once a day (at bedtime)  -- Indication: For Diabetes    cetirizine 10 mg oral tablet  -- 1 tab(s) by mouth once a day  -- Indication: For allergies    doxycycline monohydrate 100 mg oral capsule  -- 1 cap(s) by mouth every 12 hours  -- Indication: For Osteomyelitis of metatarsal    Xanax 0.5 mg oral tablet  -- 1 tab(s) by mouth 3 times a day, As needed, anxiety MDD:3 tabs  -- Indication: For anxiety    ipratropium-albuterol 0.5 mg-2.5 mg/3 mLinhalation solution  -- 3 milliliter(s) inhaled every 6 hours, As Needed  -- Indication: For COPD exacerbation    budesonide-formoterol 80 mcg-4.5 mcg/inh inhalation aerosol  -- 1 dose(s) inhaled 2 times a day  -- Indication: For COPD exacerbation    cefTRIAXone 2 g injection  -- 2 gram(s) intravenously once a day MDD:ends 5/22/18.  CMP CBC ESR CRP faxed Dr. Estes [phone]  -- Indication: For Osteomyelitis of metatarsal    ferrous sulfate 325 mg oral tablet  -- 1 tab(s) by mouth 3 times a day with meals  -- Check with your doctor before becoming pregnant.  Do not chew, break, or crush.  May discolor urine or feces.    -- Indication: For anemia    montelukast 10 mg oral tablet  -- 1 tab(s) by mouth once a day  -- Indication: For COPD exacerbation    magnesium oxide 400 mg (241.3 mg elemental magnesium) oral tablet  -- 1 tab(s) by mouth 3 times a day (with meals)  -- Indication: For wound healing    zinc sulfate 220 mg oral capsule  -- 1 cap(s) by mouth once a day  -- Indication: For wound healing    Cepacol Sore Throat 15 mg-3.6 mg mucous membrane lozenge  -- 1 lozenge mucous membrane 4 times a day, As Needed sore throat  -- Indication: For sore throat    saccharomyces boulardii lyo 250 mg oral capsule  -- 1 cap(s) by mouth 2 times a day  -- Indication: For while on antibiotics    pantoprazole 40 mg oral delayed release tablet  -- 1 tab(s) by mouth once a day (before a meal)  -- Indication: For reflux    buPROPion 300 mg/24 hours (XL) oral tablet, extended release  -- 1 tab(s) by mouth once a day  -- Indication: For Mood    ascorbic acid 500 mg oral tablet  -- 1 tab(s) by mouth once a day  -- Indication: For vitamin    folic acid 1 mg oral tablet  -- 1 tab(s) by mouth once a day  -- Indication: For vitamin    ergocalciferol 50,000 intl units (1.25 mg) oral capsule  -- 1 cap(s) by mouth once a week  -- Indication: For vitamin I will START or STAY ON the medications listed below when I get home from the hospital:    3:1 Commode  -- 1 unit device    Dx: R27.0  M62.81  -- Indication: For Toileting    predniSONE 10 mg oral tablet  -- 1 tab(s) by mouth once a day   -- It is very important that you take or use this exactly as directed.  Do not skip doses or discontinue unless directed by your doctor.  Obtain medical advice before taking any non-prescription drugs as some may affect the action of this medication.  Take with food or milk.    -- Indication: For COPD exacerbation    oxyCODONE 10 mg oral tablet  -- 1 tab(s) by mouth every 6 hours, As needed, Severe Pain (7 - 10) MDD:4tabs  -- Indication: For Pain    oxyCODONE 5 mg oral capsule  -- 2 cap(s) by mouth every 6 hours, As Needed MDD:8 tabs  -- Caution federal law prohibits the transfer of this drug to any person other  than the person for whom it was prescribed.  It is very important that you take or use this exactly as directed.  Do not skip doses or discontinue unless directed by your doctor.  May cause drowsiness.  Alcohol may intensify this effect.  Use care when operating dangerous machinery.  This prescription cannot be refilled.  Using more of this medication than prescribed may cause serious breathing problems.    -- Indication: For Pain    gabapentin 800 mg oral tablet  -- 1 tab(s) by mouth 3 times a day, As Needed -neuropathic pain  -- Indication: For Pain    DULoxetine 60 mg oral delayed release capsule  -- 2 cap(s) by mouth once a day  -- Indication: For Mood    traZODone 100 mg oral tablet  -- 2 tab(s) by mouth once a day (at bedtime) MDD:1 tabs  -- Indication: For insomnia/mood    Tresiba FlexTouch 100 units/mL subcutaneous solution  -- 70 unit(s) subcutaneous once a day (at bedtime)  -- Indication: For Diabetes    cetirizine 10 mg oral tablet  -- 1 tab(s) by mouth once a day  -- Indication: For allergies    doxycycline monohydrate 100 mg oral capsule  -- 1 cap(s) by mouth every 12 hours  -- Indication: For Osteomyelitis of metatarsal    Xanax 0.5 mg oral tablet  -- 1 tab(s) by mouth 3 times a day, As needed, anxiety MDD:3 tabs  -- Indication: For anxiety    ipratropium-albuterol 0.5 mg-2.5 mg/3 mLinhalation solution  -- 3 milliliter(s) inhaled every 6 hours, As Needed  -- Indication: For COPD exacerbation    budesonide-formoterol 80 mcg-4.5 mcg/inh inhalation aerosol  -- 1 dose(s) inhaled 2 times a day  -- Indication: For COPD exacerbation    cefTRIAXone 2 g injection  -- 2 gram(s) intravenously once a day MDD:ends 5/22/18.  CMP CBC ESR CRP faxed Dr. Estes [phone]  -- Indication: For Osteomyelitis of metatarsal    ferrous sulfate 325 mg oral tablet  -- 1 tab(s) by mouth 3 times a day with meals  -- Check with your doctor before becoming pregnant.  Do not chew, break, or crush.  May discolor urine or feces.    -- Indication: For anemia    montelukast 10 mg oral tablet  -- 1 tab(s) by mouth once a day  -- Indication: For COPD exacerbation    magnesium oxide 400 mg (241.3 mg elemental magnesium) oral tablet  -- 1 tab(s) by mouth 3 times a day (with meals)  -- Indication: For wound healing    zinc sulfate 220 mg oral capsule  -- 1 cap(s) by mouth once a day  -- Indication: For wound healing    Cepacol Sore Throat 15 mg-3.6 mg mucous membrane lozenge  -- 1 lozenge mucous membrane 4 times a day, As Needed sore throat  -- Indication: For sore throat    saccharomyces boulardii lyo 250 mg oral capsule  -- 1 cap(s) by mouth 2 times a day  -- Indication: For while on antibiotics    pantoprazole 40 mg oral delayed release tablet  -- 1 tab(s) by mouth once a day (before a meal)  -- Indication: For reflux    buPROPion 300 mg/24 hours (XL) oral tablet, extended release  -- 1 tab(s) by mouth once a day  -- Indication: For Mood    ascorbic acid 500 mg oral tablet  -- 1 tab(s) by mouth once a day  -- Indication: For vitamin    folic acid 1 mg oral tablet  -- 1 tab(s) by mouth once a day  -- Indication: For vitamin    ergocalciferol 50,000 intl units (1.25 mg) oral capsule  -- 1 cap(s) by mouth once a week  -- Indication: For vitamin

## 2018-05-04 NOTE — PROGRESS NOTE ADULT - PROBLEM SELECTOR PROBLEM 1
COPD exacerbation
Electrolyte disturbance
Foot ulcer due to secondary DM
Metabolic encephalopathy
Osteomyelitis of metatarsal
Metabolic encephalopathy
Osteomyelitis of metatarsal
CKD (chronic kidney disease) stage 5, GFR less than 15 ml/min
COPD exacerbation
Osteomyelitis of metatarsal
Metabolic encephalopathy
COPD exacerbation
Osteomyelitis of metatarsal
Foot ulcer due to secondary DM

## 2018-05-04 NOTE — PROGRESS NOTE ADULT - PROBLEM SELECTOR PLAN 1
Confirmed on MRI of foot   s/p 1st metatarsal amputation 4/26/18  Clean margin with Strep sp.   Continue Doxycycline 100mg IV BID and Ceftriaxone 2 grams Q 24H  Follow up culture results
Confirmed on MRI of foot   s/p 1st metatarsal amputation 4/26/18  Clean margin with Strep group B  Continue Doxycycline 100mg BID  - CHANGED TO oral 5/3/18 -  (ends 5/22/18)  Continue Ceftraixone 2 grams Q24H x 21 days (ends 5/22/18)
Confirmed on MRI of foot   s/p 1st metatarsal amputation 4/26/18  Clean margin with Strep group B  Continue Doxycycline 100mg BID  - CHANGED TO oral 5/3/18 -  (ends 5/22/18)  Continue Ceftraixone 2 grams Q24H x 21 days (ends 5/22/18)
Confirmed on MRI of foot   s/p bone biopsy  - will start empiric antibiotics today.   - start Doxycycline 100mg IV BID, start Ceftriaxone 2 grams Q 24H.
Confirmed on MRI of foot   suggest bone biopsy prior to start of antibiotics.  - Declining RENAL FUNCTION  and patient may need Left AVF placement.  DEFER picc line for now
OM on MRI s/p resection by podiatry  ID started abx  suspect bone culture results to guide abx choice/duration.
OM on MRI s/p resection by podiatry, uncomplicated well tolerated procedure  ID started abx  pending bone culture results to guide abx choice/duration.
repeat BMP as na,k and bicarb all abnormal
resolved
s/p Picc line  to be d/c on abx until 5/22/18
states she has hx COPD  add solumedrol, c/w nebs/budesonide  Z pack  pulmonary consulted
Confirmed on MRI of foot   s/p 1st metatarsal amputation 4/26/18  Clean margin with Strep group B  Continue Doxycycline 100mg IV BID and Ceftriaxone 2 grams Q 24H  Follow up culture results  Will need long term antibiotics, will d/w Nephrology on HD status
resolved
c/w solumedrol/ nebs/budesonide---switch to PO pred in am  Z pack  pulmonary eval appreciated
- continue wound care.  for MRI of the foot today. If Osteomyelitis found on MRI, suggest bone biopsy prior to start of antibiotics.   - Declining RENAL FUNCTION  and patient may need Left AVF placement.  DEFER picc line for now
Confirmed on MRI of foot   s/p 1st metatarsal amputation 4/26/18  Clean margin with Strep group B  Continue Doxycycline 100mg IV BID  - this can be change to ORAL   Continue Ceftraixone 2 grams Q24H x 21 days (ends 5/22/18)
suspect either uremia or hepatic  check ammonia, tsh  lactulose/rifamixin  abdominal ultrasound
improving  c/w solumedrol/ nebs/budesonide  Z pack  pulmonary eval appreciated
OM on MRI s/p resection by podiatry, uncomplicated well tolerated procedure  ID started abx  pending bone culture results to guide abx choice/duration.

## 2018-05-04 NOTE — PROGRESS NOTE ADULT - PROBLEM SELECTOR PLAN 4
venofer/epogen  stable  heme following
Nephrology following
venofer/epogen  stable  heme + renal following
Nephrology following
Palliative   Jaimee Malik LCSW, met with patient earlier.   HCP completed and discussed Advance Directives. She consented to a DNR/I   ( SEE separate note) Patient better spirits and accepting of dialysis.   Spoke with patient - she confirms above. Discussed advance directives/ CPR  - risks and benefits.  She states would not want any aggressive inverventions in the event of a CPA. Would not want to be put on machines nor have a feeding tube. She states that " I know I may only be 53 but feel much older because of my health". She  feels she has been through so much already  and states "I  just want to go when it comes time" .  MOLST completed. Psychosocial support.
stable  AVF once cleared by ID/podiatry  likely outpatient   d/w renal
venofer/epogen  stable
venofer/epogen  stable  heme following
venofer/epogen  stable  heme following
worsening renal function.  possible need for HD
worsening renal function.  possible need for HD  - No contraindications from ID Point of view for patient to have fistula placement
Nephrology following
venofer/epogen  stable
venofer/epogen  stable  heme following
venofer/epogen  stable  heme following
suspected

## 2018-05-04 NOTE — DISCHARGE NOTE ADULT - PLAN OF CARE
follow up Continue all medications as prescribed. Be sure to follow up with a psychiatrist or therapist. resolution Continue all antibiotics as prescribed. You may benefit from taking a probiotic such a florastor which can be bought over the counter for the duration of time that you are on antibiotics to help prevent an infectious diarrhea called Clostridium difficile. If you notice you are having more than 4-5 bowel movements that are very liquidy or diarrheal, be sure to see your primary care doctor or come to the ER to have your stool tested. If your original infection seems to get worse, if you are having lots of high fevers over 100.4F, chills, extreme shakiness, very bad difficulty breathing, cannot hold what you are eating down, or develop a bad rash, come to the ER immediately. Your kidney function is not optimal. This can be due to a variety of reasons. If you were not aware of this, you should discuss with your Primary Care Doctor about seeing a Nephrologist. If you already have a nephrologist, be sure to continue following up as scheduled.    Be sure to follow up with the vascular surgery team to discuss fistula placement, this can be coordinated with your nephrologist You were noticed to have blood work with some abnormalities. At this time, you are safe to leave the hospital, we do not suspect that anything immediately will come of these findings, but it is something that should be followed to see if it is getting better, staying the same, or getting worse. Be sure to discuss this at your follow up visit with your Primary Care Doctor to have these tests repeated and to see what work up, if any, is necessary to continue managing it. Be sure to follow up with hematology. follow up with your Primary Care Doctor to review your blood work regularly. Be sure to alert a medical professional immediately if you have symptoms of acute or worsening anemia including but not limited to the following: lightheadedness, dizziness, paleness, palpitations, shortness of breath, abnormal bleeding. You were noted to have an abnormality on imaging done while you were in the hospital. You should be sure to follow up, either with your Primary Care Doctor, or a specialist (gastroenterologist), in order to make a decision of if you would like to continue surveillance to follow this abnormality up. No intervention was required, or recommended to be done, while you were in the hospital, this should be done on an outpatient basis. Be sure to take all medications as prescribed, even if you do not feel short of breath. The only medication to be taken on an as needed basis is a rescue inhaler. If you have been prescribed a taper of steroids, be sure to take it as prescribed. If you experience worsening shortness of breath unimproved by nebulizers or inhalers, wheezing, breathlessness, be sure to come to the ER for evaluation.

## 2018-05-04 NOTE — PROGRESS NOTE ADULT - PROBLEM SELECTOR PLAN 6
hga1c 5.6  hold lantus ,c/w yair
no evidence of cirrhosis on ultrasound. d/c lactulose/rifaximin.  ammonia normal
u/s hepatosplenomegaly, cirrhosis on old CT.  d/c lactulose/rifaximin as no encephalopathy.  ammonia normal  outpatient GI evaluation
- if has recurrence, will check for infectious diarrhea / r/o C diff  pt on Florastor
hga1c 5.6  add lantus ,c/w raiss  steroid induced hyperglycemia
no evidence of cirrhosis on ultrasound. d/c lactulose/rifaximin.  ammonia normal
no evidence of cirrhosis on ultrasound. d/c lactulose/rifaximin.  ammonia normal
u/s hepatosplenomegaly, cirrhosis on old CT.  d/c lactulose/rifaximin as no encephalopathy.  ammonia normal  outpatient GI evaluation
no evidence of cirrhosis on ultrasound. d/c lactulose/rifaximin.  ammonia normal
u/s hepatosplenomegaly, cirrhosis on old CT.   d/c lactulose/rifaximin as no encephalopathy.  ammonia normal  outpatient GI evaluation
no evidence of cirrhosis on ultrasound. d/c lactulose/rifaximin.  ammonia normal

## 2018-05-04 NOTE — PROGRESS NOTE ADULT - ATTENDING COMMENTS
Patient was seen bedside with resident.  I reviewed the above assessment and documentation completed by the resident physician.  I verbally discussed the evaluation and treatment plan with resident.  The podiatry team will continue to follow patient while in house.
Will Follow
Will Follow
Anticipated sequences of events - Bone biopsy, start of antibiotics, clearance for AV fistula  - need to speak with podiatry on plans. medical treatment vs surgical resection
pending PICC line  patient can follow with me in office in 2 weeks.
Will Follow
If no more diarrhea,  may discharge to home today  patient can follow with me in office in 2 weeks.
Goals of Care discussed at length with the patient. This conversation included current condition, prognosis, and options for therapy. Discussed desires by patient for further care and wishes were expressed - she wants to get better but is overcome with emotions regarding the prospects of her health in the future. Does not want to burden her family. Is overwhelmed with the idea of HD and the amount of physician follow up her many medical conditions may have. Expresses not wanting to go on and pursue treatment if she is going to be back in forth from provider to provider and stressing family members out. Family present outside the room, with patient permission all of the aforementioned items discussed with them as well. They expressed understanding and support for the patient. Psych reconsult requested. Length of Conversation ~45min.

## 2018-05-04 NOTE — PROGRESS NOTE ADULT - ASSESSMENT
Imp:  Patient is seen for pancytopenia; known HSM, possible underlying cirrhosis  Labs reviewed-iron studies non-diagnostic, B12 normal, folate low; elevated Cr-  Multifactoria anemia, secondary to chronic disease/CKD,HSM : on IV iron RHEA  Thrombocytopenia-secondary to HSM, ? cirrhosis, infection and antibiotics. repeat HIT antibody negative. suspect low plt due to infection and splenic sequestration

## 2018-05-04 NOTE — PROGRESS NOTE ADULT - PROBLEM SELECTOR PROBLEM 4
Pancytopenia
CKD (chronic kidney disease) stage 5, GFR less than 15 ml/min
Encounter for palliative care
Pancytopenia
CKD (chronic kidney disease) stage 5, GFR less than 15 ml/min
Pancytopenia
CKD (chronic kidney disease) stage 5, GFR less than 15 ml/min
Liver cirrhosis secondary to CANALES (nonalcoholic steatohepatitis)
Pancytopenia

## 2018-05-07 ENCOUNTER — CHART COPY (OUTPATIENT)
Age: 53
End: 2018-05-07

## 2018-05-08 ENCOUNTER — APPOINTMENT (OUTPATIENT)
Dept: INTERNAL MEDICINE | Facility: CLINIC | Age: 53
End: 2018-05-08
Payer: MEDICAID

## 2018-05-08 VITALS
BODY MASS INDEX: 29.03 KG/M2 | TEMPERATURE: 98 F | OXYGEN SATURATION: 94 % | SYSTOLIC BLOOD PRESSURE: 120 MMHG | HEART RATE: 78 BPM | DIASTOLIC BLOOD PRESSURE: 72 MMHG | HEIGHT: 67 IN | WEIGHT: 185 LBS

## 2018-05-08 DIAGNOSIS — F32.9 ANXIETY DISORDER, UNSPECIFIED: ICD-10-CM

## 2018-05-08 DIAGNOSIS — F32.9 MAJOR DEPRESSIVE DISORDER, SINGLE EPISODE, UNSPECIFIED: ICD-10-CM

## 2018-05-08 DIAGNOSIS — F41.9 ANXIETY DISORDER, UNSPECIFIED: ICD-10-CM

## 2018-05-08 DIAGNOSIS — M86.9 OSTEOMYELITIS, UNSPECIFIED: ICD-10-CM

## 2018-05-08 LAB — SURGICAL PATHOLOGY FINAL REPORT - CH: SIGNIFICANT CHANGE UP

## 2018-05-08 PROCEDURE — 99496 TRANSJ CARE MGMT HIGH F2F 7D: CPT

## 2018-05-08 RX ORDER — CEFDINIR 300 MG/1
300 CAPSULE ORAL
Qty: 20 | Refills: 0 | Status: COMPLETED | COMMUNITY
Start: 2017-12-28

## 2018-05-08 RX ORDER — ERGOCALCIFEROL 1.25 MG/1
1.25 MG CAPSULE, LIQUID FILLED ORAL
Qty: 4 | Refills: 0 | Status: COMPLETED | COMMUNITY
Start: 2018-03-05

## 2018-05-08 RX ORDER — BUPROPION HYDROCHLORIDE 150 MG/1
150 TABLET, EXTENDED RELEASE ORAL DAILY
Qty: 30 | Refills: 1 | Status: ACTIVE | COMMUNITY
Start: 2018-05-08 | End: 1900-01-01

## 2018-05-08 RX ORDER — OXYCODONE 5 MG/1
5 TABLET ORAL EVERY 6 HOURS
Qty: 112 | Refills: 0 | Status: ACTIVE | COMMUNITY
Start: 2018-05-08

## 2018-05-08 RX ORDER — PREDNISONE 10 MG/1
10 TABLET ORAL DAILY
Qty: 30 | Refills: 0 | Status: ACTIVE | COMMUNITY
Start: 2018-05-08

## 2018-05-09 ENCOUNTER — APPOINTMENT (OUTPATIENT)
Dept: INTERNAL MEDICINE | Facility: CLINIC | Age: 53
End: 2018-05-09

## 2018-05-11 ENCOUNTER — CHART COPY (OUTPATIENT)
Age: 53
End: 2018-05-11

## 2018-05-11 ENCOUNTER — APPOINTMENT (OUTPATIENT)
Dept: NEPHROLOGY | Facility: CLINIC | Age: 53
End: 2018-05-11
Payer: MEDICAID

## 2018-05-11 VITALS
BODY MASS INDEX: 29.82 KG/M2 | WEIGHT: 190 LBS | HEART RATE: 76 BPM | DIASTOLIC BLOOD PRESSURE: 76 MMHG | HEIGHT: 67 IN | SYSTOLIC BLOOD PRESSURE: 150 MMHG

## 2018-05-11 DIAGNOSIS — N18.5 CHRONIC KIDNEY DISEASE, STAGE 5: ICD-10-CM

## 2018-05-11 LAB — HEMOGLOBIN: 9.5

## 2018-05-11 PROCEDURE — 85018 HEMOGLOBIN: CPT | Mod: QW

## 2018-05-11 PROCEDURE — 36415 COLL VENOUS BLD VENIPUNCTURE: CPT

## 2018-05-11 PROCEDURE — 99215 OFFICE O/P EST HI 40 MIN: CPT | Mod: 25

## 2018-05-11 PROCEDURE — 96372 THER/PROPH/DIAG INJ SC/IM: CPT

## 2018-05-11 RX ORDER — ERYTHROPOIETIN 40000 [IU]/ML
40000 INJECTION, SOLUTION INTRAVENOUS; SUBCUTANEOUS
Qty: 1 | Refills: 0 | Status: COMPLETED | OUTPATIENT
Start: 2018-05-11

## 2018-05-11 RX ADMIN — ERYTHROPOIETIN 0 UNIT/ML: 40000 INJECTION, SOLUTION INTRAVENOUS; SUBCUTANEOUS at 00:00

## 2018-05-12 ENCOUNTER — MEDICATION RENEWAL (OUTPATIENT)
Age: 53
End: 2018-05-12

## 2018-05-13 LAB
ALBUMIN SERPL ELPH-MCNC: 3 G/DL
ANION GAP SERPL CALC-SCNC: 12 MMOL/L
BUN SERPL-MCNC: 42 MG/DL
CALCIUM SERPL-MCNC: 7.8 MG/DL
CHLORIDE SERPL-SCNC: 105 MMOL/L
CO2 SERPL-SCNC: 22 MMOL/L
CREAT SERPL-MCNC: 2.81 MG/DL
GLUCOSE SERPL-MCNC: 182 MG/DL
PHOSPHATE SERPL-MCNC: 3.2 MG/DL
POTASSIUM SERPL-SCNC: 5.3 MMOL/L
SODIUM SERPL-SCNC: 139 MMOL/L

## 2018-05-16 ENCOUNTER — APPOINTMENT (OUTPATIENT)
Dept: RADIOLOGY | Facility: CLINIC | Age: 53
End: 2018-05-16

## 2018-05-16 ENCOUNTER — OUTPATIENT (OUTPATIENT)
Dept: OUTPATIENT SERVICES | Facility: HOSPITAL | Age: 53
LOS: 1 days | End: 2018-05-16
Payer: MEDICAID

## 2018-05-16 DIAGNOSIS — Z00.8 ENCOUNTER FOR OTHER GENERAL EXAMINATION: ICD-10-CM

## 2018-05-16 PROCEDURE — 73630 X-RAY EXAM OF FOOT: CPT

## 2018-05-16 PROCEDURE — 73630 X-RAY EXAM OF FOOT: CPT | Mod: 26,RT

## 2018-05-18 ENCOUNTER — APPOINTMENT (OUTPATIENT)
Dept: INTERNAL MEDICINE | Facility: CLINIC | Age: 53
End: 2018-05-18
Payer: MEDICAID

## 2018-05-18 ENCOUNTER — LABORATORY RESULT (OUTPATIENT)
Age: 53
End: 2018-05-18

## 2018-05-18 VITALS
OXYGEN SATURATION: 98 % | TEMPERATURE: 97.8 F | WEIGHT: 190 LBS | SYSTOLIC BLOOD PRESSURE: 140 MMHG | BODY MASS INDEX: 29.82 KG/M2 | DIASTOLIC BLOOD PRESSURE: 80 MMHG | HEART RATE: 84 BPM | HEIGHT: 67 IN

## 2018-05-18 DIAGNOSIS — E11.610 TYPE 2 DIABETES MELLITUS WITH DIABETIC NEUROPATHIC ARTHROPATHY: ICD-10-CM

## 2018-05-18 DIAGNOSIS — E11.9 TYPE 2 DIABETES MELLITUS W/OUT COMPLICATIONS: ICD-10-CM

## 2018-05-18 DIAGNOSIS — J44.9 CHRONIC OBSTRUCTIVE PULMONARY DISEASE, UNSPECIFIED: ICD-10-CM

## 2018-05-18 DIAGNOSIS — Z79.4 TYPE 2 DIABETES MELLITUS W/OUT COMPLICATIONS: ICD-10-CM

## 2018-05-18 DIAGNOSIS — D63.1 CHRONIC KIDNEY DISEASE, STAGE 5: ICD-10-CM

## 2018-05-18 DIAGNOSIS — N18.5 CHRONIC KIDNEY DISEASE, STAGE 5: ICD-10-CM

## 2018-05-18 PROCEDURE — 99214 OFFICE O/P EST MOD 30 MIN: CPT | Mod: 25

## 2018-05-18 PROCEDURE — 96372 THER/PROPH/DIAG INJ SC/IM: CPT

## 2018-05-18 RX ORDER — ERYTHROPOIETIN 40000 [IU]/ML
40000 INJECTION, SOLUTION INTRAVENOUS; SUBCUTANEOUS
Qty: 1 | Refills: 0 | Status: COMPLETED | OUTPATIENT
Start: 2018-05-18

## 2018-05-18 RX ORDER — CEPHALEXIN 500 MG/1
500 CAPSULE ORAL
Qty: 30 | Refills: 0 | Status: COMPLETED | COMMUNITY
Start: 2018-03-27

## 2018-05-18 RX ORDER — ERYTHROPOIETIN 40000 [IU]/ML
40000 INJECTION, SOLUTION INTRAVENOUS; SUBCUTANEOUS
Qty: 1 | Refills: 2 | Status: ACTIVE | COMMUNITY
Start: 2018-05-18

## 2018-05-18 RX ORDER — DOXYCYCLINE HYCLATE 100 MG/1
100 TABLET ORAL TWICE DAILY
Qty: 20 | Refills: 0 | Status: ACTIVE | COMMUNITY
Start: 2018-05-08 | End: 1900-01-01

## 2018-05-18 RX ORDER — DOXYCYCLINE 100 MG/1
100 CAPSULE ORAL
Qty: 36 | Refills: 0 | Status: COMPLETED | COMMUNITY
Start: 2018-05-04

## 2018-05-18 RX ADMIN — ERYTHROPOIETIN 1 UNIT/ML: 40000 INJECTION, SOLUTION INTRAVENOUS; SUBCUTANEOUS at 00:00

## 2018-05-19 LAB
ANION GAP SERPL CALC-SCNC: 11 MMOL/L
BASOPHILS # BLD AUTO: 0.01 K/UL
BASOPHILS NFR BLD AUTO: 0.3 %
BUN SERPL-MCNC: 32 MG/DL
CALCIUM SERPL-MCNC: 8.2 MG/DL
CHLORIDE SERPL-SCNC: 104 MMOL/L
CO2 SERPL-SCNC: 24 MMOL/L
CREAT SERPL-MCNC: 2.87 MG/DL
EOSINOPHIL # BLD AUTO: 0.07 K/UL
EOSINOPHIL NFR BLD AUTO: 2.1 %
GLUCOSE SERPL-MCNC: 119 MG/DL
HCT VFR BLD CALC: 31.8 %
HGB BLD-MCNC: 9.5 G/DL
IMM GRANULOCYTES NFR BLD AUTO: 0.3 %
LYMPHOCYTES # BLD AUTO: 0.83 K/UL
LYMPHOCYTES NFR BLD AUTO: 24.8 %
MAN DIFF?: NORMAL
MCHC RBC-ENTMCNC: 28.9 PG
MCHC RBC-ENTMCNC: 29.9 GM/DL
MCV RBC AUTO: 96.7 FL
MONOCYTES # BLD AUTO: 0.38 K/UL
MONOCYTES NFR BLD AUTO: 11.3 %
NEUTROPHILS # BLD AUTO: 2.05 K/UL
NEUTROPHILS NFR BLD AUTO: 61.2 %
PLATELET # BLD AUTO: NORMAL K/UL
POTASSIUM SERPL-SCNC: 4.9 MMOL/L
RBC # BLD: 3.29 M/UL
RBC # FLD: 15 %
SODIUM SERPL-SCNC: 139 MMOL/L
WBC # FLD AUTO: 3.35 K/UL

## 2018-05-19 NOTE — ADDENDUM
[FreeTextEntry1] : Patient's condition requires positioning of the body to alleviate pain, promote good body alignment and to prevent contractures. patients condition requires special attachments that cannot be fixed and used on an ordinary bed. Patient is at risk for aspiration and requires at least a 30 degree elevation of the head of the bed. Patient has tried and failed multiple pillows and wedges.

## 2018-05-19 NOTE — HISTORY OF PRESENT ILLNESS
[FreeTextEntry1] : f/u to CKD, anemia, DM and Charcot foot.\par  [de-identified] : Pt has been seeing nephrology re the kidney disease. Her diabetes is under control. She is to be tested for the CBC today. Charcot foot is stable.

## 2018-05-23 ENCOUNTER — APPOINTMENT (OUTPATIENT)
Dept: INTERNAL MEDICINE | Facility: CLINIC | Age: 53
End: 2018-05-23
Payer: MEDICAID

## 2018-05-23 PROCEDURE — 88305 TISSUE EXAM BY PATHOLOGIST: CPT

## 2018-05-23 PROCEDURE — 83880 ASSAY OF NATRIURETIC PEPTIDE: CPT

## 2018-05-23 PROCEDURE — 84295 ASSAY OF SERUM SODIUM: CPT

## 2018-05-23 PROCEDURE — 87070 CULTURE OTHR SPECIMN AEROBIC: CPT

## 2018-05-23 PROCEDURE — 73718 MRI LOWER EXTREMITY W/O DYE: CPT

## 2018-05-23 PROCEDURE — 36415 COLL VENOUS BLD VENIPUNCTURE: CPT

## 2018-05-23 PROCEDURE — 84100 ASSAY OF PHOSPHORUS: CPT

## 2018-05-23 PROCEDURE — 93971 EXTREMITY STUDY: CPT

## 2018-05-23 PROCEDURE — 82747 ASSAY OF FOLIC ACID RBC: CPT

## 2018-05-23 PROCEDURE — 82330 ASSAY OF CALCIUM: CPT

## 2018-05-23 PROCEDURE — 93306 TTE W/DOPPLER COMPLETE: CPT

## 2018-05-23 PROCEDURE — 81001 URINALYSIS AUTO W/SCOPE: CPT

## 2018-05-23 PROCEDURE — P9016: CPT

## 2018-05-23 PROCEDURE — 96375 TX/PRO/DX INJ NEW DRUG ADDON: CPT

## 2018-05-23 PROCEDURE — 82140 ASSAY OF AMMONIA: CPT

## 2018-05-23 PROCEDURE — 84300 ASSAY OF URINE SODIUM: CPT

## 2018-05-23 PROCEDURE — 36430 TRANSFUSION BLD/BLD COMPNT: CPT

## 2018-05-23 PROCEDURE — 71045 X-RAY EXAM CHEST 1 VIEW: CPT

## 2018-05-23 PROCEDURE — 86022 PLATELET ANTIBODIES: CPT

## 2018-05-23 PROCEDURE — 82435 ASSAY OF BLOOD CHLORIDE: CPT

## 2018-05-23 PROCEDURE — 84466 ASSAY OF TRANSFERRIN: CPT

## 2018-05-23 PROCEDURE — 83550 IRON BINDING TEST: CPT

## 2018-05-23 PROCEDURE — 87075 CULTR BACTERIA EXCEPT BLOOD: CPT

## 2018-05-23 PROCEDURE — 80053 COMPREHEN METABOLIC PANEL: CPT

## 2018-05-23 PROCEDURE — 87040 BLOOD CULTURE FOR BACTERIA: CPT

## 2018-05-23 PROCEDURE — 82272 OCCULT BLD FECES 1-3 TESTS: CPT

## 2018-05-23 PROCEDURE — 82803 BLOOD GASES ANY COMBINATION: CPT

## 2018-05-23 PROCEDURE — 86900 BLOOD TYPING SEROLOGIC ABO: CPT

## 2018-05-23 PROCEDURE — 76700 US EXAM ABDOM COMPLETE: CPT

## 2018-05-23 PROCEDURE — 88311 DECALCIFY TISSUE: CPT

## 2018-05-23 PROCEDURE — 99214 OFFICE O/P EST MOD 30 MIN: CPT

## 2018-05-23 PROCEDURE — 93005 ELECTROCARDIOGRAM TRACING: CPT

## 2018-05-23 PROCEDURE — 93923 UPR/LXTR ART STDY 3+ LVLS: CPT

## 2018-05-23 PROCEDURE — 83935 ASSAY OF URINE OSMOLALITY: CPT

## 2018-05-23 PROCEDURE — 94760 N-INVAS EAR/PLS OXIMETRY 1: CPT

## 2018-05-23 PROCEDURE — 86901 BLOOD TYPING SEROLOGIC RH(D): CPT

## 2018-05-23 PROCEDURE — 82728 ASSAY OF FERRITIN: CPT

## 2018-05-23 PROCEDURE — 82009 KETONE BODYS QUAL: CPT

## 2018-05-23 PROCEDURE — 85027 COMPLETE CBC AUTOMATED: CPT

## 2018-05-23 PROCEDURE — 82746 ASSAY OF FOLIC ACID SERUM: CPT

## 2018-05-23 PROCEDURE — 94640 AIRWAY INHALATION TREATMENT: CPT

## 2018-05-23 PROCEDURE — 84702 CHORIONIC GONADOTROPIN TEST: CPT

## 2018-05-23 PROCEDURE — 83036 HEMOGLOBIN GLYCOSYLATED A1C: CPT

## 2018-05-23 PROCEDURE — 86140 C-REACTIVE PROTEIN: CPT

## 2018-05-23 PROCEDURE — G0365: CPT

## 2018-05-23 PROCEDURE — 85610 PROTHROMBIN TIME: CPT

## 2018-05-23 PROCEDURE — 85045 AUTOMATED RETICULOCYTE COUNT: CPT

## 2018-05-23 PROCEDURE — 96374 THER/PROPH/DIAG INJ IV PUSH: CPT

## 2018-05-23 PROCEDURE — 73630 X-RAY EXAM OF FOOT: CPT

## 2018-05-23 PROCEDURE — 85520 HEPARIN ASSAY: CPT

## 2018-05-23 PROCEDURE — 85014 HEMATOCRIT: CPT

## 2018-05-23 PROCEDURE — 86850 RBC ANTIBODY SCREEN: CPT

## 2018-05-23 PROCEDURE — 82947 ASSAY GLUCOSE BLOOD QUANT: CPT

## 2018-05-23 PROCEDURE — 86923 COMPATIBILITY TEST ELECTRIC: CPT

## 2018-05-23 PROCEDURE — 83735 ASSAY OF MAGNESIUM: CPT

## 2018-05-23 PROCEDURE — 83605 ASSAY OF LACTIC ACID: CPT

## 2018-05-23 PROCEDURE — 87186 SC STD MICRODIL/AGAR DIL: CPT

## 2018-05-23 PROCEDURE — 85730 THROMBOPLASTIN TIME PARTIAL: CPT

## 2018-05-23 PROCEDURE — 73620 X-RAY EXAM OF FOOT: CPT

## 2018-05-23 PROCEDURE — 99285 EMERGENCY DEPT VISIT HI MDM: CPT | Mod: 25

## 2018-05-23 PROCEDURE — 82607 VITAMIN B-12: CPT

## 2018-05-23 PROCEDURE — 84443 ASSAY THYROID STIM HORMONE: CPT

## 2018-05-23 PROCEDURE — 84132 ASSAY OF SERUM POTASSIUM: CPT

## 2018-05-23 PROCEDURE — 80048 BASIC METABOLIC PNL TOTAL CA: CPT

## 2018-05-23 PROCEDURE — 82962 GLUCOSE BLOOD TEST: CPT

## 2018-05-29 ENCOUNTER — CLINICAL ADVICE (OUTPATIENT)
Age: 53
End: 2018-05-29

## 2018-05-29 DIAGNOSIS — K59.03 DRUG INDUCED CONSTIPATION: ICD-10-CM

## 2018-05-29 RX ORDER — DOCUSATE SODIUM 100 MG/1
100 CAPSULE, LIQUID FILLED ORAL 3 TIMES DAILY
Qty: 90 | Refills: 0 | Status: ACTIVE | COMMUNITY
Start: 2018-05-29 | End: 1900-01-01

## 2018-05-29 RX ORDER — MORPHINE SULFATE 20 MG/5ML
20 SOLUTION ORAL
Qty: 1 | Refills: 0 | Status: ACTIVE | COMMUNITY
Start: 2018-05-29 | End: 1900-01-01

## 2018-05-30 RX ORDER — MORPHINE SULFATE 15 MG/1
15 TABLET, FILM COATED, EXTENDED RELEASE ORAL
Qty: 60 | Refills: 0 | Status: ACTIVE | COMMUNITY
Start: 2018-05-29

## 2018-06-04 RX ORDER — FOLIC ACID 1 MG/1
1 TABLET ORAL DAILY
Qty: 30 | Refills: 5 | Status: DISCONTINUED | COMMUNITY
Start: 2018-05-08 | End: 2018-06-04

## 2018-06-04 RX ORDER — CALCITRIOL 0.25 UG/1
0.25 CAPSULE, LIQUID FILLED ORAL
Qty: 30 | Refills: 5 | Status: ACTIVE | COMMUNITY
Start: 2018-04-07

## 2018-06-04 RX ORDER — CHLORHEXIDINE GLUCONATE 4 %
400 (240 MG) LIQUID (ML) TOPICAL
Qty: 30 | Refills: 2 | Status: DISCONTINUED | COMMUNITY
Start: 2018-05-08 | End: 2018-06-04

## 2018-06-11 ENCOUNTER — MEDICATION RENEWAL (OUTPATIENT)
Age: 53
End: 2018-06-11

## 2018-06-11 RX ORDER — ONDANSETRON 8 MG/1
8 TABLET ORAL
Qty: 90 | Refills: 0 | Status: ACTIVE | COMMUNITY
Start: 2018-05-08 | End: 1900-01-01

## 2018-06-11 RX ORDER — ALPRAZOLAM 0.5 MG/1
0.5 TABLET ORAL
Qty: 90 | Refills: 0 | Status: ACTIVE | COMMUNITY
Start: 2018-05-08 | End: 1900-01-01

## 2018-06-14 ENCOUNTER — MEDICATION RENEWAL (OUTPATIENT)
Age: 53
End: 2018-06-14

## 2018-06-15 ENCOUNTER — APPOINTMENT (OUTPATIENT)
Dept: NEPHROLOGY | Facility: CLINIC | Age: 53
End: 2018-06-15

## 2018-06-18 ENCOUNTER — APPOINTMENT (OUTPATIENT)
Dept: INTERNAL MEDICINE | Facility: CLINIC | Age: 53
End: 2018-06-18

## 2018-06-19 ENCOUNTER — APPOINTMENT (OUTPATIENT)
Dept: INTERNAL MEDICINE | Facility: CLINIC | Age: 53
End: 2018-06-19

## 2018-06-22 ENCOUNTER — MEDICATION RENEWAL (OUTPATIENT)
Age: 53
End: 2018-06-22

## 2018-06-22 RX ORDER — PROMETHAZINE HYDROCHLORIDE 25 MG/1
25 TABLET ORAL
Qty: 60 | Refills: 1 | Status: ACTIVE | COMMUNITY
Start: 2018-06-22 | End: 1900-01-01

## 2018-06-25 NOTE — PROGRESS NOTE BEHAVIORAL HEALTH - NSBHFUPINTERVALCCFT_PSY_A_CORE
How Severe Is Your Skin Lesion?: mild Has Your Skin Lesion Been Treated?: not been treated Is This A New Presentation, Or A Follow-Up?: Growth Additional History: Patient would like to have skin tag removed today. "I feel a lot better"

## 2018-07-13 ENCOUNTER — APPOINTMENT (OUTPATIENT)
Dept: INTERVENTIONAL RADIOLOGY/VASCULAR | Facility: HOSPITAL | Age: 53
End: 2018-07-13

## 2018-07-13 ENCOUNTER — OUTPATIENT (OUTPATIENT)
Dept: OUTPATIENT SERVICES | Facility: HOSPITAL | Age: 53
LOS: 1 days | Discharge: HOME | End: 2018-07-13
Payer: OTHER MISCELLANEOUS

## 2018-07-13 DIAGNOSIS — Z95.9 PRESENCE OF CARDIAC AND VASCULAR IMPLANT AND GRAFT, UNSPECIFIED: ICD-10-CM

## 2018-07-13 PROCEDURE — 77001 FLUOROGUIDE FOR VEIN DEVICE: CPT | Mod: 26

## 2018-07-13 PROCEDURE — 36569 INSJ PICC 5 YR+ W/O IMAGING: CPT

## 2018-07-13 PROCEDURE — 76937 US GUIDE VASCULAR ACCESS: CPT | Mod: 26

## 2018-08-01 ENCOUNTER — OUTPATIENT (OUTPATIENT)
Dept: OUTPATIENT SERVICES | Facility: HOSPITAL | Age: 53
LOS: 1 days | End: 2018-08-01
Payer: MEDICAID

## 2018-08-01 PROCEDURE — G9001: CPT

## 2018-08-16 ENCOUNTER — INPATIENT (INPATIENT)
Facility: HOSPITAL | Age: 53
LOS: 0 days | Discharge: HOSPICE | DRG: 951 | End: 2018-08-17
Attending: HOSPITALIST | Admitting: HOSPITALIST
Payer: MEDICAID

## 2018-08-16 VITALS
HEART RATE: 116 BPM | DIASTOLIC BLOOD PRESSURE: 58 MMHG | SYSTOLIC BLOOD PRESSURE: 96 MMHG | OXYGEN SATURATION: 95 % | RESPIRATION RATE: 16 BRPM

## 2018-08-16 PROCEDURE — 72125 CT NECK SPINE W/O DYE: CPT | Mod: 26

## 2018-08-16 PROCEDURE — 70450 CT HEAD/BRAIN W/O DYE: CPT | Mod: 26

## 2018-08-16 PROCEDURE — 99291 CRITICAL CARE FIRST HOUR: CPT

## 2018-08-16 RX ORDER — NALOXONE HYDROCHLORIDE 4 MG/.1ML
0.4 SPRAY NASAL ONCE
Qty: 0 | Refills: 0 | Status: COMPLETED | OUTPATIENT
Start: 2018-08-16 | End: 2018-08-16

## 2018-08-16 RX ORDER — SODIUM CHLORIDE 9 MG/ML
2000 INJECTION INTRAMUSCULAR; INTRAVENOUS; SUBCUTANEOUS ONCE
Qty: 0 | Refills: 0 | Status: COMPLETED | OUTPATIENT
Start: 2018-08-16 | End: 2018-08-16

## 2018-08-16 RX ORDER — PIPERACILLIN AND TAZOBACTAM 4; .5 G/20ML; G/20ML
3.38 INJECTION, POWDER, LYOPHILIZED, FOR SOLUTION INTRAVENOUS ONCE
Qty: 0 | Refills: 0 | Status: COMPLETED | OUTPATIENT
Start: 2018-08-16 | End: 2018-08-16

## 2018-08-16 RX ORDER — VANCOMYCIN HCL 1 G
1000 VIAL (EA) INTRAVENOUS ONCE
Qty: 0 | Refills: 0 | Status: COMPLETED | OUTPATIENT
Start: 2018-08-16 | End: 2018-08-16

## 2018-08-16 RX ADMIN — SODIUM CHLORIDE 1000 MILLILITER(S): 9 INJECTION INTRAMUSCULAR; INTRAVENOUS; SUBCUTANEOUS at 23:45

## 2018-08-16 RX ADMIN — PIPERACILLIN AND TAZOBACTAM 200 GRAM(S): 4; .5 INJECTION, POWDER, LYOPHILIZED, FOR SOLUTION INTRAVENOUS at 23:45

## 2018-08-16 RX ADMIN — NALOXONE HYDROCHLORIDE 0.4 MILLIGRAM(S): 4 SPRAY NASAL at 23:58

## 2018-08-16 NOTE — ED PROVIDER NOTE - PHYSICAL EXAMINATION
Constitutional : Making incomprehensible noises   Head : , no swelling  Mouth :mm dry  Neck : supple, trachea in midline  Chest :FXO chest decreased breath sounds. Extra breath sounds on inspiratory effort  Heart :S1 S2 distant  Abdomen :abd soft, non tender  Musc/Skel :ext no swelling, no deformity, no spine tenderness, distal pulses present  Neuro  :AAO 3 no focal deficits  Skin: small hematoma in left lateral aspect of face. L foot 1st and 2nd toe amputation with ulcer on plantar aspect. R foot big toe     amputation with ulcers on plantar aspect. Pt has bruises of diffrent stages and     ecchymosis. Bruise on L illiac and abd. Large Bruise L scapular area and posterior     aspect of lower chest. no groin errythema Constitutional : Making incomprehensible noises   Head : ns, left side of the face swelling, small hematoma  Mouth :mm dry  Neck : supple, trachea in midline  Chest :FOX chest decreased breath sounds. Extra breath sounds on inspiratory effort  grunting  Heart :S1 S2 distant  Abdomen :abd soft, mild distention  Neuro  : stuporous, after narcan patient woke up, more alert, moving all ext, trying to sit up   Musc/Skel fox feet with ulder, distal toe amputation L foot 1st and 2nd toe amputation with ulcer on plantar aspect. R foot big toe   amputation with ulcers on plantar aspect.   skin Pt has bruises of different stages and ecchymosis. Bruise on L illiac and abd. Large Bruise L scapular area and posterior   aspect of lower chest. no groin erythema

## 2018-08-16 NOTE — ED PROVIDER NOTE - MEDICAL DECISION MAKING DETAILS
Pt presenting for AMS and fall from hospice. CT scan revealed bleed. Pt is a critical pt. Plan to obtain CT scan, blood work, labs, medicate and re-eval.

## 2018-08-16 NOTE — ED ADULT NURSE NOTE - OBJECTIVE STATEMENT
patient is under care of Hospice, as per EMS fell 15 hours ago and has had a decrease in mental status, with fever, code called, dr barrera at bedside, patient has a 2 pigtail picc line left arm, possible bleed, multiple bruising several stages and abrasion to right elbow, bleed under control

## 2018-08-16 NOTE — ED ADULT TRIAGE NOTE - CHIEF COMPLAINT QUOTE
pt BIBA from Hospice center s/p fall 15 hours ago, left eye ecchymotic and edematous. per EMS, Hospice center informed them pt is not at baseline mental status. pt nonverbal, not responding to verbal stimuli. priority CT called, pt transported tp CT.

## 2018-08-16 NOTE — ED PROVIDER NOTE - CRITICAL CARE PROVIDED
additional history taking/documentation/conducted a detailed discussion of DNR status/interpretation of diagnostic studies/direct patient care (not related to procedure)

## 2018-08-16 NOTE — ED PROVIDER NOTE - OBJECTIVE STATEMENT
52 y/o female BIBA with a hx of arthritis, Asthma, Back ache, DM, Fibromyalgia, osteopenia and shoulder stiffness presents to the ED s/p fall which onset yesterday. According to EMS, pt is currently on home hospice. EMS states that pt's boyfriend took pt to hospice after she had fallen. Hospice started pt on pain medication and after they noted that pt was becoming more altered, they contacted EMS to bring her to the ED. In the ED, pt is altered. After pt received Narcan, she started making incomprehensible noises/voices. HPI and ROS limited due to pt's medical condition.   Health Care Proxy: Kim Tse    [phone]

## 2018-08-16 NOTE — ED PROVIDER NOTE - PROGRESS NOTE DETAILS
spoke to Bernie RALPH for neurosurgery spoke to Bernie RALPH for neurosurgery, patient seen, will wait for health proxy spoke to hcp, would like patient to be comfortable

## 2018-08-17 ENCOUNTER — TRANSCRIPTION ENCOUNTER (OUTPATIENT)
Age: 53
End: 2018-08-17

## 2018-08-17 VITALS — OXYGEN SATURATION: 94 % | RESPIRATION RATE: 18 BRPM

## 2018-08-17 DIAGNOSIS — S06.300A UNSPECIFIED FOCAL TRAUMATIC BRAIN INJURY WITHOUT LOSS OF CONSCIOUSNESS, INITIAL ENCOUNTER: ICD-10-CM

## 2018-08-17 DIAGNOSIS — L97.519 NON-PRESSURE CHRONIC ULCER OF OTHER PART OF RIGHT FOOT WITH UNSPECIFIED SEVERITY: ICD-10-CM

## 2018-08-17 DIAGNOSIS — R74.8 ABNORMAL LEVELS OF OTHER SERUM ENZYMES: ICD-10-CM

## 2018-08-17 DIAGNOSIS — N18.5 CHRONIC KIDNEY DISEASE, STAGE 5: ICD-10-CM

## 2018-08-17 DIAGNOSIS — I62.9 NONTRAUMATIC INTRACRANIAL HEMORRHAGE, UNSPECIFIED: ICD-10-CM

## 2018-08-17 LAB
ACETONE SERPL-MCNC: ABNORMAL
ALBUMIN SERPL ELPH-MCNC: 2 G/DL — LOW (ref 3.3–5.2)
ALP SERPL-CCNC: 128 U/L — HIGH (ref 40–120)
ALT FLD-CCNC: 46 U/L — HIGH
ANION GAP SERPL CALC-SCNC: 16 MMOL/L — SIGNIFICANT CHANGE UP (ref 5–17)
ANISOCYTOSIS BLD QL: SLIGHT — SIGNIFICANT CHANGE UP
APTT BLD: 25.5 SEC — LOW (ref 27.5–37.4)
AST SERPL-CCNC: 22 U/L — SIGNIFICANT CHANGE UP
BILIRUB SERPL-MCNC: 0.6 MG/DL — SIGNIFICANT CHANGE UP (ref 0.4–2)
BUN SERPL-MCNC: 74 MG/DL — HIGH (ref 8–20)
CALCIUM SERPL-MCNC: 7.3 MG/DL — LOW (ref 8.6–10.2)
CHLORIDE SERPL-SCNC: 91 MMOL/L — LOW (ref 98–107)
CO2 SERPL-SCNC: 18 MMOL/L — LOW (ref 22–29)
CREAT SERPL-MCNC: 3.43 MG/DL — HIGH (ref 0.5–1.3)
DACRYOCYTES BLD QL SMEAR: SLIGHT — SIGNIFICANT CHANGE UP
GLUCOSE SERPL-MCNC: 351 MG/DL — HIGH (ref 70–115)
HCT VFR BLD CALC: 18.7 % — CRITICAL LOW (ref 37–47)
HGB BLD-MCNC: 6 G/DL — CRITICAL LOW (ref 12–16)
HYPOCHROMIA BLD QL: SLIGHT — SIGNIFICANT CHANGE UP
INR BLD: 1.58 RATIO — HIGH (ref 0.88–1.16)
LACTATE BLDV-MCNC: 1.1 MMOL/L — SIGNIFICANT CHANGE UP (ref 0.5–2)
LYMPHOCYTES # BLD AUTO: 3 % — LOW (ref 20–55)
MACROCYTES BLD QL: SLIGHT — SIGNIFICANT CHANGE UP
MCHC RBC-ENTMCNC: 27.3 PG — SIGNIFICANT CHANGE UP (ref 27–31)
MCHC RBC-ENTMCNC: 32.1 G/DL — SIGNIFICANT CHANGE UP (ref 32–36)
MCV RBC AUTO: 85 FL — SIGNIFICANT CHANGE UP (ref 81–99)
METHOD TYPE: SIGNIFICANT CHANGE UP
MICROCYTES BLD QL: SLIGHT — SIGNIFICANT CHANGE UP
MONOCYTES NFR BLD AUTO: 5 % — SIGNIFICANT CHANGE UP (ref 3–10)
MRSA SPEC QL CULT: SIGNIFICANT CHANGE UP
NEUTROPHILS NFR BLD AUTO: 91 % — HIGH (ref 37–73)
NEUTS BAND # BLD: 1 % — SIGNIFICANT CHANGE UP (ref 0–8)
OVALOCYTES BLD QL SMEAR: SLIGHT — SIGNIFICANT CHANGE UP
PHOSPHATE SERPL-MCNC: 2.8 MG/DL — SIGNIFICANT CHANGE UP (ref 2.4–4.7)
PLAT MORPH BLD: NORMAL — SIGNIFICANT CHANGE UP
PLATELET # BLD AUTO: 90 K/UL — LOW (ref 150–400)
POIKILOCYTOSIS BLD QL AUTO: SLIGHT — SIGNIFICANT CHANGE UP
POLYCHROMASIA BLD QL SMEAR: SLIGHT — SIGNIFICANT CHANGE UP
POTASSIUM SERPL-MCNC: 4.8 MMOL/L — SIGNIFICANT CHANGE UP (ref 3.5–5.3)
POTASSIUM SERPL-SCNC: 4.8 MMOL/L — SIGNIFICANT CHANGE UP (ref 3.5–5.3)
PROT SERPL-MCNC: 5.9 G/DL — LOW (ref 6.6–8.7)
PROTHROM AB SERPL-ACNC: 17.5 SEC — HIGH (ref 9.8–12.7)
RBC # BLD: 2.2 M/UL — LOW (ref 4.4–5.2)
RBC # FLD: 13.5 % — SIGNIFICANT CHANGE UP (ref 11–15.6)
RBC BLD AUTO: ABNORMAL
SODIUM SERPL-SCNC: 125 MMOL/L — LOW (ref 135–145)
TROPONIN T SERPL-MCNC: 0.31 NG/ML — HIGH (ref 0–0.06)
WBC # BLD: 13.2 K/UL — HIGH (ref 4.8–10.8)
WBC # FLD AUTO: 13.2 K/UL — HIGH (ref 4.8–10.8)

## 2018-08-17 PROCEDURE — 99497 ADVNCD CARE PLAN 30 MIN: CPT | Mod: 25

## 2018-08-17 PROCEDURE — 85610 PROTHROMBIN TIME: CPT

## 2018-08-17 PROCEDURE — 84100 ASSAY OF PHOSPHORUS: CPT

## 2018-08-17 PROCEDURE — 71045 X-RAY EXAM CHEST 1 VIEW: CPT

## 2018-08-17 PROCEDURE — 87186 SC STD MICRODIL/AGAR DIL: CPT

## 2018-08-17 PROCEDURE — 96365 THER/PROPH/DIAG IV INF INIT: CPT

## 2018-08-17 PROCEDURE — 85027 COMPLETE CBC AUTOMATED: CPT

## 2018-08-17 PROCEDURE — 71045 X-RAY EXAM CHEST 1 VIEW: CPT | Mod: 26

## 2018-08-17 PROCEDURE — 85730 THROMBOPLASTIN TIME PARTIAL: CPT

## 2018-08-17 PROCEDURE — 80053 COMPREHEN METABOLIC PANEL: CPT

## 2018-08-17 PROCEDURE — 70450 CT HEAD/BRAIN W/O DYE: CPT

## 2018-08-17 PROCEDURE — 84484 ASSAY OF TROPONIN QUANT: CPT

## 2018-08-17 PROCEDURE — 82009 KETONE BODYS QUAL: CPT

## 2018-08-17 PROCEDURE — 36415 COLL VENOUS BLD VENIPUNCTURE: CPT

## 2018-08-17 PROCEDURE — 96375 TX/PRO/DX INJ NEW DRUG ADDON: CPT

## 2018-08-17 PROCEDURE — 99223 1ST HOSP IP/OBS HIGH 75: CPT

## 2018-08-17 PROCEDURE — 72125 CT NECK SPINE W/O DYE: CPT

## 2018-08-17 PROCEDURE — 99285 EMERGENCY DEPT VISIT HI MDM: CPT | Mod: 25

## 2018-08-17 PROCEDURE — 83605 ASSAY OF LACTIC ACID: CPT

## 2018-08-17 PROCEDURE — 87040 BLOOD CULTURE FOR BACTERIA: CPT

## 2018-08-17 PROCEDURE — 87150 DNA/RNA AMPLIFIED PROBE: CPT

## 2018-08-17 RX ORDER — MORPHINE SULFATE 50 MG/1
2 CAPSULE, EXTENDED RELEASE ORAL EVERY 4 HOURS
Qty: 0 | Refills: 0 | Status: DISCONTINUED | OUTPATIENT
Start: 2018-08-17 | End: 2018-08-17

## 2018-08-17 RX ORDER — ONDANSETRON 8 MG/1
4 TABLET, FILM COATED ORAL EVERY 4 HOURS
Qty: 0 | Refills: 0 | Status: DISCONTINUED | OUTPATIENT
Start: 2018-08-17 | End: 2018-08-17

## 2018-08-17 RX ORDER — ACETAMINOPHEN 500 MG
1000 TABLET ORAL ONCE
Qty: 0 | Refills: 0 | Status: COMPLETED | OUTPATIENT
Start: 2018-08-17 | End: 2018-08-17

## 2018-08-17 RX ORDER — CETIRIZINE HYDROCHLORIDE 10 MG/1
1 TABLET ORAL
Qty: 0 | Refills: 0 | COMMUNITY

## 2018-08-17 RX ORDER — INSULIN DEGLUDEC 100 U/ML
70 INJECTION, SOLUTION SUBCUTANEOUS
Qty: 0 | Refills: 0 | COMMUNITY

## 2018-08-17 RX ADMIN — Medication 250 MILLIGRAM(S): at 01:13

## 2018-08-17 RX ADMIN — ONDANSETRON 4 MILLIGRAM(S): 8 TABLET, FILM COATED ORAL at 05:56

## 2018-08-17 RX ADMIN — PIPERACILLIN AND TAZOBACTAM 3.38 GRAM(S): 4; .5 INJECTION, POWDER, LYOPHILIZED, FOR SOLUTION INTRAVENOUS at 00:15

## 2018-08-17 RX ADMIN — MORPHINE SULFATE 2 MILLIGRAM(S): 50 CAPSULE, EXTENDED RELEASE ORAL at 05:56

## 2018-08-17 RX ADMIN — Medication 400 MILLIGRAM(S): at 05:55

## 2018-08-17 NOTE — CONSULT NOTE ADULT - CONSULT REASON
Right inferior frontal and left parietal subcortical hemorrhagic contusions   with perihematoma edema. Bilateral frontal, parietal, and temporal   subarachnoid hemorrhage and small volume subarachnoid hemorrhage in the   suprasellar cistern on the right extending into the sylvian cistern and   anterior interhemispheric fissure. Mild regional mass effect associated with   the right frontal hemorrhagic contusion resulting in partial effacement of   the right frontal horn. No shift of the midline or central herniation.

## 2018-08-17 NOTE — ED ADULT NURSE REASSESSMENT NOTE - NS ED NURSE REASSESS COMMENT FT1
MD at pt bedside, POC and test results explained to pt health care proxy who verbalize understanding of care, pt moaning and groaning, status unchanged, pt non-verbal, awaiting admission consult.

## 2018-08-17 NOTE — CONSULT NOTE ADULT - SUBJECTIVE AND OBJECTIVE BOX
HPI: Patient is a 53y old  Female who presents  as transfer from hospice care. Patient is DNR/DNI, made choice for hospice care. Refused fistula and dialysis last admit and  wanted comfort care only.  Patient with fall yesterday, altered mental status and trans here to [facility] ed.   gcs=10  Eyes open, withdraws from pain, incomprehensible sounds.        PAST MEDICAL & SURGICAL HISTORY:  Fibromyalgia  DM (diabetes mellitus)  Foot ulcer: Left Foot  Osteopenia  Chronic pain  Back ache  Arthritis  Shoulder joint stiffness, left  [place] esophagus  Stomach ulcer  Diabetes  Asthma  S/P knee surgery  S/P hysterectomy  S/P cholecystectomy      SOCIAL HISTORY:  +  tobacco,      FAMILY HISTORY:  MOTHER BONE CA, FATHER AND BROTHER COPD      ROS:  UNOBTAINABLE AT THIS TIME      MEDICATIONS:  naloxone Injectable 0.4 milliGRAM(s) IV Push Once  piperacillin/tazobactam IVPB. 3.375 Gram(s) IV Intermittent once  sodium chloride 0.9% Bolus 2000 milliLiter(s) IV Bolus once  vancomycin  IVPB 1000 milliGRAM(s) IV Intermittent once    MEDICATIONS  (PRN): SEE RECONCILIATION        Allergies: No Known Allergies    Vital Signs Last 24 Hrs  T(C): --  T(F): --  HR: 76 (16 Aug 2018 23:44) (76 - 116)  BP: 116/53 (16 Aug 2018 23:44) (96/58 - 116/53)  BP(mean): --  RR: 16 (16 Aug 2018 23:44) (16 - 16)  SpO2: 93% (16 Aug 2018 23:44) (93% - 95%)    PHYSICAL EXAM:  GENERAL: NAD, well-groomed, well-developed  HEAD:  Atraumatic, Normocephalic  EYES: EOMI, pupils large 6mm equal sluggish,  conjunctiva and sclera clear  ENMT: unable to examine  NECK: Supple, No JVD  NERVOUS SYSTEM:  GCS=4/4/2  TOTAL 10  EYES OPEN, pupils 6mm equal , sluggish to react  WITHDRAWS FROM PAIN  INCOMPREHENSIBLE SOUNDS  NOT FOLLOWING COMMANDS  MOVES ALL EXTREMITIES SPONTANEOUSLY  CHEST/LUNG: Clear to percussion bilaterally; No rales, rhonchi, wheezing, or rubs  HEART: Regular rate   ABDOMEN: Soft, Nontender, Nondistended; Bowel sounds present  EXTREMITIES: left foot with toe amputations, right foot, foul smelling, purulent discharge, mult open ulcers.   LYMPH: No lymphadenopathy noted  SKIN: No rashes or lesions      RADIOLOGY & ADDITIONAL STUDIES:  There is a right inferior frontal hemorrhagic contusion measuring up to 3.3   x 1.7 cm with surrounding perihematoma edema. There is a left parietal   subcortical hemorrhagic contusion measuring up to 2.1 x 2.2 cm with mild   perihematoma edema. There is bilateral frontal, parietal, and temporal   subarachnoid hemorrhage. There is a small volume of subarachnoid hemorrhage   in the suprasellar cistern on the right extending into the sylvian cistern   and anterior interhemispheric fissure. There is mild regional mass effect   associated with the right frontal hemorrhagic contusion resulting in partial   effacement of the right frontal horn. There is no shift of the midline or   central herniation. There is generalized cerebral volume loss with   commensurate prominence of the ventricles and sulci. Mild chronic ischemic   changes are seen in the frontoparietal white matter.     There is mild left lateral periorbital scalp soft tissue swelling. The skull   base and bony calvarium are intact. There is minimal bilateral ethmoid   mucosal thickening and mild left maxillary sinus mucosal thickening with   aerosolized secretions in the left maxilla. There is a wide defect in the   anterior nasal cartilaginous septum.     IMPRESSION:     Right inferior frontal and left parietal subcortical hemorrhagic contusions   with perihematoma edema. Bilateral frontal, parietal, and temporal   subarachnoid hemorrhage and small volume subarachnoid hemorrhage in the   suprasellar cistern on the right extending into the sylvian cistern and   anterior interhemispheric fissure. Mild regional mass effect associated with   the right frontal hemorrhagic contusion resulting in partial effacement of   the right frontal horn. No shift of the midline or central herniation.                        6.0    13.2  )-----------( 90       ( 16 Aug 2018 23:59 )             18.7     08-16    125<L>  |  91<L>  |  74.0<H>  ----------------------------<  351<H>  4.8   |  18.0<L>  |  3.43<H>    Ca    7.3<L>      16 Aug 2018 23:59  Phos  2.8     08-16    TPro  5.9<L>  /  Alb  2.0<L>  /  TBili  0.6  /  DBili  x   /  AST  22  /  ALT  46<H>  /  AlkPhos  128<H>  08-16    PT/INR - ( 16 Aug 2018 23:59 )   PT: 17.5 sec;   INR: 1.58 ratio         PTT - ( 16 Aug 2018 23:59 )  PTT:25.5 sec

## 2018-08-17 NOTE — H&P ADULT - PROBLEM SELECTOR PLAN 1
neurosurgical evaluation appreciated  family and EMR support pt expressed wishes for comfort measures only and DNR/DNI status, MOL signed in May, proxy to bring form  Hospice consult  pt to go to private room on 6T  pain control with morphine prn  if secretions, consider starting scopolamine patch  vitals, blood draws, and VCD boots held as per plan d/w family

## 2018-08-17 NOTE — DISCHARGE NOTE ADULT - SECONDARY DIAGNOSIS.
Chronic pain syndrome CKD (chronic kidney disease) stage 5, GFR less than 15 ml/min DM (diabetes mellitus) Fibromyalgia Ulcer of right foot, unspecified ulcer stage

## 2018-08-17 NOTE — H&P ADULT - HISTORY OF PRESENT ILLNESS
52 y/o female w/ pmh/o of arthritis, Asthma, Back ache, DM, Fibromyalgia, osteopenia, NAFL/Cirrhosis, who is on hospice  who was brought by partner due to worsening mentation after falling at home and hitting head. Partner states that she was initially alert however is now only turning head and groaning. In ED pt found to have intracranial hemorrhage with poor prognosis. Findings discussed with partner and proxy who both explicitly state that patient wishes are to allow natural death and agree that they want patient placed on comfort measures. Wishes verified by palliative care progress notes in last admission in may when MOLST signed. No vomiting, diarrhea, fever, rash, HA reported. +AMS, confusion, lethargy.

## 2018-08-17 NOTE — DISCHARGE NOTE ADULT - HOSPITAL COURSE
52 y/o female w/ pmh/o of arthritis, Asthma, Back ache, DM, Fibromyalgia, osteopenia, NAFL/Cirrhosis, who is on hospice  who was brought by partner due to worsening mentation after falling at home and hitting head. Partner states that she was initially alert however is now only turning head and groaning. In ED pt found to have intracranial hemorrhage with poor prognosis. Findings discussed with partner and proxy who both explicitly state that patient wishes are to allow natural death and agree that they want patient placed on comfort measures. Wishes verified by palliative care progress notes in last admission in may when MOLST signed. No vomiting, diarrhea, fever, rash, HA reported. +AMS, confusion, lethargy.     Patient  is on hospice due to CKD and liver cirrhosis, admitted for comfort measures in setting of intracranial hemorrhage s/p fall.

## 2018-08-17 NOTE — DISCHARGE NOTE ADULT - MEDICATION SUMMARY - MEDICATIONS TO STOP TAKING
I will STOP taking the medications listed below when I get home from the hospital:    ergocalciferol 50,000 intl units (1.25 mg) oral capsule  -- 1 cap(s) by mouth once a week    ipratropium-albuterol 0.5 mg-2.5 mg/3 mLinhalation solution  -- 3 milliliter(s) inhaled every 6 hours, As Needed    budesonide-formoterol 80 mcg-4.5 mcg/inh inhalation aerosol  -- 1 dose(s) inhaled 2 times a day    Tresiba FlexTouch 100 units/mL subcutaneous solution  -- 70 unit(s) subcutaneous once a day (at bedtime)    cetirizine 10 mg oral tablet  -- 1 tab(s) by mouth once a day    montelukast 10 mg oral tablet  -- 1 tab(s) by mouth once a day    cefTRIAXone 2 g injection  -- 2 gram(s) intravenously once a day MDD:ends 5/22/18.  CMP CBC ESR CRP faxed Dr. Estes [phone]    ferrous sulfate 325 mg oral tablet  -- 1 tab(s) by mouth 3 times a day with meals  -- Check with your doctor before becoming pregnant.  Do not chew, break, or crush.  May discolor urine or feces.    Cepacol Sore Throat 15 mg-3.6 mg mucous membrane lozenge  -- 1 lozenge mucous membrane 4 times a day, As Needed sore throat    pantoprazole 40 mg oral delayed release tablet  -- 1 tab(s) by mouth once a day (before a meal)    ascorbic acid 500 mg oral tablet  -- 1 tab(s) by mouth once a day    3:1 Commode  -- 1 unit device    Dx: R27.0  M62.81    predniSONE 10 mg oral tablet  -- 1 tab(s) by mouth once a day   -- It is very important that you take or use this exactly as directed.  Do not skip doses or discontinue unless directed by your doctor.  Obtain medical advice before taking any non-prescription drugs as some may affect the action of this medication.  Take with food or milk.    gabapentin 800 mg oral tablet  -- 1 tab(s) by mouth 3 times a day, As Needed -neuropathic pain    buPROPion 300 mg/24 hours (XL) oral tablet, extended release  -- 1 tab(s) by mouth once a day    DULoxetine 60 mg oral delayed release capsule  -- 2 cap(s) by mouth once a day    traZODone 100 mg oral tablet  -- 2 tab(s) by mouth once a day (at bedtime) MDD:1 tabs    Xanax 0.5 mg oral tablet  -- 1 tab(s) by mouth 3 times a day, As needed, anxiety MDD:3 tabs    magnesium oxide 400 mg (241.3 mg elemental magnesium) oral tablet  -- 1 tab(s) by mouth 3 times a day (with meals)    zinc sulfate 220 mg oral capsule  -- 1 cap(s) by mouth once a day    saccharomyces boulardii lyo 250 mg oral capsule  -- 1 cap(s) by mouth 2 times a day    doxycycline monohydrate 100 mg oral capsule  -- 1 cap(s) by mouth every 12 hours    folic acid 1 mg oral tablet  -- 1 tab(s) by mouth once a day    oxyCODONE 5 mg oral capsule  -- 2 cap(s) by mouth every 6 hours, As Needed MDD:8 tabs  -- Caution federal law prohibits the transfer of this drug to any person other  than the person for whom it was prescribed.  It is very important that you take or use this exactly as directed.  Do not skip doses or discontinue unless directed by your doctor.  May cause drowsiness.  Alcohol may intensify this effect.  Use care when operating dangerous machinery.  This prescription cannot be refilled.  Using more of this medication than prescribed may cause serious breathing problems.

## 2018-08-17 NOTE — H&P ADULT - ATTENDING COMMENTS
30 min time spent discussing advanced care planning including code status, existence of health care proxy, plan of treatment, consultants if called, and prognosis. Family and pt in agreement with above, all questions answered and concerns addressed.      DNR DNI  comfort care   proxy to bring copy of MOLST

## 2018-08-17 NOTE — CONSULT NOTE ADULT - ASSESSMENT
IMP:   Right inferior frontal and left parietal subcortical hemorrhagic contusions   with perihematoma edema. Bilateral frontal, parietal, and temporal   subarachnoid hemorrhage and small volume subarachnoid hemorrhage in the   suprasellar cistern on the right extending into the sylvian cistern and   anterior interhemispheric fissure. Mild regional mass effect associated with   the right frontal hemorrhagic contusion resulting in partial effacement of   the right frontal horn. No shift of the midline or central herniation.             GCS=4/4/2  TOTAL 10  EYES OPEN  WITHDRAWS FROM PAIN  INCOMPREHENSIBLE SOUNDS    NOT FOLLOWING COMMANDS  MOVES ALL EXTREMITIES SPONTANEOUSLY    MULTIPLE MEDICAL COMORBIDITIES

## 2018-08-17 NOTE — CONSULT NOTE ADULT - ATTENDING COMMENTS
PLAN:  DISCUSSED WITH DR REY    SPOKE WITH PATIENTS SIGNIFICANT OTHER LETTY RODRIGUEZ AT BEDSIDE AND MEDICAL PROXY DEZ. [phone]  PATIENT IS DNR/DNI  PATIENT REFUSED DIALYSIS FISTULA AND DIALYSIS  PATIENT WAS IN HOSPICE CARE BY HER CHOICE  STATED SHE WANTED COMFORT CARE ONLY  WANTED PAIN MANAGEMENT ONLY  WANTED TO BE LEFT ALONE TO DIE  NEVER WANTED TO BE IN A HOSPITAL AGAIN    THEY ARE REFUSING NEUROSURGICAL SURGERY, NEUROSURGICAL PROCEEDERS, AND DO NOT WANT ANY FURTHER WORK UP.    MANAGEMENT AT PER MEDICAL SERVICE. PLAN:  DISCUSSED WITH DR REY    SPOKE WITH PATIENTS SIGNIFICANT OTHER LETTY RODRIGUEZ AT BEDSIDE AND MEDICAL PROXY DEZ BORGES. [phone]  PATIENT IS DNR/DNI  PATIENT REFUSED DIALYSIS FISTULA AND DIALYSIS  PATIENT WAS IN HOSPICE CARE BY HER CHOICE  STATED SHE WANTED COMFORT CARE ONLY  WANTED PAIN MANAGEMENT ONLY  WANTED TO BE LEFT ALONE TO DIE  NEVER WANTED TO BE IN A HOSPITAL AGAIN    THEY ARE REFUSING NEUROSURGICAL SURGERY, NEUROSURGICAL PROCEEDERS, AND DO NOT WANT ANY FURTHER WORK UP.    MANAGEMENT AT PER MEDICAL SERVICE. PLAN:  DISCUSSED WITH DR REY    SPOKE WITH PATIENTS SIGNIFICANT OTHER LETTY RODRIGUEZ AT BEDSIDE AND MEDICAL PROXY DEZ BORGES. [phone]  PATIENT IS DNR/DNI  PATIENT REFUSED DIALYSIS FISTULA AND DIALYSIS  PATIENT WAS IN HOSPICE CARE BY HER CHOICE  STATED SHE WANTED COMFORT CARE ONLY  WANTED PAIN MANAGEMENT ONLY  WANTED TO BE LEFT ALONE TO DIE  NEVER WANTED TO BE IN A HOSPITAL AGAIN    THEY ARE REFUSING NEUROSURGICAL SURGERY, NEUROSURGICAL PROCEEDURES, AND DO NOT WANT ANY FURTHER WORK UP.    MANAGEMENT AT PER MEDICAL SERVICE.    NSGY Attg:    see above    imaging reviewed    agree with plan as documented

## 2018-08-17 NOTE — H&P ADULT - ASSESSMENT
54 yo female, who is on hospice due to CKD and liver cirrhosis, admitted for comfort measures in setting of intracranial hemorrhage s/p fall.

## 2018-08-17 NOTE — ED ADULT NURSE REASSESSMENT NOTE - NS ED NURSE REASSESS COMMENT FT1
Admitting physician at pt bedside, POC being discussed with pt family member who agrees comfort and pain management measure at this time, hospice in am.

## 2018-08-17 NOTE — DISCHARGE NOTE ADULT - PATIENT PORTAL LINK FT
You can access the [facility], offered by [facility], by registering with the following website: [email or web address]

## 2018-08-17 NOTE — DISCHARGE NOTE ADULT - CARE PLAN
Principal Discharge DX:	Intracranial bleed  Goal:	hospice inn transfer  Assessment and plan of treatment:	Hospice care  Secondary Diagnosis:	Chronic pain syndrome  Secondary Diagnosis:	CKD (chronic kidney disease) stage 5, GFR less than 15 ml/min  Secondary Diagnosis:	DM (diabetes mellitus)  Secondary Diagnosis:	Fibromyalgia  Secondary Diagnosis:	Ulcer of right foot, unspecified ulcer stage

## 2018-08-19 LAB
-  AMPICILLIN/SULBACTAM: SIGNIFICANT CHANGE UP
-  AMPICILLIN/SULBACTAM: SIGNIFICANT CHANGE UP
-  CEFAZOLIN: SIGNIFICANT CHANGE UP
-  CEFAZOLIN: SIGNIFICANT CHANGE UP
-  CLINDAMYCIN: SIGNIFICANT CHANGE UP
-  CLINDAMYCIN: SIGNIFICANT CHANGE UP
-  ERYTHROMYCIN: SIGNIFICANT CHANGE UP
-  ERYTHROMYCIN: SIGNIFICANT CHANGE UP
-  GENTAMICIN: SIGNIFICANT CHANGE UP
-  GENTAMICIN: SIGNIFICANT CHANGE UP
-  OXACILLIN: SIGNIFICANT CHANGE UP
-  OXACILLIN: SIGNIFICANT CHANGE UP
-  PENICILLIN: SIGNIFICANT CHANGE UP
-  PENICILLIN: SIGNIFICANT CHANGE UP
-  RIFAMPIN: SIGNIFICANT CHANGE UP
-  RIFAMPIN: SIGNIFICANT CHANGE UP
-  TETRACYCLINE: SIGNIFICANT CHANGE UP
-  TETRACYCLINE: SIGNIFICANT CHANGE UP
-  TRIMETHOPRIM/SULFAMETHOXAZOLE: SIGNIFICANT CHANGE UP
-  TRIMETHOPRIM/SULFAMETHOXAZOLE: SIGNIFICANT CHANGE UP
-  VANCOMYCIN: SIGNIFICANT CHANGE UP
-  VANCOMYCIN: SIGNIFICANT CHANGE UP
CULTURE RESULTS: SIGNIFICANT CHANGE UP
CULTURE RESULTS: SIGNIFICANT CHANGE UP
METHOD TYPE: SIGNIFICANT CHANGE UP
METHOD TYPE: SIGNIFICANT CHANGE UP
ORGANISM # SPEC MICROSCOPIC CNT: SIGNIFICANT CHANGE UP
SPECIMEN SOURCE: SIGNIFICANT CHANGE UP
SPECIMEN SOURCE: SIGNIFICANT CHANGE UP

## 2018-08-20 DIAGNOSIS — Z71.89 OTHER SPECIFIED COUNSELING: ICD-10-CM

## 2018-11-10 NOTE — PROGRESS NOTE ADULT - PROBLEM SELECTOR PROBLEM 1
Bacteremia
Bacteremia
Bacteremia due to Staphylococcus
Bacteremia
Bacteremia due to Staphylococcus
Diabetic foot infection
Diabetic foot infection
Osteomyelitis of multiple sites, unspecified chronicity
Osteomyelitis of multiple sites, unspecified chronicity
Oriented - self; Oriented - place; Oriented - time
Diabetic foot infection
Osteomyelitis of multiple sites, unspecified chronicity
Bacteremia

## 2019-04-11 NOTE — PATIENT PROFILE ADULT. - PRO ARRIVE FROM
home details… detailed exam ROM intact/no joint erythema/no joint warmth/no joint swelling/normal strength/no calf tenderness

## 2019-11-18 NOTE — PROGRESS NOTE ADULT - PROBLEM SELECTOR PROBLEM 5
Diabetes
Diabetes
COPD (chronic obstructive pulmonary disease)
DM (diabetes mellitus)
Diabetes
COPD (chronic obstructive pulmonary disease)
Diabetes
Phlebitis
COPD (chronic obstructive pulmonary disease)
yes

## 2020-01-21 NOTE — DIETITIAN INITIAL EVALUATION ADULT. - +GENDER
Continue zinc oxide cream with padded gauze barrier AAA  Continue to monitor at [facility]  Statement Selected

## 2020-06-09 NOTE — PROGRESS NOTE ADULT - PROBLEM SELECTOR PLAN 7
severe abdominal pain
monitor--improving  iatrogenic due to lactulose
resolved  iatrogenic due to lactulose
monitor--improving  iatrogenic due to lactulose
resolved  iatrogenic due to lactulose
u/s hepatosplenomegaly, cirrhosis on old CT.   d/c lactulose/rifaximin as no encephalopathy.  ammonia normal  outpatient GI evaluation
monitor  iatrogenic due to lactulose
resolved  iatrogenic due to lactulose
resolved  iatrogenic due to lactulose

## 2020-12-02 NOTE — PHYSICAL THERAPY INITIAL EVALUATION ADULT - LIVES WITH, PROFILE
Normally I would need to evaluate this at an office visit. I can send #30 to bridge her.   significant other/with 1 step to enter

## 2021-03-05 NOTE — PROGRESS NOTE ADULT - ASSESSMENT
Curtis PERES called to bedside following post op CXR showing large pneumothorax. At this time MD Triplett made aware of Pt increasing restlessness and O2 requirements not relieved with fentanyl or 2 neb treatments and came to bedside. MD Lo and Curtis placed right sided chest tube at bedside. 75mcg of fentanyl and 1.5mg of versed given for procedure. Pt tolerated well, VSS. Able to titrate O2 requirements down to 4L (O2>95%) following CT placement. No air leak. Verbal orders from MD Lo to place chest tube to suction at -20.   54 y/o F with long standing left foot ulcer, with underlying Osteomyelitis left first metatarsal on MRI

## 2021-06-08 NOTE — GOALS OF CARE CONVERSATION - PERSONAL ADVANCE DIRECTIVE - WHAT MATTERS MOST
Pt wants to make sure her pain is controlled at time of natural death and that her wishes are carried out since she is aware her niece and s/o would have difficulty making decisions. done

## 2021-12-03 NOTE — PHYSICAL THERAPY INITIAL EVALUATION ADULT - CAPILLARY REFILL, RLE
[facility]  PROCEDURE NOTE    Name:  Tess Flores  MR#:  [id number]  :  1978  ACCOUNT #:  [de-identified]  DATE OF SERVICE:  2021    PREOPERATIVE DIAGNOSIS:  Chest pain. POSTOPERATIVE DIAGNOSES:  1. Hypertensive lower esophageal sphincter with incomplete relaxation (78 mmHg/integrated relaxation pressure equals 23). 2.  Normal motility with average distal contractile integral 2961.  3.  Impedance data of 100% liquid and 90% viscous swallows showing a complete transit. PROCEDURE PERFORMED:  High-resolution impedance manometry. SURGEON:  Merrill Rodriguez MD    PROCEDURE:  After obtaining informed consent, the patient underwent nasal intubation. The lower esophageal sphincter was located between 45.3 and 48.2 cm from the nares. The resting pressure at the lower esophageal sphincter, mid respiration, averaged 78 mmHg with normal up to 45. The relaxation was incomplete with IRP averaging 23, normal less than 20. Despite this evidence of outflow obstruction, the patient had normal peristalsis throughout, vigorous contractility with a DCI averaging 2961, and had no trouble pushing bolus through the GE junction. Impedance data showed 90% of viscous swallows and 100% of liquid swallows with a complete transit.     DISPOSITION:  Further followup per Dr. Camille Garcia MD      [id number]  D:  2021 10:14  T:  2021 16:36  JOB #:  [id number]  CC:  MD Mateus Bullock MD
+pedal pulse/less than/equal to 3 secs

## 2022-01-18 NOTE — H&P ADULT. - DOCUMENT STATUS
Additional Notes: Patient will bring medication list to next visit Detail Level: Simple Quality 130: Documentation Of Current Medications In The Medical Record: Documentation of a medical reason(s) for not documenting, updating, or reviewing the patientâs current medications list (e.g., patient is in an urgent or emergent medical situation) Authored by Attending

## 2022-02-15 NOTE — PROGRESS NOTE ADULT - PROVIDER SPECIALTY LIST ADULT
Infectious Disease Tremfya Counseling: I discussed with the patient the risks of guselkumab including but not limited to immunosuppression, serious infections, worsening of inflammatory bowel disease and drug reactions.  The patient understands that monitoring is required including a PPD at baseline and must alert us or the primary physician if symptoms of infection or other concerning signs are noted.

## 2022-04-06 NOTE — DIETITIAN INITIAL EVALUATION ADULT. - PATIENT MEETING ESTIMATED NUTRIENT NEEDS
Vital Signs Last 24 Hrs  T(C): 36.5 (06 Apr 2022 15:14), Max: 36.5 (06 Apr 2022 15:14)  T(F): 97.7 (06 Apr 2022 15:14), Max: 97.7 (06 Apr 2022 15:14)  HR: 81 (06 Apr 2022 18:22) (80 - 81)  BP: 159/75 (06 Apr 2022 18:22) (143/62 - 159/75)  BP(mean): --  RR: 20 (06 Apr 2022 15:14) (20 - 20)  SpO2: 96% (06 Apr 2022 15:14) (96% - 96%)    GENERAL: NAD, lying in bed comfortably  HEAD:  Atraumatic, Normocephalic  EYES: EOMI, conjunctiva and sclera clear  ENT: Moist mucous membranes  NECK: Supple, No JVD  CHEST/LUNG: decreased bibasilar breath sounds; Unlabored respirations  HEART: Regular rate and rhythm; No murmurs, rubs, or gallops  ABDOMEN: Bowel sounds present; Soft, Nontender, Nondistended.   EXTREMITIES: 2+ pitting edema b/l LE; No clubbing, cyanosis  NERVOUS SYSTEM:  Alert & Oriented X3, speech clear. No deficits   SKIN: No rashes or lesions Statement Selected

## 2022-11-02 NOTE — ED PROVIDER NOTE - MEDICAL DECISION MAKING DETAILS
Patient is overdue to an appt, please have her schedule an appt if she wishes to restart gabapentin. We will discuss refills at [facility].     Of note, PDMP shows she has not filled gabapentin since Jan 2022, a 90 day supply  
Pt has been out of her medication for 2 days.   
Pt with diabetic foot ulcers/gangrene. Antibiotics, labs.

## 2022-11-07 NOTE — PATIENT PROFILE ADULT. - FUNCTIONAL SCREEN CURRENT LEVEL: DRESSING, MLM
(0) independent Consent (Near Eyelid Margin)/Introductory Paragraph: The rationale for Mohs was explained to the patient and consent was obtained. The risks, benefits and alternatives to therapy were discussed in detail. Specifically, the risks of ectropion or eyelid deformity, infection, scarring, bleeding, prolonged wound healing, incomplete removal, allergy to anesthesia, nerve injury and recurrence were addressed. Prior to the procedure, the treatment site was clearly identified and confirmed by the patient. All components of Universal Protocol/PAUSE Rule completed.

## 2023-10-29 NOTE — ED ADULT TRIAGE NOTE - NS ED TRIAGE AVPU SCALE
Continue patient's outpatient statin     Alert-The patient is alert, awake and responds to voice. The patient is oriented to time, place, and person. The triage nurse is able to obtain subjective information.

## 2024-05-14 NOTE — PROGRESS NOTE ADULT - PROBLEM SELECTOR PLAN 2
Patient ID: [id number]     Chief Complaint: Follow-up and Depression      HPI:     This is a telemedicine note. Patient was treated using telemedicine, real time audio and video, according to [facility] protocols. I, Karma Villarreal MD, conducted the visit from the [facility]. The patient participated in the visit at a non-[facility] location selected by the patient, identified below. I am licensed in the state where the patient stated they are located. The patient stated that they understood and accepted the privacy and security risks to their information at their location. This visit is not recorded.    Patient was located at the patient's home.       Shreya Alvarez is a 38 y.o. female here today for a telemedicine visit.     Mild intermittent asthma -  Well-controlled on Anoro and albuterol     Hypertension  Dr. Lux dx when she was 22  On Procardia 60 Mg XR and losartan 25 mg   Birth control- Vasectomy     Anxiety  Depression  Reports Bereket is working great for her  Denies of any suicidal/homicidal symptoms  On prn Buspar   Phq 9 -3    Hypertension   On Procardia and losartan    status post vasectomy  Sexually active with only      Asthma  Well-controlled on Anuro and Albuterol       HLD  Diet controlled     Hypothyroidism  On levothyroxine   Asx     Obesity  Body mass index is 42.96 kg/m².    -------------------------------------    Anxiety    Asthma    Elevated low density lipoprotein (LDL) cholesterol level    Hypertension    Hypothyroidism    Mild intermittent asthma        Past Surgical History:   Procedure Laterality Date    APPENDECTOMY       SECTION         Review of patient's allergies indicates:   Allergen Reactions    Cat/feline products Rash and Shortness Of Breath    Dog dander Itching, Rash and Shortness Of Breath    Grass pollen- grass standard Itching and Rash       Current Outpatient Medications   Medication Instructions    albuterol (PROAIR HFA) 90 
mcg/actuation inhaler 2 puffs, Inhalation, Every 6 hours PRN, Rescue    ARNUITY ELLIPTA 200 mcg/actuation inhaler 1 puff, Inhalation, Daily    atorvastatin (LIPITOR) 40 mg, Oral, Daily    benzonatate (TESSALON) 100 mg, Oral, 3 times daily PRN    busPIRone (BUSPAR) 15 mg, Oral, Daily    cetirizine (ZYRTEC) 10 mg, Oral, Daily    cholecalciferol (vitamin D3) 50,000 Units, Oral, Every 7 days    levothyroxine (SYNTHROID) 125 mcg, Oral, Daily    losartan (COZAAR) 25 mg, Oral, Daily    NIFEdipine (PROCARDIA-XL) 60 MG (OSM) 24 hr tablet See Instructions, TAKE 1 TABLET BY MOUTH DAILY, # 90 tab(s), 3 Refill(s), Pharmacy: [facility], 170, cm, Height/Length Dosing, 01/28/22 8:56:00 CST, 120.2, kg, Weight Dosing, 01/28/22 8:56:00 CST    semaglutide (OZEMPIC SUBQ) 7.5 mg, Subcutaneous, Every 7 days    vilazodone (VIIBRYD) 40 mg, Oral, Daily       Social History     Socioeconomic History    Marital status:    Tobacco Use    Smoking status: Never     Passive exposure: Never    Smokeless tobacco: Never   Substance and Sexual Activity    Alcohol use: Not Currently    Drug use: Never    Sexual activity: Yes     Partners: Male     Birth control/protection: Partner-Vasectomy     Social Determinants of Health     Financial Resource Strain: Low Risk  (12/12/2023)    Overall Financial Resource Strain (CARDIA)     Difficulty of Paying Living Expenses: Not very hard   Food Insecurity: No Food Insecurity (12/12/2023)    Hunger Vital Sign     Worried About Running Out of Food in the Last Year: Never true     Ran Out of Food in the Last Year: Never true   Transportation Needs: No Transportation Needs (12/12/2023)    PRAPARE - Transportation     Lack of Transportation (Medical): No     Lack of Transportation (Non-Medical): No   Physical Activity: Insufficiently Active (12/12/2023)    Exercise Vital Sign     Days of Exercise per Week: 1 day     Minutes of Exercise per Session: 10 min   Stress: Stress Concern Present (12/12/2023)    
[employer] - Occupational Stress Questionnaire     Feeling of Stress : Rather much   Housing Stability: Low Risk  (12/12/2023)    Housing Stability Vital Sign     Unable to Pay for Housing in the Last Year: No     Number of Places Lived in the Last Year: 1     Unstable Housing in the Last Year: No        Family History   Problem Relation Name Age of Onset    Asthma Mother Talya Sanchez     Hearing loss Mother Talya Sanchez     No Known Problems Father      Cancer Maternal Uncle Melquiades Mott         Pancreatic Cancer    Hearing loss Maternal Uncle Melquiades Sheetsoie     Cancer Paternal Uncle Juan Sanchez         Lung Cancer    Cancer Maternal Grandmother Verónica Zarco         Colon Cancer    COPD Maternal Grandmother Verónica Zarco     Heart disease Maternal Grandmother Verónica Zarco     Heart disease Paternal Grandmother Yue Sanchez     Hypertension Paternal Grandmother Yue Sanchez     Cancer Paternal Grandfather Vanessa Sanchez         Mantle Cell Lymphoma    Diabetes Paternal Grandfather Vanessa Sanchez     Hypertension Paternal Grandfather Vanessa Sanchez         Patient Care Team:  Karma Villarreal MD as PCP - General (Family Medicine)      Subjective:       ROS    See HPI for details    Constitutional: Denies Change in appetite. Denies Chills. Denies Fever. Denies Night sweats.  Eye: Denies Blurred vision. Denies Discharge. Denies Eye pain.  ENT: Denies Decreased hearing. Denies Sore throat. Denies Swollen glands.  Respiratory: Denies Cough. Denies Shortness of breath. Denies Shortness of breath with exertion. Denies Wheezing.  Cardiovascular: DeniesChest pain at rest. Denies Chest pain with exertion. Denies Irregular heartbeat. Denies Palpitations. Denies Edema.  Gastrointestinal: Denies Abdominal pain. DeniesDiarrhea. Denies Nausea. Denies Vomiting. Denies Hematemesis or Hematochezia.  Genitourinary: Denies Dysuria. Denies Urinary frequency. Denies Urinary urgency. Denies Blood in 
continue wound care  podiatry following
urine.  Endocrine: Denies Cold intolerance. Denies Excessive thirst. Denies Heat intolerance. Denies Weight loss. Denies Weight gain.  Musculoskeletal: Denies Painful joints. Denies Weakness.  Integumentary: Denies Rash. Denies Itching. Denies Dry skin.  Neurologic: Denies Dizziness. Denies Fainting. Denies Headache.  Psychiatric: Denies Depression. Denies Anxiety. Denies Suicidal/Homicidal ideations.    All Other ROS: Negative except as stated in HPI.       Objective:     Visit Vitals  [facility] 05/06/2024 (Approximate)       Physical Exam    Physical Exam: LIMITED DUE TO TELEMEDICINE RESTRICTIONS.  General: Alert and oriented, obese, No acute distress.  Head: Normocephalic, Atraumatic.  Eye: Sclera non-icteric.  Neck/Thyroid:  Full range of motion.  Respiratory: Non-labored respirations, Symmetrical chest wall expansion.  Musculoskeletal: Normal range of motion.  Integumentary:  No visible suspicious lesions or rashes. No diaphoresis.   Neurologic: No focal deficits  Psychiatric: Normal interaction, Coherent speech, Euthymic mood, Appropriate affect       Assessment:       ICD-10-CM ICD-9-CM   1. Episode of recurrent major depressive disorder, unspecified depression episode severity  F33.9 296.30   2. KATH (generalized anxiety disorder)  F41.1 300.02   3. Obesity due to excess calories, unspecified classification, unspecified whether serious comorbidity present  E66.09 278.00   4. Benign essential HTN  I10 401.1        Plan:     1. Mild episode of recurrent major depressive disorder  2. KATH (generalized anxiety disorder)  Continue Viibryd as prescribed  Continue yoga    3. Obesity due to excess calories, unspecified classification, unspecified whether serious comorbidity present  There is no height or weight on file to calculate BMI.  Goal BMI <30.  Exercise 5 times a week for 30 minutes per day.  Avoid soda, simple sugars, excessive rice, potatoes or bread. Limit fast foods and fried foods.  Choose complex carbs in 
moderation (example: green vegetables, beans, oatmeal). Eat plenty of fresh fruits and vegetables with lean meats daily.  Do not skip meals. Eat a balanced portion size.  Avoid fad diets. Consider permanent healthy life style changes.     4. Benign essential HTN  Pressure at goal   Continue losartan and Procardia as prescribed   Discussed with the patient in detail that losartan is teratogenic.  Patient voiced understanding and reports that her  had vasectomy and she is sexually active only with her            Medication List with Changes/Refills   Current Medications    ALBUTEROL (PROAIR HFA) 90 MCG/ACTUATION INHALER    Inhale 2 puffs into the lungs every 6 (six) hours as needed for Wheezing. Rescue       Start Date: 2/20/2024 End Date: 2/19/2025    ARNUITY ELLIPTA 200 MCG/ACTUATION INHALER    Inhale 1 puff into the lungs once daily.       Start Date: 2/20/2024 End Date: --    ATORVASTATIN (LIPITOR) 40 MG TABLET    Take 1 tablet (40 mg total) by mouth once daily.       Start Date: 2/20/2024 End Date: 2/19/2025    BENZONATATE (TESSALON) 100 MG CAPSULE    Take 1 capsule (100 mg total) by mouth 3 (three) times daily as needed for Cough.       Start Date: 12/21/2023End Date: --    BUSPIRONE (BUSPAR) 15 MG TABLET    Take 15 mg by mouth Daily.       Start Date: 5/6/2022  End Date: --    CETIRIZINE (ZYRTEC) 10 MG TABLET    Take 1 tablet (10 mg total) by mouth Daily.       Start Date: 3/26/2024 End Date: --    CHOLECALCIFEROL, VITAMIN D3, 1,250 MCG (50,000 UNIT) CAPSULE    Take 1 capsule (50,000 Units total) by mouth every 7 days.       Start Date: 2/1/2023  End Date: --    LEVOTHYROXINE (SYNTHROID) 125 MCG TABLET    Take 1 tablet (125 mcg total) by mouth Daily.       Start Date: 2/19/2024 End Date: --    LOSARTAN (COZAAR) 25 MG TABLET    Take 1 tablet (25 mg total) by mouth once daily.       Start Date: 3/26/2024 End Date: 3/21/2025    NIFEDIPINE (PROCARDIA-XL) 60 MG (OSM) 24 HR TABLET    See Instructions, 
TAKE 1 TABLET BY MOUTH DAILY, # 90 tab(s), 3 Refill(s), Pharmacy: [facility], 170, cm, Height/Length Dosing, 01/28/22 8:56:00 CST, 120.2, kg, Weight Dosing, 01/28/22 8:56:00 CST       Start Date: 3/26/2024 End Date: --    SEMAGLUTIDE (OZEMPIC SUBQ)    Inject 7.5 mg into the skin every 7 days.       Start Date: --        End Date: --    VILAZODONE (VIIBRYD) 40 MG TAB TABLET    Take 1 tablet (40 mg total) by mouth once daily.       Start Date: 5/6/2024  End Date: 5/6/2025          Follow up in about 6 months (around 11/14/2024) for Virtual Visit Depression . In addition to their scheduled follow up, the patient has also been instructed to follow up on as needed basis.       Video Time Documentation:  Spent 10 minutes with patient face to face discussed health concerns. More than 50% of this time was spent in counseling and coordination of care.  Answers submitted by the patient for this visit:  Review of Systems Questionnaire (Submitted on 5/14/2024)  activity change: No  unexpected weight change: No  rhinorrhea: No  trouble swallowing: No  visual disturbance: No  chest tightness: No  polyuria: No  difficulty urinating: No  menstrual problem: No  joint swelling: No  arthralgias: No  confusion: No  dysphoric mood: No    
- continue wound care.
- continue wound care.
Patient in good spirits and accepting of current condition.   cont Trazadone and Cymbalta  Outpatient psyhc follow up
c/w solumedrol/ nebs/budesonide  pulmonary following
c/w solumedrol/ nebs/budesonide---switch to prednisone after biopsy  Z pack  pulmonary eval appreciated
continue wound care  podiatry following
continue wound care  podiatry following
with OM on MRI  hold off on abx per ID,  updated podiatry resident---f/u plan of care  (surgical vs medical mgmt)  will check MRI foot to r/o osteomyelitis,  ID consulted for wound culture  norco for pain (home med)
wound care per podiatry  will check MRI foot to r/o osteomyelitis,  ID consulted for wound culture  fu blood culture  norco for pain (home med)
continue wound care  podiatry following
wound care per podiatry  will check MRI foot to r/o osteomyelitis,  ID consulted for wound culture  fu blood culture  norco for pain (home med)
c/w solumedrol/ nebs/budesonide---switch to prednisone after biopsy  pulmonary following
continue medical management
with OM on MRI  hold off on abx per ID  d/w podiatry: planned for bone biopsy THURS tentatively
continue wound care  podiatry following
seem at baseline  renal to r/u  c/w lasix drip for now
with OM on MRI  hold off on abx per ID,  awaiting PODIATRY EValuation re plan of care  (surgical vs medical mgmt)  hold off on abx per ID  norco for pain (home med)
c/w solumedrol/ nebs/budesonide  pulmonary following

## 2025-04-28 NOTE — PROCEDURE NOTE - NSINDICATIONS_GEN_A_CORE
Received voicemail from patient wanting to reschedule appointment on 5/1 due to scheduling conflicts. Attempted to reach patient. unable to reach patient. Left voicemail for patient to call office to reschedule appointment    
antibiotic therapy
antibiotic therapy/emergency venous access